# Patient Record
Sex: FEMALE | Race: WHITE | NOT HISPANIC OR LATINO | Employment: OTHER | URBAN - METROPOLITAN AREA
[De-identification: names, ages, dates, MRNs, and addresses within clinical notes are randomized per-mention and may not be internally consistent; named-entity substitution may affect disease eponyms.]

---

## 2017-01-13 ENCOUNTER — LAB VISIT (OUTPATIENT)
Dept: LAB | Facility: HOSPITAL | Age: 82
End: 2017-01-13
Attending: INTERNAL MEDICINE
Payer: MEDICARE

## 2017-01-13 DIAGNOSIS — I10 ESSENTIAL HYPERTENSION: ICD-10-CM

## 2017-01-13 LAB
ALBUMIN SERPL BCP-MCNC: 3.8 G/DL
ALP SERPL-CCNC: 79 U/L
ALT SERPL W/O P-5'-P-CCNC: 11 U/L
ANION GAP SERPL CALC-SCNC: 7 MMOL/L
AST SERPL-CCNC: 17 U/L
BASOPHILS # BLD AUTO: 0.04 K/UL
BASOPHILS NFR BLD: 0.5 %
BILIRUB SERPL-MCNC: 0.8 MG/DL
BUN SERPL-MCNC: 11 MG/DL
CALCIUM SERPL-MCNC: 9.3 MG/DL
CHLORIDE SERPL-SCNC: 100 MMOL/L
CHOLEST/HDLC SERPL: 2.8 {RATIO}
CO2 SERPL-SCNC: 29 MMOL/L
CREAT SERPL-MCNC: 0.8 MG/DL
DIFFERENTIAL METHOD: ABNORMAL
EOSINOPHIL # BLD AUTO: 0.2 K/UL
EOSINOPHIL NFR BLD: 2.2 %
ERYTHROCYTE [DISTWIDTH] IN BLOOD BY AUTOMATED COUNT: 13 %
EST. GFR  (AFRICAN AMERICAN): >60 ML/MIN/1.73 M^2
EST. GFR  (NON AFRICAN AMERICAN): >60 ML/MIN/1.73 M^2
GLUCOSE SERPL-MCNC: 99 MG/DL
HCT VFR BLD AUTO: 41.7 %
HDL/CHOLESTEROL RATIO: 35.2 %
HDLC SERPL-MCNC: 159 MG/DL
HDLC SERPL-MCNC: 56 MG/DL
HGB BLD-MCNC: 14.1 G/DL
LDLC SERPL CALC-MCNC: 82 MG/DL
LYMPHOCYTES # BLD AUTO: 1.7 K/UL
LYMPHOCYTES NFR BLD: 22.6 %
MCH RBC QN AUTO: 30.6 PG
MCHC RBC AUTO-ENTMCNC: 33.8 %
MCV RBC AUTO: 91 FL
MONOCYTES # BLD AUTO: 0.5 K/UL
MONOCYTES NFR BLD: 6.3 %
NEUTROPHILS # BLD AUTO: 5.1 K/UL
NEUTROPHILS NFR BLD: 68.4 %
NONHDLC SERPL-MCNC: 103 MG/DL
PLATELET # BLD AUTO: 266 K/UL
PMV BLD AUTO: 8.9 FL
POTASSIUM SERPL-SCNC: 4.6 MMOL/L
PROT SERPL-MCNC: 6.9 G/DL
RBC # BLD AUTO: 4.61 M/UL
SODIUM SERPL-SCNC: 136 MMOL/L
TRIGL SERPL-MCNC: 105 MG/DL
TSH SERPL DL<=0.005 MIU/L-ACNC: 1.56 UIU/ML
WBC # BLD AUTO: 7.43 K/UL

## 2017-01-13 PROCEDURE — 84443 ASSAY THYROID STIM HORMONE: CPT

## 2017-01-13 PROCEDURE — 80053 COMPREHEN METABOLIC PANEL: CPT

## 2017-01-13 PROCEDURE — 36415 COLL VENOUS BLD VENIPUNCTURE: CPT

## 2017-01-13 PROCEDURE — 80061 LIPID PANEL: CPT

## 2017-01-13 PROCEDURE — 85025 COMPLETE CBC W/AUTO DIFF WBC: CPT

## 2017-01-19 ENCOUNTER — OFFICE VISIT (OUTPATIENT)
Dept: INTERNAL MEDICINE | Facility: CLINIC | Age: 82
End: 2017-01-19
Payer: MEDICARE

## 2017-01-19 ENCOUNTER — TELEPHONE (OUTPATIENT)
Dept: INTERNAL MEDICINE | Facility: CLINIC | Age: 82
End: 2017-01-19

## 2017-01-19 VITALS
WEIGHT: 135.56 LBS | BODY MASS INDEX: 20.55 KG/M2 | HEART RATE: 80 BPM | HEIGHT: 68 IN | SYSTOLIC BLOOD PRESSURE: 138 MMHG | DIASTOLIC BLOOD PRESSURE: 80 MMHG

## 2017-01-19 DIAGNOSIS — M85.80 OSTEOPENIA: ICD-10-CM

## 2017-01-19 DIAGNOSIS — E03.4 HYPOTHYROIDISM DUE TO ACQUIRED ATROPHY OF THYROID: ICD-10-CM

## 2017-01-19 DIAGNOSIS — G31.84 MCI (MILD COGNITIVE IMPAIRMENT): ICD-10-CM

## 2017-01-19 DIAGNOSIS — R41.89 COGNITIVE DECLINE: Primary | ICD-10-CM

## 2017-01-19 DIAGNOSIS — I10 ESSENTIAL HYPERTENSION: ICD-10-CM

## 2017-01-19 DIAGNOSIS — N39.41 URGE INCONTINENCE OF URINE: Primary | ICD-10-CM

## 2017-01-19 DIAGNOSIS — E78.00 PURE HYPERCHOLESTEROLEMIA: ICD-10-CM

## 2017-01-19 DIAGNOSIS — I70.0 AORTIC ATHEROSCLEROSIS: ICD-10-CM

## 2017-01-19 PROCEDURE — 3079F DIAST BP 80-89 MM HG: CPT | Mod: S$GLB,,, | Performed by: INTERNAL MEDICINE

## 2017-01-19 PROCEDURE — 3075F SYST BP GE 130 - 139MM HG: CPT | Mod: S$GLB,,, | Performed by: INTERNAL MEDICINE

## 2017-01-19 PROCEDURE — 99214 OFFICE O/P EST MOD 30 MIN: CPT | Mod: S$GLB,,, | Performed by: INTERNAL MEDICINE

## 2017-01-19 PROCEDURE — 1159F MED LIST DOCD IN RCRD: CPT | Mod: S$GLB,,, | Performed by: INTERNAL MEDICINE

## 2017-01-19 PROCEDURE — 1157F ADVNC CARE PLAN IN RCRD: CPT | Mod: S$GLB,,, | Performed by: INTERNAL MEDICINE

## 2017-01-19 PROCEDURE — 1160F RVW MEDS BY RX/DR IN RCRD: CPT | Mod: S$GLB,,, | Performed by: INTERNAL MEDICINE

## 2017-01-19 PROCEDURE — 1126F AMNT PAIN NOTED NONE PRSNT: CPT | Mod: S$GLB,,, | Performed by: INTERNAL MEDICINE

## 2017-01-19 PROCEDURE — 99499 UNLISTED E&M SERVICE: CPT | Mod: S$GLB,,, | Performed by: INTERNAL MEDICINE

## 2017-01-19 PROCEDURE — 99999 PR PBB SHADOW E&M-EST. PATIENT-LVL IV: CPT | Mod: PBBFAC,,, | Performed by: INTERNAL MEDICINE

## 2017-01-19 NOTE — PROGRESS NOTES
Subjective:       Patient ID: Estefania Mccollum is a 82 y.o. female.    Chief Complaint: Follow-up (memory )    HPI   Patient is here for 6 month follow up  Patient is here for follow up of cognitive decline. She is on Namenda 5 mg and would like follow up at the main [place]. She liked Dr. Patel very much but is uncomfortable going to the [facility].    She has a long history of hypertension, hypothyroidism, and hyperlipidemia with good control on her current medications.    She was noted to have aortic atherosclerosis and is on asa and statin.  Review of Systems   Constitutional: Negative for chills, fever and unexpected weight change.   HENT: Negative for trouble swallowing.    Respiratory: Negative for cough, shortness of breath and wheezing.    Cardiovascular: Negative for chest pain and palpitations.   Gastrointestinal: Negative for abdominal distention, abdominal pain, blood in stool and vomiting.   Musculoskeletal: Negative for back pain.       Objective:      Physical Exam   Constitutional: She is oriented to person, place, and time. She appears well-developed and well-nourished. No distress.   Neck: Carotid bruit is not present. No thyromegaly present.   Cardiovascular: Normal rate, regular rhythm and normal heart sounds.  PMI is not displaced.    Pulmonary/Chest: Effort normal and breath sounds normal. No respiratory distress.   Abdominal: Soft. Bowel sounds are normal. She exhibits no distension. There is no tenderness.   Musculoskeletal: She exhibits no edema.   Neurological: She is alert and oriented to person, place, and time.       Assessment:       1. Cognitive decline    2. Aortic atherosclerosis    3. MCI (mild cognitive impairment)    4. Essential hypertension    5. Hypothyroidism due to acquired atrophy of thyroid    6. Pure hypercholesterolemia    7. Osteopenia        Plan:       Estefania was seen today for follow-up.    Diagnoses and all orders for this visit:    Cognitive decline; patient is  on namenda 5 mg and does seem to be helping, she has had a recent knee surgery and does not seem to be negatively effected by the surgery mentally.  -     Ambulatory consult to Neurology    Aortic atherosclerosis:    MCI (mild cognitive impairment)    Essential hypertension    Hypothyroidism due to acquired atrophy of thyroid    Pure hypercholesterolemia    Osteopenia      Return in about 8 months (around 9/15/2017) for Annual exam.    New Prescriptions    No medications on file       Modified Medications    No medications on file       Orders Placed This Encounter   Procedures    Ambulatory consult to Neurology     Referral Priority:   Routine     Referral Type:   Consultation     Referral Reason:   Specialty Services Required     Requested Specialty:   Neurology     Number of Visits Requested:   1       Labs, studies and consults associated with this visit were reviewed

## 2017-01-19 NOTE — MR AVS SNAPSHOT
[facility] - Internal Medicine  [address]  [facility] [address]  Phone: [phone]  Fax: [phone]                  Estefania Mccollum   2017 11:00 AM   Office Visit    Description:  Female : 10/31/1934   Provider:  Danna Levine MD   Department:  [facility]           Reason for Visit     Follow-up           Diagnoses this Visit        Comments    Cognitive decline    -  Primary     Aortic atherosclerosis         MCI (mild cognitive impairment)         Essential hypertension         Hypothyroidism due to acquired atrophy of thyroid         Pure hypercholesterolemia         Osteopenia                To Do List           Future Appointments        Provider Department Dept Phone    2017 2:00 PM Thien Guy MD [facility] [phone]    2017 12:00 PM [facility] [phone]      Goals (5 Years of Data)     None      Follow-Up and Disposition     Return in about 8 months (around 9/15/2017) for Annual exam.      [facility]     [facility] Nurse Care Line - 24/7 Assistance  Registered nurses in the [facility] provide clinical advisement, health education, appointment booking, and other advisory services.  Call for this free service at [phone].             Medications           Message regarding Medications     Verify the changes and/or additions to your medication regime listed below are the same as discussed with your clinician today.  If any of these changes or additions are incorrect, please notify your healthcare provider.             Verify that the below list of medications is an accurate representation of the medications you are currently taking.  If none reported, the list may be blank. If incorrect, please contact your healthcare provider. Carry this list with you in case of emergency.           Current Medications     aspirin 325 MG tablet Take 1 tablet (325 mg total) by  "mouth 2 (two) times daily.    calcium carbonate (OS-RAMONE) 600 mg (1,500 mg) Tab Take 600 mg by mouth 2 (two) times daily with meals.    fish oil-omega-3 fatty acids 300-1,000 mg capsule Take 2 g by mouth once daily.    levothyroxine (SYNTHROID) 100 MCG tablet Take 1 tablet (100 mcg total) by mouth once daily.    lisinopril (PRINIVIL,ZESTRIL) 20 MG tablet Take 1 tablet (20 mg total) by mouth once daily. Cancel lisinopril hctz    memantine (NAMENDA) 5 MG Tab Take 1 tablet (5 mg total) by mouth every morning.    mometasone 0.1% (ELOCON) 0.1 % cream Apply topically once daily. For itching of outer ears.    multivitamin capsule Take 1 capsule by mouth once daily.    naproxen sodium (ANAPROX) 220 MG tablet Take 220 mg by mouth 3 (three) times daily with meals.    omeprazole (PRILOSEC) 40 MG capsule Take 1 capsule (40 mg total) by mouth once daily. Cancel 20 mg    ondansetron (ZOFRAN-ODT) 4 MG TbDL Take 1 tablet (4 mg total) by mouth every 8 (eight) hours as needed.    oxybutynin (DITROPAN) 5 MG Tab Take 1 tablet (5 mg total) by mouth 2 (two) times daily.    oxycodone-acetaminophen (PERCOCET)  mg per tablet Take 1 tablet by mouth every 4 (four) hours as needed for Pain.    pravastatin (PRAVACHOL) 80 MG tablet Take 1 tablet (80 mg total) by mouth once daily.           Clinical Reference Information           Vital Signs - Last Recorded  Most recent update: 1/19/2017 11:02 AM by Earnest Núñez MA    BP Pulse Ht Wt BMI    138/80 80 5' 8" (1.727 m) 61.5 kg (135 lb 9.3 oz) 20.62 kg/m2      Blood Pressure          Most Recent Value    BP  138/80      Allergies as of 1/19/2017     Tetracycline      Immunizations Administered on Date of Encounter - 1/19/2017     None      Orders Placed During Today's Visit      Normal Orders This Visit    Ambulatory consult to Neurology       "

## 2017-01-19 NOTE — TELEPHONE ENCOUNTER
I cannot see that she has been prescribed this medication. If she would like to see Urology to see if it is needed I put a urology referral in. GML

## 2017-01-19 NOTE — TELEPHONE ENCOUNTER
Pt states that she forgot to ask you for a prescription. Myrbetriq ER 25 mg tablets. Pt would like to have rx sent to [facility].

## 2017-01-23 ENCOUNTER — HOSPITAL ENCOUNTER (OUTPATIENT)
Dept: RADIOLOGY | Facility: HOSPITAL | Age: 82
Discharge: HOME | End: 2017-01-23
Attending: ORTHOPAEDIC SURGERY
Payer: MEDICARE

## 2017-01-23 ENCOUNTER — OFFICE VISIT (OUTPATIENT)
Dept: NEUROLOGY | Facility: CLINIC | Age: 82
End: 2017-01-23
Payer: MEDICARE

## 2017-01-23 ENCOUNTER — OFFICE VISIT (OUTPATIENT)
Dept: ORTHOPEDICS | Facility: CLINIC | Age: 82
End: 2017-01-23
Payer: MEDICARE

## 2017-01-23 VITALS
HEIGHT: 68 IN | SYSTOLIC BLOOD PRESSURE: 142 MMHG | WEIGHT: 138 LBS | DIASTOLIC BLOOD PRESSURE: 84 MMHG | BODY MASS INDEX: 20.92 KG/M2 | HEART RATE: 93 BPM

## 2017-01-23 VITALS
BODY MASS INDEX: 20.95 KG/M2 | WEIGHT: 138.25 LBS | SYSTOLIC BLOOD PRESSURE: 138 MMHG | DIASTOLIC BLOOD PRESSURE: 72 MMHG | HEART RATE: 77 BPM | HEIGHT: 68 IN

## 2017-01-23 DIAGNOSIS — M25.562 LEFT KNEE PAIN, UNSPECIFIED CHRONICITY: ICD-10-CM

## 2017-01-23 DIAGNOSIS — R41.3 MEMORY LOSS: Primary | ICD-10-CM

## 2017-01-23 DIAGNOSIS — N39.498 OTHER URINARY INCONTINENCE: ICD-10-CM

## 2017-01-23 DIAGNOSIS — M25.562 LEFT KNEE PAIN, UNSPECIFIED CHRONICITY: Primary | ICD-10-CM

## 2017-01-23 PROCEDURE — 1126F AMNT PAIN NOTED NONE PRSNT: CPT | Mod: S$GLB,,, | Performed by: PSYCHIATRY & NEUROLOGY

## 2017-01-23 PROCEDURE — 99024 POSTOP FOLLOW-UP VISIT: CPT | Mod: S$GLB,,, | Performed by: ORTHOPAEDIC SURGERY

## 2017-01-23 PROCEDURE — 3075F SYST BP GE 130 - 139MM HG: CPT | Mod: S$GLB,,, | Performed by: PSYCHIATRY & NEUROLOGY

## 2017-01-23 PROCEDURE — 99213 OFFICE O/P EST LOW 20 MIN: CPT | Mod: S$GLB,,, | Performed by: PSYCHIATRY & NEUROLOGY

## 2017-01-23 PROCEDURE — 99999 PR PBB SHADOW E&M-EST. PATIENT-LVL III: CPT | Mod: PBBFAC,,, | Performed by: ORTHOPAEDIC SURGERY

## 2017-01-23 PROCEDURE — 1160F RVW MEDS BY RX/DR IN RCRD: CPT | Mod: S$GLB,,, | Performed by: PSYCHIATRY & NEUROLOGY

## 2017-01-23 PROCEDURE — 99999 PR PBB SHADOW E&M-EST. PATIENT-LVL III: CPT | Mod: PBBFAC,,, | Performed by: PSYCHIATRY & NEUROLOGY

## 2017-01-23 PROCEDURE — 1157F ADVNC CARE PLAN IN RCRD: CPT | Mod: S$GLB,,, | Performed by: PSYCHIATRY & NEUROLOGY

## 2017-01-23 PROCEDURE — 1159F MED LIST DOCD IN RCRD: CPT | Mod: S$GLB,,, | Performed by: PSYCHIATRY & NEUROLOGY

## 2017-01-23 PROCEDURE — 3078F DIAST BP <80 MM HG: CPT | Mod: S$GLB,,, | Performed by: PSYCHIATRY & NEUROLOGY

## 2017-01-23 PROCEDURE — 73560 X-RAY EXAM OF KNEE 1 OR 2: CPT | Mod: 26,LT,, | Performed by: RADIOLOGY

## 2017-01-23 RX ORDER — MEMANTINE HYDROCHLORIDE 10 MG/1
10 TABLET ORAL 2 TIMES DAILY
Qty: 60 TABLET | Refills: 11 | Status: SHIPPED | OUTPATIENT
Start: 2017-01-23 | End: 2017-08-15 | Stop reason: SDUPTHER

## 2017-01-23 NOTE — LETTER
January 23, 2017      Danna Levine MD  [address]  [facility] [address]           [facility] Neurology  [address]  [facility] [address]  Phone: [phone]  Fax: [phone]          Patient: Estefania Mccollum   MR Number: [id number]   YOB: 1934   Date of Visit: 1/23/2017       Dear Dr. Danna Levine:    Thank you for referring Estefania Mccollum to me for evaluation. Attached you will find relevant portions of my assessment and plan of care.    If you have questions, please do not hesitate to call me. I look forward to following Estefania Mccollum along with you.    Sincerely,    Dax Lopez MD    Enclosure  CC:  No Recipients    If you would like to receive this communication electronically, please contact [email or web address] or [phone] to request more information on [email or web address] Link access.    For providers and/or their staff who would like to refer a patient to [facility], please contact us through our one-stop-shop provider referral line, [facility], at [phone].    If you feel you have received this communication in error or would no longer like to receive these types of communications, please e-mail [email or web address]

## 2017-01-23 NOTE — MR AVS SNAPSHOT
[facility]  [address]  [facility] [address]  Phone: [phone]  Fax: [phone]                  Estefania Mccollum   2017 3:00 PM   Office Visit    Description:  Female : 10/31/1934   Provider:  Dxa Lopez MD   Department:  [facility]           Reason for Visit     Follow-up           Diagnoses this Visit        Comments    Memory loss    -  Primary     Other urinary incontinence                To Do List           Future Appointments        Provider Department Dept Phone    2017 12:00 PM [facility] [phone]    2017 3:00 PM Thien Guy MD [facility] - Orthopedics [phone]      Goals (5 Years of Data)     None       These Medications        Disp Refills Start End    memantine (NAMENDA) 10 MG Tab 60 tablet 11 2017    Take 1 tablet (10 mg total) by mouth 2 (two) times daily. - Oral    Pharmacy: [facility] [place] [id number] [facility] Ph #: [phone]         [facility]     [facility] Nurse Care Line -  Assistance  Registered nurses in the [facility] provide clinical advisement, health education, appointment booking, and other advisory services.  Call for this free service at [phone].             Medications           Message regarding Medications     Verify the changes and/or additions to your medication regime listed below are the same as discussed with your clinician today.  If any of these changes or additions are incorrect, please notify your healthcare provider.        START taking these NEW medications        Refills    memantine (NAMENDA) 10 MG Tab 11    Sig: Take 1 tablet (10 mg total) by mouth 2 (two) times daily.    Class: Normal    Route: Oral           Verify that the below list of medications is an accurate representation of the medications you are currently taking.  If none reported, the list may be blank. If  "incorrect, please contact your healthcare provider. Carry this list with you in case of emergency.           Current Medications     calcium carbonate (OS-RAMONE) 600 mg (1,500 mg) Tab Take 600 mg by mouth 2 (two) times daily with meals.    fish oil-omega-3 fatty acids 300-1,000 mg capsule Take 2 g by mouth once daily.    levothyroxine (SYNTHROID) 100 MCG tablet Take 1 tablet (100 mcg total) by mouth once daily.    lisinopril (PRINIVIL,ZESTRIL) 20 MG tablet Take 1 tablet (20 mg total) by mouth once daily. Cancel lisinopril hctz    multivitamin capsule Take 1 capsule by mouth once daily.    naproxen sodium (ANAPROX) 220 MG tablet Take 220 mg by mouth 3 (three) times daily with meals.    omeprazole (PRILOSEC) 40 MG capsule Take 1 capsule (40 mg total) by mouth once daily. Cancel 20 mg    oxybutynin (DITROPAN) 5 MG Tab Take 1 tablet (5 mg total) by mouth 2 (two) times daily.    oxycodone-acetaminophen (PERCOCET)  mg per tablet Take 1 tablet by mouth every 4 (four) hours as needed for Pain.    pravastatin (PRAVACHOL) 80 MG tablet Take 1 tablet (80 mg total) by mouth once daily.    aspirin 325 MG tablet Take 1 tablet (325 mg total) by mouth 2 (two) times daily.    memantine (NAMENDA) 10 MG Tab Take 1 tablet (10 mg total) by mouth 2 (two) times daily.    mometasone 0.1% (ELOCON) 0.1 % cream Apply topically once daily. For itching of outer ears.    ondansetron (ZOFRAN-ODT) 4 MG TbDL Take 1 tablet (4 mg total) by mouth every 8 (eight) hours as needed.           Clinical Reference Information           Vital Signs - Last Recorded  Most recent update: 1/23/2017  3:41 PM by Alysia Olivera MA    BP Pulse Ht Wt BMI    138/72 77 5' 8" (1.727 m) 62.7 kg (138 lb 3.7 oz) 21.02 kg/m2      Blood Pressure          Most Recent Value    BP  138/72      Allergies as of 1/23/2017     Tetracycline      Immunizations Administered on Date of Encounter - 1/23/2017     None      Orders Placed During Today's Visit      Normal Orders This " Visit    Ambulatory consult to Urogynecology       Instructions    Take namenda 10mg in the morning for 1 week then 10mg in the morning and 5 in the evening for 1 week then 10mg twice daily

## 2017-01-23 NOTE — MR AVS SNAPSHOT
[facility]oped[facility]  [address]  [facility] [address]  Phone: [phone]                  Estefania Mccollum   2017 2:00 PM   Office Visit    Description:  Female : 10/31/1934   Provider:  Thien Guy MD   Department:  [facility]           Reason for Visit     Post-op Evaluation           Diagnoses this Visit        Comments    Left knee pain, unspecified chronicity    -  Primary            To Do List           Future Appointments        Provider Department Dept Phone    2017 12:00 PM [facility] [phone]    2017 3:00 PM Thien Guy MD [facility] [phone]      Goals (5 Years of Data)     None      [facility]     [facility] Nurse Care Line -  Assistance  Registered nurses in the [facility] provide clinical advisement, health education, appointment booking, and other advisory services.  Call for this free service at [phone].             Medications           Message regarding Medications     Verify the changes and/or additions to your medication regime listed below are the same as discussed with your clinician today.  If any of these changes or additions are incorrect, please notify your healthcare provider.             Verify that the below list of medications is an accurate representation of the medications you are currently taking.  If none reported, the list may be blank. If incorrect, please contact your healthcare provider. Carry this list with you in case of emergency.           Current Medications     calcium carbonate (OS-RAMONE) 600 mg (1,500 mg) Tab Take 600 mg by mouth 2 (two) times daily with meals.    fish oil-omega-3 fatty acids 300-1,000 mg capsule Take 2 g by mouth once daily.    levothyroxine (SYNTHROID) 100 MCG tablet Take 1 tablet (100 mcg total) by mouth once daily.    lisinopril (PRINIVIL,ZESTRIL) 20 MG tablet Take 1 tablet (20 mg total) by mouth  "once daily. Cancel lisinopril hctz    memantine (NAMENDA) 5 MG Tab Take 1 tablet (5 mg total) by mouth every morning.    multivitamin capsule Take 1 capsule by mouth once daily.    naproxen sodium (ANAPROX) 220 MG tablet Take 220 mg by mouth 3 (three) times daily with meals.    omeprazole (PRILOSEC) 40 MG capsule Take 1 capsule (40 mg total) by mouth once daily. Cancel 20 mg    oxybutynin (DITROPAN) 5 MG Tab Take 1 tablet (5 mg total) by mouth 2 (two) times daily.    oxycodone-acetaminophen (PERCOCET)  mg per tablet Take 1 tablet by mouth every 4 (four) hours as needed for Pain.    pravastatin (PRAVACHOL) 80 MG tablet Take 1 tablet (80 mg total) by mouth once daily.    aspirin 325 MG tablet Take 1 tablet (325 mg total) by mouth 2 (two) times daily.    mometasone 0.1% (ELOCON) 0.1 % cream Apply topically once daily. For itching of outer ears.    ondansetron (ZOFRAN-ODT) 4 MG TbDL Take 1 tablet (4 mg total) by mouth every 8 (eight) hours as needed.           Clinical Reference Information           Vital Signs - Last Recorded  Most recent update: 1/23/2017  1:59 PM by Fay Bernard MA    BP Pulse Ht Wt BMI    (!) 142/84 (BP Location: Right arm, Patient Position: Sitting, BP Method: Automatic) 93 5' 8" (1.727 m) 62.6 kg (138 lb 0.1 oz) 20.98 kg/m2      Blood Pressure          Most Recent Value    BP  (!)  142/84      Allergies as of 1/23/2017     Tetracycline      Immunizations Administered on Date of Encounter - 1/23/2017     None      Orders Placed During Today's Visit     Future Labs/Procedures Expected by Expires    X-Ray Knee 1 or 2 View Left  1/23/2017 1/23/2018      "

## 2017-01-23 NOTE — PROGRESS NOTES
[facility] - NEUROLOGY  [facility]    Date: 2017   Patient Name: Estefania Mccollum   MRN: [id number]   PCP: Danna Levine  Referring Provider: Danna Levine MD    Assessment:      This is Estefania Mccollum, 82 y.o. female with moderate Alzheimer v vascular type dementia here for follow up.  CTH images reviewed and consistent with diagnosis, currently independent in ADL's.    Plan:      Increase namenda to 10mg bid as directed  Referral to Urogyn for frequency and incontinence.  Patient should not drive  Return in 6 months       I discussed side effects of the medications. I asked the patient to  stop the medication if She notices serious adverse effects as we discussed and to seek immediate medical attention at an ER.     Dax Lopez MD  [facility]   Department of Neurology    Subjective:   Initially seen 10/2016 by Dr. Patel for memory loss.  Presents with , generally unchanged since that time, compliant with namenda    PAST MEDICAL HISTORY:  Past Medical History   Diagnosis Date    Adenomatous polyp     Allergy      sinus    Annual physical exam 2011    Arthritis      osteoarthritis    Cataract     History of bone density study 2009    History of colonoscopy 10/30/2008    History of mammogram 2011    Hyperlipidemia     Hypertension     Macular degeneration     Postmenopausal hormone replacement therapy      therapy stopped    Thyroid disease      hypothyroidism    Vertigo        PAST SURGICAL HISTORY:  Past Surgical History   Procedure Laterality Date    Hysterectomy       total    Cholecystectomy      Cataract extraction, bilateral      Appendectomy       12 years old    Tonsillectomy      Cataract extraction       Both eyes    Yag        Left Eye     section       x 3    Colonoscopy  10/02/2013       CURRENT MEDS:  Current Outpatient Prescriptions   Medication Sig Dispense Refill    calcium  carbonate (OS-RAMONE) 600 mg (1,500 mg) Tab Take 600 mg by mouth 2 (two) times daily with meals.      fish oil-omega-3 fatty acids 300-1,000 mg capsule Take 2 g by mouth once daily.      levothyroxine (SYNTHROID) 100 MCG tablet Take 1 tablet (100 mcg total) by mouth once daily. 90 tablet 3    lisinopril (PRINIVIL,ZESTRIL) 20 MG tablet Take 1 tablet (20 mg total) by mouth once daily. Cancel lisinopril hctz 90 tablet 3    multivitamin capsule Take 1 capsule by mouth once daily.      naproxen sodium (ANAPROX) 220 MG tablet Take 220 mg by mouth 3 (three) times daily with meals.      omeprazole (PRILOSEC) 40 MG capsule Take 1 capsule (40 mg total) by mouth once daily. Cancel 20 mg 90 capsule 4    ondansetron (ZOFRAN-ODT) 4 MG TbDL Take 1 tablet (4 mg total) by mouth every 8 (eight) hours as needed. 30 tablet 0    oxybutynin (DITROPAN) 5 MG Tab Take 1 tablet (5 mg total) by mouth 2 (two) times daily. 180 tablet 4    pravastatin (PRAVACHOL) 80 MG tablet Take 1 tablet (80 mg total) by mouth once daily. 90 tablet 4    aspirin 325 MG tablet Take 1 tablet (325 mg total) by mouth 2 (two) times daily. 56 tablet 0    memantine (NAMENDA) 10 MG Tab Take 1 tablet (10 mg total) by mouth 2 (two) times daily. 60 tablet 11    mometasone 0.1% (ELOCON) 0.1 % cream Apply topically once daily. For itching of outer ears. 45 g 1    oxycodone-acetaminophen (PERCOCET)  mg per tablet Take 1 tablet by mouth every 4 (four) hours as needed for Pain. 60 tablet 0     No current facility-administered medications for this visit.        ALLERGIES:  Review of patient's allergies indicates:   Allergen Reactions    Tetracycline Other (See Comments)     Other reaction(s): Rash.. Pt states no drug allergie       FAMILY HISTORY:  Family History   Problem Relation Age of Onset    Heart failure Mother     Hypertension Mother     Hyperlipidemia Mother     Heart failure Father     Hypertension Father     Hyperlipidemia Father     Asthma  "Father     Hypertension Sister     Diabetes Sister      Prediabetes    Heart attack Brother     No Known Problems Daughter     Heart disease Brother      Has had open heart surgery    No Known Problems Brother     Mental retardation Daughter     No Known Problems Daughter     Coronary artery disease Neg Hx     Amblyopia Neg Hx     Blindness Neg Hx     Cataracts Neg Hx     Glaucoma Neg Hx     Macular degeneration Neg Hx     Retinal detachment Neg Hx     Strabismus Neg Hx     Melanoma Neg Hx     Psoriasis Neg Hx     Lupus Neg Hx     Eczema Neg Hx     Acne Neg Hx        SOCIAL HISTORY:  Social History   Substance Use Topics    Smoking status: Never Smoker    Smokeless tobacco: Never Used    Alcohol use Yes      Comment: wine occasionally       Review of Systems:  12 review of systems is negative except for the symptoms mentioned in HPI.        Objective:     Vitals:    01/23/17 1540   BP: 138/72   Pulse: 77   Weight: 62.7 kg (138 lb 3.7 oz)   Height: 5' 8" (1.727 m)       General: NAD, well nourished   Eyes: no tearing, discharge, no erythema   ENT: moist mucous membranes of the oral cavity, nares patent    Neck: Supple, full range of motion  Cardiovascular: Warm and well perfused, pulses equal and symmetrical  Lungs: Normal work of breathing, normal chest wall excursions  Skin: No rash, lesions, or breakdown on exposed skin  Psychiatry: Mood and affect are appropriate   Abdomen: soft, non tender, non distended  Extremeties: No cyanosis, clubbing or edema.    Neurological   MENTAL STATUS: MOCA 13/30 (-4 trails, cube, numbers, hands; -1 naming, -1 digits, -3 serial seven, -1 sentence, -1 fluency, -1 abstraction, -4 recall, -1 date)  CRANIAL NERVES: Visual fields intact. PERRL. EOMI. Facial sensation intact. Face symmetrical. Hearing imparied. Full shoulder shrug bilaterally. Tongue protrudes midline   SENSORY: Sensation is intact to light touch throughout.  Joint position perception intact. " Negative Romberg.   MOTOR: Normal bulk and tone. No pronator drift.  5/5 deltoid, biceps, triceps, interosseous, hand  bilaterally. 5/5 iliopsoas, knee extension/flexion, foot dorsi/plantarflexion bilaterally.    CEREBELLAR/COORDINATION/GAIT: Gait steady with normal arm swing and stride length.

## 2017-01-23 NOTE — PATIENT INSTRUCTIONS
Take namenda 10mg in the morning for 1 week then 10mg in the morning and 5 in the evening for 1 week then 10mg twice daily

## 2017-01-24 NOTE — PROGRESS NOTES
"Estefania Mccollum presents for second post-operative visit following a left knee makoplasty performed by Dr. Guy on 12/6/2016. Tolerating pain medication well.      Exam:   Visit Vitals    BP (!) 142/84 (BP Location: Right arm, Patient Position: Sitting, BP Method: Automatic)    Pulse 93    Ht 5' 8" (1.727 m)    Wt 62.6 kg (138 lb 0.1 oz)    BMI 20.98 kg/m2     Ambulating well with assistive device.  Incision is clean and dry without drainage or erythema. ROM:0-120    Initial post-operative radiographs reviewed today revealing a well fixed and aligned prosthesis.    A/P:  6 weeks s/p left total knee replacement  Weight-bear as tolerated continue trying extension.  Follow-up in 6 weeks.    "

## 2017-02-01 ENCOUNTER — TELEPHONE (OUTPATIENT)
Dept: NEUROLOGY | Facility: CLINIC | Age: 82
End: 2017-02-01

## 2017-02-01 NOTE — TELEPHONE ENCOUNTER
----- Message from Janice Lujan MA sent at 1/27/2017  3:27 PM CST -----  Contact: Self       ----- Message -----     From: Ana M Ogden     Sent: 1/27/2017   3:13 PM       To: Jag PADILLA Staff    Pt called to speak with someone in regards to he rx dosage. Pt stated she was told by the doctor to take one pill a day however the bottle states to take two pills daily.     Pt is confused on the dosage and would like to speak with someone to clarify    Pt can be contacted at  [phone]

## 2017-02-14 ENCOUNTER — OFFICE VISIT (OUTPATIENT)
Dept: NEUROLOGY | Facility: CLINIC | Age: 82
End: 2017-02-14
Payer: MEDICARE

## 2017-02-14 VITALS
SYSTOLIC BLOOD PRESSURE: 144 MMHG | HEART RATE: 84 BPM | HEIGHT: 68 IN | WEIGHT: 137.38 LBS | BODY MASS INDEX: 20.82 KG/M2 | DIASTOLIC BLOOD PRESSURE: 84 MMHG | RESPIRATION RATE: 16 BRPM

## 2017-02-14 DIAGNOSIS — R35.1 NOCTURIA: ICD-10-CM

## 2017-02-14 DIAGNOSIS — R41.3 MEMORY LOSS: Primary | ICD-10-CM

## 2017-02-14 PROCEDURE — 1160F RVW MEDS BY RX/DR IN RCRD: CPT | Mod: S$GLB,,, | Performed by: PSYCHIATRY & NEUROLOGY

## 2017-02-14 PROCEDURE — 3077F SYST BP >= 140 MM HG: CPT | Mod: S$GLB,,, | Performed by: PSYCHIATRY & NEUROLOGY

## 2017-02-14 PROCEDURE — 99214 OFFICE O/P EST MOD 30 MIN: CPT | Mod: S$GLB,,, | Performed by: PSYCHIATRY & NEUROLOGY

## 2017-02-14 PROCEDURE — 1157F ADVNC CARE PLAN IN RCRD: CPT | Mod: S$GLB,,, | Performed by: PSYCHIATRY & NEUROLOGY

## 2017-02-14 PROCEDURE — 1159F MED LIST DOCD IN RCRD: CPT | Mod: S$GLB,,, | Performed by: PSYCHIATRY & NEUROLOGY

## 2017-02-14 PROCEDURE — 1126F AMNT PAIN NOTED NONE PRSNT: CPT | Mod: S$GLB,,, | Performed by: PSYCHIATRY & NEUROLOGY

## 2017-02-14 PROCEDURE — 3079F DIAST BP 80-89 MM HG: CPT | Mod: S$GLB,,, | Performed by: PSYCHIATRY & NEUROLOGY

## 2017-02-14 PROCEDURE — 99999 PR PBB SHADOW E&M-EST. PATIENT-LVL III: CPT | Mod: PBBFAC,,, | Performed by: PSYCHIATRY & NEUROLOGY

## 2017-02-14 RX ORDER — ACETAMINOPHEN 325 MG/1
325 TABLET ORAL EVERY 6 HOURS PRN
COMMUNITY

## 2017-02-14 RX ORDER — MEMANTINE HYDROCHLORIDE 5 MG/1
10 TABLET ORAL DAILY
COMMUNITY
End: 2017-08-15 | Stop reason: DRUGHIGH

## 2017-02-14 NOTE — PATIENT INSTRUCTIONS
Old Bottle (5mg): take 2 in the morning and 1 at night until that bottle runs out  New Bottle (10mg): Take as written on bottle: 1 twice daily

## 2017-02-14 NOTE — MR AVS SNAPSHOT
[facility]  [address] [address]  Phone: [phone]  Fax: [phone]                  Estefania Mccollum   2017 2:00 PM   Office Visit    Description:  Female : 10/31/1934   Provider:  Marc Preston MD   Department:  [facility]           Reason for Visit     Memory Loss                To Do List           Future Appointments        Provider Department Dept Phone    2017 3:00 PM Thien Guy MD [facility] - Orthopedics [phone]      Goals (5 Years of Data)     None      Follow-Up and Disposition     Return in about 6 months (around 2017).      [facility] On Call     [facility] On Call Nurse Care Line -  Assistance  Registered nurses in the [facility] On Call Center provide clinical advisement, health education, appointment booking, and other advisory services.  Call for this free service at [phone].             Medications           Message regarding Medications     Verify the changes and/or additions to your medication regime listed below are the same as discussed with your clinician today.  If any of these changes or additions are incorrect, please notify your healthcare provider.        STOP taking these medications     naproxen sodium (ANAPROX) 220 MG tablet Take 220 mg by mouth 3 (three) times daily with meals.    oxycodone-acetaminophen (PERCOCET)  mg per tablet Take 1 tablet by mouth every 4 (four) hours as needed for Pain.           Verify that the below list of medications is an accurate representation of the medications you are currently taking.  If none reported, the list may be blank. If incorrect, please contact your healthcare provider. Carry this list with you in case of emergency.           Current Medications     acetaminophen (TYLENOL) 325 MG tablet Take 325 mg by mouth every 6 (six) hours as needed for Pain.    aspirin 325 MG tablet Take 1 tablet (325 mg total) by mouth 2 (two) times daily.     "calcium carbonate (OS-RAMONE) 600 mg (1,500 mg) Tab Take 600 mg by mouth once daily.     fish oil-omega-3 fatty acids 300-1,000 mg capsule Take 2 g by mouth once daily.    levothyroxine (SYNTHROID) 100 MCG tablet Take 1 tablet (100 mcg total) by mouth once daily.    lisinopril (PRINIVIL,ZESTRIL) 20 MG tablet Take 1 tablet (20 mg total) by mouth once daily. Cancel lisinopril hctz    memantine (NAMENDA) 5 MG Tab Take 10 mg by mouth once daily.    mometasone 0.1% (ELOCON) 0.1 % cream Apply topically once daily. For itching of outer ears.    multivitamin capsule Take 1 capsule by mouth once daily.    omeprazole (PRILOSEC) 40 MG capsule Take 1 capsule (40 mg total) by mouth once daily. Cancel 20 mg    ondansetron (ZOFRAN-ODT) 4 MG TbDL Take 1 tablet (4 mg total) by mouth every 8 (eight) hours as needed.    oxybutynin (DITROPAN) 5 MG Tab Take 1 tablet (5 mg total) by mouth 2 (two) times daily.    pravastatin (PRAVACHOL) 80 MG tablet Take 1 tablet (80 mg total) by mouth once daily.    memantine (NAMENDA) 10 MG Tab Take 1 tablet (10 mg total) by mouth 2 (two) times daily.           Clinical Reference Information           Your Vitals Were     BP Pulse Resp Height Weight BMI    144/84 (BP Location: Left arm, Patient Position: Sitting, BP Method: Manual) 84 16 5' 8" (1.727 m) 62.3 kg (137 lb 5.6 oz) 20.88 kg/m2      Blood Pressure          Most Recent Value    BP  (!)  144/84      Allergies as of 2/14/2017     Tetracycline      Immunizations Administered on Date of Encounter - 2/14/2017     None      Instructions    Old Bottle (5mg): take 2 in the morning and 1 at night until that bottle runs out  New Bottle (10mg): Take as written on bottle: 1 twice daily        Language Assistance Services     ATTENTION: Language assistance services are available, free of charge. Please call [phone].      ATENCIÓN: Si habla william, tiene a navarro disposición servicios gratuitos de asistencia lingüística. Llame al [phone].     DANA Ý: " N?u b?n nói Ti?ng Vi?t, có các d?ch v? h? tr? ngôn ng? mi?n phí dành cho b?n. G?i s? [phone].         [facility] complies with applicable Federal civil rights laws and does not discriminate on the basis of race, color, national origin, age, disability, or sex.

## 2017-02-14 NOTE — PROGRESS NOTES
HPI:  Estefania Mccollum is a 82 y.o. female known to me for  Vertigo. Responds to PT. Some vague symptoms at times, but improved with normal MRI brain prior  Patient has not seen me since 2014  Her dizziness has been rare and she uses rest for these brief spells which are positional and are improved overall.  However, in 2016, she saw neurology (Dr Patel) for MCI. She had CT and labs then. Namenda was started and she was followed up last month with neurology  She was increased on Namenda to 10mg BID by last neurology visit but only is taking 10mg in the am only-- was not sure she wanted to increase.  The patient reads and does memory exercises.  She has nocturia despite ditropan.   She shares bill responsibilities with her   She notes a mild amount of short term memory loss near daily but functions well with daily activities without help from family   She does drive without accident or incidents.     Review of Systems   Constitutional: Negative for fever.   Eyes: Negative for double vision.   Respiratory: Negative for hemoptysis.    Cardiovascular: Negative for leg swelling.   Gastrointestinal: Negative for blood in stool.   Genitourinary: Negative for hematuria.   Musculoskeletal: Negative for falls.   Skin: Negative for rash.   Neurological: Negative for seizures and headaches.   Psychiatric/Behavioral: Positive for memory loss.       Objective:      Physical Exam      Exam:  Gen Appearance, well developed/nourished in no apparent distress  CV: 2+ distal pulses with no edema or swelling  Neuro:  MS: Awake, alert, sustains attention.Oriented times 4 except off by one day (states she and her  don't celebrate Jett's day).   Recent recall 2/3 at 3 minutes/remote memory intact, Language is full to spontaneous speech/comprehension. Fund of Knowledge is full  Some mild difficulty with current events. Clock drawing is good. Patient's high intelligence is likely compensating for more subtle memory loss--  she writes notes to herself about advice during exam  CN: Optic discs are flat with normal vasculature, PERRL, Extraoccular movements and visual fields are full. Normal facial sensation and strength, Hearing symmetric, Tongue and Palate are midline and strong. Shoulder Shrug symmetric and strong.  Motor: Normal bulk, tone, no abnormal movements. 5/5 strength bilateral upper/lower extremities with 2+ reflexes and no clonus  Sensory:  Intact to temp and vibration Romberg negative  Cerebellar: Finger-nose,Rapid alternating movements intact  Gait: Normal stance, no ataxia      CT head 10/2016: 1.  No CT evidence of an acute intracranial abnormality.  2.  Atrophy and small vessel ischemic changes of the periventricular white matter    Labs: 10/2016 TSH and B12 normal  1/2017 CMP, CBC unremarkable  2  Assessment:     Estefania Mccollum is a 82 y.o. female known to me for  Vertigo. Responds to PT. Some vague symptoms at times, but improved with normal MRI brain prior  Also has mild cognitive impairment like symptoms vs Early Alzheimer's disease.     Plan:   1. Can increase Namenda to 10mg BID for MCI as prior. Discussed the further  treatment being good diet and physical and memory exercise. No restrictions/ patient is functioning well.   2. PT PRN vertigo helps. Meclizine causes her too many side effects. She has seen ENT and continues to follow with ENT about otalgia/ hearing loss/ prior infections  3. Refer to urology for nocturia despite oxybutynin use- she wants to wait for now.   RTC 6 months

## 2017-02-20 ENCOUNTER — OFFICE VISIT (OUTPATIENT)
Dept: ORTHOPEDICS | Facility: CLINIC | Age: 82
End: 2017-02-20
Payer: MEDICARE

## 2017-02-20 VITALS
SYSTOLIC BLOOD PRESSURE: 105 MMHG | TEMPERATURE: 99 F | WEIGHT: 139.69 LBS | HEART RATE: 85 BPM | DIASTOLIC BLOOD PRESSURE: 61 MMHG | BODY MASS INDEX: 21.17 KG/M2 | HEIGHT: 68 IN | RESPIRATION RATE: 18 BRPM

## 2017-02-20 DIAGNOSIS — M25.562 CHRONIC PAIN OF LEFT KNEE: Primary | ICD-10-CM

## 2017-02-20 DIAGNOSIS — G89.29 CHRONIC PAIN OF LEFT KNEE: Primary | ICD-10-CM

## 2017-02-20 PROCEDURE — 99024 POSTOP FOLLOW-UP VISIT: CPT | Mod: S$GLB,,, | Performed by: ORTHOPAEDIC SURGERY

## 2017-02-20 PROCEDURE — 99999 PR PBB SHADOW E&M-EST. PATIENT-LVL III: CPT | Mod: PBBFAC,,, | Performed by: ORTHOPAEDIC SURGERY

## 2017-02-20 NOTE — MR AVS SNAPSHOT
[facility] - Orthopedics  [address]  [facility] [address]  Phone: [phone]                  Estefania Mccollum   2017 3:00 PM   Office Visit    Description:  Female : 10/31/1934   Provider:  Thien Guy MD   Department:  [facility]           Reason for Visit     Post-op Evaluation           Diagnoses this Visit        Comments    Chronic pain of left knee    -  Primary            To Do List           Future Appointments        Provider Department Dept Phone    2017 11:30 AM Thien Guy MD [facility] - Orthopedics [phone]    8/15/2017 2:30 PM Marc Preston MD [facility] - Neurology [phone]      Goals (5 Years of Data)     None      Follow-Up and Disposition     Return in about 3 months (around 2017).    Follow-up and Disposition History      [facility]     [facility] Nurse Care Line -  Assistance  Registered nurses in the [facility] On Call Center provide clinical advisement, health education, appointment booking, and other advisory services.  Call for this free service at [phone].             Medications           Message regarding Medications     Verify the changes and/or additions to your medication regime listed below are the same as discussed with your clinician today.  If any of these changes or additions are incorrect, please notify your healthcare provider.             Verify that the below list of medications is an accurate representation of the medications you are currently taking.  If none reported, the list may be blank. If incorrect, please contact your healthcare provider. Carry this list with you in case of emergency.           Current Medications     acetaminophen (TYLENOL) 325 MG tablet Take 325 mg by mouth every 6 (six) hours as needed for Pain.    calcium carbonate (OS-RAMONE) 600 mg (1,500 mg) Tab Take 600 mg by mouth once daily.     fish oil-omega-3 fatty acids 300-1,000 mg capsule Take 2 g by mouth  "once daily.    levothyroxine (SYNTHROID) 100 MCG tablet Take 1 tablet (100 mcg total) by mouth once daily.    lisinopril (PRINIVIL,ZESTRIL) 20 MG tablet Take 1 tablet (20 mg total) by mouth once daily. Cancel lisinopril hctz    memantine (NAMENDA) 10 MG Tab Take 1 tablet (10 mg total) by mouth 2 (two) times daily.    memantine (NAMENDA) 5 MG Tab Take 10 mg by mouth once daily.    multivitamin capsule Take 1 capsule by mouth once daily.    omeprazole (PRILOSEC) 40 MG capsule Take 1 capsule (40 mg total) by mouth once daily. Cancel 20 mg    ondansetron (ZOFRAN-ODT) 4 MG TbDL Take 1 tablet (4 mg total) by mouth every 8 (eight) hours as needed.    oxybutynin (DITROPAN) 5 MG Tab Take 1 tablet (5 mg total) by mouth 2 (two) times daily.    pravastatin (PRAVACHOL) 80 MG tablet Take 1 tablet (80 mg total) by mouth once daily.    aspirin 325 MG tablet Take 1 tablet (325 mg total) by mouth 2 (two) times daily.    mometasone 0.1% (ELOCON) 0.1 % cream Apply topically once daily. For itching of outer ears.           Clinical Reference Information           Your Vitals Were     BP Pulse Temp Resp Height Weight    105/61 (BP Location: Right arm, Patient Position: Sitting, BP Method: Automatic) 85 99 °F (37.2 °C) (Oral) 18 5' 8" (1.727 m) 63.3 kg (139 lb 10.6 oz)    BMI                21.24 kg/m2          Blood Pressure          Most Recent Value    BP  105/61      Allergies as of 2/20/2017     Tetracycline      Immunizations Administered on Date of Encounter - 2/20/2017     None      Language Assistance Services     ATTENTION: Language assistance services are available, free of charge. Please call [phone].      ATENCIÓN: Si davidla william, tiene a navarro disposición servicios gratuitos de asistencia lingüística. Llame al [phone].     CHÚ Ý: N?u b?n nói Ti?ng Vi?t, có các d?ch v? h? tr? ngôn ng? mi?n phí dành cho b?n. G?i s? [phone].         [facility] complies with applicable Federal civil rights laws " and does not discriminate on the basis of race, color, national origin, age, disability, or sex.

## 2017-02-20 NOTE — PROGRESS NOTES
Estefania Mccollum presents for post-operative evaluation.  She is status-post  left knee unicompartment arthroplasty.  The wound is healing well with no signs of erythema or warmth.  There is no drainage.  No clinical signs or symptoms of infection are present.    ROM 4-130.    Post-operative radiographs were obtained today and show satisfactory position of the prosthesis.    We will continue post-operative physical therapy.    Follow-up in 12 weeks.

## 2017-04-04 ENCOUNTER — LAB VISIT (OUTPATIENT)
Dept: LAB | Facility: HOSPITAL | Age: 82
End: 2017-04-04
Attending: INTERNAL MEDICINE
Payer: MEDICARE

## 2017-04-04 DIAGNOSIS — E55.9 MILD VITAMIN D DEFICIENCY: ICD-10-CM

## 2017-04-04 DIAGNOSIS — I10 ESSENTIAL HYPERTENSION: ICD-10-CM

## 2017-04-04 DIAGNOSIS — E03.4 HYPOTHYROIDISM DUE TO ACQUIRED ATROPHY OF THYROID: ICD-10-CM

## 2017-04-04 DIAGNOSIS — R73.09 ELEVATED GLUCOSE: ICD-10-CM

## 2017-04-04 LAB
ALBUMIN SERPL BCP-MCNC: 3.8 G/DL
ALP SERPL-CCNC: 76 U/L
ALT SERPL W/O P-5'-P-CCNC: 14 U/L
ANION GAP SERPL CALC-SCNC: 10 MMOL/L
AST SERPL-CCNC: 22 U/L
BASOPHILS # BLD AUTO: 0.03 K/UL
BASOPHILS NFR BLD: 0.5 %
BILIRUB SERPL-MCNC: 1 MG/DL
BUN SERPL-MCNC: 13 MG/DL
CALCIUM SERPL-MCNC: 9.2 MG/DL
CHLORIDE SERPL-SCNC: 101 MMOL/L
CHOLEST/HDLC SERPL: 2.3 {RATIO}
CO2 SERPL-SCNC: 26 MMOL/L
CREAT SERPL-MCNC: 0.8 MG/DL
DIFFERENTIAL METHOD: NORMAL
EOSINOPHIL # BLD AUTO: 0.1 K/UL
EOSINOPHIL NFR BLD: 1.9 %
ERYTHROCYTE [DISTWIDTH] IN BLOOD BY AUTOMATED COUNT: 13.3 %
EST. GFR  (AFRICAN AMERICAN): >60 ML/MIN/1.73 M^2
EST. GFR  (NON AFRICAN AMERICAN): >60 ML/MIN/1.73 M^2
GLUCOSE SERPL-MCNC: 106 MG/DL
HCT VFR BLD AUTO: 43.3 %
HDL/CHOLESTEROL RATIO: 42.8 %
HDLC SERPL-MCNC: 152 MG/DL
HDLC SERPL-MCNC: 65 MG/DL
HGB BLD-MCNC: 14.8 G/DL
LDLC SERPL CALC-MCNC: 72.6 MG/DL
LYMPHOCYTES # BLD AUTO: 1.7 K/UL
LYMPHOCYTES NFR BLD: 26.4 %
MCH RBC QN AUTO: 30.5 PG
MCHC RBC AUTO-ENTMCNC: 34.2 %
MCV RBC AUTO: 89 FL
MONOCYTES # BLD AUTO: 0.4 K/UL
MONOCYTES NFR BLD: 6.4 %
NEUTROPHILS # BLD AUTO: 4 K/UL
NEUTROPHILS NFR BLD: 64.8 %
NONHDLC SERPL-MCNC: 87 MG/DL
PLATELET # BLD AUTO: 258 K/UL
PMV BLD AUTO: 9.2 FL
POTASSIUM SERPL-SCNC: 4.4 MMOL/L
PROT SERPL-MCNC: 6.6 G/DL
RBC # BLD AUTO: 4.85 M/UL
SODIUM SERPL-SCNC: 137 MMOL/L
TRIGL SERPL-MCNC: 72 MG/DL
TSH SERPL DL<=0.005 MIU/L-ACNC: 0.75 UIU/ML
WBC # BLD AUTO: 6.24 K/UL

## 2017-04-04 PROCEDURE — 84443 ASSAY THYROID STIM HORMONE: CPT

## 2017-04-04 PROCEDURE — 80053 COMPREHEN METABOLIC PANEL: CPT

## 2017-04-04 PROCEDURE — 85025 COMPLETE CBC W/AUTO DIFF WBC: CPT

## 2017-04-04 PROCEDURE — 80061 LIPID PANEL: CPT

## 2017-04-04 PROCEDURE — 82306 VITAMIN D 25 HYDROXY: CPT

## 2017-04-04 PROCEDURE — 83036 HEMOGLOBIN GLYCOSYLATED A1C: CPT

## 2017-04-04 PROCEDURE — 36415 COLL VENOUS BLD VENIPUNCTURE: CPT

## 2017-04-05 LAB
25(OH)D3+25(OH)D2 SERPL-MCNC: 37 NG/ML
ESTIMATED AVG GLUCOSE: 117 MG/DL
HBA1C MFR BLD HPLC: 5.7 %

## 2017-04-10 ENCOUNTER — OFFICE VISIT (OUTPATIENT)
Dept: INTERNAL MEDICINE | Facility: CLINIC | Age: 82
End: 2017-04-10
Payer: MEDICARE

## 2017-04-10 VITALS
BODY MASS INDEX: 21.42 KG/M2 | DIASTOLIC BLOOD PRESSURE: 78 MMHG | SYSTOLIC BLOOD PRESSURE: 120 MMHG | WEIGHT: 141.31 LBS | OXYGEN SATURATION: 99 % | HEART RATE: 87 BPM | HEIGHT: 68 IN

## 2017-04-10 DIAGNOSIS — I10 ESSENTIAL HYPERTENSION: ICD-10-CM

## 2017-04-10 DIAGNOSIS — G31.84 MCI (MILD COGNITIVE IMPAIRMENT): ICD-10-CM

## 2017-04-10 DIAGNOSIS — E03.9 ACQUIRED HYPOTHYROIDISM: ICD-10-CM

## 2017-04-10 DIAGNOSIS — E78.00 PURE HYPERCHOLESTEROLEMIA: ICD-10-CM

## 2017-04-10 DIAGNOSIS — R73.09 ELEVATED GLUCOSE: Primary | ICD-10-CM

## 2017-04-10 PROCEDURE — 3078F DIAST BP <80 MM HG: CPT | Mod: S$GLB,,, | Performed by: INTERNAL MEDICINE

## 2017-04-10 PROCEDURE — 99214 OFFICE O/P EST MOD 30 MIN: CPT | Mod: S$GLB,,, | Performed by: INTERNAL MEDICINE

## 2017-04-10 PROCEDURE — 1157F ADVNC CARE PLAN IN RCRD: CPT | Mod: S$GLB,,, | Performed by: INTERNAL MEDICINE

## 2017-04-10 PROCEDURE — 99499 UNLISTED E&M SERVICE: CPT | Mod: S$GLB,,, | Performed by: INTERNAL MEDICINE

## 2017-04-10 PROCEDURE — 99999 PR PBB SHADOW E&M-EST. PATIENT-LVL III: CPT | Mod: PBBFAC,,, | Performed by: INTERNAL MEDICINE

## 2017-04-10 PROCEDURE — 1126F AMNT PAIN NOTED NONE PRSNT: CPT | Mod: S$GLB,,, | Performed by: INTERNAL MEDICINE

## 2017-04-10 PROCEDURE — 3074F SYST BP LT 130 MM HG: CPT | Mod: S$GLB,,, | Performed by: INTERNAL MEDICINE

## 2017-04-10 PROCEDURE — 1160F RVW MEDS BY RX/DR IN RCRD: CPT | Mod: S$GLB,,, | Performed by: INTERNAL MEDICINE

## 2017-04-10 PROCEDURE — 1159F MED LIST DOCD IN RCRD: CPT | Mod: S$GLB,,, | Performed by: INTERNAL MEDICINE

## 2017-04-10 RX ORDER — LEVOTHYROXINE SODIUM 100 UG/1
100 TABLET ORAL DAILY
Qty: 90 TABLET | Refills: 3 | Status: SHIPPED | OUTPATIENT
Start: 2017-04-10 | End: 2018-04-29 | Stop reason: SDUPTHER

## 2017-04-10 RX ORDER — LISINOPRIL 20 MG/1
20 TABLET ORAL DAILY
Qty: 90 TABLET | Refills: 3 | Status: SHIPPED | OUTPATIENT
Start: 2017-04-10 | End: 2018-05-10 | Stop reason: SDUPTHER

## 2017-04-10 RX ORDER — PRAVASTATIN SODIUM 80 MG/1
80 TABLET ORAL DAILY
Qty: 90 TABLET | Refills: 3 | Status: SHIPPED | OUTPATIENT
Start: 2017-04-10 | End: 2018-05-10 | Stop reason: SDUPTHER

## 2017-04-10 RX ORDER — OXYBUTYNIN CHLORIDE 5 MG/1
TABLET ORAL
Qty: 180 TABLET | Refills: 3 | Status: SHIPPED | OUTPATIENT
Start: 2017-04-10 | End: 2018-06-04 | Stop reason: SDUPTHER

## 2017-04-10 RX ORDER — OMEPRAZOLE 40 MG/1
40 CAPSULE, DELAYED RELEASE ORAL DAILY
Qty: 90 CAPSULE | Refills: 3 | Status: SHIPPED | OUTPATIENT
Start: 2017-04-10 | End: 2017-05-22

## 2017-04-10 NOTE — MR AVS SNAPSHOT
[facility] - Internal Medicine  [address]  [facility] [address]  Phone: [phone]  Fax: [phone]                  Estefania Mccollum   4/10/2017 2:00 PM   Office Visit    Description:  Female : 10/31/1934   Provider:  Danna Levine MD   Department:  [facility] - Internal Medicine           Reason for Visit     Follow-up           Diagnoses this Visit        Comments    Elevated glucose    -  Primary     Acquired hypothyroidism         Pure hypercholesterolemia         Essential hypertension         MCI (mild cognitive impairment)                To Do List           Future Appointments        Provider Department Dept Phone    2017 11:30 AM Thien Guy MD [facility] - Orthopedics [phone]    8/15/2017 2:30 PM Marc Preston MD [facility] - Neurology [phone]      Goals (5 Years of Data)     None      Follow-Up and Disposition     Return in about 6 months (around 10/10/2017) for Annual: cbc, cmp, lipid, tsh and A1C.      [facility] On Call     [facility] On Call Nurse Care Line - 24 Assistance  Unless otherwise directed by your provider, please contact [facility], our nurse care line that is available for  assistance.     Registered nurses in the [facility] On Call Center provide: appointment scheduling, clinical advisement, health education, and other advisory services.  Call: [phone] (toll free)               Medications           Message regarding Medications     Verify the changes and/or additions to your medication regime listed below are the same as discussed with your clinician today.  If any of these changes or additions are incorrect, please notify your healthcare provider.             Verify that the below list of medications is an accurate representation of the medications you are currently taking.  If none reported, the list may be blank. If incorrect, please contact your healthcare provider. Carry this list with you in case of emergency.     "       Current Medications     acetaminophen (TYLENOL) 325 MG tablet Take 325 mg by mouth every 6 (six) hours as needed for Pain.    calcium carbonate (OS-RAMONE) 600 mg (1,500 mg) Tab Take 600 mg by mouth once daily.     fish oil-omega-3 fatty acids 300-1,000 mg capsule Take 2 g by mouth once daily.    levothyroxine (SYNTHROID) 100 MCG tablet Take 1 tablet (100 mcg total) by mouth once daily.    lisinopril (PRINIVIL,ZESTRIL) 20 MG tablet Take 1 tablet (20 mg total) by mouth once daily. Cancel lisinopril hctz    memantine (NAMENDA) 10 MG Tab Take 1 tablet (10 mg total) by mouth 2 (two) times daily.    memantine (NAMENDA) 5 MG Tab Take 10 mg by mouth once daily.    multivitamin capsule Take 1 capsule by mouth once daily.    omeprazole (PRILOSEC) 40 MG capsule Take 1 capsule (40 mg total) by mouth once daily. Cancel 20 mg    ondansetron (ZOFRAN-ODT) 4 MG TbDL Take 1 tablet (4 mg total) by mouth every 8 (eight) hours as needed.    oxybutynin (DITROPAN) 5 MG Tab Take 1 tablet (5 mg total) by mouth 2 (two) times daily.    pravastatin (PRAVACHOL) 80 MG tablet Take 1 tablet (80 mg total) by mouth once daily.    aspirin 325 MG tablet Take 1 tablet (325 mg total) by mouth 2 (two) times daily.    mometasone 0.1% (ELOCON) 0.1 % cream Apply topically once daily. For itching of outer ears.           Clinical Reference Information           Your Vitals Were     BP Pulse Height Weight SpO2 BMI    120/78 87 5' 8" (1.727 m) 64.1 kg (141 lb 5 oz) 99% 21.49 kg/m2      Blood Pressure          Most Recent Value    BP  120/78      Allergies as of 4/10/2017     Tetracycline      Immunizations Administered on Date of Encounter - 4/10/2017     None      Orders Placed During Today's Visit     Future Labs/Procedures Expected by Expires    CBC auto differential  10/7/2017 (Approximate) 12/6/2017    Comprehensive metabolic panel  10/7/2017 (Approximate) 12/6/2017    Hemoglobin A1c  10/7/2017 (Approximate) 12/6/2017    Lipid panel  10/7/2017 " (Approximate) 12/6/2017    TSH  10/7/2017 (Approximate) 12/6/2017      Language Assistance Services     ATTENTION: Language assistance services are available, free of charge. Please call [phone].      ATENCIÓN: Si habla william, tiene a navarro disposición servicios gratuitos de asistencia lingüística. Llame al [phone].     CHÚ Ý: N?u b?n nói Ti?ng Vi?t, có các d?ch v? h? tr? ngôn ng? mi?n phí dành cho b?n. G?i s? [phone].         [facility] complies with applicable Federal civil rights laws and does not discriminate on the basis of race, color, national origin, age, disability, or sex.

## 2017-04-10 NOTE — PROGRESS NOTES
Subjective:       Patient ID: Estefania Mccollum is a 82 y.o. female.    Chief Complaint: Follow-up    HPI   Problems:    1. Rechecked BP and normal    Labs:  1. Stable    1.Weight stable:      1.Exercise: reduce to one lap in one direction and one in the opposite direction    1.Leg: shows me an area that looks like a bruise    Patient has an appt with Dr. Preston :for follow up of memory issues      Review of Systems   Constitutional: Negative for chills, fever and unexpected weight change.   HENT: Negative for trouble swallowing.    Respiratory: Negative for cough, shortness of breath and wheezing.    Cardiovascular: Negative for chest pain and palpitations.   Gastrointestinal: Negative for abdominal distention, abdominal pain, blood in stool and vomiting.   Musculoskeletal: Negative for back pain.       Objective:      Physical Exam   Constitutional: She is oriented to person, place, and time. She appears well-developed and well-nourished. No distress.   Neck: Carotid bruit is not present. No thyromegaly present.   Cardiovascular: Normal rate, regular rhythm and normal heart sounds.  PMI is not displaced.    Pulmonary/Chest: Effort normal and breath sounds normal. No respiratory distress.   Abdominal: Soft. Bowel sounds are normal. She exhibits no distension. There is no tenderness.   Musculoskeletal: She exhibits no edema.   Neurological: She is alert and oriented to person, place, and time.       Assessment:       1. Elevated glucose    2. Acquired hypothyroidism    3. Pure hypercholesterolemia    4. Essential hypertension    5. MCI (mild cognitive impairment)        Plan:       Estefania was seen today for follow-up.    Diagnoses and all orders for this visit:    Elevated glucose: A1c stable  -     Hemoglobin A1c; Future    Acquired hypothyroidism: TSH within normal limits  -     TSH; Future    Pure hypercholesterolemia: Lipid profile excellent continue same medication  -     Lipid panel; Future    Essential  hypertension: Blood pressure initially elevated but then on repeat normal, continue same medication  -     CBC auto differential; Future  -     Comprehensive metabolic panel; Future    MCI (mild cognitive impairment): Followed by Dr. Preston    Other orders  -     levothyroxine (SYNTHROID) 100 MCG tablet; Take 1 tablet (100 mcg total) by mouth once daily.  -     lisinopril (PRINIVIL,ZESTRIL) 20 MG tablet; Take 1 tablet (20 mg total) by mouth once daily. Cancel lisinopril hctz  -     omeprazole (PRILOSEC) 40 MG capsule; Take 1 capsule (40 mg total) by mouth once daily.  -     oxybutynin (DITROPAN) 5 MG Tab; Take 2 tablets at night  -     pravastatin (PRAVACHOL) 80 MG tablet; Take 1 tablet (80 mg total) by mouth once daily.      Return in about 6 months (around 10/10/2017) for Annual: cbc, cmp, lipid, tsh and A1C.    New Prescriptions    No medications on file       Modified Medications    Modified Medication Previous Medication    LEVOTHYROXINE (SYNTHROID) 100 MCG TABLET levothyroxine (SYNTHROID) 100 MCG tablet       Take 1 tablet (100 mcg total) by mouth once daily.    Take 1 tablet (100 mcg total) by mouth once daily.    LISINOPRIL (PRINIVIL,ZESTRIL) 20 MG TABLET lisinopril (PRINIVIL,ZESTRIL) 20 MG tablet       Take 1 tablet (20 mg total) by mouth once daily. Cancel lisinopril hctz    Take 1 tablet (20 mg total) by mouth once daily. Cancel lisinopril hctz    OMEPRAZOLE (PRILOSEC) 40 MG CAPSULE omeprazole (PRILOSEC) 40 MG capsule       Take 1 capsule (40 mg total) by mouth once daily.    Take 1 capsule (40 mg total) by mouth once daily. Cancel 20 mg    OXYBUTYNIN (DITROPAN) 5 MG TAB oxybutynin (DITROPAN) 5 MG Tab       Take 2 tablets at night    Take 1 tablet (5 mg total) by mouth 2 (two) times daily.    PRAVASTATIN (PRAVACHOL) 80 MG TABLET pravastatin (PRAVACHOL) 80 MG tablet       Take 1 tablet (80 mg total) by mouth once daily.    Take 1 tablet (80 mg total) by mouth once daily.       Orders Placed This  Encounter   Procedures    CBC auto differential     Standing Status:   Future     Standing Expiration Date:   12/6/2017    Comprehensive metabolic panel     Standing Status:   Future     Standing Expiration Date:   12/6/2017    Lipid panel     Standing Status:   Future     Standing Expiration Date:   12/6/2017    TSH     Standing Status:   Future     Standing Expiration Date:   12/6/2017    Hemoglobin A1c     Standing Status:   Future     Standing Expiration Date:   12/6/2017       Labs, studies and consults associated with this visit were reviewed

## 2017-04-17 ENCOUNTER — TELEPHONE (OUTPATIENT)
Dept: ORTHOPEDICS | Facility: CLINIC | Age: 82
End: 2017-04-17

## 2017-04-17 NOTE — TELEPHONE ENCOUNTER
----- Message from Jesusita Mg sent at 4/17/2017  8:26 AM CDT -----  Contact: pt  patient would like to move her appointment from 5/18/17 to the week before or the day before.  Patient's # [phone]

## 2017-04-24 ENCOUNTER — TELEPHONE (OUTPATIENT)
Dept: INTERNAL MEDICINE | Facility: CLINIC | Age: 82
End: 2017-04-24

## 2017-04-24 DIAGNOSIS — H90.3 SENSORINEURAL HEARING LOSS (SNHL) OF BOTH EARS: Primary | ICD-10-CM

## 2017-04-24 NOTE — TELEPHONE ENCOUNTER
----- Message from Fletcher Enriquez sent at 4/24/2017  9:10 AM CDT -----  Contact: self [phone]  Patient stated she need a Prior Authorization for hearing aid . Please call and advise , Thanks !

## 2017-04-24 NOTE — TELEPHONE ENCOUNTER
Please advise. Pt would like to have orders for hearing aids sent to [facility]. Pt states that her insurance is not covering this and [facility] told her that they needed orders from her PCP.

## 2017-04-25 ENCOUNTER — TELEPHONE (OUTPATIENT)
Dept: OTOLARYNGOLOGY | Facility: CLINIC | Age: 82
End: 2017-04-25

## 2017-04-25 ENCOUNTER — TELEPHONE (OUTPATIENT)
Dept: INTERNAL MEDICINE | Facility: CLINIC | Age: 82
End: 2017-04-25

## 2017-04-25 NOTE — TELEPHONE ENCOUNTER
----- Message from Jeanine Katz sent at 4/25/2017  2:23 PM CDT -----  Contact: [phone]  Please call above patient about hearing aids

## 2017-04-25 NOTE — TELEPHONE ENCOUNTER
Spoke with patient informing her that I will forward the information to [facility] for Hearing aid reimbursement.She will call her after all information is handle.

## 2017-04-25 NOTE — TELEPHONE ENCOUNTER
----- Message from Ella Palma MA sent at 4/24/2017  4:13 PM CDT -----  Contact: self - [phone]   Patient stated the orders will need to be faxed to [facility] at [phone] for the hearing aids. Please call. Thanks!

## 2017-04-25 NOTE — TELEPHONE ENCOUNTER
----- Message from Rosey Cleary sent at 4/25/2017 12:13 PM CDT -----  Contact: Pt  Pt is calling to have a signed authorization form for hearing aids sent to [facility] for verification.    Signed authorization can be faxed to [phone].  Pt can be reached at [phone].    Thank you

## 2017-04-25 NOTE — TELEPHONE ENCOUNTER
Second return call informed patient Shaw with the hearing aid will call her after she see here patients

## 2017-04-25 NOTE — TELEPHONE ENCOUNTER
----- Message from Butch Wolfe sent at 4/25/2017  8:51 AM CDT -----  Contact: self/ [phone] home  Pt is calling to check the status of the hearing aids she requested.  She wants to know if the hearing aid orders have been faxed.  Please call and advise.    Thank you

## 2017-04-27 ENCOUNTER — TELEPHONE (OUTPATIENT)
Dept: INTERNAL MEDICINE | Facility: CLINIC | Age: 82
End: 2017-04-27

## 2017-04-27 NOTE — TELEPHONE ENCOUNTER
----- Message from Olimpia Ashton sent at 4/27/2017  2:32 PM CDT -----  Contact: Nela   The patient's orders for the hearing aids were not filled out correctly.  They will need the following information. They will need the specialist that provides the hearing aid, they will need the specialist's NPI, specialist's name and physical address, diagnosis and the procedure code.     Please call [employer] at [phone], reference # [id number].    Thanks!

## 2017-05-05 ENCOUNTER — TELEPHONE (OUTPATIENT)
Dept: INTERNAL MEDICINE | Facility: CLINIC | Age: 82
End: 2017-05-05

## 2017-05-05 NOTE — TELEPHONE ENCOUNTER
Left message informing Henna Dao that pt was informed that she needed to contact her ENT physician to write the correct order for the hearing aids.

## 2017-05-05 NOTE — TELEPHONE ENCOUNTER
----- Message from Adin Hancock sent at 5/5/2017  2:44 PM CDT -----  Contact: Henna Dao [phone] ext [phone]  Henna Dao was calling to check the status of a request for the pts Hearing aids she states they were not filled correctly,Please call

## 2017-05-22 ENCOUNTER — OFFICE VISIT (OUTPATIENT)
Dept: ORTHOPEDICS | Facility: CLINIC | Age: 82
End: 2017-05-22
Payer: MEDICARE

## 2017-05-22 VITALS — WEIGHT: 142.94 LBS | HEIGHT: 68 IN | BODY MASS INDEX: 21.66 KG/M2

## 2017-05-22 DIAGNOSIS — M17.0 PRIMARY OSTEOARTHRITIS OF BOTH KNEES: Primary | ICD-10-CM

## 2017-05-22 PROCEDURE — 99999 PR PBB SHADOW E&M-EST. PATIENT-LVL II: CPT | Mod: PBBFAC,,, | Performed by: ORTHOPAEDIC SURGERY

## 2017-05-22 PROCEDURE — 1160F RVW MEDS BY RX/DR IN RCRD: CPT | Mod: S$GLB,,, | Performed by: ORTHOPAEDIC SURGERY

## 2017-05-22 PROCEDURE — 1126F AMNT PAIN NOTED NONE PRSNT: CPT | Mod: S$GLB,,, | Performed by: ORTHOPAEDIC SURGERY

## 2017-05-22 PROCEDURE — 1159F MED LIST DOCD IN RCRD: CPT | Mod: S$GLB,,, | Performed by: ORTHOPAEDIC SURGERY

## 2017-05-22 PROCEDURE — 99213 OFFICE O/P EST LOW 20 MIN: CPT | Mod: S$GLB,,, | Performed by: ORTHOPAEDIC SURGERY

## 2017-05-22 NOTE — PROGRESS NOTES
CC:   S/p left medial uni  HPI:  S/p medial uni. No pain. Doing great. 0/10 pain.    PAST MEDICAL HISTORY:   Past Medical History:   Diagnosis Date    Adenomatous polyp     Allergy     sinus    Annual physical exam 2011    Arthritis     osteoarthritis    Cataract     History of bone density study 2009    History of colonoscopy 10/30/2008    History of mammogram 2011    Hyperlipidemia     Hypertension     Macular degeneration     Postmenopausal hormone replacement therapy     therapy stopped    Thyroid disease     hypothyroidism    Vertigo      PAST SURGICAL HISTORY:   Past Surgical History:   Procedure Laterality Date    APPENDECTOMY      12 years old    CATARACT EXTRACTION      Both eyes    CATARACT EXTRACTION, BILATERAL       SECTION      x 3    CHOLECYSTECTOMY      COLONOSCOPY  10/02/2013    HYSTERECTOMY      total    TONSILLECTOMY      TOTAL KNEE ARTHROPLASTY Left 2016    Yag       Left Eye     FAMILY HISTORY:   Family History   Problem Relation Age of Onset    Heart failure Mother     Hypertension Mother     Hyperlipidemia Mother     Heart failure Father     Hypertension Father     Hyperlipidemia Father     Asthma Father     Hypertension Sister     Diabetes Sister      Prediabetes    Heart attack Brother     No Known Problems Daughter     Heart disease Brother      Has had open heart surgery    No Known Problems Brother     Mental retardation Daughter     No Known Problems Daughter     Coronary artery disease Neg Hx     Amblyopia Neg Hx     Blindness Neg Hx     Cataracts Neg Hx     Glaucoma Neg Hx     Macular degeneration Neg Hx     Retinal detachment Neg Hx     Strabismus Neg Hx     Melanoma Neg Hx     Psoriasis Neg Hx     Lupus Neg Hx     Eczema Neg Hx     Acne Neg Hx      SOCIAL HISTORY:   Social History     Social History    Marital status:      Spouse name: N/A    Number of children: N/A    Years of education: N/A      Occupational History    Not on file.     Social History Main Topics    Smoking status: Never Smoker    Smokeless tobacco: Never Used    Alcohol use Yes      Comment: wine occasionally    Drug use: No    Sexual activity: Yes     Partners: Male     Other Topics Concern    Are You Pregnant Or Think You May Be? No    Breast-Feeding No     Social History Narrative    No narrative on file       MEDICATIONS:   Current Outpatient Prescriptions:     acetaminophen (TYLENOL) 325 MG tablet, Take 325 mg by mouth every 6 (six) hours as needed for Pain., Disp: , Rfl:     calcium carbonate (OS-RAMONE) 600 mg (1,500 mg) Tab, Take 600 mg by mouth once daily. , Disp: , Rfl:     fish oil-omega-3 fatty acids 300-1,000 mg capsule, Take 2 g by mouth once daily., Disp: , Rfl:     levothyroxine (SYNTHROID) 100 MCG tablet, Take 1 tablet (100 mcg total) by mouth once daily., Disp: 90 tablet, Rfl: 3    lisinopril (PRINIVIL,ZESTRIL) 20 MG tablet, Take 1 tablet (20 mg total) by mouth once daily. Cancel lisinopril hctz, Disp: 90 tablet, Rfl: 3    memantine (NAMENDA) 10 MG Tab, Take 1 tablet (10 mg total) by mouth 2 (two) times daily., Disp: 60 tablet, Rfl: 11    memantine (NAMENDA) 5 MG Tab, Take 10 mg by mouth once daily., Disp: , Rfl:     multivitamin capsule, Take 1 capsule by mouth once daily., Disp: , Rfl:     ondansetron (ZOFRAN-ODT) 4 MG TbDL, Take 1 tablet (4 mg total) by mouth every 8 (eight) hours as needed., Disp: 30 tablet, Rfl: 0    oxybutynin (DITROPAN) 5 MG Tab, Take 2 tablets at night, Disp: 180 tablet, Rfl: 3    pravastatin (PRAVACHOL) 80 MG tablet, Take 1 tablet (80 mg total) by mouth once daily., Disp: 90 tablet, Rfl: 3    aspirin 325 MG tablet, Take 1 tablet (325 mg total) by mouth 2 (two) times daily. (Patient taking differently: Take 325 mg by mouth once. ), Disp: 56 tablet, Rfl: 0    mometasone 0.1% (ELOCON) 0.1 % cream, Apply topically once daily. For itching of outer ears., Disp: 45 g, Rfl:  "1  ALLERGIES:   Review of patient's allergies indicates:   Allergen Reactions    Tetracycline Other (See Comments)     Other reaction(s): Rash.. Pt states no drug allergie       VITAL SIGNS: Ht 5' 8" (1.727 m)   Wt 64.8 kg (142 lb 15.5 oz)   BMI 21.74 kg/m²      Review of Systems   Constitution: Negative. Negative for chills, fever and night sweats.   HENT: Negative for congestion and headaches.    Eyes: Negative for blurred vision, left vision loss and right vision loss.   Cardiovascular: Negative for chest pain and syncope.   Respiratory: Negative for cough and shortness of breath.    Endocrine: Negative for polydipsia, polyphagia and polyuria.   Hematologic/Lymphatic: Negative for bleeding problem. Does not bruise/bleed easily.   Skin: Negative for dry skin, itching and rash.   Musculoskeletal: Negative for falls and muscle weakness.   Gastrointestinal: Negative for abdominal pain and bowel incontinence.   Genitourinary: Negative for bladder incontinence and nocturia.   Neurological: Negative for disturbances in coordination, loss of balance and seizures.   Psychiatric/Behavioral: Negative for depression. The patient does not have insomnia.    Allergic/Immunologic: Negative for hives and persistent infections.       Physical Exam   Constitutional: Oriented to person, place, and time. Appears well-developed and well-nourished.   HENT:   Head: Normocephalic and atraumatic.   Nose: Nose normal.   Eyes: No scleral icterus.   Neck: Normal range of motion. Neck supple.   Cardiovascular: Normal rate and regular rhythm.    Pulses:       Radial pulses are 2+ on the right side, and 2+ on the left side.   Pulmonary/Chest: Effort normal and breath sounds normal.   Abdominal: Soft.   Neurological: Alert and oriented to person, place, and time.   Skin: Skin is warm.   Psychiatric: Normal mood and affect.     Incision left knee c/d/i  nvi  5-135 degrees        Assessment:  Encounter Diagnoses   Name Primary?    Primary " osteoarthritis of both knees Yes       Plan:     wbat  F/u 6 months.     Return in about 6 months (around 11/22/2017).

## 2017-06-20 ENCOUNTER — CLINICAL SUPPORT (OUTPATIENT)
Dept: AUDIOLOGY | Facility: CLINIC | Age: 82
End: 2017-06-20

## 2017-06-20 ENCOUNTER — OFFICE VISIT (OUTPATIENT)
Dept: OTOLARYNGOLOGY | Facility: CLINIC | Age: 82
End: 2017-06-20
Payer: MEDICARE

## 2017-06-20 VITALS
BODY MASS INDEX: 21.39 KG/M2 | WEIGHT: 141.13 LBS | DIASTOLIC BLOOD PRESSURE: 86 MMHG | HEIGHT: 68 IN | SYSTOLIC BLOOD PRESSURE: 149 MMHG | HEART RATE: 82 BPM

## 2017-06-20 DIAGNOSIS — H90.3 SENSORINEURAL HEARING LOSS, BILATERAL: Primary | ICD-10-CM

## 2017-06-20 DIAGNOSIS — H61.20 IMPACTED CERUMEN, UNSPECIFIED LATERALITY: Primary | ICD-10-CM

## 2017-06-20 PROCEDURE — 69210 REMOVE IMPACTED EAR WAX UNI: CPT | Mod: S$GLB,,, | Performed by: NURSE PRACTITIONER

## 2017-06-20 PROCEDURE — 99499 UNLISTED E&M SERVICE: CPT | Mod: S$GLB,,, | Performed by: OTOLARYNGOLOGY

## 2017-06-20 PROCEDURE — 99999 PR PBB SHADOW E&M-EST. PATIENT-LVL III: CPT | Mod: PBBFAC,,, | Performed by: NURSE PRACTITIONER

## 2017-06-20 PROCEDURE — 99213 OFFICE O/P EST LOW 20 MIN: CPT | Mod: 25,S$GLB,, | Performed by: NURSE PRACTITIONER

## 2017-06-20 PROCEDURE — 1159F MED LIST DOCD IN RCRD: CPT | Mod: S$GLB,,, | Performed by: NURSE PRACTITIONER

## 2017-06-26 NOTE — PROGRESS NOTES
Subjective:       Patient ID: Estefania Humphreys is a 82 y.o. female.    Chief Complaint: Cerumen Impaction    HPI   Estefania Humphreys is a 83 y/o CF that presents for an ear cleaning. She is in nad, respirations even/unlabored, denies, cp, sob, n/v, no complaints.    Past Medical History: Patient has a past medical history of Adenomatous polyp; Allergy; Annual physical exam (2011); Arthritis; Cataract; History of bone density study (2009); History of colonoscopy (10/30/2008); History of mammogram (2011); Hyperlipidemia; Hypertension; Macular degeneration; Postmenopausal hormone replacement therapy; Thyroid disease; and Vertigo.    Past Surgical History: Patient has a past surgical history that includes Hysterectomy; Cholecystectomy; Cataract extraction, bilateral; Appendectomy; Tonsillectomy; Cataract extraction; Yag ;  section; Colonoscopy (10/02/2013); and Total knee arthroplasty (Left, 2016).    Social History: Patient reports that she has never smoked. She has never used smokeless tobacco. She reports that she drinks alcohol. She reports that she does not use drugs.    Family History: family history includes Asthma in her father; Diabetes in her sister; Heart attack in her brother; Heart disease in her brother; Heart failure in her father and mother; Hyperlipidemia in her father and mother; Hypertension in her father, mother, and sister; Mental retardation in her daughter; No Known Problems in her brother, daughter, and daughter.    Medications:   Current Outpatient Prescriptions   Medication Sig    acetaminophen (TYLENOL) 325 MG tablet Take 325 mg by mouth every 6 (six) hours as needed for Pain.    aspirin 325 MG tablet Take 1 tablet (325 mg total) by mouth 2 (two) times daily. (Patient taking differently: Take 325 mg by mouth once. )    calcium carbonate (OS-RAMONE) 600 mg (1,500 mg) Tab Take 600 mg by mouth once daily.     fish oil-omega-3 fatty acids 300-1,000 mg capsule Take 2 g by  mouth once daily.    levothyroxine (SYNTHROID) 100 MCG tablet Take 1 tablet (100 mcg total) by mouth once daily.    lisinopril (PRINIVIL,ZESTRIL) 20 MG tablet Take 1 tablet (20 mg total) by mouth once daily. Cancel lisinopril hctz    memantine (NAMENDA) 10 MG Tab Take 1 tablet (10 mg total) by mouth 2 (two) times daily.    memantine (NAMENDA) 5 MG Tab Take 10 mg by mouth once daily.    mometasone 0.1% (ELOCON) 0.1 % cream Apply topically once daily. For itching of outer ears.    multivitamin capsule Take 1 capsule by mouth once daily.    ondansetron (ZOFRAN-ODT) 4 MG TbDL Take 1 tablet (4 mg total) by mouth every 8 (eight) hours as needed.    oxybutynin (DITROPAN) 5 MG Tab Take 2 tablets at night    pravastatin (PRAVACHOL) 80 MG tablet Take 1 tablet (80 mg total) by mouth once daily.     No current facility-administered medications for this visit.        Allergies: Patient is allergic to tetracycline.    Review of Systems   Constitutional: Negative for activity change, appetite change, fatigue, fever and unexpected weight change.   HENT: Negative for congestion, dental problem, ear discharge, ear pain, facial swelling, hearing loss, nosebleeds, postnasal drip, rhinorrhea, sinus pressure, sneezing, sore throat, tinnitus, trouble swallowing and voice change.    Eyes: Negative for pain and visual disturbance.   Respiratory: Negative for cough, chest tightness, shortness of breath, wheezing and stridor.    Cardiovascular: Negative for chest pain.   Musculoskeletal: Negative for gait problem and neck pain.   Skin: Negative for color change and rash.   Allergic/Immunologic: Negative for environmental allergies.   Neurological: Negative for dizziness, seizures, syncope, facial asymmetry, speech difficulty, weakness, light-headedness, numbness and headaches.   Psychiatric/Behavioral: Negative for agitation and confusion. The patient is not nervous/anxious.        Objective:     BP (!) 149/86 (BP Location: Right  "arm, Patient Position: Sitting, BP Method: Automatic)   Pulse 82   Ht 5' 8" (1.727 m)   Wt 64 kg (141 lb 1.5 oz)   BMI 21.45 kg/m²     Physical Exam   Constitutional: She is oriented to person, place, and time. She appears well-developed and well-nourished.   HENT:   Head: Normocephalic and atraumatic. Not macrocephalic and not microcephalic. Head is without raccoon's eyes, without Ross's sign, without abrasion, without contusion, without laceration, without right periorbital erythema and without left periorbital erythema. Hair is normal.   Right Ear: Tympanic membrane, external ear and ear canal normal. No lacerations. No drainage, swelling or tenderness. No foreign bodies. No mastoid tenderness. Tympanic membrane is not injected, not scarred, not perforated, not erythematous, not retracted and not bulging. Tympanic membrane mobility is normal. No middle ear effusion. No hemotympanum. Decreased hearing is noted.   Left Ear: Tympanic membrane, external ear and ear canal normal. No lacerations. No drainage, swelling or tenderness. No foreign bodies. No mastoid tenderness. Tympanic membrane is not injected, not scarred, not perforated, not erythematous, not retracted and not bulging. Tympanic membrane mobility is normal.  No middle ear effusion. No hemotympanum. Decreased hearing is noted.   Nose: Nose normal. No mucosal edema, rhinorrhea, nose lacerations, sinus tenderness or nasal deformity.   Mouth/Throat: Uvula is midline.     Procedure Note:     AS: The patient was brought to the minor procedure room and placed under the operating microscope. Using a combination of suction, curettes and cup forceps the patient's cerumen impaction was removed. The tympanic membrane was evaluated and was unremarkable. The patient tolerated the procedure well. There were no complications.        Procedure Note:    AD: Patient was brought to the minor procedure room and using the operating microscope the right ear canal  was " cleaned of ceruminous debris. There was a significant cerumen impaction.  The forceps and suction were both used to perform this. Tympanic membrane intact. Pt tolerated well. There were no complications.     Eyes: Conjunctivae, EOM and lids are normal. Pupils are equal, round, and reactive to light.   Neck: Trachea normal and normal range of motion. Neck supple. No spinous process tenderness and no muscular tenderness present. No neck rigidity. No edema, no erythema and normal range of motion present. No thyroid mass and no thyromegaly present.   Pulmonary/Chest: Effort normal.   Abdominal: Soft.   Musculoskeletal: Normal range of motion.   Lymphadenopathy:        Head (right side): No submental, no submandibular, no tonsillar, no preauricular and no posterior auricular adenopathy present.        Head (left side): No submental, no submandibular, no tonsillar, no preauricular, no posterior auricular and no occipital adenopathy present.     She has no cervical adenopathy.   Neurological: She is alert and oriented to person, place, and time. No cranial nerve deficit or sensory deficit.   Skin: Skin is warm and dry.   Psychiatric: She has a normal mood and affect. Her behavior is normal. Judgment and thought content normal.   Nursing note and vitals reviewed.      Assessment:       1. Impacted cerumen, unspecified laterality        Plan:       1. Hearing conservation strongly recommended.  2. Trial of amplification bilaterally also recommended.  3. Re-check of hearing in 18-24 months or sooner if subjective change noted.  4. F/U with PCP as per schedule.

## 2017-07-10 ENCOUNTER — CLINICAL SUPPORT (OUTPATIENT)
Dept: AUDIOLOGY | Facility: CLINIC | Age: 82
End: 2017-07-10

## 2017-07-10 DIAGNOSIS — H90.3 SENSORINEURAL HEARING LOSS, BILATERAL: Primary | ICD-10-CM

## 2017-07-10 PROCEDURE — 99499 UNLISTED E&M SERVICE: CPT | Mod: S$GLB,,, | Performed by: OTOLARYNGOLOGY

## 2017-07-11 ENCOUNTER — TELEPHONE (OUTPATIENT)
Dept: INTERNAL MEDICINE | Facility: CLINIC | Age: 82
End: 2017-07-11

## 2017-07-11 DIAGNOSIS — Z12.31 ENCOUNTER FOR SCREENING MAMMOGRAM FOR BREAST CANCER: Primary | ICD-10-CM

## 2017-07-11 NOTE — PROGRESS NOTES
Patient picked up c shells.  Insertion, removal, care, and maintenance were reviewed.  Patient seemed to understand these instructions.  She will call to schedule further appointments.

## 2017-07-11 NOTE — TELEPHONE ENCOUNTER
----- Message from Michelle Lin sent at 7/11/2017  2:37 PM CDT -----  Contact: Self/ [phone]   Type: Orders Request    What orders/ testing are being requested? Mammo     Is there a future appointment scheduled for the patient with PCP? No     When?    Comments: pt is calling to receive a order for a Mammo. Please call and advise     Thank you

## 2017-08-15 ENCOUNTER — OFFICE VISIT (OUTPATIENT)
Dept: NEUROLOGY | Facility: CLINIC | Age: 82
End: 2017-08-15
Payer: MEDICARE

## 2017-08-15 VITALS
HEIGHT: 68 IN | DIASTOLIC BLOOD PRESSURE: 100 MMHG | RESPIRATION RATE: 16 BRPM | HEART RATE: 76 BPM | SYSTOLIC BLOOD PRESSURE: 184 MMHG | BODY MASS INDEX: 21.59 KG/M2 | WEIGHT: 142.44 LBS

## 2017-08-15 DIAGNOSIS — I10 ESSENTIAL HYPERTENSION: ICD-10-CM

## 2017-08-15 DIAGNOSIS — R41.3 MEMORY LOSS: ICD-10-CM

## 2017-08-15 DIAGNOSIS — R42 VERTIGO: Primary | ICD-10-CM

## 2017-08-15 PROCEDURE — 99499 UNLISTED E&M SERVICE: CPT | Mod: S$GLB,,, | Performed by: PSYCHIATRY & NEUROLOGY

## 2017-08-15 PROCEDURE — 99214 OFFICE O/P EST MOD 30 MIN: CPT | Mod: S$GLB,,, | Performed by: PSYCHIATRY & NEUROLOGY

## 2017-08-15 PROCEDURE — 1159F MED LIST DOCD IN RCRD: CPT | Mod: S$GLB,,, | Performed by: PSYCHIATRY & NEUROLOGY

## 2017-08-15 PROCEDURE — 3008F BODY MASS INDEX DOCD: CPT | Mod: S$GLB,,, | Performed by: PSYCHIATRY & NEUROLOGY

## 2017-08-15 PROCEDURE — 3077F SYST BP >= 140 MM HG: CPT | Mod: S$GLB,,, | Performed by: PSYCHIATRY & NEUROLOGY

## 2017-08-15 PROCEDURE — 99999 PR PBB SHADOW E&M-EST. PATIENT-LVL III: CPT | Mod: PBBFAC,,, | Performed by: PSYCHIATRY & NEUROLOGY

## 2017-08-15 PROCEDURE — 3080F DIAST BP >= 90 MM HG: CPT | Mod: S$GLB,,, | Performed by: PSYCHIATRY & NEUROLOGY

## 2017-08-15 PROCEDURE — 1126F AMNT PAIN NOTED NONE PRSNT: CPT | Mod: S$GLB,,, | Performed by: PSYCHIATRY & NEUROLOGY

## 2017-08-15 RX ORDER — MEMANTINE HYDROCHLORIDE 10 MG/1
10 TABLET ORAL 2 TIMES DAILY
Qty: 180 TABLET | Refills: 3 | Status: SHIPPED | OUTPATIENT
Start: 2017-08-15 | End: 2018-09-09 | Stop reason: SDUPTHER

## 2017-08-15 NOTE — PATIENT INSTRUCTIONS
Take your Blood Pressure a few times per week and keep a log to bring to Dr Levine at the next visit with her.  Notify Dr Levine if blood pressure remains as high or higher than today's reading.

## 2017-08-15 NOTE — PROGRESS NOTES
HPI:  Estefania Mccollum is a 82 y.o. female known to me for  Vertigo. Responds to PT. Some vague symptoms at times, but improved with normal MRI brain prior  Also has mild cognitive impairment like symptoms vs Early Alzheimer's disease.   She increased namenda to 10mg BID without side effects and feels this helps her.  She states her memory is no worse and sometimes better.   She is driving without difficulty and functioning well.  She states today's BP is an outlier  She only rarely had dizziness. She has not had to use anti-vert as prior.   She reports compliance with HTN meds/ all meds  Review of Systems   Constitutional: Negative for fever.   Eyes: Negative for double vision.   Respiratory: Negative for shortness of breath.    Cardiovascular: Negative for chest pain.   Gastrointestinal: Negative for blood in stool.   Genitourinary: Negative for hematuria.   Musculoskeletal: Negative for falls.   Skin: Negative for rash.   Neurological: Positive for tremors. Negative for headaches.   Psychiatric/Behavioral: Positive for memory loss.       Objective:      Physical Exam      Exam:  Gen Appearance, well developed/nourished in no apparent distress  CV: 2+ distal pulses with no edema or swelling  Neuro:  MS: Awake, alert, sustains attention. She is not oriented to exact date, but knows today is Tuesday.   Recent recall is fairly well intact and recalls 2/3 at 3 minutes/remote memory intact, Language is full to spontaneous speech/comprehension. Fund of Knowledge is full  She has good note keeping about her medications. She is intact with talking about meds and their purpose.   She is more intact to current events today  CN: Optic discs are flat with normal vasculature, PERRL, Extraoccular movements and visual fields are full. Normal facial sensation and strength, Hearing symmetric, Tongue and Palate are midline and strong. Shoulder Shrug symmetric and strong.  Motor: Normal bulk, tone, no abnormal movements. 5/5 strength  bilateral upper/lower extremities with 2+ reflexes and no clonus  Sensory:  Intact to temp and vibration Romberg negative  Cerebellar: Finger-nose,Rapid alternating movements intact  Gait: Normal stance, no ataxia    CT head 10/2016: 1.  No CT evidence of an acute intracranial abnormality.  2.  Atrophy and small vessel ischemic changes of the periventricular white matter    Labs: 10/2016 TSH and B12 normal  1/2017 CMP, CBC unremarkable  2  Assessment:     Estefania Mccollum is a 82 y.o. female known to me for  Vertigo. Responds to PT. Some vague symptoms at times, but improved with normal MRI brain prior  Also has mild cognitive impairment like symptoms vs Early Alzheimer's disease.     Plan:   1. Continue Namenda to 10mg BID Discussed the further  treatment being good diet and physical and memory exercise. No restrictions/ patient is functioning well.   2. PT PRN vertigo helps. Meclizine causes her too many side effects. She has seen ENT and continues to follow with ENT about otalgia/ hearing loss/ prior infections  3. Given HTN todayCheck BP at home a few times per week until following up with Dr Levine, PCP, soon. Notify MD if BP continues to be this high.   RTC 6 months

## 2017-09-07 ENCOUNTER — OFFICE VISIT (OUTPATIENT)
Dept: INTERNAL MEDICINE | Facility: CLINIC | Age: 82
End: 2017-09-07
Payer: MEDICARE

## 2017-09-07 ENCOUNTER — HOSPITAL ENCOUNTER (OUTPATIENT)
Dept: RADIOLOGY | Facility: HOSPITAL | Age: 82
Discharge: HOME | End: 2017-09-07
Attending: INTERNAL MEDICINE
Payer: MEDICARE

## 2017-09-07 VITALS
SYSTOLIC BLOOD PRESSURE: 148 MMHG | HEART RATE: 76 BPM | DIASTOLIC BLOOD PRESSURE: 65 MMHG | BODY MASS INDEX: 22.05 KG/M2 | WEIGHT: 145.5 LBS | HEIGHT: 68 IN

## 2017-09-07 VITALS — BODY MASS INDEX: 21.52 KG/M2 | HEIGHT: 68 IN | WEIGHT: 142 LBS

## 2017-09-07 DIAGNOSIS — Z12.31 ENCOUNTER FOR SCREENING MAMMOGRAM FOR BREAST CANCER: ICD-10-CM

## 2017-09-07 DIAGNOSIS — B02.9 HERPES ZOSTER WITHOUT COMPLICATION: Primary | ICD-10-CM

## 2017-09-07 PROCEDURE — 77067 SCR MAMMO BI INCL CAD: CPT | Mod: TC

## 2017-09-07 PROCEDURE — 99999 PR PBB SHADOW E&M-EST. PATIENT-LVL II: CPT | Mod: PBBFAC,,, | Performed by: INTERNAL MEDICINE

## 2017-09-07 PROCEDURE — 3008F BODY MASS INDEX DOCD: CPT | Mod: S$GLB,,, | Performed by: INTERNAL MEDICINE

## 2017-09-07 PROCEDURE — 3078F DIAST BP <80 MM HG: CPT | Mod: S$GLB,,, | Performed by: INTERNAL MEDICINE

## 2017-09-07 PROCEDURE — 1159F MED LIST DOCD IN RCRD: CPT | Mod: S$GLB,,, | Performed by: INTERNAL MEDICINE

## 2017-09-07 PROCEDURE — 77067 SCR MAMMO BI INCL CAD: CPT | Mod: 26,,, | Performed by: RADIOLOGY

## 2017-09-07 PROCEDURE — 99212 OFFICE O/P EST SF 10 MIN: CPT | Mod: S$GLB,,, | Performed by: INTERNAL MEDICINE

## 2017-09-07 PROCEDURE — 3077F SYST BP >= 140 MM HG: CPT | Mod: S$GLB,,, | Performed by: INTERNAL MEDICINE

## 2017-09-07 RX ORDER — ASPIRIN 81 MG/1
81 TABLET ORAL DAILY
COMMUNITY

## 2017-09-07 NOTE — PROGRESS NOTES
Clinic Note  9/7/2017      Subjective:       Patient ID:  Estefania is a 82 y.o. female being seen for an urgent care visit.    Chief Complaint: Rash (pooible shingles neck and shoulders)    Rash   This is a new problem. Episode onset: unclear onset, at least 2-3 d ago. The affected locations include the left shoulder. The rash is characterized by blistering and itchiness. She was exposed to nothing. Past treatments include nothing. (Had shingles vaccine)       Review of Systems   Skin: Positive for rash.       Medication List with Changes/Refills   Current Medications    ACETAMINOPHEN (TYLENOL) 325 MG TABLET    Take 325 mg by mouth every 6 (six) hours as needed for Pain.    ASPIRIN (ECOTRIN) 81 MG EC TABLET    Take 81 mg by mouth once daily.    ASPIRIN 325 MG TABLET    Take 1 tablet (325 mg total) by mouth 2 (two) times daily.    CALCIUM CARBONATE (OS-RAMONE) 600 MG (1,500 MG) TAB    Take 600 mg by mouth once daily.     FISH OIL-OMEGA-3 FATTY ACIDS 300-1,000 MG CAPSULE    Take 2 g by mouth once daily.    LEVOTHYROXINE (SYNTHROID) 100 MCG TABLET    Take 1 tablet (100 mcg total) by mouth once daily.    LISINOPRIL (PRINIVIL,ZESTRIL) 20 MG TABLET    Take 1 tablet (20 mg total) by mouth once daily. Cancel lisinopril hctz    MEMANTINE (NAMENDA) 10 MG TAB    Take 1 tablet (10 mg total) by mouth 2 (two) times daily.    MOMETASONE 0.1% (ELOCON) 0.1 % CREAM    Apply topically once daily. For itching of outer ears.    MULTIVITAMIN CAPSULE    Take 1 capsule by mouth once daily.    OXYBUTYNIN (DITROPAN) 5 MG TAB    Take 2 tablets at night    PRAVASTATIN (PRAVACHOL) 80 MG TABLET    Take 1 tablet (80 mg total) by mouth once daily.       Patient Active Problem List   Diagnosis    Hypertension    Hypothyroidism    Hyperlipidemia    Retinal drusen - Both Eyes    Hearing loss, sensorineural    History of colonic polyps    Osteopenia    MCI (mild cognitive impairment)    Memory loss    Primary osteoarthritis of left knee    Aortic  "atherosclerosis           Objective:      BP (!) 148/65   Pulse 76   Ht 5' 8" (1.727 m)   Wt 66 kg (145 lb 8.1 oz)   BMI 22.12 kg/m²   Estimated body mass index is 22.12 kg/m² as calculated from the following:    Height as of this encounter: 5' 8" (1.727 m).    Weight as of this encounter: 66 kg (145 lb 8.1 oz).  Physical Exam   Skin:              Assessment and Plan:         Problem List Items Addressed This Visit     None      Visit Diagnoses     Herpes zoster without complication    -  Primary - very mild case likely attenuated by vaccine hx.  Minimal pain. Will hold on pain meds janna given age and cognitive function.  Too far out for antiviral rx.              Follow Up:   Return if symptoms worsen or fail to improve.        Alejandro Pickett      "

## 2017-09-19 ENCOUNTER — OFFICE VISIT (OUTPATIENT)
Dept: DERMATOLOGY | Facility: CLINIC | Age: 82
End: 2017-09-19
Payer: MEDICARE

## 2017-09-19 DIAGNOSIS — L72.0 EIC (EPIDERMAL INCLUSION CYST): Primary | ICD-10-CM

## 2017-09-19 DIAGNOSIS — Z12.83 SCREENING EXAM FOR SKIN CANCER: ICD-10-CM

## 2017-09-19 DIAGNOSIS — L73.8 SEBACEOUS GLAND HYPERPLASIA: ICD-10-CM

## 2017-09-19 DIAGNOSIS — L81.4 LENTIGO: ICD-10-CM

## 2017-09-19 DIAGNOSIS — L82.1 SK (SEBORRHEIC KERATOSIS): ICD-10-CM

## 2017-09-19 PROCEDURE — 99999 PR PBB SHADOW E&M-EST. PATIENT-LVL II: CPT | Mod: PBBFAC,,, | Performed by: DERMATOLOGY

## 2017-09-19 PROCEDURE — 99214 OFFICE O/P EST MOD 30 MIN: CPT | Mod: S$GLB,,, | Performed by: DERMATOLOGY

## 2017-09-19 PROCEDURE — 3008F BODY MASS INDEX DOCD: CPT | Mod: S$GLB,,, | Performed by: DERMATOLOGY

## 2017-09-19 PROCEDURE — 1159F MED LIST DOCD IN RCRD: CPT | Mod: S$GLB,,, | Performed by: DERMATOLOGY

## 2017-09-19 PROCEDURE — 1126F AMNT PAIN NOTED NONE PRSNT: CPT | Mod: S$GLB,,, | Performed by: DERMATOLOGY

## 2017-09-19 NOTE — PROGRESS NOTES
Subjective:       Patient ID:  Estefania Mccollum is a 82 y.o. female who presents for   Chief Complaint   Patient presents with    Skin Check     TBSE    Lesion     left buttocks     Here for TBSE. Last seen 8/23/16 for exam. No h/o nmsc    Dx with shingles 2 - 3 weeks ago. Slightly sore and itchy - improving        Review of Systems   Skin: Positive for daily sunscreen use and activity-related sunscreen use. Negative for itching, rash and recent sunburn.   Hematologic/Lymphatic: Does not bruise/bleed easily.        Objective:    Physical Exam   Constitutional: She appears well-developed and well-nourished. No distress.   Neurological: She is alert and oriented to person, place, and time. She is not disoriented.   Psychiatric: She has a normal mood and affect.   Skin:   Areas Examined (abnormalities noted in diagram):   Scalp / Hair Palpated and Inspected  Head / Face Inspection Performed  Neck Inspection Performed  Chest / Axilla Inspection Performed  Abdomen Inspection Performed  Genitals / Buttocks / Groin Inspection Performed  Back Inspection Performed  RUE Inspected  LUE Inspection Performed  RLE Inspected  LLE Inspection Performed  Nails and Digits Inspection Performed                   Diagram Legend     Erythematous scaling macule/papule c/w actinic keratosis       Vascular papule c/w angioma      Pigmented verrucoid papule/plaque c/w seborrheic keratosis      Yellow umbilicated papule c/w sebaceous hyperplasia      Irregularly shaped tan macule c/w lentigo     1-2 mm smooth white papules consistent with Milia      Movable subcutaneous cyst with punctum c/w epidermal inclusion cyst      Subcutaneous movable cyst c/w pilar cyst      Firm pink to brown papule c/w dermatofibroma      Pedunculated fleshy papule(s) c/w skin tag(s)      Evenly pigmented macule c/w junctional nevus     Mildly variegated pigmented, slightly irregular-bordered macule c/w mildly atypical nevus      Flesh colored to evenly pigmented  papule c/w intradermal nevus       Pink pearly papule/plaque c/w basal cell carcinoma      Erythematous hyperkeratotic cursted plaque c/w SCC      Surgical scar with no sign of skin cancer recurrence      Open and closed comedones      Inflammatory papules and pustules      Verrucoid papule consistent consistent with wart     Erythematous eczematous patches and plaques     Dystrophic onycholytic nail with subungual debris c/w onychomycosis     Umbilicated papule    Erythematous-base heme-crusted tan verrucoid plaque consistent with inflamed seborrheic keratosis     Erythematous Silvery Scaling Plaque c/w Psoriasis     See annotation      Assessment / Plan:        EIC (epidermal inclusion cyst) - left medial buttock x 6 months no sx  Reassurance.   I and express x 1    SK (seborrheic keratosis)  These are benign inherited growths without a malignant potential. Reassurance given to patient. No treatment is necessary.       Lentigo  This is a benign hyperpigmented sun induced lesion. Daily sun protection will reduce the number of new lesions. Treatment of these benign lesions are considered cosmetic.      Sebaceous gland hyperplasia  This is a common condition representing benign enlargement of the sebaceous lobule. It typically occurs in adulthood. Reassurance given to patient.       Screening exam for skin cancer  Total body skin examination performed today including at least 12 points as noted in physical examination. No lesions suspicious for malignancy noted.               Return if symptoms worsen or fail to improve.

## 2017-10-03 ENCOUNTER — LAB VISIT (OUTPATIENT)
Dept: LAB | Facility: HOSPITAL | Age: 82
End: 2017-10-03
Attending: INTERNAL MEDICINE
Payer: MEDICARE

## 2017-10-03 DIAGNOSIS — I10 ESSENTIAL HYPERTENSION: ICD-10-CM

## 2017-10-03 DIAGNOSIS — R73.09 ELEVATED GLUCOSE: ICD-10-CM

## 2017-10-03 DIAGNOSIS — E03.9 ACQUIRED HYPOTHYROIDISM: ICD-10-CM

## 2017-10-03 DIAGNOSIS — E78.00 PURE HYPERCHOLESTEROLEMIA: ICD-10-CM

## 2017-10-03 LAB
ALBUMIN SERPL BCP-MCNC: 3.8 G/DL
ALP SERPL-CCNC: 82 U/L
ALT SERPL W/O P-5'-P-CCNC: 15 U/L
ANION GAP SERPL CALC-SCNC: 8 MMOL/L
AST SERPL-CCNC: 23 U/L
BASOPHILS # BLD AUTO: 0.02 K/UL
BASOPHILS NFR BLD: 0.2 %
BILIRUB SERPL-MCNC: 1.4 MG/DL
BUN SERPL-MCNC: 13 MG/DL
CALCIUM SERPL-MCNC: 9.5 MG/DL
CHLORIDE SERPL-SCNC: 99 MMOL/L
CHOLEST SERPL-MCNC: 195 MG/DL
CHOLEST/HDLC SERPL: 2.5 {RATIO}
CO2 SERPL-SCNC: 29 MMOL/L
CREAT SERPL-MCNC: 0.8 MG/DL
DIFFERENTIAL METHOD: ABNORMAL
EOSINOPHIL # BLD AUTO: 0.1 K/UL
EOSINOPHIL NFR BLD: 1.1 %
ERYTHROCYTE [DISTWIDTH] IN BLOOD BY AUTOMATED COUNT: 13.6 %
EST. GFR  (AFRICAN AMERICAN): >60 ML/MIN/1.73 M^2
EST. GFR  (NON AFRICAN AMERICAN): >60 ML/MIN/1.73 M^2
ESTIMATED AVG GLUCOSE: 111 MG/DL
GLUCOSE SERPL-MCNC: 116 MG/DL
HBA1C MFR BLD HPLC: 5.5 %
HCT VFR BLD AUTO: 43.6 %
HDLC SERPL-MCNC: 79 MG/DL
HDLC SERPL: 40.5 %
HGB BLD-MCNC: 14.7 G/DL
LDLC SERPL CALC-MCNC: 100.8 MG/DL
LYMPHOCYTES # BLD AUTO: 1.5 K/UL
LYMPHOCYTES NFR BLD: 16.8 %
MCH RBC QN AUTO: 30.4 PG
MCHC RBC AUTO-ENTMCNC: 33.7 G/DL
MCV RBC AUTO: 90 FL
MONOCYTES # BLD AUTO: 0.5 K/UL
MONOCYTES NFR BLD: 5.8 %
NEUTROPHILS # BLD AUTO: 6.8 K/UL
NEUTROPHILS NFR BLD: 76.1 %
NONHDLC SERPL-MCNC: 116 MG/DL
PLATELET # BLD AUTO: 245 K/UL
PMV BLD AUTO: 8.9 FL
POTASSIUM SERPL-SCNC: 4.1 MMOL/L
PROT SERPL-MCNC: 6.8 G/DL
RBC # BLD AUTO: 4.84 M/UL
SODIUM SERPL-SCNC: 136 MMOL/L
TRIGL SERPL-MCNC: 76 MG/DL
TSH SERPL DL<=0.005 MIU/L-ACNC: 1.45 UIU/ML
WBC # BLD AUTO: 8.89 K/UL

## 2017-10-03 PROCEDURE — 80061 LIPID PANEL: CPT

## 2017-10-03 PROCEDURE — 83036 HEMOGLOBIN GLYCOSYLATED A1C: CPT

## 2017-10-03 PROCEDURE — 36415 COLL VENOUS BLD VENIPUNCTURE: CPT

## 2017-10-03 PROCEDURE — 84443 ASSAY THYROID STIM HORMONE: CPT

## 2017-10-03 PROCEDURE — 80053 COMPREHEN METABOLIC PANEL: CPT

## 2017-10-03 PROCEDURE — 85025 COMPLETE CBC W/AUTO DIFF WBC: CPT

## 2017-10-09 ENCOUNTER — OFFICE VISIT (OUTPATIENT)
Dept: INTERNAL MEDICINE | Facility: CLINIC | Age: 82
End: 2017-10-09
Payer: MEDICARE

## 2017-10-09 VITALS
BODY MASS INDEX: 22.38 KG/M2 | WEIGHT: 147.69 LBS | HEART RATE: 83 BPM | HEIGHT: 68 IN | SYSTOLIC BLOOD PRESSURE: 128 MMHG | DIASTOLIC BLOOD PRESSURE: 78 MMHG | OXYGEN SATURATION: 97 %

## 2017-10-09 DIAGNOSIS — E03.9 HYPOTHYROIDISM, UNSPECIFIED TYPE: ICD-10-CM

## 2017-10-09 DIAGNOSIS — Z01.00 ENCOUNTER FOR VISION SCREENING: ICD-10-CM

## 2017-10-09 DIAGNOSIS — R73.09 ELEVATED GLUCOSE: ICD-10-CM

## 2017-10-09 DIAGNOSIS — E78.00 PURE HYPERCHOLESTEROLEMIA: ICD-10-CM

## 2017-10-09 DIAGNOSIS — G31.84 MCI (MILD COGNITIVE IMPAIRMENT): ICD-10-CM

## 2017-10-09 DIAGNOSIS — I10 ESSENTIAL HYPERTENSION: Primary | ICD-10-CM

## 2017-10-09 PROCEDURE — 99999 PR PBB SHADOW E&M-EST. PATIENT-LVL III: CPT | Mod: PBBFAC,,, | Performed by: INTERNAL MEDICINE

## 2017-10-09 PROCEDURE — 99214 OFFICE O/P EST MOD 30 MIN: CPT | Mod: S$GLB,,, | Performed by: INTERNAL MEDICINE

## 2017-10-09 PROCEDURE — 99499 UNLISTED E&M SERVICE: CPT | Mod: S$GLB,,, | Performed by: INTERNAL MEDICINE

## 2017-10-09 NOTE — PROGRESS NOTES
Subjective:      Patient ID: Estefania Mccollum is a 82 y.o. female.    Chief Complaint: Follow-up    HPI:  HPI   Patient had shingles since the last appt, a small patch on her back. She did take the shingles vaccine.    Reviewed labs, A1C normal range remainder of labs acceptable    Blood pressure: lisinopril 20 mg , doing well on current dose of medication, discussed salt reduction. We discussed that if her blood pressure is above 140/90 take an additional 1/2 a pill of the lisinopril 20 mg.    She also took a flu shot    Patient states that she has imbalance problems.    Annual exam: Reviewed 10/9/2017  Colonoscopy:  , 10/2/13  Mammogram: 2017  Gyn: Patient may make appt  Optho: Dr. Thibodeaux  Flu:   Tetanus  3/17/2014  Shingles: 2007  Pneumovax: 10/16/2002  Bone Density: 2013 3/29/2016        Patient Active Problem List   Diagnosis    Hypertension    Hypothyroidism    Hyperlipidemia    Retinal drusen - Both Eyes    Hearing loss, sensorineural    History of colonic polyps    Osteopenia    MCI (mild cognitive impairment)    Memory loss    Primary osteoarthritis of left knee    Aortic atherosclerosis     Past Medical History:   Diagnosis Date    Adenomatous polyp     Allergy     sinus    Annual physical exam 2011    Arthritis     osteoarthritis    Cataract     History of bone density study 2009    History of colonoscopy 10/30/2008    History of mammogram 2011    Hyperlipidemia     Hypertension     Macular degeneration     Postmenopausal hormone replacement therapy     therapy stopped    Thyroid disease     hypothyroidism    Vertigo      Past Surgical History:   Procedure Laterality Date    APPENDECTOMY      12 years old    CATARACT EXTRACTION      Both eyes    CATARACT EXTRACTION, BILATERAL       SECTION      x 3    CHOLECYSTECTOMY      COLONOSCOPY  10/02/2013    HYSTERECTOMY      total    OOPHORECTOMY      TONSILLECTOMY      TOTAL KNEE  "ARTHROPLASTY Left 12/2016    Yag       Left Eye     Family History   Problem Relation Age of Onset    Heart failure Mother     Hypertension Mother     Hyperlipidemia Mother     Heart failure Father     Hypertension Father     Hyperlipidemia Father     Asthma Father     Hypertension Sister     Diabetes Sister      Prediabetes    Heart attack Brother     No Known Problems Daughter     Heart disease Brother      Has had open heart surgery    No Known Problems Brother     Mental retardation Daughter     No Known Problems Daughter     Coronary artery disease Neg Hx     Amblyopia Neg Hx     Blindness Neg Hx     Cataracts Neg Hx     Glaucoma Neg Hx     Macular degeneration Neg Hx     Retinal detachment Neg Hx     Strabismus Neg Hx     Melanoma Neg Hx     Psoriasis Neg Hx     Lupus Neg Hx     Eczema Neg Hx     Acne Neg Hx      Review of Systems   Constitutional: Negative for chills, fever and unexpected weight change.   HENT: Negative for trouble swallowing.    Respiratory: Negative for cough, shortness of breath and wheezing.    Cardiovascular: Negative for chest pain and palpitations.   Gastrointestinal: Negative for abdominal distention, abdominal pain, blood in stool and vomiting.   Musculoskeletal: Negative for back pain.     Objective:     Vitals:    10/09/17 1346   BP: 128/78   Pulse: 83   SpO2: 97%   Weight: 67 kg (147 lb 11.3 oz)   Height: 5' 8" (1.727 m)   PainSc: 0-No pain     Body mass index is 22.46 kg/m².  Physical Exam   Constitutional: She is oriented to person, place, and time. She appears well-developed and well-nourished. No distress.   Neck: Carotid bruit is not present. No thyromegaly present.   Cardiovascular: Normal rate, regular rhythm and normal heart sounds.  PMI is not displaced.    Pulmonary/Chest: Effort normal and breath sounds normal. No respiratory distress.   Abdominal: Soft. Bowel sounds are normal. She exhibits no distension. There is no tenderness. "   Musculoskeletal: She exhibits no edema.   Neurological: She is alert and oriented to person, place, and time.     Assessment:     1. Essential hypertension    2. Encounter for vision screening    3. Hypothyroidism, unspecified type    4. Pure hypercholesterolemia    5. MCI (mild cognitive impairment)    6. Elevated glucose      Plan:   Estefania was seen today for follow-up.    Diagnoses and all orders for this visit:    Essential hypertension  The current medical regimen is effective;  continue present plan and medications.    -     CBC auto differential; Future  -     Comprehensive metabolic panel; Future    Encounter for vision screening  -     Ambulatory referral to Optometry    Hypothyroidism, unspecified type  The current medical regimen is effective;  continue present plan and medications.    -     TSH; Future    Pure hypercholesterolemia  The current medical regimen is effective;  continue present plan and medications.    -     Lipid panel; Future    MCI (mild cognitive impairment)  The current medical regimen is effective;  continue present plan and medications.      Elevated glucose: A1C 5.5  -     Hemoglobin A1c; Future      Return in about 6 months (around 4/9/2018), or Annual exam , for Follow up with cbc, cmp, lipid, tsh and A1C.     Medication List          Accurate as of 10/9/17  3:15 PM. If you have any questions, ask your nurse or doctor.               CONTINUE taking these medications    acetaminophen 325 MG tablet  Commonly known as:  TYLENOL     aspirin 81 MG EC tablet  Commonly known as:  ECOTRIN     calcium carbonate 600 mg calcium (1,500 mg) Tab  Commonly known as:  OS-RAMONE     fish oil-omega-3 fatty acids 300-1,000 mg capsule     levothyroxine 100 MCG tablet  Commonly known as:  SYNTHROID  Take 1 tablet (100 mcg total) by mouth once daily.     lisinopril 20 MG tablet  Commonly known as:  PRINIVIL,ZESTRIL  Take 1 tablet (20 mg total) by mouth once daily. Cancel lisinopril hctz     memantine 10  MG Tab  Commonly known as:  NAMENDA  Take 1 tablet (10 mg total) by mouth 2 (two) times daily.     mometasone 0.1% 0.1 % cream  Commonly known as:  ELOCON  Apply topically once daily. For itching of outer ears.     multivitamin capsule     oxybutynin 5 MG Tab  Commonly known as:  DITROPAN  Take 2 tablets at night     pravastatin 80 MG tablet  Commonly known as:  PRAVACHOL  Take 1 tablet (80 mg total) by mouth once daily.

## 2017-10-09 NOTE — PATIENT INSTRUCTIONS
We discussed that if her blood pressure is above 140/90 take an additional 1/2 a pill of the lisinopril 20 mg.    Balance is likely a function of age, PT may help, use a cane if she is unsteady.    Your next appt is in April: you should be able to book January 1st

## 2017-10-17 ENCOUNTER — OFFICE VISIT (OUTPATIENT)
Dept: OPTOMETRY | Facility: CLINIC | Age: 82
End: 2017-10-17
Payer: MEDICARE

## 2017-10-17 DIAGNOSIS — Z96.1 PSEUDOPHAKIA OF BOTH EYES: ICD-10-CM

## 2017-10-17 DIAGNOSIS — H35.363 RETINAL DRUSEN, BILATERAL: Primary | ICD-10-CM

## 2017-10-17 DIAGNOSIS — Z13.5 SCREENING FOR GLAUCOMA: ICD-10-CM

## 2017-10-17 DIAGNOSIS — H52.4 PRESBYOPIA: ICD-10-CM

## 2017-10-17 PROCEDURE — 99999 PR PBB SHADOW E&M-EST. PATIENT-LVL II: CPT | Mod: PBBFAC,,, | Performed by: OPTOMETRIST

## 2017-10-17 PROCEDURE — 92014 COMPRE OPH EXAM EST PT 1/>: CPT | Mod: S$GLB,,, | Performed by: OPTOMETRIST

## 2017-10-17 PROCEDURE — 92015 DETERMINE REFRACTIVE STATE: CPT | Mod: S$GLB,,, | Performed by: OPTOMETRIST

## 2017-10-17 NOTE — PROGRESS NOTES
HPI     DLS: 10/25/2016  Pt states Va has been stable. Pt denies floaters, flashes, glare, and eye   allergies. Only wears OTC +2.50--no problems with distance VA    NO gtts    Sp PC IOL OU / YAG OS   Hx PXS   Old CR Scar OD   ARM OU       Last edited by Tyler Thibodeaux, OD on 10/17/2017  2:27 PM. (History)        ROS     Positive for: Eyes (cat surgery OU)    Negative for: Constitutional, Gastrointestinal, Neurological, Skin,   Genitourinary, Musculoskeletal, HENT, Endocrine, Cardiovascular,   Respiratory, Psychiatric, Allergic/Imm, Heme/Lymph    Last edited by Tyler Thibodeaux, OD on 10/17/2017  2:27 PM. (History)        Assessment /Plan     For exam results, see Encounter Report.    Retinal drusen, bilateral    Pseudophakia of both eyes    Screening for glaucoma    Presbyopia      1. Pt gave up on CL wear  2.  Mild pco OD SP pciol ou/YAG OS.  Pt happy w otc readers  3. Hx PXS prior to cat surgery OU.  iop low. Mod cupping. +mild fam hx.  Doubt glauc now. Will monitor  4. Old CR scar OD  5. Mild ARM changes OU. Stable.  Pt to cont w amsler grid/vitamins    PLAN:     rtc 1 yr, or immediately if amsler changes

## 2017-10-17 NOTE — LETTER
October 17, 2017      Danna Levine MD  [address]  [facility] [address]           [place] [facility]  [address] [facility]  [place] [address]  Phone: [phone]  Fax: [phone]          Patient: Estefania Mccollum   MR Number: [id number]   YOB: 1934   Date of Visit: 10/17/2017       Dear Dr. Danna Levine:    Thank you for referring Estefania Mccollum to me for evaluation. Attached you will find relevant portions of my assessment and plan of care.    If you have questions, please do not hesitate to call me. I look forward to following Estefania Mccollum along with you.    Sincerely,    Tyler Thibodeaux, OD    Enclosure  CC:  No Recipients    If you would like to receive this communication electronically, please contact [email or web address] or [phone] to request more information on [employer] access.    For providers and/or their staff who would like to refer a patient to [facility], please contact us through our one-stop-shop provider referral line, [facility], at [phone].    If you feel you have received this communication in error or would no longer like to receive these types of communications, please e-mail [email or web address]

## 2017-10-19 ENCOUNTER — TELEPHONE (OUTPATIENT)
Dept: INTERNAL MEDICINE | Facility: CLINIC | Age: 82
End: 2017-10-19

## 2017-10-19 ENCOUNTER — OFFICE VISIT (OUTPATIENT)
Dept: OTOLARYNGOLOGY | Facility: CLINIC | Age: 82
End: 2017-10-19
Payer: MEDICARE

## 2017-10-19 ENCOUNTER — DOCUMENTATION ONLY (OUTPATIENT)
Dept: INTERNAL MEDICINE | Facility: CLINIC | Age: 82
End: 2017-10-19

## 2017-10-19 VITALS
BODY MASS INDEX: 22.46 KG/M2 | SYSTOLIC BLOOD PRESSURE: 118 MMHG | WEIGHT: 147.69 LBS | DIASTOLIC BLOOD PRESSURE: 76 MMHG

## 2017-10-19 DIAGNOSIS — H61.23 BILATERAL IMPACTED CERUMEN: ICD-10-CM

## 2017-10-19 DIAGNOSIS — Z12.11 COLON CANCER SCREENING: Primary | ICD-10-CM

## 2017-10-19 DIAGNOSIS — H93.8X3 SENSATION OF FULLNESS IN BOTH EARS: Primary | ICD-10-CM

## 2017-10-19 PROCEDURE — 99999 PR PBB SHADOW E&M-EST. PATIENT-LVL III: CPT | Mod: PBBFAC,,, | Performed by: NURSE PRACTITIONER

## 2017-10-19 PROCEDURE — 99213 OFFICE O/P EST LOW 20 MIN: CPT | Mod: 25,S$GLB,, | Performed by: NURSE PRACTITIONER

## 2017-10-19 PROCEDURE — 69210 REMOVE IMPACTED EAR WAX UNI: CPT | Mod: S$GLB,,, | Performed by: NURSE PRACTITIONER

## 2017-10-19 NOTE — PROGRESS NOTES
Subjective:       Patient ID: Estefania Mccollum is a 82 y.o. female.    Chief Complaint: No chief complaint on file.    Ear Fullness    There is pain in both ears. This is a recurrent problem. The current episode started more than 1 month ago. The problem occurs constantly. The problem has been unchanged. There has been no fever. The patient is experiencing no pain. Associated symptoms include hearing loss. Pertinent negatives include no abdominal pain, coughing, diarrhea, ear discharge, headaches, neck pain, rash, rhinorrhea, sore throat or vomiting. She has tried nothing for the symptoms. Her past medical history is significant for hearing loss. There is no history of a chronic ear infection or a tympanostomy tube.      Her  is present for today's visit.    Past Medical History: Patient has a past medical history of Adenomatous polyp; Allergy; Annual physical exam (2011); Arthritis; Cataract; History of bone density study (2009); History of colonoscopy (10/30/2008); History of mammogram (2011); Hyperlipidemia; Hypertension; Macular degeneration; Postmenopausal hormone replacement therapy; Thyroid disease; and Vertigo.    Past Surgical History: Patient has a past surgical history that includes Hysterectomy; Cholecystectomy; Cataract extraction, bilateral; Appendectomy; Tonsillectomy; Cataract extraction; Yag ;  section; Colonoscopy (10/02/2013); Total knee arthroplasty (Left, 2016); and Oophorectomy.    Social History: Patient reports that she has never smoked. She has never used smokeless tobacco. She reports that she drinks alcohol. She reports that she does not use drugs.    Family History: family history includes Asthma in her father; Diabetes in her sister; Heart attack in her brother; Heart disease in her brother; Heart failure in her father and mother; Hyperlipidemia in her father and mother; Hypertension in her father, mother, and sister; Mental retardation in her daughter; No  Known Problems in her brother, daughter, and daughter.    Medications:   Current Outpatient Prescriptions   Medication Sig    acetaminophen (TYLENOL) 325 MG tablet Take 325 mg by mouth every 6 (six) hours as needed for Pain.    aspirin (ECOTRIN) 81 MG EC tablet Take 81 mg by mouth once daily.    calcium carbonate (OS-RAMONE) 600 mg (1,500 mg) Tab Take 600 mg by mouth once daily.     fish oil-omega-3 fatty acids 300-1,000 mg capsule Take 2 g by mouth once daily.    FLUAD 0805-2024, 65 YR UP,,PF, 45 mcg (15 mcg x 3)/0.5 mL Syrg     levothyroxine (SYNTHROID) 100 MCG tablet Take 1 tablet (100 mcg total) by mouth once daily.    lisinopril (PRINIVIL,ZESTRIL) 20 MG tablet Take 1 tablet (20 mg total) by mouth once daily. Cancel lisinopril hctz    memantine (NAMENDA) 10 MG Tab Take 1 tablet (10 mg total) by mouth 2 (two) times daily.    mometasone 0.1% (ELOCON) 0.1 % cream Apply topically once daily. For itching of outer ears.    multivitamin capsule Take 1 capsule by mouth once daily.    oxybutynin (DITROPAN) 5 MG Tab Take 2 tablets at night    pravastatin (PRAVACHOL) 80 MG tablet Take 1 tablet (80 mg total) by mouth once daily.     No current facility-administered medications for this visit.        Allergies: Patient is allergic to tetracycline.    Review of Systems   Constitutional: Negative for activity change, appetite change, chills, diaphoresis, fatigue, fever and unexpected weight change.   HENT: Positive for hearing loss. Negative for congestion, dental problem, drooling, ear discharge, ear pain, facial swelling, mouth sores, nosebleeds, postnasal drip, rhinorrhea, sinus pain, sinus pressure, sneezing, sore throat, tinnitus, trouble swallowing and voice change.         B ear fullness.   Eyes: Negative for pain and visual disturbance.   Respiratory: Negative for cough, chest tightness, shortness of breath, wheezing and stridor.    Cardiovascular: Negative for chest pain.   Gastrointestinal: Negative for  abdominal pain, diarrhea and vomiting.   Musculoskeletal: Negative for gait problem and neck pain.   Skin: Negative for color change and rash.   Allergic/Immunologic: Negative for environmental allergies.   Neurological: Negative for dizziness, seizures, syncope, facial asymmetry, speech difficulty, weakness, light-headedness, numbness and headaches.   Psychiatric/Behavioral: Negative for agitation and confusion. The patient is not nervous/anxious.        Objective:       /76 (BP Location: Right arm, Patient Position: Sitting, BP Method: Large (Automatic))   Wt 67 kg (147 lb 11.3 oz)   BMI 22.46 kg/m²     Physical Exam   Constitutional: She is oriented to person, place, and time. She appears well-developed and well-nourished.   HENT:   Head: Normocephalic and atraumatic. Not macrocephalic and not microcephalic. Head is without raccoon's eyes, without Ross's sign, without abrasion, without contusion, without laceration, without right periorbital erythema and without left periorbital erythema. Hair is normal.   Right Ear: Tympanic membrane, external ear and ear canal normal. No lacerations. No drainage, swelling or tenderness. No foreign bodies. No mastoid tenderness. Tympanic membrane is not injected, not scarred, not perforated, not erythematous, not retracted and not bulging. Tympanic membrane mobility is normal. No middle ear effusion. No hemotympanum. Decreased hearing is noted.   Left Ear: Tympanic membrane, external ear and ear canal normal. No lacerations. No drainage, swelling or tenderness. No foreign bodies. No mastoid tenderness. Tympanic membrane is not injected, not scarred, not perforated, not erythematous, not retracted and not bulging. Tympanic membrane mobility is normal.  No middle ear effusion. No hemotympanum. Decreased hearing is noted.   Nose: Nose normal. No mucosal edema, rhinorrhea, nose lacerations, sinus tenderness or nasal deformity.   Mouth/Throat: Uvula is midline.   No AOM or  OE.  No mastoid, tenderness, swelling, or redness.  Facial nerve intact.     Wears B hearing aids.   Eyes: Conjunctivae, EOM and lids are normal. Pupils are equal, round, and reactive to light.   Neck: Trachea normal and normal range of motion. Neck supple. No spinous process tenderness and no muscular tenderness present. No neck rigidity. No edema, no erythema and normal range of motion present. No thyroid mass and no thyromegaly present.   Pulmonary/Chest: Effort normal.   Abdominal: Soft.   Musculoskeletal: Normal range of motion.   Lymphadenopathy:        Head (right side): No submental, no submandibular, no tonsillar, no preauricular and no posterior auricular adenopathy present.        Head (left side): No submental, no submandibular, no tonsillar, no preauricular, no posterior auricular and no occipital adenopathy present.     She has no cervical adenopathy.   Neurological: She is alert and oriented to person, place, and time. No cranial nerve deficit or sensory deficit.   Skin: Skin is warm and dry.   Psychiatric: She has a normal mood and affect. Her behavior is normal. Judgment and thought content normal.   Nursing note and vitals reviewed.      Assessment:       1. Sensation of fullness in both ears    2. Bilateral impacted cerumen        Plan:        RTC prn for routine ear cleaning.

## 2017-11-09 ENCOUNTER — PATIENT OUTREACH (OUTPATIENT)
Dept: ORTHOPEDICS | Facility: CLINIC | Age: 82
End: 2017-11-09

## 2018-02-21 ENCOUNTER — PES CALL (OUTPATIENT)
Dept: ADMINISTRATIVE | Facility: CLINIC | Age: 83
End: 2018-02-21

## 2018-02-27 ENCOUNTER — OFFICE VISIT (OUTPATIENT)
Dept: NEUROLOGY | Facility: CLINIC | Age: 83
End: 2018-02-27
Payer: MEDICARE

## 2018-02-27 VITALS
HEIGHT: 67 IN | BODY MASS INDEX: 24.08 KG/M2 | HEART RATE: 78 BPM | DIASTOLIC BLOOD PRESSURE: 76 MMHG | SYSTOLIC BLOOD PRESSURE: 138 MMHG | RESPIRATION RATE: 16 BRPM | WEIGHT: 153.44 LBS

## 2018-02-27 DIAGNOSIS — R26.89 BALANCE PROBLEM: ICD-10-CM

## 2018-02-27 DIAGNOSIS — G47.09 OTHER INSOMNIA: ICD-10-CM

## 2018-02-27 DIAGNOSIS — R41.3 MEMORY LOSS: Primary | ICD-10-CM

## 2018-02-27 PROCEDURE — 99214 OFFICE O/P EST MOD 30 MIN: CPT | Mod: S$GLB,,, | Performed by: PSYCHIATRY & NEUROLOGY

## 2018-02-27 PROCEDURE — 1159F MED LIST DOCD IN RCRD: CPT | Mod: S$GLB,,, | Performed by: PSYCHIATRY & NEUROLOGY

## 2018-02-27 PROCEDURE — 99999 PR PBB SHADOW E&M-EST. PATIENT-LVL III: CPT | Mod: PBBFAC,,, | Performed by: PSYCHIATRY & NEUROLOGY

## 2018-02-27 PROCEDURE — 3008F BODY MASS INDEX DOCD: CPT | Mod: S$GLB,,, | Performed by: PSYCHIATRY & NEUROLOGY

## 2018-02-27 PROCEDURE — 1126F AMNT PAIN NOTED NONE PRSNT: CPT | Mod: S$GLB,,, | Performed by: PSYCHIATRY & NEUROLOGY

## 2018-02-27 NOTE — PROGRESS NOTES
HPI:  Estefania Mccollum is a 83 y.o. female known to me for  Vertigo. Responds to PT. Some vague symptoms at times, but improved with normal MRI brain prior  Also has mild cognitive impairment like symptoms vs Early Alzheimer's disease.   Patient states her memory is the same, still not better and not as good as her husbands.  She is not currently dizzy but feels her balance is not perfect. There is no room spinning and she does not trip and fall and wonders if her balance if slowly declining with aging. There is not focal weakness or numbness.  No back or neck pain.  Walking speech is normal but she is careful.  She states she has difficulty with sleep at night after sleeping until 8am and trying to fall asleep at 9pm but can't sleep until 10pm. There is no electronic use.   She continues to drive without accidents or incidents  No numbness in the feet    Review of Systems   Constitutional: Negative for fever.   HENT: Negative for nosebleeds.    Eyes: Negative for double vision.   Respiratory: Negative for shortness of breath.    Cardiovascular: Negative for chest pain.   Gastrointestinal: Negative for blood in stool.   Genitourinary: Negative for hematuria.   Musculoskeletal: Negative for falls.   Skin: Negative for rash.   Neurological: Negative for dizziness, tremors, sensory change, focal weakness and headaches.   Psychiatric/Behavioral: Positive for memory loss.       Objective:      Physical Exam      Exam:  Gen Appearance, well developed/nourished in no apparent distress  CV: 2+ distal pulses with no edema or swelling  Neuro:  MS: Awake, alert, sustains attention. She is  oriented to all except exact date today, and knows today is Tuesday.   Recent recall /remote memory intact, Language is full to spontaneous speech/comprehension. Fund of Knowledge is full  She has good note keeping about her medications and keeps a detailed calender   CN: Optic discs are flat with normal vasculature, PERRL, Extraoccular  movements and visual fields are full. Normal facial sensation and strength, Hearing symmetric, Tongue and Palate are midline and strong. Shoulder Shrug symmetric and strong.  Motor: Normal bulk, tone, no abnormal movements. 5/5 strength bilateral upper/lower extremities with 2+ reflexes and no clonus  Sensory:  Intact to temp and vibration Romberg negative  Cerebellar: Finger-nose,Rapid alternating movements intact  Gait: Normal stance, no ataxia    CT head 10/2016: 1.  No CT evidence of an acute intracranial abnormality.  2.  Atrophy and small vessel ischemic changes of the periventricular white matter    Labs: 10/2016 TSH and B12 normal  1/2017 CMP, CBC unremarkable  2  Assessment:     Estefania Mccollum is a 83 y.o. female known to me for  Vertigo. Responds to PT. Some vague symptoms at times, but improved with normal MRI brain prior  Also has mild cognitive impairment like symptoms vs Early Alzheimer's disease.     Plan:   1. Continue Namenda to 10mg BID Discussed the further  treatment being good diet and physical and memory exercise. No restrictions/ patient is functioning well.   2. PT PRN vertigo helps. Meclizine causes her too many side effects. She has seen ENT and continues to follow with ENT about otalgia/ hearing loss/ prior infections  3. Refer to PT given her difficulty with balance which may be age related or other- will follow for improvement.   4. Reviewed sleep hygeine with patient who complains as insomnia- prolonged time in bed a cause and she was instructed to reduce this.     RTC 6 months

## 2018-03-08 ENCOUNTER — TELEPHONE (OUTPATIENT)
Dept: OTOLARYNGOLOGY | Facility: CLINIC | Age: 83
End: 2018-03-08

## 2018-03-08 ENCOUNTER — TELEPHONE (OUTPATIENT)
Dept: NEUROLOGY | Facility: CLINIC | Age: 83
End: 2018-03-08

## 2018-03-08 NOTE — TELEPHONE ENCOUNTER
----- Message from Daya Walden sent at 3/8/2018  7:42 AM CST -----  Contact: PATIENT  Estefania Mccollum  MRN: [id number]  : 10/31/1934  PCP: Danna Levine  Home Phone      [phone]  Work Phone      Not on file.  Mobile          [phone]      MESSAGE: Patient states that she is not comfortable doing physical therapy as Dr. Preston recommended.  Is there something else that she can do instead.        Phone: [phone]

## 2018-03-08 NOTE — TELEPHONE ENCOUNTER
She does not have to do PT. She can try to join a class like [employer] or other exercise group for elderly individuals if desired.

## 2018-03-08 NOTE — TELEPHONE ENCOUNTER
----- Message from Elodia Beverly MA sent at 3/8/2018 11:22 AM CST -----  Contact: Pt  Mable, this Dr. Oquendo patient. Can you please follow up with her?    Thanks,  Elodia Beverly MA  ----- Message -----  From: Rosey Cleary  Sent: 3/8/2018  11:15 AM  To: [facility] Staff    Pt is calling to speak with nurse in regards to pt care and can be reached at [phone].  Thank you

## 2018-03-20 ENCOUNTER — TELEPHONE (OUTPATIENT)
Dept: OTOLARYNGOLOGY | Facility: CLINIC | Age: 83
End: 2018-03-20

## 2018-03-20 NOTE — TELEPHONE ENCOUNTER
----- Message from Marysol Navarrete sent at 3/20/2018  3:40 PM CDT -----  Contact: Self [phone]  Patient is requesting a call back to see if it is possible to move her appointment on 3.22.18 to 4.26.18 after her scheduled appointment with her primary care doctor.  She does not want to cancel if nothing is available that day but would like a call either way to let her know.    Patient may be reached at [phone].    Thank you.  NAIF

## 2018-03-20 NOTE — TELEPHONE ENCOUNTER
----- Message from Marysol Navarrete sent at 3/20/2018  3:40 PM CDT -----  Contact: Self [phone]  Patient is requesting a call back to see if it is possible to move her appointment on 3.22.18 to 4.26.18 after her scheduled appointment with her primary care doctor.  She does not want to cancel if nothing is available that day but would like a call either way to let her know.    Patient may be reached at [phone].    Thank you.  NAIF

## 2018-03-22 ENCOUNTER — CLINICAL SUPPORT (OUTPATIENT)
Dept: AUDIOLOGY | Facility: CLINIC | Age: 83
End: 2018-03-22
Payer: MEDICARE

## 2018-03-22 ENCOUNTER — OFFICE VISIT (OUTPATIENT)
Dept: OTOLARYNGOLOGY | Facility: CLINIC | Age: 83
End: 2018-03-22
Payer: MEDICARE

## 2018-03-22 VITALS
BODY MASS INDEX: 23.88 KG/M2 | HEART RATE: 90 BPM | HEIGHT: 67 IN | DIASTOLIC BLOOD PRESSURE: 78 MMHG | SYSTOLIC BLOOD PRESSURE: 131 MMHG | WEIGHT: 152.13 LBS

## 2018-03-22 DIAGNOSIS — H93.8X3 SENSATION OF FULLNESS IN BOTH EARS: Primary | ICD-10-CM

## 2018-03-22 DIAGNOSIS — H90.3 SENSORINEURAL HEARING LOSS (SNHL) OF BOTH EARS: ICD-10-CM

## 2018-03-22 DIAGNOSIS — H90.3 SENSORINEURAL HEARING LOSS, BILATERAL: Primary | ICD-10-CM

## 2018-03-22 PROCEDURE — 99999 PR PBB SHADOW E&M-EST. PATIENT-LVL III: CPT | Mod: PBBFAC,,, | Performed by: OTOLARYNGOLOGY

## 2018-03-22 PROCEDURE — 99213 OFFICE O/P EST LOW 20 MIN: CPT | Mod: 25,S$GLB,, | Performed by: OTOLARYNGOLOGY

## 2018-03-22 PROCEDURE — 92557 COMPREHENSIVE HEARING TEST: CPT | Mod: S$GLB,,, | Performed by: AUDIOLOGIST-HEARING AID FITTER

## 2018-03-22 PROCEDURE — 99999 PR PBB SHADOW E&M-EST. PATIENT-LVL I: CPT | Mod: PBBFAC,,,

## 2018-03-22 PROCEDURE — 69210 REMOVE IMPACTED EAR WAX UNI: CPT | Mod: S$GLB,,, | Performed by: OTOLARYNGOLOGY

## 2018-03-22 PROCEDURE — 3078F DIAST BP <80 MM HG: CPT | Mod: CPTII,S$GLB,, | Performed by: OTOLARYNGOLOGY

## 2018-03-22 PROCEDURE — 3075F SYST BP GE 130 - 139MM HG: CPT | Mod: CPTII,S$GLB,, | Performed by: OTOLARYNGOLOGY

## 2018-03-22 RX ORDER — OMEPRAZOLE 40 MG/1
CAPSULE, DELAYED RELEASE ORAL
COMMUNITY
Start: 2018-03-06 | End: 2018-05-10 | Stop reason: SDUPTHER

## 2018-03-22 NOTE — PROGRESS NOTES
Estefania Mccollum was seen in the clinic today for a hearing evaluation. Ms. Mccollum has an established sensorineural hearing loss. She currently wears bilateral hearing aids.     Audiological testing revealed a moderate rising to moderately severe sensorineural hearing loss in the left ear and a moderate to severe sensorineural hearing loss in the right ear. A speech reception threshold was obtained at 40 dBHL, bilaterally. Speech discrimination was 76% in both ears.    Recommendations:  1. Otologic evaluation  2. Annual hearing evaluation  3. Continued use of hearing aids

## 2018-03-22 NOTE — PROGRESS NOTES
Subjective:       Patient ID: Estefania Mccollum is a 83 y.o. female.    Chief Complaint: Ear Fullness and Hearing Loss    Ear Fullness    There is pain in both ears. This is a recurrent problem. The current episode started more than 1 year ago. Episode frequency: Intermittent. The problem has been unchanged. There has been no fever. The patient is experiencing no pain. Associated symptoms include hearing loss. Pertinent negatives include no coughing, diarrhea, ear discharge, headaches, neck pain, rash, rhinorrhea or vomiting. She has tried nothing for the symptoms. Her past medical history is significant for hearing loss.   Hearing Loss:   Chronicity:  Chronic  Onset:  More than 1 year ago  Progression since onset:  Gradually worsening  Frequency:  Constantly  Severity:  Moderate  Hearing loss characteristics:  Moderate, muffled, trouble hearing TV, difficult on telephone, worse background noise and impaired select discriminationNo dizziness, no ear pain, no fever, no headaches and no rhinorrhea.  Aggravated by:  Noise  Risk factors: Presbycusis.  Treatments tried:  Hearing aids  Improvement on treatment:  MildNo dizziness.    Review of Systems   Constitutional: Negative for activity change, appetite change and fever.   HENT: Positive for hearing loss. Negative for congestion, ear discharge, ear pain, nosebleeds, postnasal drip, rhinorrhea and sneezing.    Eyes: Negative for redness and visual disturbance.   Respiratory: Negative for apnea, cough, shortness of breath and wheezing.    Cardiovascular: Negative for chest pain and palpitations.   Gastrointestinal: Negative for diarrhea and vomiting.   Genitourinary: Negative for difficulty urinating and frequency.   Musculoskeletal: Negative for arthralgias, back pain, gait problem and neck pain.   Skin: Negative for color change and rash.   Neurological: Negative for dizziness, speech difficulty, weakness and headaches.   Hematological: Negative for adenopathy. Does not  bruise/bleed easily.   Psychiatric/Behavioral: Negative for agitation and behavioral problems.       Objective:        Constitutional:   Vital signs are normal. She appears well-developed and well-nourished. She is active. Normal speech.      Head:  Normocephalic and atraumatic. Salivary glands normal.  Facial strength is normal.      Nose:  Nose normal including turbinates, nasal mucosa, sinuses and nasal septum.     Mouth/Throat  Oropharynx clear and moist without lesions or asymmetry and normal uvula midline.     Neck:  Neck normal without thyromegaly masses, asymmetry, normal tracheal structure, crepitus, and tenderness, thyroid normal, trachea normal, phonation normal and full range of motion with neck supple.     Psychiatric:   She has a normal mood and affect. Her speech is normal and behavior is normal.     Neurological:   She has neurological normal, alert and oriented.     Skin:   No abrasions, lacerations, lesions, or rashes.       PROCEDURE NOTE:  Ceruminosis is noted in both EACs.  Wax was removed by manual debridement and suctioning utilizing the assistance of binocular microscopy revealing EACs and TMs WNL. The patient tolerated this procedure without difficulty. The subjective decrease noted in hearing pre-cleaning was resolved post-cleaning.    As a result of this patients history and examination findings, a comprehensive audiogram was ordered to determine the level of hearing/hearing loss.      Assessment:       1. Sensation of fullness in both ears    2. Sensorineural hearing loss (SNHL) of both ears        Plan:       1. Hearing conservation strongly recommended.  2. Trial of amplification bilaterally also recommended.  3. Re-check of hearing in 18-24 months or sooner if subjective change noted.  4. F/U with PCP as per schedule.

## 2018-03-23 ENCOUNTER — TELEPHONE (OUTPATIENT)
Dept: OTOLARYNGOLOGY | Facility: CLINIC | Age: 83
End: 2018-03-23

## 2018-03-23 NOTE — TELEPHONE ENCOUNTER
----- Message from Cathleen Daly sent at 3/22/2018  4:36 PM CDT -----  Contact: self  Pt is calling in regards to an appt on 04/26 that she said should have been scheduled. Attempted to schedule appt for pt and books weren't open for me to schedule this appt. Pt would like a call back to do so.     Pt can be reached at [phone].    Thank you

## 2018-04-17 ENCOUNTER — LAB VISIT (OUTPATIENT)
Dept: LAB | Facility: HOSPITAL | Age: 83
End: 2018-04-17
Attending: INTERNAL MEDICINE
Payer: MEDICARE

## 2018-04-17 ENCOUNTER — TELEPHONE (OUTPATIENT)
Dept: OTOLARYNGOLOGY | Facility: CLINIC | Age: 83
End: 2018-04-17

## 2018-04-17 DIAGNOSIS — I10 ESSENTIAL HYPERTENSION: ICD-10-CM

## 2018-04-17 DIAGNOSIS — E03.9 HYPOTHYROIDISM, UNSPECIFIED TYPE: ICD-10-CM

## 2018-04-17 DIAGNOSIS — R73.09 ELEVATED GLUCOSE: ICD-10-CM

## 2018-04-17 DIAGNOSIS — E78.00 PURE HYPERCHOLESTEROLEMIA: ICD-10-CM

## 2018-04-17 LAB
ALBUMIN SERPL BCP-MCNC: 3.8 G/DL
ALP SERPL-CCNC: 165 U/L
ALT SERPL W/O P-5'-P-CCNC: 12 U/L
ANION GAP SERPL CALC-SCNC: 7 MMOL/L
AST SERPL-CCNC: 19 U/L
BASOPHILS # BLD AUTO: 0.01 K/UL
BASOPHILS NFR BLD: 0.1 %
BILIRUB SERPL-MCNC: 0.9 MG/DL
BUN SERPL-MCNC: 12 MG/DL
CALCIUM SERPL-MCNC: 9.2 MG/DL
CHLORIDE SERPL-SCNC: 99 MMOL/L
CHOLEST SERPL-MCNC: 167 MG/DL
CHOLEST/HDLC SERPL: 2.6 {RATIO}
CO2 SERPL-SCNC: 29 MMOL/L
CREAT SERPL-MCNC: 0.8 MG/DL
DIFFERENTIAL METHOD: ABNORMAL
EOSINOPHIL # BLD AUTO: 0.1 K/UL
EOSINOPHIL NFR BLD: 1.1 %
ERYTHROCYTE [DISTWIDTH] IN BLOOD BY AUTOMATED COUNT: 13.3 %
EST. GFR  (AFRICAN AMERICAN): >60 ML/MIN/1.73 M^2
EST. GFR  (NON AFRICAN AMERICAN): >60 ML/MIN/1.73 M^2
ESTIMATED AVG GLUCOSE: 111 MG/DL
GLUCOSE SERPL-MCNC: 116 MG/DL
HBA1C MFR BLD HPLC: 5.5 %
HCT VFR BLD AUTO: 44.2 %
HDLC SERPL-MCNC: 65 MG/DL
HDLC SERPL: 38.9 %
HGB BLD-MCNC: 15.2 G/DL
LDLC SERPL CALC-MCNC: 86.2 MG/DL
LYMPHOCYTES # BLD AUTO: 1.4 K/UL
LYMPHOCYTES NFR BLD: 19 %
MCH RBC QN AUTO: 31 PG
MCHC RBC AUTO-ENTMCNC: 34.4 G/DL
MCV RBC AUTO: 90 FL
MONOCYTES # BLD AUTO: 0.5 K/UL
MONOCYTES NFR BLD: 6.3 %
NEUTROPHILS # BLD AUTO: 5.2 K/UL
NEUTROPHILS NFR BLD: 73.5 %
NONHDLC SERPL-MCNC: 102 MG/DL
PLATELET # BLD AUTO: 263 K/UL
PMV BLD AUTO: 9.1 FL
POTASSIUM SERPL-SCNC: 4.4 MMOL/L
PROT SERPL-MCNC: 6.6 G/DL
RBC # BLD AUTO: 4.9 M/UL
SODIUM SERPL-SCNC: 135 MMOL/L
TRIGL SERPL-MCNC: 79 MG/DL
TSH SERPL DL<=0.005 MIU/L-ACNC: 1.26 UIU/ML
WBC # BLD AUTO: 7.11 K/UL

## 2018-04-17 PROCEDURE — 36415 COLL VENOUS BLD VENIPUNCTURE: CPT

## 2018-04-17 PROCEDURE — 85025 COMPLETE CBC W/AUTO DIFF WBC: CPT

## 2018-04-17 PROCEDURE — 80053 COMPREHEN METABOLIC PANEL: CPT

## 2018-04-17 PROCEDURE — 83036 HEMOGLOBIN GLYCOSYLATED A1C: CPT

## 2018-04-17 PROCEDURE — 80061 LIPID PANEL: CPT

## 2018-04-17 PROCEDURE — 84443 ASSAY THYROID STIM HORMONE: CPT

## 2018-04-17 NOTE — TELEPHONE ENCOUNTER
Spoke with patient again she needs to follow up with Audiology not DR KOEHLER . HEARING AID appointment slip mailed today

## 2018-04-17 NOTE — TELEPHONE ENCOUNTER
----- Message from Raquel Ogden sent at 4/12/2018 10:22 AM CDT -----  Contact: self  Pt would like to know when she should schedule her next appt.  Please advise.       Pt can be reached a [phone]

## 2018-04-26 ENCOUNTER — OFFICE VISIT (OUTPATIENT)
Dept: INTERNAL MEDICINE | Facility: CLINIC | Age: 83
End: 2018-04-26
Payer: MEDICARE

## 2018-04-26 ENCOUNTER — CLINICAL SUPPORT (OUTPATIENT)
Dept: AUDIOLOGY | Facility: CLINIC | Age: 83
End: 2018-04-26

## 2018-04-26 VITALS
WEIGHT: 152 LBS | BODY MASS INDEX: 23.86 KG/M2 | DIASTOLIC BLOOD PRESSURE: 88 MMHG | HEIGHT: 67 IN | SYSTOLIC BLOOD PRESSURE: 148 MMHG | HEART RATE: 82 BPM

## 2018-04-26 VITALS
DIASTOLIC BLOOD PRESSURE: 82 MMHG | SYSTOLIC BLOOD PRESSURE: 126 MMHG | BODY MASS INDEX: 23.67 KG/M2 | OXYGEN SATURATION: 96 % | HEART RATE: 78 BPM | WEIGHT: 150.81 LBS | HEIGHT: 67 IN

## 2018-04-26 DIAGNOSIS — G31.84 MCI (MILD COGNITIVE IMPAIRMENT): ICD-10-CM

## 2018-04-26 DIAGNOSIS — E78.00 PURE HYPERCHOLESTEROLEMIA: ICD-10-CM

## 2018-04-26 DIAGNOSIS — M85.80 OSTEOPENIA, UNSPECIFIED LOCATION: ICD-10-CM

## 2018-04-26 DIAGNOSIS — I70.0 AORTIC ATHEROSCLEROSIS: ICD-10-CM

## 2018-04-26 DIAGNOSIS — Z00.00 ENCOUNTER FOR PREVENTIVE HEALTH EXAMINATION: Primary | ICD-10-CM

## 2018-04-26 DIAGNOSIS — E03.9 HYPOTHYROIDISM, UNSPECIFIED TYPE: ICD-10-CM

## 2018-04-26 DIAGNOSIS — Z12.31 ENCOUNTER FOR SCREENING MAMMOGRAM FOR BREAST CANCER: ICD-10-CM

## 2018-04-26 DIAGNOSIS — M17.12 PRIMARY OSTEOARTHRITIS OF LEFT KNEE: ICD-10-CM

## 2018-04-26 DIAGNOSIS — Z00.00 WELLNESS EXAMINATION: ICD-10-CM

## 2018-04-26 DIAGNOSIS — H90.3 SENSORINEURAL HEARING LOSS (SNHL) OF BOTH EARS: ICD-10-CM

## 2018-04-26 DIAGNOSIS — I10 ESSENTIAL HYPERTENSION: ICD-10-CM

## 2018-04-26 DIAGNOSIS — Z78.0 POSTMENOPAUSAL: ICD-10-CM

## 2018-04-26 DIAGNOSIS — R26.89 BALANCE PROBLEM: ICD-10-CM

## 2018-04-26 DIAGNOSIS — Z00.00 PHYSICAL EXAM: Primary | ICD-10-CM

## 2018-04-26 DIAGNOSIS — M81.0 POSTMENOPAUSAL BONE LOSS: ICD-10-CM

## 2018-04-26 DIAGNOSIS — H90.3 SENSORINEURAL HEARING LOSS, BILATERAL: Primary | ICD-10-CM

## 2018-04-26 PROCEDURE — 3074F SYST BP LT 130 MM HG: CPT | Mod: CPTII,S$GLB,, | Performed by: INTERNAL MEDICINE

## 2018-04-26 PROCEDURE — 99499 UNLISTED E&M SERVICE: CPT | Mod: S$GLB,,, | Performed by: AUDIOLOGIST

## 2018-04-26 PROCEDURE — G0439 PPPS, SUBSEQ VISIT: HCPCS | Mod: S$GLB,,, | Performed by: NURSE PRACTITIONER

## 2018-04-26 PROCEDURE — 3079F DIAST BP 80-89 MM HG: CPT | Mod: CPTII,S$GLB,, | Performed by: INTERNAL MEDICINE

## 2018-04-26 PROCEDURE — 99499 UNLISTED E&M SERVICE: CPT | Mod: S$GLB,,, | Performed by: INTERNAL MEDICINE

## 2018-04-26 PROCEDURE — 99397 PER PM REEVAL EST PAT 65+ YR: CPT | Mod: S$GLB,,, | Performed by: INTERNAL MEDICINE

## 2018-04-26 PROCEDURE — 99999 PR PBB SHADOW E&M-EST. PATIENT-LVL III: CPT | Mod: PBBFAC,,, | Performed by: INTERNAL MEDICINE

## 2018-04-26 PROCEDURE — 99499 UNLISTED E&M SERVICE: CPT | Mod: S$PBB,,, | Performed by: NURSE PRACTITIONER

## 2018-04-26 PROCEDURE — 3077F SYST BP >= 140 MM HG: CPT | Mod: CPTII,S$GLB,, | Performed by: NURSE PRACTITIONER

## 2018-04-26 PROCEDURE — 99999 PR PBB SHADOW E&M-EST. PATIENT-LVL IV: CPT | Mod: PBBFAC,,, | Performed by: NURSE PRACTITIONER

## 2018-04-26 PROCEDURE — 3079F DIAST BP 80-89 MM HG: CPT | Mod: CPTII,S$GLB,, | Performed by: NURSE PRACTITIONER

## 2018-04-26 NOTE — PATIENT INSTRUCTIONS
Counseling and Referral of Other Preventative  (Italic type indicates deductible and co-insurance are waived)    Patient Name: Estefania Mccollum  Today's Date: 4/26/2018    Health Maintenance       Date Due Completion Date    DEXA SCAN 03/29/2018 3/29/2016    Override on 8/6/2009: (N/S)    Lipid Panel 04/17/2019 4/17/2018    TETANUS VACCINE 03/17/2024 3/17/2014        No orders of the defined types were placed in this encounter.    The following information is provided to all patients.  This information is to help you find resources for any of the problems found today that may be affecting your health:                Living healthy guide: www.[facility].[place].[facility]      Understanding Diabetes: www.diabetes.org      Eating healthy: www.cdc.gov/healthyweight      [facility] home safety checklist: www.cdc.gov/steadi/patient.html      Agency on Aging: www.goea.[place].[facility]      Alcoholics anonymous (AA): www.aa.org      Physical Activity: www.isaiah.nih.gov/si5vgfe      Tobacco use: www.quitwithusla.org     Exercises to Prevent Falls  Certain types of exercises may help make you less likely to fall. Try the ones below. Or do other exercises that your health care provider suggests. Depending on your health, you may need to start slowly. Don't let that stop you. Even small amounts of exercise can help you. Be sure to talk to your health care provider before starting any exercise program.       Improve balance  Many types of exercise can help improve balance. Frank chi and yoga are good examples. Here's another one to try. You can do it anytime and almost anywhere.  · Stand next to a counter or solid support.  · Push yourself up onto your tiptoes.  · Hold for 5 seconds. If you start to lose your balance, hold on to the counter.  · Rest and repeat 5 times. Work up to holding for 20 to 30 seconds, if you can. Increase flexibility  Being more flexible makes it easier for you to move around safely. Try exercises like the seated hamstring  "stretch.  · Sit in a chair and put one foot on a stool.  · Straighten your leg and reach with both hands down either side of your leg. Reach as far down your leg as you can.  · Hold for about 20 seconds.  · Go back to the starting position. Then repeat 5 times. Switch legs. Build strength  "Resistance" exercises help build strength. You can do them without equipment. Or you can use weights, elastic bands, or special machines. One such exercise is called the biceps curl. You can hold a 1-pound weight or even a can of soup. Do this exercise at least 3 times a week. Strive for every day.  · Sit up straight in a chair.  · Keep your elbow close to your body and your wrist straight.  · Bend your arm, moving your hand up to your shoulder. Then slowly lower your arm.  · Repeat 5 times. Switch to the other arm.   Build your staying power  Aerobic exercises make your heart and lungs stronger so you can keep moving longer. Walking and swimming are two of the best types of exercises you can do. Using a stationary bike is great, too. Find an aerobic exercise that you enjoy. Start slowly and build up. Even 5 minutes is helpful. Aim for a goal of 30 minutes, at least 3 times a week. You don't have to do 30 minutes in 1 session. Break it up and walk a little throughout the day.  More helpful tips  · Start easy. Slowly work up to doing more.  · Talk with your health care provider about the best exercises for you.  · Call senior centers or health clubs about exercise programs.  · If needed, have a family member watch you walk every so often to check your stability.  · Exercise with a friend. Choose an activity you both enjoy.  · Consider eli chi or yoga to strengthen your balance.  · Try exercises that you can do anytime, anywhere. Here are 2 examples. Have someone with you when you first try these:  ¨ Practice walking by placing 1 foot right in front of the other.  ¨ Stand up and sit down 10 times. Repeat this throughout the day. "   Date Last Reviewed: 6/13/2015  © 4499-8197 [employer], "[employer]". [address] [place] [address], [place] [address]. All rights reserved. This information is not intended as a substitute for professional medical care. Always follow your healthcare professional's instructions.

## 2018-04-26 NOTE — PROGRESS NOTES
"Estefania Mccollum presented for a  Medicare AWV and comprehensive Health Risk Assessment today. The following components were reviewed and updated:    · Medical history  · Family History  · Social history  · Allergies and Current Medications  · Health Risk Assessment  · Health Maintenance  · Care Team     ** See Completed Assessments for Annual Wellness Visit within the encounter summary.**       The following assessments were completed:  · Living Situation  · CAGE  · Depression Screening  · Timed Get Up and Go  · Whisper Test  · Cognitive Function Screening  · Nutrition Screening  · ADL Screening  · PAQ Screening    Vitals:    04/26/18 0929   BP: (!) 148/88   BP Location: Left arm   Patient Position: Sitting   Pulse: 82   Weight: 68.9 kg (152 lb)   Height: 5' 7" (1.702 m)     Body mass index is 23.81 kg/m².     Physical Exam   Constitutional: She is oriented to person, place, and time. She appears well-developed and well-nourished. No distress.   HENT:   Head: Normocephalic and atraumatic.   Cardiovascular: Normal rate, regular rhythm and normal heart sounds.    No murmur heard.  Pulmonary/Chest: Effort normal and breath sounds normal. No respiratory distress. She has no wheezes. She has no rales.   Musculoskeletal: She exhibits no edema, tenderness or deformity.   Slow gait   Neurological: She is alert and oriented to person, place, and time.   Memory loss   Skin: Skin is warm and dry. She is not diaphoretic.   Psychiatric: She has a normal mood and affect. Her behavior is normal. She expresses no homicidal and no suicidal ideation. She expresses no suicidal plans and no homicidal plans.   Vitals reviewed.        Diagnoses and health risks identified today and associated recommendations/orders:    1. Encounter for preventive health examination  Health Maintenance reviewed.  - DXA Bone Density Spine And Hip; Future    2. Osteopenia, unspecified location  Stable.  Calcium and Vitamin D.  Followed by PCP.  - DXA Bone " Density Spine And Hip; Future    3. Postmenopausal  - DXA Bone Density Spine And Hip; Future    4. Aortic atherosclerosis  Stable.   On statin and ASA.  Followed by PCP.     5. Essential hypertension  Slightly elevated. Asymptomatic.  Sees PCP today.  Low sodium diet.  Continue current medication.  Followed by PCP.     6. Hypothyroidism, unspecified type  Stable.   Continue current medication.  Followed by PCP.     7. Pure hypercholesterolemia  Stable.   Continue current medication.  Followed by PCP.     8. Sensorineural hearing loss (SNHL) of both ears  Stable.   Followed by ENT.     9. MCI (mild cognitive impairment)  Stable.   Continue current medication.  Followed by Neurology.     10. Primary osteoarthritis of left knee  Stable.   Followed by Ortho.     11. Balance problem  Stable.   Referred to PT by Neurology.  Followed by Neurology.       Provided Estefania with a 5-10 year written screening schedule and personal prevention plan. Recommendations were developed using the USPSTF age appropriate recommendations. Education, counseling, and referrals were provided as needed. After Visit Summary printed and given to patient which includes a list of additional screenings\tests needed.    Follow-up for PCP visit today. Return in 1 year for AWV.    Radha Weiss NP

## 2018-04-26 NOTE — PROGRESS NOTES
Mrs. Mccollum was in today with c/o hearing better without her hearing aids than with them. She also c/o broken removal handles on the custom shells (this makes it very hard for her to remove the hearing aids). After discussing her options, Mrs. Mccollum elected to send off both hearing aids for an in warranty repair of the removal handle and an overall check of the devices to ensure they are working properly. Otoscopy revealed slight non-occluding cerumen (more significant AS), bilaterally. Mrs. Mccollum requested to be seen by Dr. Oquendo today; however, that was not possible due to his schedule. An appointment was made in 2 weeks for cerumen removal with Dr. Oquendo. Immediately following this appointment, Mrs. Mccollum will see me to p/u her repaired hearing aids and determine what, if any, adjustments need to be made. Additionally, we will confirm that the devices are programmed according to her most recent audiogram from March 2018.

## 2018-04-26 NOTE — PROGRESS NOTES
Subjective:      Patient ID: Estefania Mccollum is a 83 y.o. female.    Chief Complaint: Annual Exam    HPI:  HPI   Labs were done prior to the exam  Hypothyroidism on medication and doing well  Hyperlipidemia on medications, no side effects   A1C : has been normal range  CMP: stable,alkaline phosphatase slightly elevated   CBC: normal  Blood pressure: lisinopril 20 mg , doing well on current dose of medication, discussed salt reduction. She is taking an asa   Memory: followed by Dr. Preston on Nemenda 10 mg daily    Patient states that she has imbalance problems. She is considering going to PT.    Annual exam: Reviewed 3/26/2018  Colonoscopy:  , 10/2/13  Mammogram: 2017  Gyn: Patient may make appt  Optho: Dr. Thibodeaux due in October  Flu: 2017  Tetanus  3/17/2014  Shingles: 2007  Pneumovax: 10/16/2002  Bone Density: 2013 3/29/2016        Patient Active Problem List   Diagnosis    Hypertension    Hypothyroidism    Hyperlipidemia    Retinal drusen - Both Eyes    Hearing loss, sensorineural    History of colonic polyps    Osteopenia    MCI (mild cognitive impairment)    Memory loss    Primary osteoarthritis of left knee    Aortic atherosclerosis    Balance problem     Past Medical History:   Diagnosis Date    Adenomatous polyp     Allergy     sinus    Annual physical exam 2011    Arthritis     osteoarthritis    Cataract     History of bone density study 2009    History of colonoscopy 10/30/2008    History of mammogram 2011    Hyperlipidemia     Hypertension     Macular degeneration     Postmenopausal hormone replacement therapy     therapy stopped    Thyroid disease     hypothyroidism    Vertigo      Past Surgical History:   Procedure Laterality Date    APPENDECTOMY      12 years old    CATARACT EXTRACTION      Both eyes    CATARACT EXTRACTION, BILATERAL       SECTION      x 3    CHOLECYSTECTOMY      COLONOSCOPY  10/02/2013    HYSTERECTOMY       "total    JOINT REPLACEMENT Left     OOPHORECTOMY      TONSILLECTOMY      TOTAL KNEE ARTHROPLASTY Left 12/2016    Yag       Left Eye     Family History   Problem Relation Age of Onset    Heart failure Mother     Hypertension Mother     Hyperlipidemia Mother     Heart failure Father     Hypertension Father     Hyperlipidemia Father     Asthma Father     Hypertension Sister     Diabetes Sister      Prediabetes    Heart attack Brother     No Known Problems Daughter     Heart disease Brother      Has had open heart surgery    No Known Problems Brother     Mental retardation Daughter     No Known Problems Daughter     Coronary artery disease Neg Hx     Amblyopia Neg Hx     Blindness Neg Hx     Cataracts Neg Hx     Glaucoma Neg Hx     Macular degeneration Neg Hx     Retinal detachment Neg Hx     Strabismus Neg Hx     Melanoma Neg Hx     Psoriasis Neg Hx     Lupus Neg Hx     Eczema Neg Hx     Acne Neg Hx      Review of Systems   Constitutional: Negative for chills, fever and unexpected weight change.   HENT: Negative for trouble swallowing.    Respiratory: Negative for cough, shortness of breath and wheezing.    Cardiovascular: Negative for chest pain and palpitations.   Gastrointestinal: Negative for abdominal distention, abdominal pain, blood in stool and vomiting.   Musculoskeletal: Negative for back pain.     Objective:     Vitals:    04/26/18 1259   BP: 126/82   Pulse: 78   SpO2: 96%   Weight: 68.4 kg (150 lb 12.7 oz)   Height: 5' 7" (1.702 m)   PainSc: 0-No pain     Body mass index is 23.62 kg/m².  Physical Exam   Constitutional: She is oriented to person, place, and time. She appears well-developed and well-nourished. No distress.   Neck: Carotid bruit is not present. No thyromegaly present.   Cardiovascular: Normal rate, regular rhythm and normal heart sounds.  PMI is not displaced.    Pulmonary/Chest: Effort normal and breath sounds normal. No respiratory distress.   Abdominal: " Soft. Bowel sounds are normal. She exhibits no distension. There is no tenderness.   Musculoskeletal: She exhibits no edema.   Neurological: She is alert and oriented to person, place, and time.     Assessment:     1. Physical exam    2. Wellness examination    3. Encounter for screening mammogram for breast cancer    4. Postmenopausal bone loss    5. Balance problem    6. Pure hypercholesterolemia    7. Essential hypertension    8. Hypothyroidism, unspecified type    9. MCI (mild cognitive impairment)      Plan:   Estefania was seen today for follow-up.    Diagnoses and all orders for this visit:    Estefania was seen today for annual exam.    Diagnoses and all orders for this visit:    Physical exam: updated and reviewed    Wellness examination: discussed labs    Encounter for screening mammogram for breast cancer  -     Mammo Digital Screening Bilat with Tomosynthesis CAD; Future    Postmenopausal bone loss  -     DXA Bone Density Spine And Hip; Future    Balance problem : consider PT    Pure hypercholesterolemia: continue statin    Essential hypertension: continue same med    Hypothyroidism, unspecified type: continue same thyroid    MCI (mild cognitive impairment): followed by Dr. Preston      Follow up 6 months

## 2018-04-29 RX ORDER — LEVOTHYROXINE SODIUM 100 UG/1
TABLET ORAL
Qty: 90 TABLET | Refills: 3 | Status: SHIPPED | OUTPATIENT
Start: 2018-04-29 | End: 2019-04-26 | Stop reason: SDUPTHER

## 2018-05-01 ENCOUNTER — TELEPHONE (OUTPATIENT)
Dept: INTERNAL MEDICINE | Facility: CLINIC | Age: 83
End: 2018-05-01

## 2018-05-01 NOTE — TELEPHONE ENCOUNTER
----- Message from Anisha Roldan sent at 4/30/2018  3:58 PM CDT -----  Contact: Amber/ Physical Therapy Specialist/ [phone]  Amber need the referral for P.JOSIAH Faxed to her @ [phone].

## 2018-05-10 ENCOUNTER — CLINICAL SUPPORT (OUTPATIENT)
Dept: AUDIOLOGY | Facility: CLINIC | Age: 83
End: 2018-05-10

## 2018-05-10 ENCOUNTER — OFFICE VISIT (OUTPATIENT)
Dept: OTOLARYNGOLOGY | Facility: CLINIC | Age: 83
End: 2018-05-10
Payer: MEDICARE

## 2018-05-10 VITALS
BODY MASS INDEX: 23.48 KG/M2 | DIASTOLIC BLOOD PRESSURE: 79 MMHG | WEIGHT: 149.94 LBS | SYSTOLIC BLOOD PRESSURE: 124 MMHG

## 2018-05-10 DIAGNOSIS — H93.8X3 SENSATION OF FULLNESS IN BOTH EARS: Primary | ICD-10-CM

## 2018-05-10 DIAGNOSIS — H61.23 BILATERAL IMPACTED CERUMEN: ICD-10-CM

## 2018-05-10 DIAGNOSIS — H90.3 SENSORINEURAL HEARING LOSS, BILATERAL: Primary | ICD-10-CM

## 2018-05-10 PROCEDURE — 3078F DIAST BP <80 MM HG: CPT | Mod: CPTII,S$GLB,, | Performed by: OTOLARYNGOLOGY

## 2018-05-10 PROCEDURE — 3074F SYST BP LT 130 MM HG: CPT | Mod: CPTII,S$GLB,, | Performed by: OTOLARYNGOLOGY

## 2018-05-10 PROCEDURE — 99213 OFFICE O/P EST LOW 20 MIN: CPT | Mod: 25,S$GLB,, | Performed by: OTOLARYNGOLOGY

## 2018-05-10 PROCEDURE — 69210 REMOVE IMPACTED EAR WAX UNI: CPT | Mod: S$GLB,,, | Performed by: OTOLARYNGOLOGY

## 2018-05-10 PROCEDURE — 99999 PR PBB SHADOW E&M-EST. PATIENT-LVL II: CPT | Mod: PBBFAC,,, | Performed by: OTOLARYNGOLOGY

## 2018-05-10 RX ORDER — OMEPRAZOLE 40 MG/1
40 CAPSULE, DELAYED RELEASE ORAL EVERY MORNING
Qty: 90 CAPSULE | Refills: 3 | Status: SHIPPED | OUTPATIENT
Start: 2018-05-10 | End: 2019-04-09 | Stop reason: SDUPTHER

## 2018-05-10 RX ORDER — PRAVASTATIN SODIUM 80 MG/1
80 TABLET ORAL DAILY
Qty: 90 TABLET | Refills: 3 | Status: SHIPPED | OUTPATIENT
Start: 2018-05-10 | End: 2019-04-09 | Stop reason: SDUPTHER

## 2018-05-10 RX ORDER — LISINOPRIL 20 MG/1
20 TABLET ORAL DAILY
Qty: 90 TABLET | Refills: 3 | Status: SHIPPED | OUTPATIENT
Start: 2018-05-10 | End: 2018-12-12 | Stop reason: SDUPTHER

## 2018-05-10 NOTE — PROGRESS NOTES
Mrs. Mccollum picked up her repaired hearing aids. She expressed satisfaction with the volume and sound quality of the hearing aids at today's appointment. We practiced insertion and removal multiple times. Additionally, we reviewed the difference between right and left hearing aids and I reiterated the importance of turning the hearing aids on when she takes them out of the . Mrs. Mccollum was encouraged to call if she has any issues.

## 2018-05-10 NOTE — TELEPHONE ENCOUNTER
----- Message from Diana Bishop sent at 5/10/2018  2:14 PM CDT -----  Contact: Patient [phone]  Rx Name: omeprazole (PRILOSEC) 40 MG capsule  lisinopril (PRINIVIL,ZESTRIL) 20 MG tablet  pravastatin (PRAVACHOL) 80 MG tablet    Refill: yes    Pharmacy: [facility] - [facility] [address]    Comments: 90 day supply. Call patient once Rx sent    Please call and advise.    Thank You

## 2018-05-10 NOTE — PROGRESS NOTES
Subjective:       Patient ID: Estefania Mccollum is a 83 y.o. female.    Chief Complaint: Ear Fullness and Cerumen Impaction    Ear Fullness    There is pain in both ears. This is a recurrent problem. The current episode started 1 to 4 weeks ago. The problem occurs constantly. The problem has been unchanged. There has been no fever. The patient is experiencing no pain. Associated symptoms include hearing loss. Pertinent negatives include no coughing, diarrhea, ear discharge, headaches, neck pain, rash, rhinorrhea or vomiting. She has tried nothing for the symptoms. Her past medical history is significant for hearing loss.     Review of Systems   Constitutional: Negative for activity change, appetite change and fever.   HENT: Positive for hearing loss. Negative for congestion, ear discharge, ear pain, nosebleeds, postnasal drip, rhinorrhea and sneezing.    Eyes: Negative for redness and visual disturbance.   Respiratory: Negative for apnea, cough, shortness of breath and wheezing.    Cardiovascular: Negative for chest pain and palpitations.   Gastrointestinal: Negative for diarrhea and vomiting.   Genitourinary: Negative for difficulty urinating and frequency.   Musculoskeletal: Negative for arthralgias, back pain, gait problem and neck pain.   Skin: Negative for color change and rash.   Neurological: Negative for dizziness, speech difficulty, weakness and headaches.   Hematological: Negative for adenopathy. Does not bruise/bleed easily.   Psychiatric/Behavioral: Negative for agitation and behavioral problems.       Objective:        Constitutional:   Vital signs are normal. She appears well-developed and well-nourished. She is active. Normal speech.      Head:  Normocephalic and atraumatic. Salivary glands normal.  Facial strength is normal.      Nose:  Nose normal including turbinates, nasal mucosa, sinuses and nasal septum.     Mouth/Throat  Oropharynx clear and moist without lesions or asymmetry and normal uvula  midline.     Neck:  Neck normal without thyromegaly masses, asymmetry, normal tracheal structure, crepitus, and tenderness, thyroid normal, trachea normal, phonation normal and full range of motion with neck supple.     Psychiatric:   She has a normal mood and affect. Her speech is normal and behavior is normal.     Neurological:   She has neurological normal, alert and oriented.     Skin:   No abrasions, lacerations, lesions, or rashes.       PROCEDURE NOTE:  Ceruminosis is noted in both EACs.  Wax was removed by manual debridement and suctioning utilizing the assistance of binocular microscopy revealing EACs and TMs WNL. The patient tolerated this procedure without difficulty. The subjective decrease noted in hearing pre-cleaning was resolved post-cleaning.      Assessment:       1. Sensation of fullness in both ears    2. Bilateral impacted cerumen        Plan:       1. Hearing conservation strongly recommended.  2. Trial of amplification bilaterally also recommended (patient picking up new one's today)  3. Re-check of hearing in 18-24 months or sooner if subjective change noted.  4. F/U with PCP as per schedule.

## 2018-06-04 RX ORDER — OXYBUTYNIN CHLORIDE 5 MG/1
TABLET ORAL
Qty: 180 TABLET | Refills: 3 | Status: SHIPPED | OUTPATIENT
Start: 2018-06-04 | End: 2019-04-09 | Stop reason: SDUPTHER

## 2018-06-28 ENCOUNTER — OFFICE VISIT (OUTPATIENT)
Dept: DERMATOLOGY | Facility: CLINIC | Age: 83
End: 2018-06-28
Payer: MEDICARE

## 2018-06-28 DIAGNOSIS — Z12.83 SCREENING EXAM FOR SKIN CANCER: ICD-10-CM

## 2018-06-28 DIAGNOSIS — L82.0 INFLAMED SEBORRHEIC KERATOSIS: Primary | ICD-10-CM

## 2018-06-28 DIAGNOSIS — L81.4 LENTIGO: ICD-10-CM

## 2018-06-28 DIAGNOSIS — L82.1 SK (SEBORRHEIC KERATOSIS): ICD-10-CM

## 2018-06-28 DIAGNOSIS — L73.8 SEBACEOUS GLAND HYPERPLASIA: ICD-10-CM

## 2018-06-28 PROCEDURE — 17110 DESTRUCTION B9 LES UP TO 14: CPT | Mod: S$GLB,,, | Performed by: DERMATOLOGY

## 2018-06-28 PROCEDURE — 99213 OFFICE O/P EST LOW 20 MIN: CPT | Mod: 25,S$GLB,, | Performed by: DERMATOLOGY

## 2018-06-28 PROCEDURE — 99999 PR PBB SHADOW E&M-EST. PATIENT-LVL II: CPT | Mod: PBBFAC,,, | Performed by: DERMATOLOGY

## 2018-06-28 NOTE — PROGRESS NOTES
"  Subjective:       Patient ID:  Estefania Mccollum is a 83 y.o. female who presents for   Chief Complaint   Patient presents with    Spot     face, years, asymptomatic, no prev tx    Skin Check     tbse     History of Present Illness: The patient presents for follow up of skin check.    The patient was last seen on: 9/17 for skin check (TBSE)  No h/o nmsc   Other skin complaints: spot on left cheek x many years "bothers pt".          Review of Systems   Skin: Positive for daily sunscreen use and activity-related sunscreen use. Negative for itching, rash and recent sunburn.   Hematologic/Lymphatic: Does not bruise/bleed easily.        Objective:    Physical Exam   Constitutional: She appears well-developed and well-nourished. No distress.   Neurological: She is alert and oriented to person, place, and time. She is not disoriented.   Psychiatric: She has a normal mood and affect.   Skin:   Areas Examined (abnormalities noted in diagram):   Head / Face Inspection Performed  Neck Inspection Performed  Chest / Axilla Inspection Performed  Back Inspection Performed  RUE Inspected  LUE Inspection Performed                   Diagram Legend     Erythematous scaling macule/papule c/w actinic keratosis       Vascular papule c/w angioma      Pigmented verrucoid papule/plaque c/w seborrheic keratosis      Yellow umbilicated papule c/w sebaceous hyperplasia      Irregularly shaped tan macule c/w lentigo     1-2 mm smooth white papules consistent with Milia      Movable subcutaneous cyst with punctum c/w epidermal inclusion cyst      Subcutaneous movable cyst c/w pilar cyst      Firm pink to brown papule c/w dermatofibroma      Pedunculated fleshy papule(s) c/w skin tag(s)      Evenly pigmented macule c/w junctional nevus     Mildly variegated pigmented, slightly irregular-bordered macule c/w mildly atypical nevus      Flesh colored to evenly pigmented papule c/w intradermal nevus       Pink pearly papule/plaque c/w basal cell " carcinoma      Erythematous hyperkeratotic cursted plaque c/w SCC      Surgical scar with no sign of skin cancer recurrence      Open and closed comedones      Inflammatory papules and pustules      Verrucoid papule consistent consistent with wart     Erythematous eczematous patches and plaques     Dystrophic onycholytic nail with subungual debris c/w onychomycosis     Umbilicated papule    Erythematous-base heme-crusted tan verrucoid plaque consistent with inflamed seborrheic keratosis     Erythematous Silvery Scaling Plaque c/w Psoriasis     See annotation      Assessment / Plan:        Inflamed seborrheic keratosis - left cheek  Procedure note for destruction via hyfrecation and curettage:    Verbal consent obtained. Risks of recurrence and scarring discussed.   1 lesions cleaned with alcohol and anesthetized with 1% lidocaine with epinephrine. Areas then lightly hyfrecated and curetted to remove gross lesion. Hemostasis achieved with aluminum chloride application. No complications.   Areas dressed with Vaseline jelly and bandage. Wound care discussed.      SK (seborrheic keratosis)  These are benign inherited growths without a malignant potential. Reassurance given to patient. No treatment is necessary.       Lentigo   - stable and chronic      Sebaceous gland hyperplasia  This is a common condition representing benign enlargement of the sebaceous lobule. It typically occurs in adulthood. Reassurance given to patient.       Screening exam for skin cancer    Upper body skin examination performed today including at least 6 points as noted in physical examination. No lesions suspicious for malignancy noted.               Follow-up in about 1 year (around 6/28/2019), or if symptoms worsen or fail to improve.

## 2018-06-28 NOTE — PATIENT INSTRUCTIONS

## 2018-07-26 ENCOUNTER — TELEPHONE (OUTPATIENT)
Dept: INTERNAL MEDICINE | Facility: CLINIC | Age: 83
End: 2018-07-26

## 2018-07-26 NOTE — TELEPHONE ENCOUNTER
----- Message from Ibis Boyd sent at 7/26/2018  2:05 PM CDT -----  Contact: Autumn  Pt  (Alejandro) is in the hospital. Pt is not taking it well and the nurses on the 10th floor is stating she need to be seen by someone.    Autumn would like a call back at (h) [phone] or (c) [phone].    Thank You

## 2018-07-26 NOTE — TELEPHONE ENCOUNTER
----- Message from Jeanine Núñez sent at 7/26/2018  3:58 PM CDT -----  Contact: pt daughter Autumn can be reached at [phone]  Autumn is calling to speak with the nurse concerning pt dementia. Pt is causing problems, pt  is in the hospital.     Thank you!

## 2018-07-26 NOTE — TELEPHONE ENCOUNTER
I spoke to the patient and hopefully convinced her to go home with her sister and return with Emani tomorrow. GML

## 2018-08-27 ENCOUNTER — TELEPHONE (OUTPATIENT)
Dept: NEUROLOGY | Facility: CLINIC | Age: 83
End: 2018-08-27

## 2018-08-27 NOTE — TELEPHONE ENCOUNTER
----- Message from Daya Walden sent at 2018  9:28 AM CDT -----  Contact: SUYAPA - DAUGHTER  Estefania Mccollum  MRN: [id number]  : 10/31/1934  PCP: Danna Levine  Home Phone      [phone]  Work Phone      Not on file.  Mobile          [phone]      MESSAGE: Daughter would like to speak with someone prior to the patients appointment tomorrow. Daughter would like to give an update on the patient.        Phone: [phone]

## 2018-08-27 NOTE — TELEPHONE ENCOUNTER
Spoke with daughter Emani who wanted to report some changes with her mom that she noticed and felt it was important for her upcoming visit tomorrow. About 4 weeks ago her dad had a major surgery due to having a Sarcoma, she no doubtabley noticed how much her mom depended on her dad but of course her mom won't say that. In the last 4 weeks which she feels may have been more stress related due to all what was going on and she does not think her mom was taking her medication like she was suppose to her memory issues were more noticeable to them then they had been before and that's how they knew how much their mom depended on the dad. Emani states that overall she has noticed a decline in her mom with her memory and more anxious but she doesn't feel that its to the point that she needs to take things away from her but knows that she needs help for instance driving long distances [place] she has arranged for a sitter to check on them twice a day making sure medications are being taking from the pill boxes and that they are eating and whatever else they may need. Her dad is doing much better and does not feel that a sitter all day long is necessary as they can still do for themselves. She says that her mom still drives close to home like to [employer], store, and hairdresser and she feels safe with her doing that at this time and is trying to make her mom understand the reason why she is lining up drivers to take them to there other appointments like [place] and even [place]. She was questioning if it was time to add another medication with her moms Namenda as she knows when her mom comes that she tells you she is doing good.

## 2018-08-28 ENCOUNTER — OFFICE VISIT (OUTPATIENT)
Dept: NEUROLOGY | Facility: CLINIC | Age: 83
End: 2018-08-28
Payer: MEDICARE

## 2018-08-28 VITALS
BODY MASS INDEX: 23.31 KG/M2 | HEART RATE: 82 BPM | DIASTOLIC BLOOD PRESSURE: 76 MMHG | WEIGHT: 145.06 LBS | RESPIRATION RATE: 16 BRPM | HEIGHT: 66 IN | SYSTOLIC BLOOD PRESSURE: 132 MMHG

## 2018-08-28 DIAGNOSIS — R42 VERTIGO: ICD-10-CM

## 2018-08-28 DIAGNOSIS — R26.89 BALANCE PROBLEM: ICD-10-CM

## 2018-08-28 DIAGNOSIS — R41.3 MEMORY LOSS: Primary | ICD-10-CM

## 2018-08-28 PROCEDURE — 99214 OFFICE O/P EST MOD 30 MIN: CPT | Mod: S$GLB,,, | Performed by: PSYCHIATRY & NEUROLOGY

## 2018-08-28 PROCEDURE — 99999 PR PBB SHADOW E&M-EST. PATIENT-LVL III: CPT | Mod: PBBFAC,,, | Performed by: PSYCHIATRY & NEUROLOGY

## 2018-08-28 PROCEDURE — 3075F SYST BP GE 130 - 139MM HG: CPT | Mod: CPTII,S$GLB,, | Performed by: PSYCHIATRY & NEUROLOGY

## 2018-08-28 PROCEDURE — 3078F DIAST BP <80 MM HG: CPT | Mod: CPTII,S$GLB,, | Performed by: PSYCHIATRY & NEUROLOGY

## 2018-08-28 RX ORDER — DONEPEZIL HYDROCHLORIDE 5 MG/1
5 TABLET, FILM COATED ORAL NIGHTLY
Qty: 90 TABLET | Refills: 3 | Status: SHIPPED | OUTPATIENT
Start: 2018-08-28 | End: 2019-03-12 | Stop reason: SDUPTHER

## 2018-08-28 NOTE — PROGRESS NOTES
HPI:  Estefania Mccollum is a 83 y.o. female known to me for  Vertigo. Responds to PT. Some vague symptoms at times, but improved with normal MRI brain prior  Also has mild cognitive impairment like symptoms vs Early Alzheimer's disease.     The patient's daughter called yesterday asking if another med could be added as the patient's memory is still challanged and sometimes worse.  Her  has been sick with sarcoma recently and family hired sitters to help with the couple's distance driving and medication oversight.  Patient has been having to drive more due to her 's leg surgery.   The patient is still driving well locally per her daughter.  She was advised to try a local silver sneakers like program as she declined PT and balance is no worse/ beter  She is sleeping better    Her dizziness has been inactive  The patient seemed a bit more forgetful when her  was in the hospital and she was getting little sleep in the  bed there.         Review of Systems   Constitutional: Negative for fever.   HENT: Negative for nosebleeds.    Eyes: Negative for double vision.   Respiratory: Negative for shortness of breath.    Cardiovascular: Negative for chest pain.   Gastrointestinal: Negative for blood in stool.   Genitourinary: Negative for hematuria.   Musculoskeletal: Negative for falls.   Skin: Negative for rash.   Neurological: Negative for dizziness, tremors, sensory change, focal weakness, seizures and headaches.   Psychiatric/Behavioral: Positive for memory loss.       Objective:      Physical Exam      Exam:  Gen Appearance, well developed/nourished in no apparent distress  CV: 2+ distal pulses with no edema or swelling  Neuro:  MS: Awake, alert, sustains attention. She is  oriented to all except exact date today, and knows today is Tuesday.   Recent recall is intact to her recent meals but repeats self once during visit/remote memory intact, Language is full to spontaneous speech/comprehension.  Fund of Knowledge is full  CN: Optic discs are flat with normal vasculature, PERRL, Extraoccular movements and visual fields are full. Normal facial sensation and strength, Hearing symmetric, Tongue and Palate are midline and strong. Shoulder Shrug symmetric and strong.  Motor: Normal bulk, tone, no abnormal movements. 5/5 strength bilateral upper/lower extremities with 2+ reflexes and no clonus  Sensory:  Intact to temp and vibration Romberg negative  Cerebellar: Finger-nose,Rapid alternating movements intact  Gait: Normal stance, no ataxia    CT head 10/2016: 1.  No CT evidence of an acute intracranial abnormality.  2.  Atrophy and small vessel ischemic changes of the periventricular white matter    Labs: 10/2016 TSH and B12 normal  1/2017 CMP, CBC unremarkable  2  Assessment:     Estefania Mccollum is a 83 y.o. female known to me for  Vertigo. Responds to PT. Some vague symptoms at times, but improved with normal MRI brain prior  Also has mild cognitive impairment like symptoms vs Early Alzheimer's disease.     Plan:   1. Continue Namenda to 10mg BID Discussed the further  treatment being good diet and physical and memory exercise.   2. Aricept to be added per orders unless light headedness or GI upset  3. Driving locally to familiar areas in the daytime can continue unless accidents or incidents are noted. She plans to taper driving for any worsening memory loss. She has stopped driving to unfamiliar places. We discussed that any accidents or incidents would be the best predictor that she should stop driving.   4. PT PRN vertigo helps. Meclizine causes her too many side effects. She has seen ENT and continues to follow with ENT about otalgia/ hearing loss/ prior infections  5. Referred to silver sneakers program given her difficulty with balance which may be age related- helping  6. Insomnia resolved with better sleep hygeine    RTC 6 months

## 2018-09-09 DIAGNOSIS — R41.3 MEMORY LOSS: ICD-10-CM

## 2018-09-10 RX ORDER — MEMANTINE HYDROCHLORIDE 10 MG/1
TABLET ORAL
Qty: 180 TABLET | Refills: 3 | Status: SHIPPED | OUTPATIENT
Start: 2018-09-10 | End: 2019-03-12 | Stop reason: SDUPTHER

## 2018-09-10 RX ORDER — MEMANTINE HYDROCHLORIDE 10 MG/1
10 TABLET ORAL 2 TIMES DAILY
Qty: 30 TABLET | Refills: 0 | Status: SHIPPED | OUTPATIENT
Start: 2018-09-10 | End: 2019-03-12 | Stop reason: SDUPTHER

## 2018-09-10 NOTE — TELEPHONE ENCOUNTER
Emani states that the patient needs a short term refill on Memantine 10 mg BID sent to [facility].  She also needs a 90 day supply of the same medication sent to [facility]

## 2018-09-14 ENCOUNTER — DOCUMENTATION ONLY (OUTPATIENT)
Dept: AUDIOLOGY | Facility: CLINIC | Age: 83
End: 2018-09-14

## 2018-09-14 NOTE — PROGRESS NOTES
Skye Adams B90-R   Aid lost: 11/29/16 (Lt SN: [id number])    New SN: [id number]  Warranty Ends: 12/26/19

## 2018-09-17 ENCOUNTER — HOSPITAL ENCOUNTER (OUTPATIENT)
Dept: RADIOLOGY | Facility: HOSPITAL | Age: 83
Discharge: HOME | End: 2018-09-17
Attending: INTERNAL MEDICINE
Payer: MEDICARE

## 2018-09-17 DIAGNOSIS — Z12.31 ENCOUNTER FOR SCREENING MAMMOGRAM FOR BREAST CANCER: ICD-10-CM

## 2018-09-17 PROCEDURE — 77063 BREAST TOMOSYNTHESIS BI: CPT | Mod: 26,,, | Performed by: RADIOLOGY

## 2018-09-17 PROCEDURE — 77067 SCR MAMMO BI INCL CAD: CPT | Mod: 26,,, | Performed by: RADIOLOGY

## 2018-09-17 PROCEDURE — 77067 SCR MAMMO BI INCL CAD: CPT | Mod: TC,PO

## 2018-10-23 ENCOUNTER — OFFICE VISIT (OUTPATIENT)
Dept: OPTOMETRY | Facility: CLINIC | Age: 83
End: 2018-10-23
Payer: COMMERCIAL

## 2018-10-23 DIAGNOSIS — H35.363 RETINAL DRUSEN, BILATERAL: ICD-10-CM

## 2018-10-23 DIAGNOSIS — Z96.1 PSEUDOPHAKIA OF BOTH EYES: ICD-10-CM

## 2018-10-23 DIAGNOSIS — Z13.5 SCREENING FOR GLAUCOMA: ICD-10-CM

## 2018-10-23 DIAGNOSIS — H52.4 PRESBYOPIA: Primary | ICD-10-CM

## 2018-10-23 PROCEDURE — 92014 COMPRE OPH EXAM EST PT 1/>: CPT | Mod: S$GLB,,, | Performed by: OPTOMETRIST

## 2018-10-23 PROCEDURE — 99212 OFFICE O/P EST SF 10 MIN: CPT | Mod: PBBFAC,PO | Performed by: OPTOMETRIST

## 2018-10-23 PROCEDURE — 92015 DETERMINE REFRACTIVE STATE: CPT | Mod: S$GLB,,, | Performed by: OPTOMETRIST

## 2018-10-23 PROCEDURE — 99999 PR PBB SHADOW E&M-EST. PATIENT-LVL II: CPT | Mod: PBBFAC,,, | Performed by: OPTOMETRIST

## 2018-10-23 NOTE — PROGRESS NOTES
HPI     DLS:10/17/2018  Pt states  No [facility] changes OTC reader +2.50.  No problems with distance  No f/f   No gtts    Last edited by Tyler Thibodeaux, OD on 10/23/2018  1:27 PM. (History)        ROS     Positive for: Eyes (cat surgery OU)    Negative for: Constitutional, Gastrointestinal, Neurological, Skin,   Genitourinary, Musculoskeletal, HENT, Endocrine, Cardiovascular,   Respiratory, Psychiatric, Allergic/Imm, Heme/Lymph    Last edited by Tyler Thibodeaux, OD on 10/23/2018  1:27 PM. (History)        Assessment /Plan     For exam results, see Encounter Report.    Presbyopia    Pseudophakia of both eyes    Screening for glaucoma    Retinal drusen, bilateral      1. Pt gave up on CL wear  2.  Mild pco OD SP pciol ou/YAG OS.  Pt happy w otc readers  3. Hx PXS prior to cat surgery OU.  iop low. Mod cupping. +mild fam hx.  Doubt glauc now. Will monitor  4. Old CR scar OD  5. Mild ARM changes OU. Stable.  Pt to cont w amsler grid/vitamins    PLAN:     rtc 1 yr, or immediately if amsler changes

## 2018-12-05 ENCOUNTER — TELEPHONE (OUTPATIENT)
Dept: OTOLARYNGOLOGY | Facility: CLINIC | Age: 83
End: 2018-12-05

## 2018-12-12 ENCOUNTER — CLINICAL SUPPORT (OUTPATIENT)
Dept: AUDIOLOGY | Facility: CLINIC | Age: 83
End: 2018-12-12
Payer: MEDICARE

## 2018-12-12 ENCOUNTER — OFFICE VISIT (OUTPATIENT)
Dept: OTOLARYNGOLOGY | Facility: CLINIC | Age: 83
End: 2018-12-12
Payer: MEDICARE

## 2018-12-12 ENCOUNTER — TELEPHONE (OUTPATIENT)
Dept: INTERNAL MEDICINE | Facility: CLINIC | Age: 83
End: 2018-12-12

## 2018-12-12 VITALS — SYSTOLIC BLOOD PRESSURE: 101 MMHG | TEMPERATURE: 98 F | HEART RATE: 84 BPM | DIASTOLIC BLOOD PRESSURE: 64 MMHG

## 2018-12-12 DIAGNOSIS — H61.23 BILATERAL IMPACTED CERUMEN: ICD-10-CM

## 2018-12-12 DIAGNOSIS — H90.3 SENSORY HEARING LOSS, BILATERAL: Primary | ICD-10-CM

## 2018-12-12 DIAGNOSIS — Z97.4 WEARS HEARING AID IN BOTH EARS: ICD-10-CM

## 2018-12-12 DIAGNOSIS — Z86.69 HISTORY OF HEARING LOSS: Primary | ICD-10-CM

## 2018-12-12 PROCEDURE — 92557 COMPREHENSIVE HEARING TEST: CPT | Mod: HCNC,S$GLB,, | Performed by: AUDIOLOGIST

## 2018-12-12 PROCEDURE — G0268 REMOVAL OF IMPACTED WAX MD: HCPCS | Mod: HCNC,S$GLB,, | Performed by: OTOLARYNGOLOGY

## 2018-12-12 PROCEDURE — 99499 UNLISTED E&M SERVICE: CPT | Mod: HCNC,S$GLB,, | Performed by: OTOLARYNGOLOGY

## 2018-12-12 PROCEDURE — 99999 PR PBB SHADOW E&M-EST. PATIENT-LVL IV: CPT | Mod: PBBFAC,HCNC,, | Performed by: OTOLARYNGOLOGY

## 2018-12-12 PROCEDURE — 99999 PR PBB SHADOW E&M-EST. PATIENT-LVL I: CPT | Mod: PBBFAC,HCNC,,

## 2018-12-12 RX ORDER — LISINOPRIL 20 MG/1
20 TABLET ORAL DAILY
Qty: 90 TABLET | Refills: 3 | Status: SHIPPED | OUTPATIENT
Start: 2018-12-12 | End: 2020-02-13

## 2018-12-12 NOTE — PROGRESS NOTES
Audiologic Evaluation    Estefania Mccollum was seen on the above date for a hearing evaluation. Estefania Mccollum reports a known hearing loss for which she wears amplification.     Audiometric testing via insert ear phones indicated a mild to severe sensorineural hearing loss for 250-8000 Hz in the left ear ear and a mild to profound sensorineural hearing loss for 250-8000 Hz in the right ear. Pure tone average and speech recognition threshold were in good agreement. Excellent to good  speech discrimination ability was demonstrated in quiet when novel words were presented at an amplified level in the left and right ears, respectively.      Recommendations:  1) Otologic consultation.  2) Annual audiometric testing to monitor hearing sensitivity.  3) Continued use of amplification and periodic follow-up with dispensing audiologist.

## 2018-12-12 NOTE — PATIENT INSTRUCTIONS
Dry cerumen impactions removed from both eacs; AS CI > AD CI  Audiometry reviewed, compared to previous study  Return to clinic in 4 months for ear cleaning

## 2018-12-13 NOTE — PROGRESS NOTES
CC:Ear cleaning +  HPI:Ms. Mccollum is an 84-year-old  female who is accompanied by her  today. She was evaluated by me back in June of 2013 for evaluation of hearing loss as well as removal of large cerumen impactions from both ear canals.  She was also diagnosed with otitis externa of the right ear canal then.  She had been followed by Dr. Oquendo  previously.  She is here is ostensibly here for ear cleaning procedures.   She had  completed an audiometric study earlier this year in March, the results of which were duplicated below.    She  completed another audiometric study today after her ears were cleaned.  She is not wearing her hearing aids today.  Her medication list includes Namenda and Aricept.    Past Medical History:   Diagnosis Date    Adenomatous polyp     Allergy     sinus    Annual physical exam 8/30/2011    Arthritis     osteoarthritis    Cataract     History of bone density study 8/6/2009    History of colonoscopy 10/30/2008    History of mammogram 8/19/2011    Hyperlipidemia     Hypertension     Macular degeneration     Postmenopausal hormone replacement therapy     therapy stopped    Thyroid disease     hypothyroidism    Vertigo     Allergies:  Tetracycline  Current Outpatient Medications on File Prior to Visit   Medication Sig Dispense Refill    acetaminophen (TYLENOL) 325 MG tablet Take 325 mg by mouth every 6 (six) hours as needed for Pain.      aspirin (ECOTRIN) 81 MG EC tablet Take 81 mg by mouth once daily.      calcium carbonate (OS-RAMONE) 600 mg (1,500 mg) Tab Take 600 mg by mouth once daily.       fish oil-omega-3 fatty acids 300-1,000 mg capsule Take 2 g by mouth once daily.      levothyroxine (SYNTHROID) 100 MCG tablet TAKE 1 TABLET EVERY DAY 90 tablet 3    memantine (NAMENDA) 10 MG Tab TAKE 1 TABLET TWICE DAILY 180 tablet 3    memantine (NAMENDA) 10 MG Tab Take 1 tablet (10 mg total) by mouth 2 (two) times daily. 30 tablet 0    multivitamin capsule  Take 1 capsule by mouth once daily.      omeprazole (PRILOSEC) 40 MG capsule Take 1 capsule (40 mg total) by mouth every morning. 90 capsule 3    oxybutynin (DITROPAN) 5 MG Tab TAKE 2 TABLETS AT NIGHT 180 tablet 3    pravastatin (PRAVACHOL) 80 MG tablet Take 1 tablet (80 mg total) by mouth once daily. 90 tablet 3    donepezil (ARICEPT) 5 MG tablet Take 1 tablet (5 mg total) by mouth every evening. 90 tablet 3    mometasone 0.1% (ELOCON) 0.1 % cream Apply topically once daily. For itching of outer ears. 45 g 1     No current facility-administered medications on file prior to visit.        PE:  Blood pressure 101/64 pulse 84 temperature 97.7°  General:  Alert and oriented lady in no acute distress; she is quite hard of hearing without her hearing aids  Both ears were examined under the microscope in the micro procedure room.  Some dry cerumen is removed from the right ear canal manually.  Right eardrum is intact and clear as visualized.  A larger volume of semi occlusive cerumen is removed from left ear canal manually.  A blunt curette micro forceps were used.  Left eardrum is intact and clear as visualized.  The patient is immediately sent for an audiometric study, the results of which duplicated below and  compared to her previous study.      DIAGNOSIS:     ICD-10-CM ICD-9-CM    1. History of hearing loss Z86.69 V12.49    2. Bilateral impacted cerumen H61.23 380.4    3. Wears hearing aid in both ears Z97.4 [id number]      PLAN: Dry cerumen impactions removed from both eacs; AS CI > AD CI  Audiometry reviewed, compared to previous study  Return to clinic in 4 months for ear cleaning

## 2019-02-01 ENCOUNTER — PES CALL (OUTPATIENT)
Dept: ADMINISTRATIVE | Facility: CLINIC | Age: 84
End: 2019-02-01

## 2019-02-01 ENCOUNTER — TELEPHONE (OUTPATIENT)
Dept: INTERNAL MEDICINE | Facility: CLINIC | Age: 84
End: 2019-02-01

## 2019-02-01 DIAGNOSIS — R73.09 ELEVATED GLUCOSE: ICD-10-CM

## 2019-02-01 DIAGNOSIS — E53.8 B12 NUTRITIONAL DEFICIENCY: Primary | ICD-10-CM

## 2019-02-01 DIAGNOSIS — E03.9 HYPOTHYROIDISM, UNSPECIFIED TYPE: ICD-10-CM

## 2019-02-01 DIAGNOSIS — E55.9 MILD VITAMIN D DEFICIENCY: ICD-10-CM

## 2019-02-01 DIAGNOSIS — I10 ESSENTIAL HYPERTENSION: ICD-10-CM

## 2019-02-01 NOTE — TELEPHONE ENCOUNTER
----- Message from Sherron Hatfield sent at 2/1/2019  3:03 PM CST -----  Doctor appointment and lab have been scheduled.  Please link lab orders to the lab appointment.  Date of doctor appointment:  3/14  Physical or EP:  EPP  Date of lab appointment:  3/7  Comments:

## 2019-02-07 NOTE — TELEPHONE ENCOUNTER
Outpatient Procedures Ordered This Visit    CBC auto differential              Comprehensive metabolic panel              Hemoglobin A1c              Lipid panel              TSH              Vitamin B12              Vitamin D               I believe they are near [place] for lab

## 2019-03-07 ENCOUNTER — LAB VISIT (OUTPATIENT)
Dept: LAB | Facility: HOSPITAL | Age: 84
End: 2019-03-07
Attending: INTERNAL MEDICINE
Payer: MEDICARE

## 2019-03-07 DIAGNOSIS — I10 ESSENTIAL HYPERTENSION: ICD-10-CM

## 2019-03-07 DIAGNOSIS — R73.09 ELEVATED GLUCOSE: ICD-10-CM

## 2019-03-07 DIAGNOSIS — E03.9 HYPOTHYROIDISM, UNSPECIFIED TYPE: ICD-10-CM

## 2019-03-07 DIAGNOSIS — E55.9 MILD VITAMIN D DEFICIENCY: ICD-10-CM

## 2019-03-07 DIAGNOSIS — E53.8 B12 NUTRITIONAL DEFICIENCY: ICD-10-CM

## 2019-03-07 LAB
25(OH)D3+25(OH)D2 SERPL-MCNC: 26 NG/ML
ALBUMIN SERPL BCP-MCNC: 3.9 G/DL
ALP SERPL-CCNC: 88 U/L
ALT SERPL W/O P-5'-P-CCNC: 9 U/L
ANION GAP SERPL CALC-SCNC: 9 MMOL/L
AST SERPL-CCNC: 16 U/L
BASOPHILS # BLD AUTO: 0.04 K/UL
BASOPHILS NFR BLD: 0.6 %
BILIRUB SERPL-MCNC: 1.1 MG/DL
BUN SERPL-MCNC: 12 MG/DL
CALCIUM SERPL-MCNC: 9.6 MG/DL
CHLORIDE SERPL-SCNC: 99 MMOL/L
CHOLEST SERPL-MCNC: 170 MG/DL
CHOLEST/HDLC SERPL: 2.5 {RATIO}
CO2 SERPL-SCNC: 28 MMOL/L
CREAT SERPL-MCNC: 0.9 MG/DL
DIFFERENTIAL METHOD: ABNORMAL
EOSINOPHIL # BLD AUTO: 0.1 K/UL
EOSINOPHIL NFR BLD: 1.5 %
ERYTHROCYTE [DISTWIDTH] IN BLOOD BY AUTOMATED COUNT: 13.4 %
EST. GFR  (AFRICAN AMERICAN): >60 ML/MIN/1.73 M^2
EST. GFR  (NON AFRICAN AMERICAN): 59 ML/MIN/1.73 M^2
ESTIMATED AVG GLUCOSE: 117 MG/DL
GLUCOSE SERPL-MCNC: 106 MG/DL
HBA1C MFR BLD HPLC: 5.7 %
HCT VFR BLD AUTO: 45.4 %
HDLC SERPL-MCNC: 67 MG/DL
HDLC SERPL: 39.4 %
HGB BLD-MCNC: 15.2 G/DL
LDLC SERPL CALC-MCNC: 86 MG/DL
LYMPHOCYTES # BLD AUTO: 1.8 K/UL
LYMPHOCYTES NFR BLD: 27.8 %
MCH RBC QN AUTO: 30 PG
MCHC RBC AUTO-ENTMCNC: 33.5 G/DL
MCV RBC AUTO: 90 FL
MONOCYTES # BLD AUTO: 0.5 K/UL
MONOCYTES NFR BLD: 8.3 %
NEUTROPHILS # BLD AUTO: 4 K/UL
NEUTROPHILS NFR BLD: 61.8 %
NONHDLC SERPL-MCNC: 103 MG/DL
PLATELET # BLD AUTO: 249 K/UL
PMV BLD AUTO: 9 FL
POTASSIUM SERPL-SCNC: 4.1 MMOL/L
PROT SERPL-MCNC: 6.8 G/DL
RBC # BLD AUTO: 5.06 M/UL
SODIUM SERPL-SCNC: 136 MMOL/L
TRIGL SERPL-MCNC: 85 MG/DL
TSH SERPL DL<=0.005 MIU/L-ACNC: 1.12 UIU/ML
VIT B12 SERPL-MCNC: 427 PG/ML
WBC # BLD AUTO: 6.51 K/UL

## 2019-03-07 PROCEDURE — 84443 ASSAY THYROID STIM HORMONE: CPT | Mod: HCNC

## 2019-03-07 PROCEDURE — 80061 LIPID PANEL: CPT | Mod: HCNC

## 2019-03-07 PROCEDURE — 85025 COMPLETE CBC W/AUTO DIFF WBC: CPT | Mod: HCNC

## 2019-03-07 PROCEDURE — 82607 VITAMIN B-12: CPT | Mod: HCNC

## 2019-03-07 PROCEDURE — 82306 VITAMIN D 25 HYDROXY: CPT | Mod: HCNC

## 2019-03-07 PROCEDURE — 80053 COMPREHEN METABOLIC PANEL: CPT | Mod: HCNC

## 2019-03-07 PROCEDURE — 36415 COLL VENOUS BLD VENIPUNCTURE: CPT | Mod: HCNC

## 2019-03-07 PROCEDURE — 83036 HEMOGLOBIN GLYCOSYLATED A1C: CPT | Mod: HCNC

## 2019-03-11 ENCOUNTER — TELEPHONE (OUTPATIENT)
Dept: INTERNAL MEDICINE | Facility: CLINIC | Age: 84
End: 2019-03-11

## 2019-03-11 NOTE — TELEPHONE ENCOUNTER
----- Message from Luz Walker sent at 3/11/2019  4:07 PM CDT -----  Contact: daughter Autumn [phone]  Patient's daughter would like to speak with Dr Levine regarding mom's and dad'a apt on 3/14, to discuss senior options with both of her parents, please call Autumn back [phone]

## 2019-03-11 NOTE — TELEPHONE ENCOUNTER
"Please tell her I tried to call her at 6 pm yesterday.    Please find out what "senior options" she is concerned about and maybe we have someone help.    Dr. Levine  "

## 2019-03-12 ENCOUNTER — OFFICE VISIT (OUTPATIENT)
Dept: NEUROLOGY | Facility: CLINIC | Age: 84
End: 2019-03-12
Payer: MEDICARE

## 2019-03-12 VITALS
BODY MASS INDEX: 24.31 KG/M2 | RESPIRATION RATE: 16 BRPM | DIASTOLIC BLOOD PRESSURE: 86 MMHG | WEIGHT: 151.25 LBS | HEIGHT: 66 IN | HEART RATE: 86 BPM | SYSTOLIC BLOOD PRESSURE: 140 MMHG

## 2019-03-12 DIAGNOSIS — R41.3 MEMORY LOSS: Primary | ICD-10-CM

## 2019-03-12 DIAGNOSIS — R42 VERTIGO: ICD-10-CM

## 2019-03-12 DIAGNOSIS — G47.09 OTHER INSOMNIA: ICD-10-CM

## 2019-03-12 PROCEDURE — 3079F DIAST BP 80-89 MM HG: CPT | Mod: HCNC,CPTII,S$GLB, | Performed by: PSYCHIATRY & NEUROLOGY

## 2019-03-12 PROCEDURE — 1101F PT FALLS ASSESS-DOCD LE1/YR: CPT | Mod: HCNC,CPTII,S$GLB, | Performed by: PSYCHIATRY & NEUROLOGY

## 2019-03-12 PROCEDURE — 3079F PR MOST RECENT DIASTOLIC BLOOD PRESSURE 80-89 MM HG: ICD-10-PCS | Mod: HCNC,CPTII,S$GLB, | Performed by: PSYCHIATRY & NEUROLOGY

## 2019-03-12 PROCEDURE — 3077F PR MOST RECENT SYSTOLIC BLOOD PRESSURE >= 140 MM HG: ICD-10-PCS | Mod: HCNC,CPTII,S$GLB, | Performed by: PSYCHIATRY & NEUROLOGY

## 2019-03-12 PROCEDURE — 3077F SYST BP >= 140 MM HG: CPT | Mod: HCNC,CPTII,S$GLB, | Performed by: PSYCHIATRY & NEUROLOGY

## 2019-03-12 PROCEDURE — 99214 OFFICE O/P EST MOD 30 MIN: CPT | Mod: HCNC,S$GLB,, | Performed by: PSYCHIATRY & NEUROLOGY

## 2019-03-12 PROCEDURE — 99999 PR PBB SHADOW E&M-EST. PATIENT-LVL III: CPT | Mod: PBBFAC,HCNC,, | Performed by: PSYCHIATRY & NEUROLOGY

## 2019-03-12 PROCEDURE — 1101F PR PT FALLS ASSESS DOC 0-1 FALLS W/OUT INJ PAST YR: ICD-10-PCS | Mod: HCNC,CPTII,S$GLB, | Performed by: PSYCHIATRY & NEUROLOGY

## 2019-03-12 PROCEDURE — 99214 PR OFFICE/OUTPT VISIT, EST, LEVL IV, 30-39 MIN: ICD-10-PCS | Mod: HCNC,S$GLB,, | Performed by: PSYCHIATRY & NEUROLOGY

## 2019-03-12 PROCEDURE — 99999 PR PBB SHADOW E&M-EST. PATIENT-LVL III: ICD-10-PCS | Mod: PBBFAC,HCNC,, | Performed by: PSYCHIATRY & NEUROLOGY

## 2019-03-12 RX ORDER — MEMANTINE HYDROCHLORIDE 10 MG/1
10 TABLET ORAL 2 TIMES DAILY
Qty: 180 TABLET | Refills: 3 | Status: SHIPPED | OUTPATIENT
Start: 2019-03-12 | End: 2020-07-06

## 2019-03-12 RX ORDER — DONEPEZIL HYDROCHLORIDE 5 MG/1
5 TABLET, FILM COATED ORAL NIGHTLY
Qty: 90 TABLET | Refills: 3 | Status: SHIPPED | OUTPATIENT
Start: 2019-03-12 | End: 2020-06-19 | Stop reason: SDUPTHER

## 2019-03-12 NOTE — PROGRESS NOTES
HPI:  Estefania Mccollum is a 84 y.o. female known to me for  Vertigo. Responds to PT. Some vague symptoms at times, but improved with normal MRI brain prior  Also has mild cognitive impairment like symptoms vs Early Alzheimer's disease.     Since the last visit, the patient started Aricept and memory is about the same.  She is still forgetful, but she does not feel this is daily.  She lives with her elderly  and they have meals on wheels now. She manages her medication with the help of her .  She continues to drive without accident or incident.    Her vertigo is not currently active.   She continues to exercise and sleeps well at night but has melatonin 3mg nightly if needed    She enjoys visiting with her family  She has one daughter living in [place] and one in [facility]     states her memory is only mildly worse at times and she drives well.    Review of Systems   Constitutional: Negative for fever.   HENT: Negative for nosebleeds.    Eyes: Negative for double vision.   Respiratory: Negative for shortness of breath.    Cardiovascular: Negative for chest pain.   Gastrointestinal: Negative for blood in stool.   Genitourinary: Negative for hematuria.   Musculoskeletal: Negative for falls.   Skin: Negative for rash.   Neurological: Negative for dizziness, tremors, sensory change, focal weakness, seizures and headaches.   Psychiatric/Behavioral: Positive for memory loss.       Objective:      Physical Exam      Exam:  Gen Appearance, well developed/nourished in no apparent distress  CV: 2+ distal pulses with no edema or swelling  Neuro:  MS: Awake, alert, sustains attention. She is  oriented to all except exact date today, and knows today is Tuesday (consistent with prior).   Recent recall is 2/3 at 5 minutes /remote memory intact, Language is full to spontaneous speech/comprehension. Fund of Knowledge is full  Visual spatial skill are good   She is able to name the current and 2 past presidents.    CN: Optic discs are flat with normal vasculature, PERRL, Extraoccular movements and visual fields are full. Normal facial sensation and strength, Hearing symmetric, Tongue and Palate are midline and strong. Shoulder Shrug symmetric and strong.  Motor: Normal bulk, tone, no abnormal movements. 5/5 strength bilateral upper/lower extremities with 2+ reflexes and no clonus  Sensory:  Intact to temp and vibration Romberg negative  Cerebellar: Finger-nose,Rapid alternating movements intact  Gait: Normal stance, no ataxia    CT head 10/2016: 1.  No CT evidence of an acute intracranial abnormality.  2.  Atrophy and small vessel ischemic changes of the periventricular white matter    Labs: 10/2016 TSH and B12 normal  1/2017 CMP, CBC unremarkable  2  Assessment:     Estefania Mccollum is a 84 y.o. female known to me for  Vertigo. Responds to PT. Some vague symptoms at times, but improved with normal MRI brain prior  Also has mild cognitive impairment like symptoms vs Early Alzheimer's disease.     Plan:   1. Continue Namenda to 10mg BID Discussed the further  treatment being good diet and physical and memory exercise.   2. Aricept to continue at current dose  3. Driving locally to familiar areas in the daytime can continue unless accidents or incidents are noted. She plans to taper driving for any worsening memory loss. She has stopped driving to unfamiliar places. We reviewed that any accidents or incidents would be the best predictor that she should stop driving.   4. PT PRN vertigo helps. Meclizine causes her too many side effects. She has seen ENT and continues to follow with ENT about otalgia/ hearing loss/ prior infections  5. Referred to silver sneakers program given her difficulty with balance which may be age related- helping  6. Insomnia resolved with better sleep hygeine. Melatonin 3mg OTC to use PRN     RTC 6 months

## 2019-03-14 ENCOUNTER — OFFICE VISIT (OUTPATIENT)
Dept: OTOLARYNGOLOGY | Facility: CLINIC | Age: 84
End: 2019-03-14
Payer: MEDICARE

## 2019-03-14 ENCOUNTER — OFFICE VISIT (OUTPATIENT)
Dept: INTERNAL MEDICINE | Facility: CLINIC | Age: 84
End: 2019-03-14
Payer: MEDICARE

## 2019-03-14 VITALS
HEART RATE: 90 BPM | SYSTOLIC BLOOD PRESSURE: 132 MMHG | WEIGHT: 130.06 LBS | DIASTOLIC BLOOD PRESSURE: 71 MMHG | BODY MASS INDEX: 20.37 KG/M2

## 2019-03-14 VITALS
BODY MASS INDEX: 20.48 KG/M2 | DIASTOLIC BLOOD PRESSURE: 80 MMHG | HEART RATE: 81 BPM | SYSTOLIC BLOOD PRESSURE: 128 MMHG | OXYGEN SATURATION: 97 % | WEIGHT: 130.5 LBS | HEIGHT: 67 IN

## 2019-03-14 DIAGNOSIS — L98.9 SKIN LESION: ICD-10-CM

## 2019-03-14 DIAGNOSIS — E03.9 HYPOTHYROIDISM, UNSPECIFIED TYPE: ICD-10-CM

## 2019-03-14 DIAGNOSIS — I70.0 AORTIC ATHEROSCLEROSIS: ICD-10-CM

## 2019-03-14 DIAGNOSIS — E78.00 PURE HYPERCHOLESTEROLEMIA: ICD-10-CM

## 2019-03-14 DIAGNOSIS — I10 ESSENTIAL HYPERTENSION: ICD-10-CM

## 2019-03-14 DIAGNOSIS — H93.8X3 SENSATION OF FULLNESS IN BOTH EARS: Primary | ICD-10-CM

## 2019-03-14 DIAGNOSIS — Z13.83 SCREENING FOR RESPIRATORY CONDITION: ICD-10-CM

## 2019-03-14 DIAGNOSIS — R26.89 BALANCE PROBLEM: ICD-10-CM

## 2019-03-14 DIAGNOSIS — R26.9 GAIT ABNORMALITY: Primary | ICD-10-CM

## 2019-03-14 LAB
POST FEV1 FVC: NORMAL
POST FEV1: NORMAL
POST FVC: NORMAL
PRE FEV1 FVC: 77.3
PRE FEV1: 1.51
PRE FVC: 1.96
PREDICTED FEV1: 72
PREDICTED FVC: 70

## 2019-03-14 PROCEDURE — 99999 PR PBB SHADOW E&M-EST. PATIENT-LVL III: ICD-10-PCS | Mod: PBBFAC,HCNC,, | Performed by: OTOLARYNGOLOGY

## 2019-03-14 PROCEDURE — 3075F PR MOST RECENT SYSTOLIC BLOOD PRESS GE 130-139MM HG: ICD-10-PCS | Mod: HCNC,CPTII,S$GLB, | Performed by: OTOLARYNGOLOGY

## 2019-03-14 PROCEDURE — 99214 OFFICE O/P EST MOD 30 MIN: CPT | Mod: HCNC,S$GLB,, | Performed by: INTERNAL MEDICINE

## 2019-03-14 PROCEDURE — 99999 PR PBB SHADOW E&M-EST. PATIENT-LVL III: CPT | Mod: PBBFAC,HCNC,, | Performed by: OTOLARYNGOLOGY

## 2019-03-14 PROCEDURE — 3075F SYST BP GE 130 - 139MM HG: CPT | Mod: HCNC,CPTII,S$GLB, | Performed by: OTOLARYNGOLOGY

## 2019-03-14 PROCEDURE — 69210 REMOVE IMPACTED EAR WAX UNI: CPT | Mod: HCNC,S$GLB,, | Performed by: OTOLARYNGOLOGY

## 2019-03-14 PROCEDURE — 99999 PR PBB SHADOW E&M-EST. PATIENT-LVL V: ICD-10-PCS | Mod: PBBFAC,HCNC,, | Performed by: INTERNAL MEDICINE

## 2019-03-14 PROCEDURE — 3074F SYST BP LT 130 MM HG: CPT | Mod: HCNC,CPTII,S$GLB, | Performed by: INTERNAL MEDICINE

## 2019-03-14 PROCEDURE — 3079F DIAST BP 80-89 MM HG: CPT | Mod: HCNC,CPTII,S$GLB, | Performed by: INTERNAL MEDICINE

## 2019-03-14 PROCEDURE — 1101F PR PT FALLS ASSESS DOC 0-1 FALLS W/OUT INJ PAST YR: ICD-10-PCS | Mod: HCNC,CPTII,S$GLB, | Performed by: OTOLARYNGOLOGY

## 2019-03-14 PROCEDURE — 3074F PR MOST RECENT SYSTOLIC BLOOD PRESSURE < 130 MM HG: ICD-10-PCS | Mod: HCNC,CPTII,S$GLB, | Performed by: INTERNAL MEDICINE

## 2019-03-14 PROCEDURE — 1101F PT FALLS ASSESS-DOCD LE1/YR: CPT | Mod: HCNC,CPTII,S$GLB, | Performed by: OTOLARYNGOLOGY

## 2019-03-14 PROCEDURE — 99213 PR OFFICE/OUTPT VISIT, EST, LEVL III, 20-29 MIN: ICD-10-PCS | Mod: 25,HCNC,S$GLB, | Performed by: OTOLARYNGOLOGY

## 2019-03-14 PROCEDURE — 3079F PR MOST RECENT DIASTOLIC BLOOD PRESSURE 80-89 MM HG: ICD-10-PCS | Mod: HCNC,CPTII,S$GLB, | Performed by: INTERNAL MEDICINE

## 2019-03-14 PROCEDURE — 1101F PR PT FALLS ASSESS DOC 0-1 FALLS W/OUT INJ PAST YR: ICD-10-PCS | Mod: HCNC,CPTII,S$GLB, | Performed by: INTERNAL MEDICINE

## 2019-03-14 PROCEDURE — 3078F PR MOST RECENT DIASTOLIC BLOOD PRESSURE < 80 MM HG: ICD-10-PCS | Mod: HCNC,CPTII,S$GLB, | Performed by: OTOLARYNGOLOGY

## 2019-03-14 PROCEDURE — 99213 OFFICE O/P EST LOW 20 MIN: CPT | Mod: 25,HCNC,S$GLB, | Performed by: OTOLARYNGOLOGY

## 2019-03-14 PROCEDURE — 99214 PR OFFICE/OUTPT VISIT, EST, LEVL IV, 30-39 MIN: ICD-10-PCS | Mod: HCNC,S$GLB,, | Performed by: INTERNAL MEDICINE

## 2019-03-14 PROCEDURE — 69210 PR REMOVAL IMPACTED CERUMEN REQUIRING INSTRUMENTATION, UNILATERAL: ICD-10-PCS | Mod: HCNC,S$GLB,, | Performed by: OTOLARYNGOLOGY

## 2019-03-14 PROCEDURE — 3078F DIAST BP <80 MM HG: CPT | Mod: HCNC,CPTII,S$GLB, | Performed by: OTOLARYNGOLOGY

## 2019-03-14 PROCEDURE — 99999 PR PBB SHADOW E&M-EST. PATIENT-LVL V: CPT | Mod: PBBFAC,HCNC,, | Performed by: INTERNAL MEDICINE

## 2019-03-14 PROCEDURE — 1101F PT FALLS ASSESS-DOCD LE1/YR: CPT | Mod: HCNC,CPTII,S$GLB, | Performed by: INTERNAL MEDICINE

## 2019-03-14 NOTE — Clinical Note
Helen Joaquin, I was supposed to send you this chart with the picture.  Her  had an appointment on May 7th and we were able to get her an appointment the same day.  If you agreed that this is okay.,Then we will leave that date

## 2019-03-14 NOTE — PROGRESS NOTES
Subjective:      Patient ID: Estefania Mccollum is a 84 y.o. female.    Chief Complaint: Follow-up    HPI:  HPI   Patient comes in every 6 months for review of medical problems. Her health has been declining.  She still lives independently with her  and still is able to drive but only short distances.  She still goes to exercise 3 times a week but wonders whether she would further benefit from physical therapy based on some balance issues.  She is followed in Neurology.  She has been diagnosed with mild cognitive impairment.  She has a history of hypertension, hypothyroidism and hyperlipidemia all of which have medications that she is taking and have been reviewed.  Laboratory studies were done prior to the appointment which do show good control of the thyroid and the lipids.    She also tells me of a new problem which is an area on the left thigh that I have taken a picture of.  Lesion of the skin present about a month          .    Patient Active Problem List   Diagnosis    Hypertension    Hypothyroidism    Hyperlipidemia    Retinal drusen - Both Eyes    Hearing loss, sensorineural    History of colonic polyps    Osteopenia    MCI (mild cognitive impairment)    Memory loss    Primary osteoarthritis of left knee    Aortic atherosclerosis    Balance problem     Past Medical History:   Diagnosis Date    Adenomatous polyp     Allergy     sinus    Annual physical exam 2011    Arthritis     osteoarthritis    Cataract     History of bone density study 2009    History of colonoscopy 10/30/2008    History of mammogram 2011    Hyperlipidemia     Hypertension     Macular degeneration     Postmenopausal hormone replacement therapy     therapy stopped    Thyroid disease     hypothyroidism    Vertigo      Past Surgical History:   Procedure Laterality Date    APPENDECTOMY      12 years old    CATARACT EXTRACTION      Both eyes    CATARACT EXTRACTION, BILATERAL       SECTION  "     x 3    CHOLECYSTECTOMY      COLONOSCOPY  10/02/2013    COLONOSCOPY N/A 10/2/2013    [facility] (4TH [facility])    HYSTERECTOMY      total    JOINT REPLACEMENT Left     OOPHORECTOMY      REPLACEMENT-KNEE WITH NAVIGATION and PATELLOPLASTY Left 12/6/2016    [facility] OR 2ND FLR    TONSILLECTOMY      TOTAL KNEE ARTHROPLASTY Left 12/2016    Yag       Left Eye     Family History   Problem Relation Age of Onset    Heart failure Mother     Hypertension Mother     Hyperlipidemia Mother     Heart failure Father     Hypertension Father     Hyperlipidemia Father     Asthma Father     Hypertension Sister     Diabetes Sister         Prediabetes    Heart attack Brother     No Known Problems Daughter     Heart disease Brother         Has had open heart surgery    No Known Problems Brother     Mental retardation Daughter     No Known Problems Daughter     Coronary artery disease Neg Hx     Amblyopia Neg Hx     Blindness Neg Hx     Cataracts Neg Hx     Glaucoma Neg Hx     Macular degeneration Neg Hx     Retinal detachment Neg Hx     Strabismus Neg Hx     Melanoma Neg Hx     Psoriasis Neg Hx     Lupus Neg Hx     Eczema Neg Hx     Acne Neg Hx      Review of Systems   Constitutional: Negative for chills, fever and unexpected weight change.   HENT: Negative for trouble swallowing.    Respiratory: Negative for cough, shortness of breath and wheezing.    Cardiovascular: Negative for chest pain and palpitations.   Gastrointestinal: Negative for abdominal distention, abdominal pain, blood in stool and vomiting.   Musculoskeletal: Negative for back pain.     Objective:     Vitals:    03/14/19 1037   BP: 128/80   Pulse: 81   SpO2: 97%   Weight: 59.2 kg (130 lb 8.2 oz)   Height: 5' 7" (1.702 m)   PainSc: 0-No pain     Body mass index is 20.44 kg/m².  Physical Exam   Constitutional: She is oriented to person, place, and time. She appears " "well-developed and well-nourished. No distress.   Neck: Carotid bruit is not present. No thyromegaly present.   Cardiovascular: Normal rate, regular rhythm and normal heart sounds. PMI is not displaced.   Pulmonary/Chest: Effort normal and breath sounds normal. No respiratory distress.   Abdominal: Soft. Bowel sounds are normal. She exhibits no distension. There is no tenderness.   Musculoskeletal: She exhibits no edema.   Neurological: She is alert and oriented to person, place, and time.     Assessment:     1. Gait abnormality    2. Skin lesion    3. Aortic atherosclerosis    4. Balance problem    5. Essential hypertension    6. Pure hypercholesterolemia    7. Hypothyroidism, unspecified type      Plan:   Estefania was seen today for follow-up.    Diagnoses and all orders for this visit:    Gait abnormality  -     Ambulatory consult to Physical Therapy   I have read through the notes and I am unsure in with the patient's gait abnormality truly is.  She has some features of what I would believed to be Parkinson's but these have not been mentioned in the Neurology notes.  I believe that she would benefit from physical therapy    Skin lesion  -     Ambulatory consult to Dermatology  For skin lesion to the left thigh    Aortic atherosclerosis:  Patient on statin and aspirin:  She should continue on pravastatin 80 mg and baby aspirin 81 mg    Balance problem:  PTT scheduled    Essential hypertension:  Continue on lisinopril 20 mg    Pure hypercholesterolemia:  Continue on proper statin 80 mg    Hypothyroidism, unspecified type:  Continue on levothyroxine 100 mg daily        Problem List Items Addressed This Visit     Hypertension    Hypothyroidism    Hyperlipidemia    Aortic atherosclerosis    Overview     Location in Record and Date: X-Ray Chest-12/9/2008   "There are some calcifications at the level of the aortic arch."   Other Chronic Conditions: HLD   Medications: aspirin 325 mg, fish oil-ome  ga-3 fatty acids " 300-1,000 mg, Pravachol 80 mg     Please document in your clinic note if the patient has:     Aortic Atherosclerosis   Other   Clinically Undetermined          Balance problem      Other Visit Diagnoses     Gait abnormality    -  Primary    Relevant Orders    Ambulatory consult to Physical Therapy    Skin lesion        Relevant Orders    Ambulatory consult to Dermatology        Orders Placed This Encounter   Procedures    Ambulatory consult to Physical Therapy     Referral Priority:   Routine     Referral Type:   Physical Medicine     Referral Reason:   Specialty Services Required     Requested Specialty:   Physical Therapy     Number of Visits Requested:   1    Ambulatory consult to Dermatology     Referral Priority:   Routine     Referral Type:   Consultation     Referral Reason:   Specialty Services Required     Requested Specialty:   Dermatology     Number of Visits Requested:   1     Follow-up in about 6 months (around 9/14/2019) for Follow up: 6 months.     Medication List           Accurate as of 3/14/19 10:59 AM. If you have any questions, ask your nurse or doctor.               CONTINUE taking these medications    acetaminophen 325 MG tablet  Commonly known as:  TYLENOL     aspirin 81 MG EC tablet  Commonly known as:  ECOTRIN     calcium carbonate 600 mg calcium (1,500 mg) Tab  Commonly known as:  OS-RAMONE     donepezil 5 MG tablet  Commonly known as:  ARICEPT  Take 1 tablet (5 mg total) by mouth every evening.     fish oil-omega-3 fatty acids 300-1,000 mg capsule     levothyroxine 100 MCG tablet  Commonly known as:  SYNTHROID  TAKE 1 TABLET EVERY DAY     lisinopril 20 MG tablet  Commonly known as:  PRINIVIL,ZESTRIL  Take 1 tablet (20 mg total) by mouth once daily.     memantine 10 MG Tab  Commonly known as:  NAMENDA  Take 1 tablet (10 mg total) by mouth 2 (two) times daily.     mometasone 0.1% 0.1 % cream  Commonly known as:  ELOCON  Apply topically once daily. For itching of outer ears.     multivitamin  capsule     omeprazole 40 MG capsule  Commonly known as:  PRILOSEC  Take 1 capsule (40 mg total) by mouth every morning.     oxybutynin 5 MG Tab  Commonly known as:  DITROPAN  TAKE 2 TABLETS AT NIGHT     pravastatin 80 MG tablet  Commonly known as:  PRAVACHOL  Take 1 tablet (80 mg total) by mouth once daily.

## 2019-03-14 NOTE — PROGRESS NOTES
Subjective:       Patient ID: Estefania Mccollum is a 84 y.o. female.    Chief Complaint: Ear Fullness    Ear Fullness    There is pain in both ears. This is a recurrent problem. The current episode started 1 to 4 weeks ago. The problem occurs constantly. The problem has been unchanged. There has been no fever. The patient is experiencing no pain. Associated symptoms include hearing loss. Pertinent negatives include no coughing, diarrhea, ear discharge, headaches, neck pain, rash, rhinorrhea or vomiting. Associated symptoms comments: Wears aids binaurally.. She has tried nothing for the symptoms. Her past medical history is significant for hearing loss.     Review of Systems   Constitutional: Negative for activity change, appetite change and fever.   HENT: Positive for hearing loss. Negative for congestion, ear discharge, ear pain, nosebleeds, postnasal drip, rhinorrhea and sneezing.    Eyes: Negative for redness and visual disturbance.   Respiratory: Negative for apnea, cough, shortness of breath and wheezing.    Cardiovascular: Negative for chest pain and palpitations.   Gastrointestinal: Negative for diarrhea and vomiting.   Genitourinary: Negative for difficulty urinating and frequency.   Musculoskeletal: Negative for arthralgias, back pain, gait problem and neck pain.   Skin: Negative for color change and rash.   Neurological: Negative for dizziness, speech difficulty, weakness and headaches.   Hematological: Negative for adenopathy. Does not bruise/bleed easily.   Psychiatric/Behavioral: Negative for agitation and behavioral problems.       Objective:        Constitutional:   Vital signs are normal. She appears well-developed and well-nourished. She is active. Normal speech.      Head:  Normocephalic and atraumatic. Salivary glands normal.  Facial strength is normal.      Nose:  Nose normal including turbinates, nasal mucosa, sinuses and nasal septum.     Mouth/Throat  Oropharynx clear and moist without lesions  or asymmetry and normal uvula midline.     Neck:  Neck normal without thyromegaly masses, asymmetry, normal tracheal structure, crepitus, and tenderness, thyroid normal, trachea normal, phonation normal and full range of motion with neck supple.     Psychiatric:   She has a normal mood and affect. Her speech is normal and behavior is normal.     Neurological:   She has neurological normal, alert and oriented.     Skin:   No abrasions, lacerations, lesions, or rashes.         PROCEDURE NOTE:  Ceruminosis is noted in both EACs.  Wax was removed by manual debridement and suctioning utilizing the assistance of binocular microscopy revealing EACs and TMs WNL. The patient tolerated this procedure without difficulty. The subjective decrease noted in hearing pre-cleaning was resolved post-cleaning.       Assessment:       1. Sensation of fullness in both ears        Plan:       1. Hearing conservation strongly recommended.  2. Trial of amplification bilaterally also recommended (patient already wearing).  3. Re-check of hearing in 18-24 months or sooner if subjective change noted.  4. F/U with PCP as per schedule.

## 2019-04-09 RX ORDER — PRAVASTATIN SODIUM 80 MG/1
TABLET ORAL
Qty: 90 TABLET | Refills: 3 | Status: SHIPPED | OUTPATIENT
Start: 2019-04-09 | End: 2020-06-10 | Stop reason: SDUPTHER

## 2019-04-09 RX ORDER — OMEPRAZOLE 40 MG/1
CAPSULE, DELAYED RELEASE ORAL
Qty: 90 CAPSULE | Refills: 3 | Status: SHIPPED | OUTPATIENT
Start: 2019-04-09 | End: 2020-06-10 | Stop reason: SDUPTHER

## 2019-04-09 RX ORDER — OXYBUTYNIN CHLORIDE 5 MG/1
TABLET ORAL
Qty: 180 TABLET | Refills: 3 | Status: SHIPPED | OUTPATIENT
Start: 2019-04-09 | End: 2020-06-11

## 2019-04-26 RX ORDER — LEVOTHYROXINE SODIUM 100 UG/1
100 TABLET ORAL DAILY
Qty: 90 TABLET | Refills: 1 | Status: SHIPPED | OUTPATIENT
Start: 2019-04-26 | End: 2019-10-28 | Stop reason: SDUPTHER

## 2019-04-26 NOTE — TELEPHONE ENCOUNTER
----- Message from Genevieve Kebede sent at 4/26/2019  8:04 AM CDT -----  Contact: [phone]  Type: Rx    Name of medication(s): levothyroxine (SYNTHROID) 100 MCG tablet    Is this a refill? New rx?  refill    Who prescribed medication? Abner    Pharmacy Name, Phone, & Location:[facility] - [place], [place] - [address]      Comments:  Please advise, thank you

## 2019-05-27 ENCOUNTER — OFFICE VISIT (OUTPATIENT)
Dept: DERMATOLOGY | Facility: CLINIC | Age: 84
End: 2019-05-27
Payer: MEDICARE

## 2019-05-27 DIAGNOSIS — L81.4 LENTIGO: ICD-10-CM

## 2019-05-27 DIAGNOSIS — D48.5 NEOPLASM OF UNCERTAIN BEHAVIOR OF SKIN: Primary | ICD-10-CM

## 2019-05-27 DIAGNOSIS — L73.8 SEBACEOUS GLAND HYPERPLASIA: ICD-10-CM

## 2019-05-27 DIAGNOSIS — Z12.83 SCREENING EXAM FOR SKIN CANCER: ICD-10-CM

## 2019-05-27 DIAGNOSIS — L82.1 SK (SEBORRHEIC KERATOSIS): ICD-10-CM

## 2019-05-27 PROCEDURE — 1101F PT FALLS ASSESS-DOCD LE1/YR: CPT | Mod: HCNC,CPTII,S$GLB, | Performed by: DERMATOLOGY

## 2019-05-27 PROCEDURE — 11102 PR TANGENTIAL BIOPSY, SKIN, SINGLE LESION: ICD-10-PCS | Mod: HCNC,S$GLB,, | Performed by: DERMATOLOGY

## 2019-05-27 PROCEDURE — 99214 OFFICE O/P EST MOD 30 MIN: CPT | Mod: 25,HCNC,S$GLB, | Performed by: DERMATOLOGY

## 2019-05-27 PROCEDURE — 99999 PR PBB SHADOW E&M-EST. PATIENT-LVL II: ICD-10-PCS | Mod: PBBFAC,HCNC,, | Performed by: DERMATOLOGY

## 2019-05-27 PROCEDURE — 1101F PR PT FALLS ASSESS DOC 0-1 FALLS W/OUT INJ PAST YR: ICD-10-PCS | Mod: HCNC,CPTII,S$GLB, | Performed by: DERMATOLOGY

## 2019-05-27 PROCEDURE — 88305 TISSUE EXAM BY PATHOLOGIST: CPT | Mod: HCNC | Performed by: PATHOLOGY

## 2019-05-27 PROCEDURE — 99999 PR PBB SHADOW E&M-EST. PATIENT-LVL II: CPT | Mod: PBBFAC,HCNC,, | Performed by: DERMATOLOGY

## 2019-05-27 PROCEDURE — 11102 TANGNTL BX SKIN SINGLE LES: CPT | Mod: HCNC,S$GLB,, | Performed by: DERMATOLOGY

## 2019-05-27 PROCEDURE — 88305 TISSUE SPECIMEN TO PATHOLOGY, DERMATOLOGY: ICD-10-PCS | Mod: 26,HCNC,, | Performed by: PATHOLOGY

## 2019-05-27 PROCEDURE — 99214 PR OFFICE/OUTPT VISIT, EST, LEVL IV, 30-39 MIN: ICD-10-PCS | Mod: 25,HCNC,S$GLB, | Performed by: DERMATOLOGY

## 2019-05-27 NOTE — PROGRESS NOTES
Subjective:       Patient ID:  Estefania Mccollum is a 84 y.o. female who presents for   Chief Complaint   Patient presents with    Skin Check     TBSE    Lesion     left thigh     History of Present Illness: The patient presents for follow up of skin check.    The patient was last seen on: 6/18 for skin check (TBSE)  No h/o nmsc   Other skin complaints: spot on left thigh x 1 month. + scabs      Review of Systems   Skin: Positive for daily sunscreen use and activity-related sunscreen use. Negative for itching, rash and recent sunburn.   Hematologic/Lymphatic: Does not bruise/bleed easily.        Objective:    Physical Exam   Constitutional: She appears well-developed and well-nourished. No distress.   Neurological: She is alert. She is disoriented (with ).   Psychiatric: She has a normal mood and affect.   Skin:   Areas Examined (abnormalities noted in diagram):   Scalp / Hair Palpated and Inspected  Head / Face Inspection Performed  Neck Inspection Performed  Chest / Axilla Inspection Performed  Abdomen Inspection Performed  Genitals / Buttocks / Groin Inspection Performed  Back Inspection Performed  RUE Inspected  LUE Inspection Performed  RLE Inspected  LLE Inspection Performed  Nails and Digits Inspection Performed                   Diagram Legend     Erythematous scaling macule/papule c/w actinic keratosis       Vascular papule c/w angioma      Pigmented verrucoid papule/plaque c/w seborrheic keratosis      Yellow umbilicated papule c/w sebaceous hyperplasia      Irregularly shaped tan macule c/w lentigo     1-2 mm smooth white papules consistent with Milia      Movable subcutaneous cyst with punctum c/w epidermal inclusion cyst      Subcutaneous movable cyst c/w pilar cyst      Firm pink to brown papule c/w dermatofibroma      Pedunculated fleshy papule(s) c/w skin tag(s)      Evenly pigmented macule c/w junctional nevus     Mildly variegated pigmented, slightly irregular-bordered macule c/w mildly  atypical nevus      Flesh colored to evenly pigmented papule c/w intradermal nevus       Pink pearly papule/plaque c/w basal cell carcinoma      Erythematous hyperkeratotic cursted plaque c/w SCC      Surgical scar with no sign of skin cancer recurrence      Open and closed comedones      Inflammatory papules and pustules      Verrucoid papule consistent consistent with wart     Erythematous eczematous patches and plaques     Dystrophic onycholytic nail with subungual debris c/w onychomycosis     Umbilicated papule    Erythematous-base heme-crusted tan verrucoid plaque consistent with inflamed seborrheic keratosis     Erythematous Silvery Scaling Plaque c/w Psoriasis     See annotation          Assessment / Plan:      Pathology Orders:     Normal Orders This Visit    Tissue Specimen To Pathology, Dermatology     Questions:    Directional Terms:  Other(comment)    Clinical Information:  r/o isk vs scc vs other    Specific Site:  left thigh        Neoplasm of uncertain behavior of skin  -     Tissue Specimen To Pathology, Dermatology    Shave biopsy procedure note:    Shave biopsy performed after verbal consent including risk of infection, scar, recurrence, need for additional treatment of site. Area prepped with alcohol, anesthetized with approximately 1.0cc of 1% lidocaine with epinephrine. Lesional tissue shaved with razor blade. Hemostasis achieved with application of aluminum chloride followed by hyfrecation. No complications. Dressing applied. Wound care explained.        SK (seborrheic keratosis)  These are benign inherited growths without a malignant potential. Reassurance given to patient. No treatment is necessary.     Sebaceous gland hyperplasia  This is a common condition representing benign enlargement of the sebaceous lobule. It typically occurs in adulthood. Reassurance given to patient.     Lentigo  This is a benign hyperpigmented sun induced lesion. Daily sun protection will reduce the number of new  lesions. Treatment of these benign lesions are considered cosmetic.      Screening exam for skin cancer  Total body skin examination performed today including at least 12 points as noted in physical examination. Suspicious lesions noted.               Follow up in about 1 year (around 5/27/2020).

## 2019-05-27 NOTE — PATIENT INSTRUCTIONS

## 2019-05-27 NOTE — LETTER
May 27, 2019      Danna Levine MD  [address]  [place] [address]           [facility] - Dermatology  [address]  [place] [address]  Phone: [phone]  Fax: [phone]          Patient: Estefania Mccollum   MR Number: [id number]   YOB: 1934   Date of Visit: 5/27/2019       Dear Dr. Danna Levine:    Thank you for referring Estefania Mccollum to me for evaluation. Attached you will find relevant portions of my assessment and plan of care.    If you have questions, please do not hesitate to call me. I look forward to following Estefania Mccollum along with you.    Sincerely,    Emani Nguyen MD    Enclosure  CC:  No Recipients    If you would like to receive this communication electronically, please contact [email or web address] or [phone] to request more information on [employer] Link access.    For providers and/or their staff who would like to refer a patient to [facility], please contact us through our one-stop-shop provider referral line, [facility], at [phone].    If you feel you have received this communication in error or would no longer like to receive these types of communications, please e-mail [email or web address]

## 2019-05-31 ENCOUNTER — PATIENT MESSAGE (OUTPATIENT)
Dept: DERMATOLOGY | Facility: CLINIC | Age: 84
End: 2019-05-31

## 2019-06-27 ENCOUNTER — PROCEDURE VISIT (OUTPATIENT)
Dept: DERMATOLOGY | Facility: CLINIC | Age: 84
End: 2019-06-27
Payer: MEDICARE

## 2019-06-27 DIAGNOSIS — C44.729 SCC (SQUAMOUS CELL CARCINOMA), LEG, LEFT: Primary | ICD-10-CM

## 2019-06-27 PROCEDURE — 99499 UNLISTED E&M SERVICE: CPT | Mod: HCNC,S$GLB,, | Performed by: DERMATOLOGY

## 2019-06-27 PROCEDURE — 99499 NO LOS: ICD-10-PCS | Mod: HCNC,S$GLB,, | Performed by: DERMATOLOGY

## 2019-06-27 PROCEDURE — 17262 DSTRJ MAL LES T/A/L 1.1-2.0: CPT | Mod: HCNC,S$GLB,, | Performed by: DERMATOLOGY

## 2019-06-27 PROCEDURE — 17262 PR DESTR MALIG TRUNK,EXTREM 1.1-2 CM: ICD-10-PCS | Mod: HCNC,S$GLB,, | Performed by: DERMATOLOGY

## 2019-06-27 NOTE — PROGRESS NOTES
Here for electrodesiccation and curettage of SCC (KA type) on the left thigh. bx done on 5/27/19:    FINAL PATHOLOGIC DIAGNOSIS  1. Skin, left thigh, shave biopsy:  - INVASIVE SQUAMOUS CELL CARCINOMA, WELL-DIFFERENTIATED, CRATERIFORM  (KERATOACANTHOMATOUS TYPE).  - THE TUMOR EXTENDS TO THE DEEP BIOPSY MARGIN.  MICROSCOPIC DESCRIPTION: Sections show a crateriform squamous proliferation exhibiting minimal atypia.    Electrodessication and Curettage Procedure note:    Verbal consent obtained. Lesional tissue marked and prepped with alcohol. Lesion anesthetized with 1% lidocaine with epinephrine. Curettage and Desiccation x 3 cycles to base. Aluminum chloride for hemostasis. Lesion size after primary curettage: 1.8 cm    Area bandaged and wound care explained.    F/u 3 months

## 2019-06-27 NOTE — PATIENT INSTRUCTIONS

## 2019-07-17 ENCOUNTER — OFFICE VISIT (OUTPATIENT)
Dept: INTERNAL MEDICINE | Facility: CLINIC | Age: 84
End: 2019-07-17
Payer: MEDICARE

## 2019-07-17 VITALS
DIASTOLIC BLOOD PRESSURE: 70 MMHG | HEART RATE: 84 BPM | BODY MASS INDEX: 24.45 KG/M2 | WEIGHT: 152.13 LBS | SYSTOLIC BLOOD PRESSURE: 100 MMHG | HEIGHT: 66 IN

## 2019-07-17 DIAGNOSIS — H90.3 SENSORINEURAL HEARING LOSS (SNHL) OF BOTH EARS: ICD-10-CM

## 2019-07-17 DIAGNOSIS — M85.80 OSTEOPENIA, UNSPECIFIED LOCATION: ICD-10-CM

## 2019-07-17 DIAGNOSIS — E78.00 PURE HYPERCHOLESTEROLEMIA: ICD-10-CM

## 2019-07-17 DIAGNOSIS — E03.9 HYPOTHYROIDISM, UNSPECIFIED TYPE: ICD-10-CM

## 2019-07-17 DIAGNOSIS — R41.3 MEMORY LOSS: ICD-10-CM

## 2019-07-17 DIAGNOSIS — I70.0 AORTIC ATHEROSCLEROSIS: ICD-10-CM

## 2019-07-17 DIAGNOSIS — I10 ESSENTIAL HYPERTENSION: ICD-10-CM

## 2019-07-17 DIAGNOSIS — R94.4 DECREASED GFR: ICD-10-CM

## 2019-07-17 DIAGNOSIS — R26.89 BALANCE PROBLEM: ICD-10-CM

## 2019-07-17 DIAGNOSIS — Z00.00 ENCOUNTER FOR PREVENTIVE HEALTH EXAMINATION: Primary | ICD-10-CM

## 2019-07-17 PROCEDURE — 3078F PR MOST RECENT DIASTOLIC BLOOD PRESSURE < 80 MM HG: ICD-10-PCS | Mod: HCNC,CPTII,S$GLB, | Performed by: NURSE PRACTITIONER

## 2019-07-17 PROCEDURE — 99999 PR PBB SHADOW E&M-EST. PATIENT-LVL IV: ICD-10-PCS | Mod: PBBFAC,HCNC,, | Performed by: NURSE PRACTITIONER

## 2019-07-17 PROCEDURE — 99999 PR PBB SHADOW E&M-EST. PATIENT-LVL IV: CPT | Mod: PBBFAC,HCNC,, | Performed by: NURSE PRACTITIONER

## 2019-07-17 PROCEDURE — G0439 PPPS, SUBSEQ VISIT: HCPCS | Mod: HCNC,S$GLB,, | Performed by: NURSE PRACTITIONER

## 2019-07-17 PROCEDURE — G0439 PR MEDICARE ANNUAL WELLNESS SUBSEQUENT VISIT: ICD-10-PCS | Mod: HCNC,S$GLB,, | Performed by: NURSE PRACTITIONER

## 2019-07-17 PROCEDURE — 3074F PR MOST RECENT SYSTOLIC BLOOD PRESSURE < 130 MM HG: ICD-10-PCS | Mod: HCNC,CPTII,S$GLB, | Performed by: NURSE PRACTITIONER

## 2019-07-17 PROCEDURE — 3074F SYST BP LT 130 MM HG: CPT | Mod: HCNC,CPTII,S$GLB, | Performed by: NURSE PRACTITIONER

## 2019-07-17 PROCEDURE — 3078F DIAST BP <80 MM HG: CPT | Mod: HCNC,CPTII,S$GLB, | Performed by: NURSE PRACTITIONER

## 2019-07-17 NOTE — PROGRESS NOTES
I offered to discuss end of life issues, including information on how to make advance directives that the patient could use to name someone who would make medical decisions on their behalf if they became too ill to make themselves.    ___Patient declined  _X_Per patient and , advance directives in order

## 2019-07-17 NOTE — PROGRESS NOTES
"Estefania Mccollum presented for a  Medicare AWV and comprehensive Health Risk Assessment today. The following components were reviewed and updated:    · Medical history  · Family History  · Social history  · Allergies and Current Medications  · Health Risk Assessment  · Health Maintenance  · Care Team     ** See Completed Assessments for Annual Wellness Visit within the encounter summary.**       The following assessments were completed:  · Living Situation  · CAGE  · Depression Screening  · Timed Get Up and Go  · Whisper Test  · Cognitive Function Screening  · Nutrition Screening  · ADL Screening  · PAQ Screening    Vitals:    07/17/19 1020   BP: 100/70   BP Location: Left arm   Patient Position: Sitting   Pulse: 84   Weight: 69 kg (152 lb 1.9 oz)   Height: 5' 6" (1.676 m)     Body mass index is 24.55 kg/m².     Physical Exam   Constitutional: She appears well-developed and well-nourished.   To visit with , slow gait   HENT:   Head: Normocephalic and atraumatic.   Eyes: Conjunctivae are normal. No scleral icterus.   Neck: Normal range of motion. Neck supple.   Cardiovascular: Normal rate, regular rhythm, normal heart sounds and intact distal pulses.   Pulmonary/Chest: Effort normal and breath sounds normal. No respiratory distress.   Abdominal: She exhibits no distension.   Musculoskeletal: Normal range of motion. She exhibits no edema.   Neurological: She is alert.   Memory impairment noted   Skin: Skin is warm and dry. She is not diaphoretic.   Psychiatric: She has a normal mood and affect. Her behavior is normal.       Diagnoses and health risks identified today and associated recommendations/orders:    1. Encounter for preventive health examination  Assessments completed  Preventive health recommendations reviewed  Get up and go abnormal: fall precautions reviewed.  No recent falls.     2. Aortic atherosclerosis  Stable.  Controlled with current medical therapy  Followed by PCP.     3. Memory loss  Stable. "   Controlled with current medical therapy  Followed by neurology     4. Sensorineural hearing loss (SNHL) of both ears  Stable.   Has hearing aids.  Only wears them periodically  Followed by PCP.     5. Essential hypertension  Blood pressure mildly decreased today.  Patient denies dizziness, weakness, light-headedness.    Controlled with current medical therapy  Followed by PCP.     6. Pure hypercholesterolemia  Stable.   Controlled with current medical therapy  Followed by PCP.     7. Hypothyroidism, unspecified type  Stable.   Controlled with current medical therapy  Followed by PCP.     8. Osteopenia, unspecified location  Stable.   Controlled with current medical therapy  Followed by PCP.     9. Balance problem  Stable.   Fall precautions reviewed  Followed by PCP.     10. Decreased GFR  GFR on 03/07/19 noted to be 59x1.  Followed by PCP    Weight checked twice at today's visit because large increase noted from previous visit.  69.0kg and then 68.95kg obtained.  Previous visit's weigh may have been an error because 152 seems to be more consistent with what patient's weight has been in the past.     Provided Estefania with a 5-10 year written screening schedule and personal prevention plan. Recommendations were developed using the USPSTF age appropriate recommendations. Education, counseling, and referrals were provided as needed. After Visit Summary printed and given to patient which includes a list of additional screenings\tests needed.    Follow up in about 2 months (around 9/17/2019) for a routine visit with your primary care provider or sooner if problems arise. .    Alysia Case NP

## 2019-07-24 ENCOUNTER — HOSPITAL ENCOUNTER (EMERGENCY)
Facility: HOSPITAL | Age: 84
Discharge: HOME | End: 2019-07-24
Attending: SURGERY
Payer: MEDICARE

## 2019-07-24 VITALS
RESPIRATION RATE: 18 BRPM | SYSTOLIC BLOOD PRESSURE: 140 MMHG | TEMPERATURE: 97 F | WEIGHT: 162 LBS | HEART RATE: 70 BPM | BODY MASS INDEX: 26.03 KG/M2 | HEIGHT: 66 IN | OXYGEN SATURATION: 98 % | DIASTOLIC BLOOD PRESSURE: 60 MMHG

## 2019-07-24 DIAGNOSIS — R42 VERTIGO: Primary | ICD-10-CM

## 2019-07-24 DIAGNOSIS — R42 DIZZINESS: ICD-10-CM

## 2019-07-24 LAB
ALBUMIN SERPL BCP-MCNC: 3.8 G/DL (ref 3.5–5.2)
ALP SERPL-CCNC: 90 U/L (ref 55–135)
ALT SERPL W/O P-5'-P-CCNC: 11 U/L (ref 10–44)
ANION GAP SERPL CALC-SCNC: 11 MMOL/L (ref 8–16)
AST SERPL-CCNC: 19 U/L (ref 10–40)
BACTERIA #/AREA URNS HPF: NORMAL /HPF
BASOPHILS # BLD AUTO: 0.03 K/UL (ref 0–0.2)
BASOPHILS NFR BLD: 0.4 % (ref 0–1.9)
BILIRUB SERPL-MCNC: 1.4 MG/DL (ref 0.1–1)
BILIRUB UR QL STRIP: NEGATIVE
BNP SERPL-MCNC: 75 PG/ML (ref 0–99)
BUN SERPL-MCNC: 11 MG/DL (ref 8–23)
CALCIUM SERPL-MCNC: 9.3 MG/DL (ref 8.7–10.5)
CHLORIDE SERPL-SCNC: 96 MMOL/L (ref 95–110)
CK MB SERPL-MCNC: 3.8 NG/ML (ref 0.1–6.5)
CK MB SERPL-RTO: 1.1 % (ref 0–5)
CK SERPL-CCNC: 339 U/L (ref 20–180)
CK SERPL-CCNC: 339 U/L (ref 20–180)
CLARITY UR: CLEAR
CO2 SERPL-SCNC: 26 MMOL/L (ref 23–29)
COLOR UR: YELLOW
CREAT SERPL-MCNC: 0.8 MG/DL (ref 0.5–1.4)
DIFFERENTIAL METHOD: ABNORMAL
EOSINOPHIL # BLD AUTO: 0 K/UL (ref 0–0.5)
EOSINOPHIL NFR BLD: 0.5 % (ref 0–8)
ERYTHROCYTE [DISTWIDTH] IN BLOOD BY AUTOMATED COUNT: 12.9 % (ref 11.5–14.5)
EST. GFR  (AFRICAN AMERICAN): >60 ML/MIN/1.73 M^2
EST. GFR  (NON AFRICAN AMERICAN): >60 ML/MIN/1.73 M^2
GLUCOSE SERPL-MCNC: 113 MG/DL (ref 70–110)
GLUCOSE UR QL STRIP: NEGATIVE
HCT VFR BLD AUTO: 45.4 % (ref 37–48.5)
HGB BLD-MCNC: 15.9 G/DL (ref 12–16)
HGB UR QL STRIP: ABNORMAL
IMM GRANULOCYTES # BLD AUTO: 0.02 K/UL (ref 0–0.04)
IMM GRANULOCYTES NFR BLD AUTO: 0.2 % (ref 0–0.5)
KETONES UR QL STRIP: NEGATIVE
LEUKOCYTE ESTERASE UR QL STRIP: NEGATIVE
LYMPHOCYTES # BLD AUTO: 1 K/UL (ref 1–4.8)
LYMPHOCYTES NFR BLD: 11.5 % (ref 18–48)
MAGNESIUM SERPL-MCNC: 2 MG/DL (ref 1.6–2.6)
MCH RBC QN AUTO: 29.4 PG (ref 27–31)
MCHC RBC AUTO-ENTMCNC: 35 G/DL (ref 32–36)
MCV RBC AUTO: 84 FL (ref 82–98)
MICROSCOPIC COMMENT: NORMAL
MONOCYTES # BLD AUTO: 0.6 K/UL (ref 0.3–1)
MONOCYTES NFR BLD: 7.4 % (ref 4–15)
NEUTROPHILS # BLD AUTO: 6.7 K/UL (ref 1.8–7.7)
NEUTROPHILS NFR BLD: 80 % (ref 38–73)
NITRITE UR QL STRIP: NEGATIVE
NRBC BLD-RTO: 0 /100 WBC
PH UR STRIP: 7 [PH] (ref 5–8)
PHOSPHATE SERPL-MCNC: 2.9 MG/DL (ref 2.7–4.5)
PLATELET # BLD AUTO: 225 K/UL (ref 150–350)
PMV BLD AUTO: 9.2 FL (ref 9.2–12.9)
POTASSIUM SERPL-SCNC: 4 MMOL/L (ref 3.5–5.1)
PROT SERPL-MCNC: 6.5 G/DL (ref 6–8.4)
PROT UR QL STRIP: NEGATIVE
RBC # BLD AUTO: 5.41 M/UL (ref 4–5.4)
RBC #/AREA URNS HPF: 4 /HPF (ref 0–4)
SODIUM SERPL-SCNC: 133 MMOL/L (ref 136–145)
SP GR UR STRIP: 1.01 (ref 1–1.03)
TROPONIN I SERPL DL<=0.01 NG/ML-MCNC: 0.01 NG/ML (ref 0–0.03)
TSH SERPL DL<=0.005 MIU/L-ACNC: 2.31 UIU/ML (ref 0.4–4)
URN SPEC COLLECT METH UR: ABNORMAL
UROBILINOGEN UR STRIP-ACNC: NEGATIVE EU/DL
WBC # BLD AUTO: 8.35 K/UL (ref 3.9–12.7)

## 2019-07-24 PROCEDURE — 96360 HYDRATION IV INFUSION INIT: CPT | Mod: HCNC

## 2019-07-24 PROCEDURE — 84484 ASSAY OF TROPONIN QUANT: CPT | Mod: HCNC

## 2019-07-24 PROCEDURE — 93010 EKG 12-LEAD: ICD-10-PCS | Mod: HCNC,,, | Performed by: INTERNAL MEDICINE

## 2019-07-24 PROCEDURE — 82553 CREATINE MB FRACTION: CPT | Mod: HCNC

## 2019-07-24 PROCEDURE — 99285 EMERGENCY DEPT VISIT HI MDM: CPT | Mod: 25,HCNC

## 2019-07-24 PROCEDURE — 83880 ASSAY OF NATRIURETIC PEPTIDE: CPT | Mod: HCNC

## 2019-07-24 PROCEDURE — 93010 ELECTROCARDIOGRAM REPORT: CPT | Mod: HCNC,,, | Performed by: INTERNAL MEDICINE

## 2019-07-24 PROCEDURE — 93005 ELECTROCARDIOGRAM TRACING: CPT | Mod: HCNC

## 2019-07-24 PROCEDURE — 80053 COMPREHEN METABOLIC PANEL: CPT | Mod: HCNC

## 2019-07-24 PROCEDURE — 82550 ASSAY OF CK (CPK): CPT | Mod: HCNC

## 2019-07-24 PROCEDURE — 83735 ASSAY OF MAGNESIUM: CPT | Mod: HCNC

## 2019-07-24 PROCEDURE — 85025 COMPLETE CBC W/AUTO DIFF WBC: CPT | Mod: HCNC

## 2019-07-24 PROCEDURE — 25000003 PHARM REV CODE 250: Mod: HCNC | Performed by: SURGERY

## 2019-07-24 PROCEDURE — 81000 URINALYSIS NONAUTO W/SCOPE: CPT | Mod: HCNC

## 2019-07-24 PROCEDURE — 84100 ASSAY OF PHOSPHORUS: CPT | Mod: HCNC

## 2019-07-24 PROCEDURE — 36415 COLL VENOUS BLD VENIPUNCTURE: CPT | Mod: HCNC

## 2019-07-24 PROCEDURE — 84443 ASSAY THYROID STIM HORMONE: CPT | Mod: HCNC

## 2019-07-24 RX ORDER — ONDANSETRON 4 MG/1
4 TABLET, ORALLY DISINTEGRATING ORAL EVERY 8 HOURS PRN
Qty: 20 TABLET | Refills: 0 | Status: SHIPPED | OUTPATIENT
Start: 2019-07-24 | End: 2019-09-17

## 2019-07-24 RX ORDER — MECLIZINE HYDROCHLORIDE 25 MG/1
25 TABLET ORAL
Status: COMPLETED | OUTPATIENT
Start: 2019-07-24 | End: 2019-07-24

## 2019-07-24 RX ORDER — SODIUM CHLORIDE 9 MG/ML
1000 INJECTION, SOLUTION INTRAVENOUS
Status: COMPLETED | OUTPATIENT
Start: 2019-07-24 | End: 2019-07-24

## 2019-07-24 RX ORDER — ONDANSETRON 4 MG/1
4 TABLET, ORALLY DISINTEGRATING ORAL
Status: COMPLETED | OUTPATIENT
Start: 2019-07-24 | End: 2019-07-24

## 2019-07-24 RX ORDER — MECLIZINE HYDROCHLORIDE 25 MG/1
25 TABLET ORAL 3 TIMES DAILY PRN
Qty: 20 TABLET | Refills: 0 | Status: SHIPPED | OUTPATIENT
Start: 2019-07-24 | End: 2019-09-17

## 2019-07-24 RX ADMIN — SODIUM CHLORIDE 1000 ML: 0.9 INJECTION, SOLUTION INTRAVENOUS at 09:07

## 2019-07-24 RX ADMIN — ONDANSETRON 4 MG: 4 TABLET, ORALLY DISINTEGRATING ORAL at 10:07

## 2019-07-24 RX ADMIN — MECLIZINE HYDROCHLORIDE 25 MG: 25 TABLET ORAL at 10:07

## 2019-07-24 NOTE — ED TRIAGE NOTES
PT presents via [facility] with C/O dizziness     x 3 days and slid out of her chair onto the floor, Denies LOC, denies hitting her head

## 2019-07-24 NOTE — ED PROVIDER NOTES
[facility]                                                 Chief Complaint  84 y.o. female with Dizziness (x 3 days)    History of Present Illness  Estefania Mccollum presents to the emergency room with dizziness issues  The patient has longstanding and dizziness issues with vertigo today  Patient felt weak and almost fell at home, denies actual head trauma  Patient denies any loss of consciousness, long history of vertigo noted  Alert and appropriate, no neuro deficit vision, no obvious nystagmus now  Review of systems is only positive for dizziness, no chest pain or SOB    The history is provided by the patient   device was not used during this ER visit    Past Medical History   -- Adenomatous polyp    -- Allergy    -- Annual physical exam    -- Arthritis    -- Cataract    -- History of bone density study    -- History of colonoscopy    -- History of mammogram    -- Hyperlipidemia    -- Hypertension    -- Macular degeneration    -- Postmenopausal hormone replacement therapy    -- Thyroid disease    -- Vertigo      Past Surgical History   -- APPENDECTOMY     -- CATARACT EXTRACTION     -- CATARACT EXTRACTION, BILATERAL     --  SECTION     -- CHOLECYSTECTOMY     -- COLONOSCOPY     -- COLONOSCOPY     -- HYSTERECTOMY     -- JOINT REPLACEMENT     -- OOPHORECTOMY     -- REPLACEMENT-KNEE     -- TONSILLECTOMY     -- TOTAL KNEE ARTHROPLASTY     -- Yag         Review of patient's allergies    -- Tetracycline       I have reviewed all of this patient's past medical, surgical, family, and social   histories as well as active allergies and medications documented in the  electronic medical record    Review of Systems and Physical Exam      Review of Systems  -- Constitution - no fever, denies fatigue, no weakness, no chills  -- Eyes - no tearing or redness, no visual disturbance  -- Ear, Nose - no tinnitus or earache, no nasal congestion or discharge  -- Mouth,Throat - no sore  throat, no toothache, normal voice, normal swallowing  -- Respiratory - denies cough and congestion, no shortness of breath, no AYOUB  -- Cardiovascular - denies chest pain, no palpitations, denies claudication  -- Gastrointestinal - denies abdominal pain, nausea, vomiting, or diarrhea  -- Genitourinary - no dysuria, denies flank pain, no hematuria, no STD risk  -- Musculoskeletal - denies back pain, negative for trauma or injury  -- Neurological - dizziness, no headache, denies weakness or seizure  -- Skin - denies pallor, rash, or changes in skin. no hives or welts noted  -- Psychiatric - Denies SI or HI, no psychosis or fractured thought noted     Vital Signs  Her tympanic temperature is 97.2 °F (36.2 °C).   Her blood pressure is 170/70 and her pulse is 80.   Her respiration is 18 and oxygen saturation is 98%.     Physical Exam  -- Nursing note and vitals reviewed  -- Constitutional: Appears well-developed and well-nourished  -- Head: Atraumatic. Normocephalic. No obvious abnormality  -- Eyes: Pupils are equal and reactive to light. Normal conjunctiva and lids  -- Cardiac: Normal rate, regular rhythm and normal heart sounds  -- Pulmonary: Normal respiratory effort, breath sounds clear to auscultation  -- Abdominal: Soft, no tenderness. Normal bowel sounds. Normal liver edge  -- Musculoskeletal: Normal range of motion, no effusions. Joints stable   -- Neurological: No focal deficits. Showed good interaction with staff  -- Vascular: Posterior tibial, dorsalis pedis and radial pulses 2+ bilaterally      Emergency Room Course      Lab Results   (L)   K 4.0   CL 96   CO2 26   BUN 11   CREATININE 0.8    (H)   ALKPHOS 90   AST 19   ALT 11   BILITOT 1.4 (H)   ALBUMIN 3.8   PROT 6.5   WBC 8.35   HGB 15.9   HCT 45.4       (H)    (H)   CPKMB 3.8   TROPONINI 0.006   BNP 75   MG 2.0   TSH 2.310     Urinalysis  -- Urinalysis performed during this ER visit showed no signs of infection       EKG  -- The EKG findings today were without concerning findings from baseline     Radiology  -- The CT of the head performed in the ER today was negative for acute pathology  -- Chest x-ray showed no infiltrate and showed no acute pathology     Medications Given  0.9%  NaCl infusion (1,000 mLs Intravenous New Bag 7/24/19 0947)   meclizine tablet 25 mg (25 mg Oral Given 7/24/19 1028)   ondansetron disintegrating tablet 4 mg (4 mg Oral Given 7/24/19 1029)     MDM  Number of Diagnoses or Management Options  Dizziness: new, needed workup  Vertigo: new, needed workup     Amount and/or Complexity of Data Reviewed  Clinical lab tests: ordered and reviewed  Tests in the radiology section of CPT®: ordered and reviewed  Tests in the medicine section of CPT®: ordered and reviewed    Risk of Complications, Morbidity, and/or Mortality  Presenting problems: low  Diagnostic procedures: low  Management options: low       ED Physician Management  -- Diagnosis management comments: 84 y.o. female with dizziness today  -- longstanding history of vertigo, improved with IV fluids and Antivert here  -- patient is asymptomatic on discharge, will prescribe Antivert at home  -- patient counseled to follow up with her primary care physician in 48 hours  -- patient counseled to return to the ER with any concerning signs or symptoms    Diagnosis  -- The primary encounter diagnosis was Vertigo.   -- A diagnosis of Dizziness was also pertinent to this visit.    Disposition and Plan  -- Disposition: home  -- Condition: stable  -- Follow-up: Patient to follow up with Danna Levine MD in 1-2 days.  -- I advised the patient that we have found no life threatening condition today  -- At this time, I believe the patient is clinically stable for discharge.   -- The patient acknowledges that close follow up with a MD is required   -- Patient agrees to comply with all instruction and direction given in the ER    This note is dictated on M*Modal word  recognition program.  There are word recognition mistakes that are occasionally missed on review.         Hair Longo MD  07/24/19 3383

## 2019-07-26 ENCOUNTER — OFFICE VISIT (OUTPATIENT)
Dept: INTERNAL MEDICINE | Facility: CLINIC | Age: 84
End: 2019-07-26
Payer: MEDICARE

## 2019-07-26 VITALS
HEIGHT: 68 IN | HEART RATE: 93 BPM | SYSTOLIC BLOOD PRESSURE: 100 MMHG | DIASTOLIC BLOOD PRESSURE: 62 MMHG | BODY MASS INDEX: 24.63 KG/M2

## 2019-07-26 DIAGNOSIS — R41.3 MEMORY LOSS: ICD-10-CM

## 2019-07-26 DIAGNOSIS — R26.89 BALANCE PROBLEM: ICD-10-CM

## 2019-07-26 DIAGNOSIS — I10 ESSENTIAL HYPERTENSION: ICD-10-CM

## 2019-07-26 DIAGNOSIS — G31.84 MCI (MILD COGNITIVE IMPAIRMENT): Primary | ICD-10-CM

## 2019-07-26 DIAGNOSIS — R42 VERTIGO: ICD-10-CM

## 2019-07-26 PROCEDURE — 99214 PR OFFICE/OUTPT VISIT, EST, LEVL IV, 30-39 MIN: ICD-10-PCS | Mod: HCNC,S$GLB,, | Performed by: PHYSICIAN ASSISTANT

## 2019-07-26 PROCEDURE — 1100F PR PT FALLS ASSESS DOC 2+ FALLS/FALL W/INJURY/YR: ICD-10-PCS | Mod: HCNC,CPTII,S$GLB, | Performed by: PHYSICIAN ASSISTANT

## 2019-07-26 PROCEDURE — 3288F FALL RISK ASSESSMENT DOCD: CPT | Mod: HCNC,CPTII,S$GLB, | Performed by: PHYSICIAN ASSISTANT

## 2019-07-26 PROCEDURE — 3078F PR MOST RECENT DIASTOLIC BLOOD PRESSURE < 80 MM HG: ICD-10-PCS | Mod: HCNC,CPTII,S$GLB, | Performed by: PHYSICIAN ASSISTANT

## 2019-07-26 PROCEDURE — 99999 PR PBB SHADOW E&M-EST. PATIENT-LVL IV: CPT | Mod: PBBFAC,HCNC,, | Performed by: PHYSICIAN ASSISTANT

## 2019-07-26 PROCEDURE — 99214 OFFICE O/P EST MOD 30 MIN: CPT | Mod: HCNC,S$GLB,, | Performed by: PHYSICIAN ASSISTANT

## 2019-07-26 PROCEDURE — 99999 PR PBB SHADOW E&M-EST. PATIENT-LVL IV: ICD-10-PCS | Mod: PBBFAC,HCNC,, | Performed by: PHYSICIAN ASSISTANT

## 2019-07-26 PROCEDURE — 1100F PTFALLS ASSESS-DOCD GE2>/YR: CPT | Mod: HCNC,CPTII,S$GLB, | Performed by: PHYSICIAN ASSISTANT

## 2019-07-26 PROCEDURE — 3288F PR FALLS RISK ASSESSMENT DOCUMENTED: ICD-10-PCS | Mod: HCNC,CPTII,S$GLB, | Performed by: PHYSICIAN ASSISTANT

## 2019-07-26 PROCEDURE — 3078F DIAST BP <80 MM HG: CPT | Mod: HCNC,CPTII,S$GLB, | Performed by: PHYSICIAN ASSISTANT

## 2019-07-26 PROCEDURE — 3074F SYST BP LT 130 MM HG: CPT | Mod: HCNC,CPTII,S$GLB, | Performed by: PHYSICIAN ASSISTANT

## 2019-07-26 PROCEDURE — 3074F PR MOST RECENT SYSTOLIC BLOOD PRESSURE < 130 MM HG: ICD-10-PCS | Mod: HCNC,CPTII,S$GLB, | Performed by: PHYSICIAN ASSISTANT

## 2019-07-26 NOTE — PATIENT INSTRUCTIONS
DECREASE LISINOPRIL TO HALF TABLET 10MG DAILY.     PLEASE BRING ALL MEDICATIONS TO YOUR NEXT APPOINTMENT    I WOULD LIKE FOR YOU TO SEE YOUR NEUROLOGIST SOONER THAN YOUR SCHEDULED APPOINTMENT. I WILL CALL YOU WITH THIS INFORMATION.     FOLLOW UP WITH DR. SWENSON ON Monday AT 10:00AM

## 2019-07-26 NOTE — PROGRESS NOTES
"Subjective:       Patient ID: Estefania Mccollum is a 84 y.o. female.    Chief Complaint: Follow-up    HPI     Established pt of Danna Levine MD (new to me)    Here with  to follow up recent ED visit two days ago (7/24/19) for dizziness and vertigo.     In ED patient was evaluated with lab, EKG and CT Head which were unremarkable for acute process. Treated with Zofran, Meclizine and IV fluids while in ED with improvement of symptoms and discharged home with the same.     Today pt states medications has helped. No longer nausea but still occ feels "dizzy" which she mostly characterizes as generalized weakness/balance when walking. No room spinning sensations. Per chart review she has had chronic issues with memory, balance and vertigo. Following with Neurology. No recent falls. She is able to ambulate with her walker but slow gait.     Was referred by PCP for Physical therapy for gait/balance at last visit.     Today her BP is low normal in clinic. Pt reports not drinking much fluids as wants to limit how often she as to get up to ambulate to the restroom.   preparing medications at home. He is unsure of medication names.     Past Medical History:   Diagnosis Date    Adenomatous polyp     Allergy     sinus    Annual physical exam 8/30/2011    Arthritis     osteoarthritis    Cataract     History of bone density study 8/6/2009    History of colonoscopy 10/30/2008    History of mammogram 8/19/2011    Hyperlipidemia     Hypertension     Macular degeneration     Postmenopausal hormone replacement therapy     therapy stopped    Thyroid disease     hypothyroidism    Vertigo      Social History     Tobacco Use    Smoking status: Never Smoker    Smokeless tobacco: Never Used   Substance Use Topics    Alcohol use: Yes     Comment: wine occasionally    Drug use: No     Review of patient's allergies indicates:   Allergen Reactions    Tetracycline Other (See Comments)     Other reaction(s): Rash.. " "Pt states no drug allergie           Review of Systems   Constitutional: Negative for chills, diaphoresis, fever and unexpected weight change.   Eyes: Negative for visual disturbance.   Respiratory: Negative for cough and shortness of breath.    Cardiovascular: Negative for chest pain and leg swelling.   Gastrointestinal: Negative for abdominal pain, nausea (resolved) and vomiting.   Skin: Negative for rash.   Neurological: Positive for dizziness (none currently, improved with meclizine) and weakness (generalized/balance). Negative for light-headedness and headaches.       Objective: /62   Pulse 93   Ht 5' 8" (1.727 m)   BMI 24.63 kg/m²         Physical Exam   Constitutional: She appears well-developed and well-nourished. No distress.   HENT:   Head: Normocephalic and atraumatic.   Mouth/Throat: Oropharynx is clear and moist.   Eyes: Pupils are equal, round, and reactive to light.   Cardiovascular: Normal rate and regular rhythm. Exam reveals no friction rub.   No murmur heard.  Pulmonary/Chest: Effort normal and breath sounds normal. She has no wheezes. She has no rales.   Abdominal: Soft. Bowel sounds are normal. There is no tenderness.   Musculoskeletal:   In wheelchair  5/5/ strength to BLE/BUE  Alert and conversational   Neurological: She is alert.   Skin: Skin is warm and dry. No rash noted.   Vitals reviewed.      Assessment:       1. MCI (mild cognitive impairment)    2. Memory loss    3. Balance problem    4. Vertigo        Plan:         Estefania was seen today for follow-up.    Diagnoses and all orders for this visit:    MCI (mild cognitive impairment)    Memory loss    Balance problem    Vertigo    Essential hypertension      Given low normal BP today will decrease lisinopril to half tablet 20->10mg  Advises increased hydration  PCP f/u next week  [facility] about sooner follow up there as well.   ED precautions discussed.        Elba Higginbotham PA-C    "

## 2019-07-26 NOTE — Clinical Note
Pt with recent ED visit on 7/24 for dizziness/vertigo. Reaching out for earlier appointment than currently schedule for follow up

## 2019-07-29 ENCOUNTER — TELEPHONE (OUTPATIENT)
Dept: INTERNAL MEDICINE | Facility: CLINIC | Age: 84
End: 2019-07-29

## 2019-07-29 ENCOUNTER — OFFICE VISIT (OUTPATIENT)
Dept: INTERNAL MEDICINE | Facility: CLINIC | Age: 84
End: 2019-07-29
Payer: MEDICARE

## 2019-07-29 ENCOUNTER — PATIENT MESSAGE (OUTPATIENT)
Dept: INTERNAL MEDICINE | Facility: CLINIC | Age: 84
End: 2019-07-29

## 2019-07-29 VITALS — OXYGEN SATURATION: 96 % | DIASTOLIC BLOOD PRESSURE: 80 MMHG | HEART RATE: 83 BPM | SYSTOLIC BLOOD PRESSURE: 130 MMHG
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
## 2019-07-29 DIAGNOSIS — R42 DIZZINESS: ICD-10-CM

## 2019-07-29 DIAGNOSIS — R53.1 WEAKNESS: ICD-10-CM

## 2019-07-29 PROCEDURE — 1101F PR PT FALLS ASSESS DOC 0-1 FALLS W/OUT INJ PAST YR: ICD-10-PCS | Mod: HCNC,CPTII,S$GLB, | Performed by: INTERNAL MEDICINE

## 2019-07-29 PROCEDURE — 99214 PR OFFICE/OUTPT VISIT, EST, LEVL IV, 30-39 MIN: ICD-10-PCS | Mod: HCNC,S$GLB,, | Performed by: INTERNAL MEDICINE

## 2019-07-29 PROCEDURE — 3079F DIAST BP 80-89 MM HG: CPT | Mod: HCNC,CPTII,S$GLB, | Performed by: INTERNAL MEDICINE

## 2019-07-29 PROCEDURE — 99214 OFFICE O/P EST MOD 30 MIN: CPT | Mod: HCNC,S$GLB,, | Performed by: INTERNAL MEDICINE

## 2019-07-29 PROCEDURE — 1101F PT FALLS ASSESS-DOCD LE1/YR: CPT | Mod: HCNC,CPTII,S$GLB, | Performed by: INTERNAL MEDICINE

## 2019-07-29 PROCEDURE — 99999 PR PBB SHADOW E&M-EST. PATIENT-LVL V: ICD-10-PCS | Mod: PBBFAC,HCNC,, | Performed by: INTERNAL MEDICINE

## 2019-07-29 PROCEDURE — 3079F PR MOST RECENT DIASTOLIC BLOOD PRESSURE 80-89 MM HG: ICD-10-PCS | Mod: HCNC,CPTII,S$GLB, | Performed by: INTERNAL MEDICINE

## 2019-07-29 PROCEDURE — 99999 PR PBB SHADOW E&M-EST. PATIENT-LVL V: CPT | Mod: PBBFAC,HCNC,, | Performed by: INTERNAL MEDICINE

## 2019-07-29 PROCEDURE — 3075F SYST BP GE 130 - 139MM HG: CPT | Mod: HCNC,CPTII,S$GLB, | Performed by: INTERNAL MEDICINE

## 2019-07-29 PROCEDURE — 3075F PR MOST RECENT SYSTOLIC BLOOD PRESS GE 130-139MM HG: ICD-10-PCS | Mod: HCNC,CPTII,S$GLB, | Performed by: INTERNAL MEDICINE

## 2019-07-29 NOTE — PROGRESS NOTES
Subjective:      Patient ID: Estefania Mccollum is a 84 y.o. female.    Chief Complaint: Follow-up    HPI:  HPI   Patient is here for ER follow up. She is a long time patient of [facility]. She has developed MCI and possible early Alzheimers. Recently she had been driving up to a week ago . The first change was that she went to the dentist and you were unable to drive home with dizziness and weakness. She describes dizzy as swaying or unable to stand. On Thursday of last week she slid to the floor at her home and was taken to the ER. The work up of CT/lab/urine was negative. She subsequently was on the floor on Sunday and today. It appears that she slipped off her Lazy Boy both times. She was able to walk with a walker at some point prior to finding her on the floor.    CT head, labs and urine reviewed.    Patient has a neurologist and ENT.  She generally saw her ENT for vertigo but actually has not seen him in a while.  Her neurologist is currently out of town.    Patient Active Problem List   Diagnosis    Hypertension    Hypothyroidism    Hyperlipidemia    Retinal drusen - Both Eyes    Hearing loss, sensorineural    History of colonic polyps    Osteopenia    MCI (mild cognitive impairment)    Memory loss    Primary osteoarthritis of left knee    Aortic atherosclerosis    Balance problem    Decreased GFR     Past Medical History:   Diagnosis Date    Adenomatous polyp     Allergy     sinus    Annual physical exam 8/30/2011    Arthritis     osteoarthritis    Cataract     History of bone density study 8/6/2009    History of colonoscopy 10/30/2008    History of mammogram 8/19/2011    Hyperlipidemia     Hypertension     Macular degeneration     Postmenopausal hormone replacement therapy     therapy stopped    Thyroid disease     hypothyroidism    Vertigo      Past Surgical History:   Procedure Laterality Date    APPENDECTOMY      12 years old     CATARACT EXTRACTION      Both eyes    CATARACT EXTRACTION, BILATERAL       SECTION      x 3    CHOLECYSTECTOMY      COLONOSCOPY  10/02/2013    COLONOSCOPY N/A 10/2/2013    [facility] (4TH F[facility])    HYSTERECTOMY      total    JOINT REPLACEMENT Left     OOPHORECTOMY      REPLACEMENT-KNEE WITH NAVIGATION and PATELLOPLASTY Left 2016    Performed by [facility] at [facility] OR 2ND FLR    TONSILLECTOMY      TOTAL KNEE ARTHROPLASTY Left 2016    Yag       Left Eye     Family History   Problem Relation Age of Onset    Heart failure Mother     Hypertension Mother     Hyperlipidemia Mother     Heart failure Father     Hypertension Father     Hyperlipidemia Father     Asthma Father     Hypertension Sister     Diabetes Sister         Prediabetes    Heart attack Brother     No Known Problems Daughter     Heart disease Brother         Has had open heart surgery    No Known Problems Brother     Mental retardation Daughter     No Known Problems Daughter     Coronary artery disease Neg Hx     Amblyopia Neg Hx     Blindness Neg Hx     Cataracts Neg Hx     Glaucoma Neg Hx     Macular degeneration Neg Hx     Retinal detachment Neg Hx     Strabismus Neg Hx     Melanoma Neg Hx     Psoriasis Neg Hx     Lupus Neg Hx     Eczema Neg Hx     Acne Neg Hx      Review of Systems   Patient has been the same question during the visit she is known to have MCI which has likely progressed.  She has difficulty explaining her dizziness or her weakness.  Objective:     Vitals:    19 1016   BP: 130/80   Pulse: 83   SpO2: 96%   PainSc: 0-No pain     There is no height or weight on file to calculate BMI.  Physical Exam   Patient was able to stand but she did not have a walker so I did not feel I could safely walker.  Lungs were clear to auscultation and percussion  Heart is a regular rate and rhythm  Assessment:     1. Fall, sequela    2. Dizziness   "    Plan:   Estefania was seen today for follow-up.    Diagnoses and all orders for this visit:    Fall, sequela:  I have spoken with the patient's daughter Emani and she is coming in and has an appointment with Neurology for her mother tomorrow.  Review of the ER evaluation does not show any new findings.  I am sending home health and physical therapy as well as a wheelchair for home use  -     Ambulatory referral to Home Health      Dizziness:  I have asked her sister who is present to help with an appointment for ENT to decide what part dizziness is playing.  -     Ambulatory consult to ENT    I have suggested to her daughter that I would like some observations by her to see how we could further help.  It is not clear actually what is happening to the patient.    In addition her  is also present and he fell into a ditch at the house yesterday and scraped his arm apparently paramedics are coming to change the dressing daily      Problem List Items Addressed This Visit     None      Visit Diagnoses     Fall, sequela    -  Primary    Relevant Orders    Ambulatory referral to Home Health    WALKER FOR HOME USE    Dizziness        Relevant Orders    Ambulatory consult to ENT    Ambulatory consult to ENT        Orders Placed This Encounter   Procedures    WALKER FOR HOME USE     Order Specific Question:   Type of Walker:     Answer:   Adult (5'4"-6'6")     Order Specific Question:   With wheels?     Answer:   Yes     Order Specific Question:   Height:     Answer:   5'8"     Order Specific Question:   Weight:     Answer:   162lb     Order Specific Question:   Length of need (1-99 months):     Answer:   99     Order Specific Question:   Please check all that apply:     Answer:   Patient's condition impairs ambulation.    Ambulatory referral to Home Health     Referral Priority:   Routine     Referral Type:   Home Health     Referral Reason:   Specialty Services Required     Requested Specialty:   Home Health " Services     Number of Visits Requested:   1    Ambulatory consult to ENT     Referral Priority:   Routine     Referral Type:   Consultation     Referral Reason:   Specialty Services Required     Requested Specialty:   Otolaryngology     Number of Visits Requested:   1    Ambulatory consult to ENT     Referral Priority:   Routine     Referral Type:   Consultation     Referral Reason:   Specialty Services Required     Requested Specialty:   Otolaryngology     Number of Visits Requested:   1     No follow-ups on file.     Medication List           Accurate as of 7/29/19  6:27 PM. If you have any questions, ask your nurse or doctor.               CONTINUE taking these medications    acetaminophen 325 MG tablet  Commonly known as:  TYLENOL     aspirin 81 MG EC tablet  Commonly known as:  ECOTRIN     calcium carbonate 600 mg calcium (1,500 mg) Tab  Commonly known as:  OS-RAMONE     donepezil 5 MG tablet  Commonly known as:  ARICEPT  Take 1 tablet (5 mg total) by mouth every evening.     fish oil-omega-3 fatty acids 300-1,000 mg capsule     levothyroxine 100 MCG tablet  Commonly known as:  SYNTHROID  Take 1 tablet (100 mcg total) by mouth once daily.     lisinopril 20 MG tablet  Commonly known as:  PRINIVIL,ZESTRIL  Take 1 tablet (20 mg total) by mouth once daily.     meclizine 25 mg tablet  Commonly known as:  ANTIVERT  Take 1 tablet (25 mg total) by mouth 3 (three) times daily as needed.     memantine 10 MG Tab  Commonly known as:  NAMENDA  Take 1 tablet (10 mg total) by mouth 2 (two) times daily.     mometasone 0.1% 0.1 % cream  Commonly known as:  ELOCON  Apply topically once daily. For itching of outer ears.     multivitamin capsule     omeprazole 40 MG capsule  Commonly known as:  PRILOSEC  TAKE 1 CAPSULE EVERY MORNING     ondansetron 4 MG Tbdl  Commonly known as:  ZOFRAN-ODT  Take 1 tablet (4 mg total) by mouth every 8 (eight) hours as needed (nausea).     oxybutynin 5 MG Tab  Commonly known as:  DITROPAN  TAKE 2  TABLETS AT NIGHT     pravastatin 80 MG tablet  Commonly known as:  PRAVACHOL  TAKE 1 TABLET ONE TIME DAILY

## 2019-07-29 NOTE — TELEPHONE ENCOUNTER
----- Message from Anmol Driscoll sent at 7/29/2019  8:04 AM CDT -----  Contact: Daughter/ Emani [phone]  She is worried about them living at home with no assistance.    Thank you

## 2019-07-29 NOTE — TELEPHONE ENCOUNTER
I spoke to Emani. She will be taking her mother to a Neurology appt tomorrow. I have suggested that she also make the ENT appt. Home Health and PT have been ordered. Wheel chair for home use. L

## 2019-07-30 ENCOUNTER — OFFICE VISIT (OUTPATIENT)
Dept: NEUROLOGY | Facility: CLINIC | Age: 84
End: 2019-07-30
Payer: MEDICARE

## 2019-07-30 VITALS
HEIGHT: 67 IN | DIASTOLIC BLOOD PRESSURE: 90 MMHG | SYSTOLIC BLOOD PRESSURE: 138 MMHG | RESPIRATION RATE: 14 BRPM | BODY MASS INDEX: 23.18 KG/M2 | WEIGHT: 147.69 LBS | HEART RATE: 82 BPM

## 2019-07-30 DIAGNOSIS — E78.00 PURE HYPERCHOLESTEROLEMIA: ICD-10-CM

## 2019-07-30 DIAGNOSIS — R42 VERTIGO: Primary | ICD-10-CM

## 2019-07-30 DIAGNOSIS — G31.84 MCI (MILD COGNITIVE IMPAIRMENT): ICD-10-CM

## 2019-07-30 DIAGNOSIS — H90.3 SENSORINEURAL HEARING LOSS (SNHL) OF BOTH EARS: ICD-10-CM

## 2019-07-30 DIAGNOSIS — R26.89 BALANCE PROBLEM: ICD-10-CM

## 2019-07-30 DIAGNOSIS — I10 ESSENTIAL HYPERTENSION: ICD-10-CM

## 2019-07-30 DIAGNOSIS — E03.9 HYPOTHYROIDISM, UNSPECIFIED TYPE: ICD-10-CM

## 2019-07-30 DIAGNOSIS — R41.3 MEMORY LOSS: ICD-10-CM

## 2019-07-30 DIAGNOSIS — R94.4 DECREASED GFR: ICD-10-CM

## 2019-07-30 PROCEDURE — G0180 PR HOME HEALTH MD CERTIFICATION: ICD-10-PCS | Mod: ,,, | Performed by: INTERNAL MEDICINE

## 2019-07-30 PROCEDURE — G0180 MD CERTIFICATION HHA PATIENT: HCPCS | Mod: ,,, | Performed by: INTERNAL MEDICINE

## 2019-07-30 PROCEDURE — 3080F DIAST BP >= 90 MM HG: CPT | Mod: HCNC,CPTII,S$GLB, | Performed by: NURSE PRACTITIONER

## 2019-07-30 PROCEDURE — 3075F SYST BP GE 130 - 139MM HG: CPT | Mod: HCNC,CPTII,S$GLB, | Performed by: NURSE PRACTITIONER

## 2019-07-30 PROCEDURE — 99214 OFFICE O/P EST MOD 30 MIN: CPT | Mod: HCNC,S$GLB,, | Performed by: NURSE PRACTITIONER

## 2019-07-30 PROCEDURE — 3075F PR MOST RECENT SYSTOLIC BLOOD PRESS GE 130-139MM HG: ICD-10-PCS | Mod: HCNC,CPTII,S$GLB, | Performed by: NURSE PRACTITIONER

## 2019-07-30 PROCEDURE — 99999 PR PBB SHADOW E&M-EST. PATIENT-LVL IV: CPT | Mod: PBBFAC,HCNC,, | Performed by: NURSE PRACTITIONER

## 2019-07-30 PROCEDURE — 99214 PR OFFICE/OUTPT VISIT, EST, LEVL IV, 30-39 MIN: ICD-10-PCS | Mod: HCNC,S$GLB,, | Performed by: NURSE PRACTITIONER

## 2019-07-30 PROCEDURE — 99999 PR PBB SHADOW E&M-EST. PATIENT-LVL IV: ICD-10-PCS | Mod: PBBFAC,HCNC,, | Performed by: NURSE PRACTITIONER

## 2019-07-30 PROCEDURE — 1101F PR PT FALLS ASSESS DOC 0-1 FALLS W/OUT INJ PAST YR: ICD-10-PCS | Mod: HCNC,CPTII,S$GLB, | Performed by: NURSE PRACTITIONER

## 2019-07-30 PROCEDURE — 1101F PT FALLS ASSESS-DOCD LE1/YR: CPT | Mod: HCNC,CPTII,S$GLB, | Performed by: NURSE PRACTITIONER

## 2019-07-30 PROCEDURE — 3080F PR MOST RECENT DIASTOLIC BLOOD PRESSURE >= 90 MM HG: ICD-10-PCS | Mod: HCNC,CPTII,S$GLB, | Performed by: NURSE PRACTITIONER

## 2019-07-30 NOTE — PROGRESS NOTES
"HPI: Estefania Mccollum is a 84 y.o. female with Vertigo. Responds to PT. Some vague symptoms at times, but improved with normal MRI brain prior. Also has mild cognitive impairment like symptoms vs. Early Alzheimer's disease. She has HTN, HLD, and hypothyroidism.     She presents today for an ER follow up visit for "vertigo".    Dizziness:  Patient presents for evaluation of dizziness. It is difficult for her to describe the dizziness, due to memory issues.   The symptoms started on 7/21/2019 after laying down in a dentist chair for an hour then being raised up.   The attacks occur times per day/week and last for minutes/hours: every day, but unable to provide details due to memory loss  Positions that worsen symptoms: standing up; she did have a 30 point drop in SBP upon standing with orthostatic vitals; however, her lower SBP was in the 120's.   Previous workup/treatments: Head CT per ED unremarkable. Prescribed Meclizine, but has "wooziness and fatigue" with this. Has seen an ENT in the past. PT improved dizziness. Could not tolerate Meclizine prior, due to side effects.   Recent changes in blood pressure medication: no  Associated ear symptoms: no  Hearing loss: yes  Other associated symptoms:   Recent infections: no  Head trauma: no  Neck pain: no  Anxiety: no    Family reports that memory loss is worse over the past 6 months, but she stopped wearing her hearing aids 6 months ago. She is less engaged in conversation and is confused at times, but patient cannot hear well, as she is not using her hearing aids.     No hallucinations. No anxiety or depression. She sleeps well at night. Appetite is fair- 4 pound weight loss since last visit.     No falls or gait imbalance until she had return of her vertiginous complaints, which did result in one fall. Her daughter is very concerned about "mobility issues", though patient was functioning well with no gait imbalance prior to the onset of her dizziness.     She stopped " driving since her dizziness returned, but drove well prior to this.     She has one daughter living in [place] and one in [facility]    Review of Systems   Constitutional: Negative for fever.   HENT: Positive for hearing loss. Negative for nosebleeds.    Eyes: Negative for double vision.   Respiratory: Negative for shortness of breath.    Cardiovascular: Negative for chest pain.   Gastrointestinal: Negative for blood in stool.   Genitourinary: Negative for hematuria.   Musculoskeletal: Positive for falls.   Skin: Negative for rash.   Neurological: Positive for dizziness. Negative for tremors, sensory change, focal weakness, seizures and headaches.   Psychiatric/Behavioral: Positive for memory loss.       Objective:      Physical Exam      Exam:  Gen Appearance, well developed/nourished in no apparent distress  CV: 2+ distal pulses with no edema or swelling  Neuro:  MS: Awake, alert, sustains attention. She is  oriented to all except exact date today, and knows today is Tuesday (consistent with prior).   Recent recall is 2/3 at 5 minutes /remote memory intact, Language is full to spontaneous speech/comprehension. Fund of Knowledge is full  Visual spatial skill are good   She is able to name the current and 2 past presidents.   CN: Optic discs are flat with normal vasculature, PERRL, Extraoccular movements and visual fields are full; horizontal nystagmus with right gaze. Normal facial sensation and strength, Hearing symmetrically reduced, Tongue and Palate are midline and strong. Shoulder Shrug symmetric and strong.  Motor: Normal bulk, tone, no abnormal movements. 5/5 strength bilateral upper/lower extremities with 2+ reflexes and no clonus  Sensory:  Intact to temp and vibration Romberg negative  Cerebellar: Finger-nose,Rapid alternating movements intact  Gait: Normal stance, ambulating with a walker for stability.     CT Head 7/2019:   No acute intracranial findings    Age-appropriate cerebral volume loss with  mild moderate patchy decreased attenuation supratentorial white matter while nonspecific suggestive for chronic ischemic change.    No evidence for acute intracranial hemorrhage.  Clinical correlation and further evaluation as warranted.    CT head 10/2016:   1.  No CT evidence of an acute intracranial abnormality.  2.  Atrophy and small vessel ischemic changes of the periventricular white matter    Labs:   10/2016 TSH and B12 normal  1/2017 CMP, CBC unremarkable  Assessment:     Estefania Mccollum is a 84 y.o. female with Vertigo. Responds to PT. Some vague symptoms at times, but improved with normal MRI brain prior. Also has mild cognitive impairment like symptoms vs. Early Alzheimer's disease. She has HTN, HLD, and hypothyroidism.     Plan:   1. Head CT reassuring. She has + vertigo with head thrust today and + nystagmus-this is consistent with vertigo.   2. She was referred for home health for PT per her PCP, which she should continue with. PT was helpful prior.   3. Referred to ENT as well, who she saw for vertigo prior.   4. She may take Meclizine if she can tolerate this; however, she has had much fatigue thus far with 25 mg per ER; advised to reduce dose by half, which she may tolerate better.   5. Fall precautions discussed; ambulate with walker until vertigo is resolved.   6. No driving until vertigo is resolved.  Driving locally to familiar areas in the daytime can continue unless accidents or incidents are noted. She plans to taper driving for any worsening memory loss. She has stopped driving to unfamiliar places. We reviewed that any accidents or incidents would be the best predictor that she should stop driving.   7. Advised to use hearing aids consistently to help with memory. I do not believe that her memory loss has progressed, but that she may not be as engaged, due to stopping use of her hearing aids.   8. Continue Namenda to 10mg BID Discussed the further  treatment being good diet and physical and  memory exercise.   9. Aricept to continue at current dose  10. Prior-referred to silver sneakers program given her difficulty with balance which may be age related-this was helpful.   11. Insomnia resolved with better sleep hygeine. Melatonin 3mg OTC to use PRN     RTC 6 weeks.

## 2019-08-14 ENCOUNTER — EXTERNAL HOME HEALTH (OUTPATIENT)
Dept: HOME HEALTH SERVICES | Facility: HOSPITAL | Age: 84
End: 2019-08-14
Payer: MEDICARE

## 2019-08-19 ENCOUNTER — TELEPHONE (OUTPATIENT)
Dept: INTERNAL MEDICINE | Facility: CLINIC | Age: 84
End: 2019-08-19

## 2019-08-19 NOTE — TELEPHONE ENCOUNTER
----- Message from Liat Espinosa sent at 8/19/2019  2:44 PM CDT -----  Contact: Alejandro/Spouse/[phone]  Diabetic or Medical Supplies.  What supplies are needed: Rolling walker  What is the brand name of the supplies:   Is this a refill or new prescription:  New Rx  Who prescribed the supplies:  Dr Washington  Pharmacy or company name, phone # and location:  [id number] - [place] - [address] [phone] (Phone) [phone] (Fax)  Comments:

## 2019-08-20 NOTE — TELEPHONE ENCOUNTER
----- Message from Quiana Osullivan sent at 8/20/2019  2:18 PM CDT -----  Contact: /bijan/[phone]  Pt  called in regards to checking the status of her order for a rolling walker.       Please advs

## 2019-08-20 NOTE — TELEPHONE ENCOUNTER
Left message for pt's  and informed I faxed the walker order form to [facility]

## 2019-08-21 ENCOUNTER — TELEPHONE (OUTPATIENT)
Dept: INTERNAL MEDICINE | Facility: CLINIC | Age: 84
End: 2019-08-21

## 2019-08-21 NOTE — TELEPHONE ENCOUNTER
----- Message from Swapna Chan RN sent at 8/20/2019  4:26 PM CDT -----  Contact: [phone]      ----- Message -----  From: Genevieve Kebede  Sent: 8/20/2019   4:22 PM  To: Abner CORREIA Staff    Patient's  is requesting if the DME company can  the wheel chair the have.    Patient does not remember where it was order from.    Please advise, thank you

## 2019-08-21 NOTE — TELEPHONE ENCOUNTER
----- Message from Whit Kaye sent at 8/21/2019  2:24 PM CDT -----  Contact: [facility] [phone]  Requesting a order for a rolling walker. Hoping to deliver it tomorrow. Fax [phone]    Please call and advise.    Thank You

## 2019-09-04 ENCOUNTER — TELEPHONE (OUTPATIENT)
Dept: HOME HEALTH SERVICES | Facility: HOSPITAL | Age: 84
End: 2019-09-04

## 2019-09-13 ENCOUNTER — PATIENT MESSAGE (OUTPATIENT)
Dept: INTERNAL MEDICINE | Facility: CLINIC | Age: 84
End: 2019-09-13

## 2019-09-15 ENCOUNTER — PATIENT OUTREACH (OUTPATIENT)
Dept: ADMINISTRATIVE | Facility: OTHER | Age: 84
End: 2019-09-15

## 2019-09-16 ENCOUNTER — TELEPHONE (OUTPATIENT)
Dept: HOME HEALTH SERVICES | Facility: HOSPITAL | Age: 84
End: 2019-09-16

## 2019-09-16 ENCOUNTER — OFFICE VISIT (OUTPATIENT)
Dept: INTERNAL MEDICINE | Facility: CLINIC | Age: 84
End: 2019-09-16
Payer: MEDICARE

## 2019-09-16 ENCOUNTER — OFFICE VISIT (OUTPATIENT)
Dept: DERMATOLOGY | Facility: CLINIC | Age: 84
End: 2019-09-16
Payer: MEDICARE

## 2019-09-16 VITALS
BODY MASS INDEX: 25.05 KG/M2 | OXYGEN SATURATION: 96 % | WEIGHT: 155.88 LBS | SYSTOLIC BLOOD PRESSURE: 122 MMHG | HEIGHT: 66 IN | HEART RATE: 93 BPM | DIASTOLIC BLOOD PRESSURE: 80 MMHG

## 2019-09-16 DIAGNOSIS — R53.81 PHYSICAL DECONDITIONING: ICD-10-CM

## 2019-09-16 DIAGNOSIS — Z12.31 ENCOUNTER FOR SCREENING MAMMOGRAM FOR BREAST CANCER: ICD-10-CM

## 2019-09-16 DIAGNOSIS — C44.729 SCC (SQUAMOUS CELL CARCINOMA), LEG, LEFT: Primary | ICD-10-CM

## 2019-09-16 DIAGNOSIS — Z74.09 IMPAIRED MOBILITY AND ADLS: ICD-10-CM

## 2019-09-16 DIAGNOSIS — Z78.9 IMPAIRED MOBILITY AND ADLS: ICD-10-CM
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
## 2019-09-16 PROCEDURE — 1101F PR PT FALLS ASSESS DOC 0-1 FALLS W/OUT INJ PAST YR: ICD-10-PCS | Mod: HCNC,CPTII,S$GLB, | Performed by: DERMATOLOGY

## 2019-09-16 PROCEDURE — 1101F PT FALLS ASSESS-DOCD LE1/YR: CPT | Mod: HCNC,CPTII,S$GLB, | Performed by: DERMATOLOGY

## 2019-09-16 PROCEDURE — 3078F PR MOST RECENT DIASTOLIC BLOOD PRESSURE < 80 MM HG: ICD-10-PCS | Mod: HCNC,CPTII,S$GLB, | Performed by: DERMATOLOGY

## 2019-09-16 PROCEDURE — 99999 PR PBB SHADOW E&M-EST. PATIENT-LVL II: ICD-10-PCS | Mod: PBBFAC,HCNC,, | Performed by: DERMATOLOGY

## 2019-09-16 PROCEDURE — 99999 PR PBB SHADOW E&M-EST. PATIENT-LVL II: CPT | Mod: PBBFAC,HCNC,, | Performed by: DERMATOLOGY

## 2019-09-16 PROCEDURE — 99214 OFFICE O/P EST MOD 30 MIN: CPT | Mod: HCNC,S$GLB,, | Performed by: INTERNAL MEDICINE

## 2019-09-16 PROCEDURE — 1101F PR PT FALLS ASSESS DOC 0-1 FALLS W/OUT INJ PAST YR: ICD-10-PCS | Mod: HCNC,CPTII,S$GLB, | Performed by: INTERNAL MEDICINE

## 2019-09-16 PROCEDURE — 3074F SYST BP LT 130 MM HG: CPT | Mod: HCNC,CPTII,S$GLB, | Performed by: DERMATOLOGY

## 2019-09-16 PROCEDURE — 3078F DIAST BP <80 MM HG: CPT | Mod: HCNC,CPTII,S$GLB, | Performed by: DERMATOLOGY

## 2019-09-16 PROCEDURE — 99214 PR OFFICE/OUTPT VISIT, EST, LEVL IV, 30-39 MIN: ICD-10-PCS | Mod: HCNC,S$GLB,, | Performed by: INTERNAL MEDICINE

## 2019-09-16 PROCEDURE — 99212 PR OFFICE/OUTPT VISIT, EST, LEVL II, 10-19 MIN: ICD-10-PCS | Mod: HCNC,S$GLB,, | Performed by: DERMATOLOGY

## 2019-09-16 PROCEDURE — 99999 PR PBB SHADOW E&M-EST. PATIENT-LVL V: ICD-10-PCS | Mod: PBBFAC,HCNC,, | Performed by: INTERNAL MEDICINE

## 2019-09-16 PROCEDURE — 3074F PR MOST RECENT SYSTOLIC BLOOD PRESSURE < 130 MM HG: ICD-10-PCS | Mod: HCNC,CPTII,S$GLB, | Performed by: INTERNAL MEDICINE

## 2019-09-16 PROCEDURE — 3074F SYST BP LT 130 MM HG: CPT | Mod: HCNC,CPTII,S$GLB, | Performed by: INTERNAL MEDICINE

## 2019-09-16 PROCEDURE — 99999 PR PBB SHADOW E&M-EST. PATIENT-LVL V: CPT | Mod: PBBFAC,HCNC,, | Performed by: INTERNAL MEDICINE

## 2019-09-16 PROCEDURE — 99212 OFFICE O/P EST SF 10 MIN: CPT | Mod: HCNC,S$GLB,, | Performed by: DERMATOLOGY

## 2019-09-16 PROCEDURE — 3079F DIAST BP 80-89 MM HG: CPT | Mod: HCNC,CPTII,S$GLB, | Performed by: INTERNAL MEDICINE

## 2019-09-16 PROCEDURE — 1101F PT FALLS ASSESS-DOCD LE1/YR: CPT | Mod: HCNC,CPTII,S$GLB, | Performed by: INTERNAL MEDICINE

## 2019-09-16 PROCEDURE — 3079F PR MOST RECENT DIASTOLIC BLOOD PRESSURE 80-89 MM HG: ICD-10-PCS | Mod: HCNC,CPTII,S$GLB, | Performed by: INTERNAL MEDICINE

## 2019-09-16 PROCEDURE — 3074F PR MOST RECENT SYSTOLIC BLOOD PRESSURE < 130 MM HG: ICD-10-PCS | Mod: HCNC,CPTII,S$GLB, | Performed by: DERMATOLOGY

## 2019-09-16 NOTE — PROGRESS NOTES
Subjective:       Patient ID:  Estefania Mccollum is a 84 y.o. female who presents for   Chief Complaint   Patient presents with    Skin Check     post ED&C f/u      S/p ED and C for SCC, KA type left thigh on 6/27/19. Healed well per pt and pt's daughter      Review of Systems   Skin: Positive for daily sunscreen use and activity-related sunscreen use. Negative for itching, rash and recent sunburn.   Hematologic/Lymphatic: Does not bruise/bleed easily.        Objective:    Physical Exam   Constitutional: She appears well-developed and well-nourished. No distress.   Neurological: She is alert and oriented to person, place, and time. She is not disoriented.   Psychiatric: She has a normal mood and affect.   Skin:   Areas Examined (abnormalities noted in diagram):   LLE Inspection Performed              Diagram Legend     Erythematous scaling macule/papule c/w actinic keratosis       Vascular papule c/w angioma      Pigmented verrucoid papule/plaque c/w seborrheic keratosis      Yellow umbilicated papule c/w sebaceous hyperplasia      Irregularly shaped tan macule c/w lentigo     1-2 mm smooth white papules consistent with Milia      Movable subcutaneous cyst with punctum c/w epidermal inclusion cyst      Subcutaneous movable cyst c/w pilar cyst      Firm pink to brown papule c/w dermatofibroma      Pedunculated fleshy papule(s) c/w skin tag(s)      Evenly pigmented macule c/w junctional nevus     Mildly variegated pigmented, slightly irregular-bordered macule c/w mildly atypical nevus      Flesh colored to evenly pigmented papule c/w intradermal nevus       Pink pearly papule/plaque c/w basal cell carcinoma      Erythematous hyperkeratotic cursted plaque c/w SCC      Surgical scar with no sign of skin cancer recurrence      Open and closed comedones      Inflammatory papules and pustules      Verrucoid papule consistent consistent with wart     Erythematous eczematous patches and plaques     Dystrophic onycholytic  nail with subungual debris c/w onychomycosis     Umbilicated papule    Erythematous-base heme-crusted tan verrucoid plaque consistent with inflamed seborrheic keratosis     Erythematous Silvery Scaling Plaque c/w Psoriasis     See annotation      Assessment / Plan:        SCC (squamous cell carcinoma), left thigh  S/p ED and C  Looks great           Follow up in about 6 months (around 3/16/2020).

## 2019-09-17 ENCOUNTER — TELEPHONE (OUTPATIENT)
Dept: INTERNAL MEDICINE | Facility: CLINIC | Age: 84
End: 2019-09-17

## 2019-09-17 ENCOUNTER — OUTPATIENT CASE MANAGEMENT (OUTPATIENT)
Dept: ADMINISTRATIVE | Facility: OTHER | Age: 84
End: 2019-09-17

## 2019-09-17 ENCOUNTER — OFFICE VISIT (OUTPATIENT)
Dept: NEUROLOGY | Facility: CLINIC | Age: 84
End: 2019-09-17
Payer: MEDICARE

## 2019-09-17 VITALS
HEIGHT: 66 IN | DIASTOLIC BLOOD PRESSURE: 68 MMHG | BODY MASS INDEX: 25.26 KG/M2 | RESPIRATION RATE: 16 BRPM | WEIGHT: 157.19 LBS | SYSTOLIC BLOOD PRESSURE: 106 MMHG | HEART RATE: 86 BPM

## 2019-09-17 DIAGNOSIS — R42 VERTIGO: Primary | ICD-10-CM

## 2019-09-17 DIAGNOSIS — R41.3 MEMORY LOSS: ICD-10-CM

## 2019-09-17 DIAGNOSIS — R53.81 PHYSICAL DECONDITIONING: ICD-10-CM

## 2019-09-17 PROCEDURE — 99214 PR OFFICE/OUTPT VISIT, EST, LEVL IV, 30-39 MIN: ICD-10-PCS | Mod: HCNC,S$GLB,, | Performed by: PSYCHIATRY & NEUROLOGY

## 2019-09-17 PROCEDURE — 99214 OFFICE O/P EST MOD 30 MIN: CPT | Mod: HCNC,S$GLB,, | Performed by: PSYCHIATRY & NEUROLOGY

## 2019-09-17 PROCEDURE — 3078F PR MOST RECENT DIASTOLIC BLOOD PRESSURE < 80 MM HG: ICD-10-PCS | Mod: HCNC,CPTII,S$GLB, | Performed by: PSYCHIATRY & NEUROLOGY

## 2019-09-17 PROCEDURE — 3074F SYST BP LT 130 MM HG: CPT | Mod: HCNC,CPTII,S$GLB, | Performed by: PSYCHIATRY & NEUROLOGY

## 2019-09-17 PROCEDURE — 99999 PR PBB SHADOW E&M-EST. PATIENT-LVL III: ICD-10-PCS | Mod: PBBFAC,HCNC,, | Performed by: PSYCHIATRY & NEUROLOGY

## 2019-09-17 PROCEDURE — 3078F DIAST BP <80 MM HG: CPT | Mod: HCNC,CPTII,S$GLB, | Performed by: PSYCHIATRY & NEUROLOGY

## 2019-09-17 PROCEDURE — 3074F PR MOST RECENT SYSTOLIC BLOOD PRESSURE < 130 MM HG: ICD-10-PCS | Mod: HCNC,CPTII,S$GLB, | Performed by: PSYCHIATRY & NEUROLOGY

## 2019-09-17 PROCEDURE — 99999 PR PBB SHADOW E&M-EST. PATIENT-LVL III: CPT | Mod: PBBFAC,HCNC,, | Performed by: PSYCHIATRY & NEUROLOGY

## 2019-09-17 PROCEDURE — 1101F PT FALLS ASSESS-DOCD LE1/YR: CPT | Mod: HCNC,CPTII,S$GLB, | Performed by: PSYCHIATRY & NEUROLOGY

## 2019-09-17 PROCEDURE — 1101F PR PT FALLS ASSESS DOC 0-1 FALLS W/OUT INJ PAST YR: ICD-10-PCS | Mod: HCNC,CPTII,S$GLB, | Performed by: PSYCHIATRY & NEUROLOGY

## 2019-09-17 NOTE — PROGRESS NOTES
This LMSW received a referral on the above patient.   Reason for referral:I have also placed an order for case management as the family feels that a different living environment may be beneficial.  The patient is against this.  We have had a discussion as to whether she would at least look at alternative living environments.    This LMSW completed assessment with patient .  Patient oriented . Patient able to answer two patient identifiers. Patient reports she and spouse resides together. Patient denied any family checks on her daily. Patient denied resources on Emergency Alert System. Patient denied needing assistance with food, shelter, medication or medical. Patient reports she can complete her ADL's without assistance. Patient reports she ambulates with a walker. Patient reports her spouse provides transportation to and from appointments. Patient reports having an MPOA, however not on file. LMSW completed PHQ screening with patient . Patient scored a 0. No SI or Hi. LMSW explain the reason for referral is to assist with different living environment. Patient reports she is not interested in a different living environment at this time. LMSW advised patient she will let PCP know. Patient has the capacity to make decisions. LMSW will close case at this time.

## 2019-09-17 NOTE — PROGRESS NOTES
HPI:  Estefania Mccollum is a 84 y.o. female known to me for  Vertigo. Responds to PT. Some vague symptoms at times, but improved with normal MRI brain prior  Also has mild cognitive impairment like symptoms vs Early Alzheimer's disease.    She is here with her daughter today  Since the last visit with me, the patient has been seeing NP, Mirian Ortiz, in Neurology in this clinic for vertigo complaint.  This is a chronic complaint for her and she was seen in the ER prior that visit    The patient had PT at home and this was prescribed by Dr Mei (epley maneuver advised)  Has some stiffness with her activities related to some arthritis  Memory is forgetful at times but this is variable. She is much better with hearing aides on.   She and family have discussed reviewing assisted living facilities   has been driving her lately   Sleep has been good    Review of Systems   Constitutional: Negative for fever.   HENT: Negative for nosebleeds.    Eyes: Negative for double vision.   Respiratory: Negative for shortness of breath.    Cardiovascular: Negative for chest pain.   Gastrointestinal: Negative for blood in stool.   Genitourinary: Negative for hematuria.   Musculoskeletal: Negative for falls.   Skin: Negative for rash.   Neurological: Negative for dizziness, tremors, sensory change, focal weakness, seizures and headaches.   Psychiatric/Behavioral: Positive for memory loss.       Objective:      Physical Exam      Exam:  Gen Appearance, well developed/nourished in no apparent distress  CV: 2+ distal pulses with no edema or swelling  Neuro:  MS: Awake, alert, sustains attention. She is  oriented to all except exact date today, and knows today is Tuesday (consistent with prior).   Recent recall is 2/3 at 5 minutes /remote memory intact, Language is full to spontaneous speech/comprehension. Fund of Knowledge is full  Visual spatial skill are good   She is able to name the current and 2 past presidents.   CN: Optic  discs are flat with normal vasculature, PERRL, Extraoccular movements and visual fields are full. Normal facial sensation and strength, Hearing symmetric, Tongue and Palate are midline and strong. Shoulder Shrug symmetric and strong.  Motor: Normal bulk, tone, no abnormal movements. 5/5 strength bilateral upper/lower extremities with 2+ reflexes and no clonus  Sensory:  Intact to temp and vibration Romberg negative  Cerebellar: Finger-nose,Rapid alternating movements intact  Gait: Normal stance, no ataxia    CT head 10/2016: 1.  No CT evidence of an acute intracranial abnormality.  2.  Atrophy and small vessel ischemic changes of the periventricular white matter    CT Head 7/2019:   No acute intracranial findings    Age-appropriate cerebral volume loss with mild moderate patchy decreased attenuation supratentorial white matter while nonspecific suggestive for chronic ischemic change.    No evidence for acute intracranial hemorrhage.  Clinical correlation and further evaluation as warranted.    Labs: 10/2016 TSH and B12 normal  1/2017 CMP, CBC unremarkable  2  Assessment:     Estefania Mccollum is a 84 y.o. female known to me for  Vertigo. Responds to PT. Some vague symptoms at times, but improved with normal MRI brain prior  Also has mild cognitive impairment like symptoms vs Early Alzheimer's disease.     Plan:   1. Continue Namenda to 10mg BID  - Discussed the further  treatment being good diet and physical and memory exercise.   2. Aricept to continue at current dose  3. She is currently avoiding driving as she continues with episodes of vertigo or memory loss  4. PT PRN vertigo helps.She has seen ENT and continues to follow with ENT about otalgia/ hearing loss/ prior infections  -Head CT reassuring in 7/2019 for dizziness She had + vertigo with head thrust in 7/2019 and + nystagmus consistent with vertigo.   -Referred to ENT as well, who she saw for vertigo prior. Will continue with PT for vestibular rehab as an  outpatient once she is discharged from home health. Add OT as she continues to have difficulty with ADLs related to some arthritic pains  -She has difficulty tolerating larger doses of meclizine  5. Referred to silver sneakers program given her difficulty with balance which may be age related- helped  6. Insomnia resolved with better sleep hygeine. Melatonin 3mg OTC to use PRN     She and her  are considering an assisted living facility vs use more resources at home    RTC 6 months

## 2019-09-17 NOTE — TELEPHONE ENCOUNTER
Please call and see what Home Health company is coming to the house. I need to place further occupational therapy orders with the company. GML

## 2019-09-17 NOTE — TELEPHONE ENCOUNTER
----- Message from Violette Benton MA sent at 9/17/2019  3:40 PM CDT -----      ----- Message -----  From: Carrie Pickett LMSW  Sent: 9/17/2019   3:27 PM  To: Abner CORREIA Staff    This LMSW received a referral on the above patient.  This LMSW contacted patient regarding referral. LMSW didn't identify any needs during SW assessment. LMSW explain the reason for referral is to assist with a different living environment. Patient reports she is not interested in a different living environment at this time.  Patient has the capacity to make decisions currently .    Thank you for the referral,  Carrie Pickett LMSW

## 2019-09-17 NOTE — TELEPHONE ENCOUNTER
"----- Message from Violette Benton MA sent at 9/17/2019  3:40 PM CDT -----  Contact: [phone]      ----- Message -----  From: Carrie Pickett LMSW  Sent: 9/17/2019  12:22 PM  To: Violette Benton MA    I am unsure what patient spouse is referring to. I contacted patient earlier and completed a SW assessment over the phone as patient PCP placed a referral to assses for needs. PCP placed a referral for  " case management as the family feels that a different living environment may be beneficial.  The patient is against this.  We have had a discussion as to whether she would at least look at alternative living environments." LMSW spoke with patient regarding referral . Patient declined information on alternative living environments.     Thank you,  Carrie Pickett LMSW     ----- Message -----  From: Violette Benton MA  Sent: 9/17/2019  12:01 PM  To: Carrie Pickett LMSW        ----- Message -----  From: Genevieve Kebede  Sent: 9/17/2019  11:06 AM  To: Abner CORREIA Staff    Patient's  is requesting a call from the office regarding a call he received about having some type of balancing test done for the patient.    Please advise, thank you    "

## 2019-09-17 NOTE — PROGRESS NOTES
Subjective:      Patient ID: Estefania Mccollum is a 84 y.o. female.    Chief Complaint: Follow-up    HPI:  HPI   Patient is accompanied by her  and her daughter.  At the last appointment the patient had been having a series of falls.  She has had aggressive physical therapy at home which the family reports is helping.  They would like to extend the physical therapy and include occupational therapy.  I have also placed an order for case management as the family feels that a different living environment may be beneficial.  The patient is against this.  We have had a discussion as to whether she would at least look at alternative living environments.  Her family states that she spends most of her day in a chair.  She does have mild cognitive impairment.  Her problems of hypertension hypothyroidism and hyperlipidemia have been under good control.  Patient Active Problem List   Diagnosis    Hypertension    Hypothyroidism    Hyperlipidemia    Retinal drusen - Both Eyes    Hearing loss, sensorineural    History of colonic polyps    Osteopenia    MCI (mild cognitive impairment)    Memory loss    Primary osteoarthritis of left knee    Aortic atherosclerosis    Balance problem    Decreased GFR     Past Medical History:   Diagnosis Date    Adenomatous polyp     Allergy     sinus    Annual physical exam 2011    Arthritis     osteoarthritis    Cataract     History of bone density study 2009    History of colonoscopy 10/30/2008    History of mammogram 2011    Hyperlipidemia     Hypertension     Macular degeneration     Postmenopausal hormone replacement therapy     therapy stopped    Squamous cell carcinoma 2019    Left thigh (ED&C)    Thyroid disease     hypothyroidism    Vertigo      Past Surgical History:   Procedure Laterality Date    APPENDECTOMY      12 years old    CATARACT EXTRACTION      Both eyes    CATARACT EXTRACTION, BILATERAL       SECTION      x 3     CHOLECYSTECTOMY      COLONOSCOPY  10/02/2013    COLONOSCOPY N/A 10/2/2013    Performed by Gil [facility] at [facility] (4TH FLR)    HYSTERECTOMY      total    JOINT REPLACEMENT Left     OOPHORECTOMY      REPLACEMENT-KNEE WITH NAVIGATION and PATELLOPLASTY Left 12/6/2016    Performed by Thien Guy [facility] at [facility] OR 2ND FLR    TONSILLECTOMY      TOTAL KNEE ARTHROPLASTY Left 12/2016    Yag       Left Eye     Family History   Problem Relation Age of Onset    Heart failure Mother     Hypertension Mother     Hyperlipidemia Mother     Heart failure Father     Hypertension Father     Hyperlipidemia Father     Asthma Father     Hypertension Sister     Diabetes Sister         Prediabetes    Heart attack Brother     No Known Problems Daughter     Heart disease Brother         Has had open heart surgery    No Known Problems Brother     Mental retardation Daughter     No Known Problems Daughter     Coronary artery disease Neg Hx     Amblyopia Neg Hx     Blindness Neg Hx     Cataracts Neg Hx     Glaucoma Neg Hx     Macular degeneration Neg Hx     Retinal detachment Neg Hx     Strabismus Neg Hx     Melanoma Neg Hx     Psoriasis Neg Hx     Lupus Neg Hx     Eczema Neg Hx     Acne Neg Hx      Review of Systems   Constitutional: Negative for activity change and unexpected weight change.   HENT: Positive for hearing loss. Negative for rhinorrhea and trouble swallowing.    Eyes: Negative for discharge and visual disturbance.   Respiratory: Negative for chest tightness and wheezing.    Cardiovascular: Negative for chest pain and palpitations.   Gastrointestinal: Positive for constipation. Negative for blood in stool, diarrhea and vomiting.   Endocrine: Negative for polydipsia and polyuria.   Genitourinary: Negative for difficulty urinating, dysuria, hematuria and menstrual problem.   Musculoskeletal: Negative for arthralgias, joint swelling and neck pain.   Neurological: Negative for  "weakness and headaches.   Psychiatric/Behavioral: Negative for confusion and dysphoric mood.    Patient does have hearing aids  Objective:     Vitals:    09/16/19 1416   BP: 122/80   Pulse: 93   SpO2: 96%   Weight: 70.7 kg (155 lb 13.8 oz)   Height: 5' 6" (1.676 m)   PainSc: 0-No pain     Body mass index is 25.16 kg/m².  Physical Exam  Assessment:     1. Fall, sequela    2. Physical deconditioning    3. Encounter for screening mammogram for breast cancer    4. Impaired mobility and ADLs      Plan:   Estefania was seen today for follow-up.    Diagnoses and all orders for this visit:    Fall, sequela  -     Ambulatory referral to Outpatient Case Management  -     Ambulatory consult to Occupational Therapy    Physical deconditioning  -     Ambulatory referral to Outpatient Case Management  -     Ambulatory consult to Occupational Therapy    Encounter for screening mammogram for breast cancer  -     Mammo Digital Screening Bilat w/ Lexa; Future    Impaired mobility and ADLs  -     Ambulatory consult to Occupational Therapy        Problem List Items Addressed This Visit     None      Visit Diagnoses     Fall, sequela    -  Primary    Relevant Orders    Ambulatory referral to Outpatient Case Management    Ambulatory consult to Occupational Therapy    Physical deconditioning        Relevant Orders    Ambulatory referral to Outpatient Case Management    Ambulatory consult to Occupational Therapy    Encounter for screening mammogram for breast cancer        Relevant Orders    Mammo Digital Screening Bilat w/ Lexa    Impaired mobility and ADLs        Relevant Orders    Ambulatory consult to Occupational Therapy        Orders Placed This Encounter   Procedures    Mammo Digital Screening Bilat w/ Lexa     Standing Status:   Future     Standing Expiration Date:   11/15/2020     Order Specific Question:   May the Radiologist modify the order per protocol to meet the clinical needs of the patient?     Answer:   Yes    Ambulatory " referral to Outpatient Case Management     Referral Priority:   Routine     Referral Type:   Consultation     Referral Reason:   Specialty Services Required     Number of Visits Requested:   1    Ambulatory consult to Occupational Therapy     Referral Priority:   Routine     Referral Type:   Occupational Therapy     Referral Reason:   Specialty Services Required     Requested Specialty:   Occupational Therapy     Number of Visits Requested:   1     No follow-ups on file.     Medication List           Accurate as of 9/16/19 11:59 PM. If you have any questions, ask your nurse or doctor.               CONTINUE taking these medications    acetaminophen 325 MG tablet  Commonly known as:  TYLENOL     aspirin 81 MG EC tablet  Commonly known as:  ECOTRIN     calcium carbonate 600 mg calcium (1,500 mg) Tab  Commonly known as:  OS-RAMONE     donepezil 5 MG tablet  Commonly known as:  ARICEPT  Take 1 tablet (5 mg total) by mouth every evening.     fish oil-omega-3 fatty acids 300-1,000 mg capsule     FLUAD 0219-1121 (65 YR UP)(PF) 45 mcg (15 mcg x 3)/0.5 mL Syrg  Generic drug:  flu vac 2019 [id number](PF)     levothyroxine 100 MCG tablet  Commonly known as:  SYNTHROID  Take 1 tablet (100 mcg total) by mouth once daily.     lisinopril 20 MG tablet  Commonly known as:  PRINIVIL,ZESTRIL  Take 1 tablet (20 mg total) by mouth once daily.     meclizine 25 mg tablet  Commonly known as:  ANTIVERT  Take 1 tablet (25 mg total) by mouth 3 (three) times daily as needed.     memantine 10 MG Tab  Commonly known as:  NAMENDA  Take 1 tablet (10 mg total) by mouth 2 (two) times daily.     multivitamin capsule     omeprazole 40 MG capsule  Commonly known as:  PRILOSEC  TAKE 1 CAPSULE EVERY MORNING     ondansetron 4 MG Tbdl  Commonly known as:  ZOFRAN-ODT  Take 1 tablet (4 mg total) by mouth every 8 (eight) hours as needed (nausea).     oxybutynin 5 MG Tab  Commonly known as:  DITROPAN  TAKE 2 TABLETS AT NIGHT     pravastatin 80 MG  tablet  Commonly known as:  PRAVACHOL  TAKE 1 TABLET ONE TIME DAILY

## 2019-09-18 ENCOUNTER — TELEPHONE (OUTPATIENT)
Dept: INTERNAL MEDICINE | Facility: CLINIC | Age: 84
End: 2019-09-18

## 2019-09-18 NOTE — TELEPHONE ENCOUNTER
Spoke with vinicius. Stated pt has [facility] coming to the house. Also stated pt's neurologist Dr. Preston has put in orders for outpatient PT on yesterday.

## 2019-09-18 NOTE — TELEPHONE ENCOUNTER
----- Message from Violette Benton MA sent at 9/18/2019  4:04 PM CDT -----  Contact: Elizabeth villanueva Mr. Mccollum/Pts Home# [phone] or Mobile# [phone]      ----- Message -----  From: Caitlin uQigley  Sent: 9/18/2019   9:03 AM  To: Abner CORREIA Staff    Patient is returning a phone call.  Who left a message for the patient:   Does patient know what this is regarding:    Comments: Mr. Mccollum said that his wife received a call on yesterday and she would like a call back please.

## 2019-09-26 ENCOUNTER — HOSPITAL ENCOUNTER (OUTPATIENT)
Facility: HOSPITAL | Age: 84
Discharge: HOME | End: 2019-09-28
Attending: SURGERY | Admitting: FAMILY MEDICINE
Payer: MEDICARE

## 2019-09-26 ENCOUNTER — PATIENT MESSAGE (OUTPATIENT)
Dept: INTERNAL MEDICINE | Facility: CLINIC | Age: 84
End: 2019-09-26

## 2019-09-26 DIAGNOSIS — R53.81 DEBILITY: ICD-10-CM

## 2019-09-26 DIAGNOSIS — R41.3 MEMORY LOSS: ICD-10-CM

## 2019-09-26 DIAGNOSIS — R29.6 FREQUENT FALLS: ICD-10-CM

## 2019-09-26 DIAGNOSIS — I10 ESSENTIAL HYPERTENSION: ICD-10-CM

## 2019-09-26 DIAGNOSIS — N30.00 ACUTE CYSTITIS WITHOUT HEMATURIA: Primary | ICD-10-CM

## 2019-09-26 DIAGNOSIS — R53.83 FATIGUE: ICD-10-CM

## 2019-09-26 LAB
ALBUMIN SERPL BCP-MCNC: 3.7 G/DL (ref 3.5–5.2)
ALP SERPL-CCNC: 81 U/L (ref 55–135)
ALT SERPL W/O P-5'-P-CCNC: 11 U/L (ref 10–44)
ANION GAP SERPL CALC-SCNC: 11 MMOL/L (ref 8–16)
AST SERPL-CCNC: 19 U/L (ref 10–40)
BACTERIA #/AREA URNS HPF: ABNORMAL /HPF
BASOPHILS # BLD AUTO: 0.02 K/UL (ref 0–0.2)
BASOPHILS NFR BLD: 0.1 % (ref 0–1.9)
BILIRUB SERPL-MCNC: 1.9 MG/DL (ref 0.1–1)
BILIRUB UR QL STRIP: ABNORMAL
BNP SERPL-MCNC: 70 PG/ML (ref 0–99)
BUN SERPL-MCNC: 19 MG/DL (ref 8–23)
CALCIUM SERPL-MCNC: 9 MG/DL (ref 8.7–10.5)
CHLORIDE SERPL-SCNC: 97 MMOL/L (ref 95–110)
CK MB SERPL-MCNC: 2.2 NG/ML (ref 0.1–6.5)
CK MB SERPL-RTO: 0.6 % (ref 0–5)
CK SERPL-CCNC: 349 U/L (ref 20–180)
CK SERPL-CCNC: 349 U/L (ref 20–180)
CLARITY UR: CLEAR
CO2 SERPL-SCNC: 23 MMOL/L (ref 23–29)
COLOR UR: YELLOW
CREAT SERPL-MCNC: 1.1 MG/DL (ref 0.5–1.4)
DIFFERENTIAL METHOD: ABNORMAL
EOSINOPHIL # BLD AUTO: 0 K/UL (ref 0–0.5)
EOSINOPHIL NFR BLD: 0 % (ref 0–8)
ERYTHROCYTE [DISTWIDTH] IN BLOOD BY AUTOMATED COUNT: 13.3 % (ref 11.5–14.5)
EST. GFR  (AFRICAN AMERICAN): 53 ML/MIN/1.73 M^2
EST. GFR  (NON AFRICAN AMERICAN): 46 ML/MIN/1.73 M^2
GLUCOSE SERPL-MCNC: 146 MG/DL (ref 70–110)
GLUCOSE UR QL STRIP: NEGATIVE
HCT VFR BLD AUTO: 43.6 % (ref 37–48.5)
HGB BLD-MCNC: 14.6 G/DL (ref 12–16)
HGB UR QL STRIP: ABNORMAL
HYALINE CASTS #/AREA URNS LPF: 7 /LPF
IMM GRANULOCYTES # BLD AUTO: 0.07 K/UL (ref 0–0.04)
IMM GRANULOCYTES NFR BLD AUTO: 0.4 % (ref 0–0.5)
KETONES UR QL STRIP: NEGATIVE
LACTATE SERPL-SCNC: 1.3 MMOL/L (ref 0.5–2.2)
LEUKOCYTE ESTERASE UR QL STRIP: ABNORMAL
LYMPHOCYTES # BLD AUTO: 1.2 K/UL (ref 1–4.8)
LYMPHOCYTES NFR BLD: 7 % (ref 18–48)
MAGNESIUM SERPL-MCNC: 2 MG/DL (ref 1.6–2.6)
MCH RBC QN AUTO: 29.7 PG (ref 27–31)
MCHC RBC AUTO-ENTMCNC: 33.5 G/DL (ref 32–36)
MCV RBC AUTO: 89 FL (ref 82–98)
MICROSCOPIC COMMENT: ABNORMAL
MONOCYTES # BLD AUTO: 1.1 K/UL (ref 0.3–1)
MONOCYTES NFR BLD: 6 % (ref 4–15)
NEUTROPHILS # BLD AUTO: 15.2 K/UL (ref 1.8–7.7)
NEUTROPHILS NFR BLD: 86.5 % (ref 38–73)
NITRITE UR QL STRIP: NEGATIVE
NRBC BLD-RTO: 0 /100 WBC
PH UR STRIP: 6 [PH] (ref 5–8)
PHOSPHATE SERPL-MCNC: 3 MG/DL (ref 2.7–4.5)
PLATELET # BLD AUTO: 228 K/UL (ref 150–350)
PMV BLD AUTO: 9.2 FL (ref 9.2–12.9)
POTASSIUM SERPL-SCNC: 4.1 MMOL/L (ref 3.5–5.1)
PROT SERPL-MCNC: 6.4 G/DL (ref 6–8.4)
PROT UR QL STRIP: ABNORMAL
RBC # BLD AUTO: 4.91 M/UL (ref 4–5.4)
RBC #/AREA URNS HPF: 35 /HPF (ref 0–4)
SODIUM SERPL-SCNC: 131 MMOL/L (ref 136–145)
SP GR UR STRIP: 1.02 (ref 1–1.03)
SQUAMOUS #/AREA URNS HPF: 7 /HPF
TROPONIN I SERPL DL<=0.01 NG/ML-MCNC: <0.006 NG/ML (ref 0–0.03)
TSH SERPL DL<=0.005 MIU/L-ACNC: 1.44 UIU/ML (ref 0.4–4)
URN SPEC COLLECT METH UR: ABNORMAL
UROBILINOGEN UR STRIP-ACNC: NEGATIVE EU/DL
WBC # BLD AUTO: 17.62 K/UL (ref 3.9–12.7)
WBC #/AREA URNS HPF: 60 /HPF (ref 0–5)

## 2019-09-26 PROCEDURE — 83605 ASSAY OF LACTIC ACID: CPT | Mod: HCNC

## 2019-09-26 PROCEDURE — 82550 ASSAY OF CK (CPK): CPT | Mod: HCNC

## 2019-09-26 PROCEDURE — 87040 BLOOD CULTURE FOR BACTERIA: CPT | Mod: HCNC

## 2019-09-26 PROCEDURE — 84100 ASSAY OF PHOSPHORUS: CPT | Mod: HCNC

## 2019-09-26 PROCEDURE — 83735 ASSAY OF MAGNESIUM: CPT | Mod: HCNC

## 2019-09-26 PROCEDURE — 87086 URINE CULTURE/COLONY COUNT: CPT | Mod: HCNC

## 2019-09-26 PROCEDURE — 36415 COLL VENOUS BLD VENIPUNCTURE: CPT | Mod: HCNC

## 2019-09-26 PROCEDURE — 85025 COMPLETE CBC W/AUTO DIFF WBC: CPT | Mod: HCNC

## 2019-09-26 PROCEDURE — 82553 CREATINE MB FRACTION: CPT | Mod: HCNC

## 2019-09-26 PROCEDURE — 83880 ASSAY OF NATRIURETIC PEPTIDE: CPT | Mod: HCNC

## 2019-09-26 PROCEDURE — 80053 COMPREHEN METABOLIC PANEL: CPT | Mod: HCNC

## 2019-09-26 PROCEDURE — 81000 URINALYSIS NONAUTO W/SCOPE: CPT | Mod: HCNC

## 2019-09-26 PROCEDURE — 93010 ELECTROCARDIOGRAM REPORT: CPT | Mod: HCNC,,, | Performed by: INTERNAL MEDICINE

## 2019-09-26 PROCEDURE — 11000001 HC ACUTE MED/SURG PRIVATE ROOM: Mod: HCNC

## 2019-09-26 PROCEDURE — 93005 ELECTROCARDIOGRAM TRACING: CPT | Mod: HCNC

## 2019-09-26 PROCEDURE — G0378 HOSPITAL OBSERVATION PER HR: HCPCS | Mod: HCNC

## 2019-09-26 PROCEDURE — 99285 EMERGENCY DEPT VISIT HI MDM: CPT | Mod: 25,HCNC

## 2019-09-26 PROCEDURE — 84484 ASSAY OF TROPONIN QUANT: CPT | Mod: HCNC

## 2019-09-26 PROCEDURE — 84443 ASSAY THYROID STIM HORMONE: CPT | Mod: HCNC

## 2019-09-26 PROCEDURE — 63600175 PHARM REV CODE 636 W HCPCS: Mod: HCNC | Performed by: SURGERY

## 2019-09-26 PROCEDURE — 93010 EKG 12-LEAD: ICD-10-PCS | Mod: HCNC,,, | Performed by: INTERNAL MEDICINE

## 2019-09-26 RX ADMIN — CEFTRIAXONE 2 G: 2 INJECTION, SOLUTION INTRAVENOUS at 11:09

## 2019-09-27 PROBLEM — R29.6 FREQUENT FALLS: Status: ACTIVE | Noted: 2019-09-27

## 2019-09-27 PROBLEM — R53.81 DEBILITY: Status: ACTIVE | Noted: 2019-09-27

## 2019-09-27 LAB
ALBUMIN SERPL BCP-MCNC: 3.3 G/DL (ref 3.5–5.2)
ALP SERPL-CCNC: 73 U/L (ref 55–135)
ALT SERPL W/O P-5'-P-CCNC: 10 U/L (ref 10–44)
ANION GAP SERPL CALC-SCNC: 9 MMOL/L (ref 8–16)
AST SERPL-CCNC: 18 U/L (ref 10–40)
BASOPHILS # BLD AUTO: 0.03 K/UL (ref 0–0.2)
BASOPHILS NFR BLD: 0.3 % (ref 0–1.9)
BILIRUB SERPL-MCNC: 1.3 MG/DL (ref 0.1–1)
BUN SERPL-MCNC: 13 MG/DL (ref 8–23)
CALCIUM SERPL-MCNC: 8.7 MG/DL (ref 8.7–10.5)
CHLORIDE SERPL-SCNC: 101 MMOL/L (ref 95–110)
CO2 SERPL-SCNC: 26 MMOL/L (ref 23–29)
CREAT SERPL-MCNC: 0.9 MG/DL (ref 0.5–1.4)
DIFFERENTIAL METHOD: ABNORMAL
EOSINOPHIL # BLD AUTO: 0.1 K/UL (ref 0–0.5)
EOSINOPHIL NFR BLD: 0.7 % (ref 0–8)
ERYTHROCYTE [DISTWIDTH] IN BLOOD BY AUTOMATED COUNT: 13.2 % (ref 11.5–14.5)
EST. GFR  (AFRICAN AMERICAN): >60 ML/MIN/1.73 M^2
EST. GFR  (NON AFRICAN AMERICAN): 59 ML/MIN/1.73 M^2
GLUCOSE SERPL-MCNC: 101 MG/DL (ref 70–110)
HCT VFR BLD AUTO: 41.9 % (ref 37–48.5)
HGB BLD-MCNC: 13.6 G/DL (ref 12–16)
IMM GRANULOCYTES # BLD AUTO: 0.03 K/UL (ref 0–0.04)
IMM GRANULOCYTES NFR BLD AUTO: 0.3 % (ref 0–0.5)
LACTATE SERPL-SCNC: 0.8 MMOL/L (ref 0.5–2.2)
LACTATE SERPL-SCNC: 1.3 MMOL/L (ref 0.5–2.2)
LYMPHOCYTES # BLD AUTO: 2 K/UL (ref 1–4.8)
LYMPHOCYTES NFR BLD: 19.2 % (ref 18–48)
MCH RBC QN AUTO: 28.8 PG (ref 27–31)
MCHC RBC AUTO-ENTMCNC: 32.5 G/DL (ref 32–36)
MCV RBC AUTO: 89 FL (ref 82–98)
MONOCYTES # BLD AUTO: 0.7 K/UL (ref 0.3–1)
MONOCYTES NFR BLD: 6.7 % (ref 4–15)
NEUTROPHILS # BLD AUTO: 7.8 K/UL (ref 1.8–7.7)
NEUTROPHILS NFR BLD: 72.8 % (ref 38–73)
NRBC BLD-RTO: 0 /100 WBC
PLATELET # BLD AUTO: 216 K/UL (ref 150–350)
PMV BLD AUTO: 9.1 FL (ref 9.2–12.9)
POTASSIUM SERPL-SCNC: 4.1 MMOL/L (ref 3.5–5.1)
PROT SERPL-MCNC: 5.7 G/DL (ref 6–8.4)
RBC # BLD AUTO: 4.73 M/UL (ref 4–5.4)
SODIUM SERPL-SCNC: 136 MMOL/L (ref 136–145)
WBC # BLD AUTO: 10.63 K/UL (ref 3.9–12.7)

## 2019-09-27 PROCEDURE — 80053 COMPREHEN METABOLIC PANEL: CPT | Mod: HCNC

## 2019-09-27 PROCEDURE — 83605 ASSAY OF LACTIC ACID: CPT | Mod: HCNC

## 2019-09-27 PROCEDURE — 97162 PT EVAL MOD COMPLEX 30 MIN: CPT | Mod: HCNC

## 2019-09-27 PROCEDURE — 97535 SELF CARE MNGMENT TRAINING: CPT | Mod: HCNC

## 2019-09-27 PROCEDURE — 85025 COMPLETE CBC W/AUTO DIFF WBC: CPT | Mod: HCNC

## 2019-09-27 PROCEDURE — 36415 COLL VENOUS BLD VENIPUNCTURE: CPT | Mod: HCNC

## 2019-09-27 PROCEDURE — 97116 GAIT TRAINING THERAPY: CPT | Mod: HCNC

## 2019-09-27 PROCEDURE — 99220 PR INITIAL OBSERVATION CARE,LEVL III: ICD-10-PCS | Mod: HCNC,,, | Performed by: FAMILY MEDICINE

## 2019-09-27 PROCEDURE — 25000003 PHARM REV CODE 250: Mod: HCNC | Performed by: NURSE PRACTITIONER

## 2019-09-27 PROCEDURE — 63600175 PHARM REV CODE 636 W HCPCS: Mod: HCNC | Performed by: SURGERY

## 2019-09-27 PROCEDURE — 97530 THERAPEUTIC ACTIVITIES: CPT | Mod: HCNC

## 2019-09-27 PROCEDURE — 94761 N-INVAS EAR/PLS OXIMETRY MLT: CPT | Mod: HCNC

## 2019-09-27 PROCEDURE — 25000003 PHARM REV CODE 250: Mod: HCNC | Performed by: SURGERY

## 2019-09-27 PROCEDURE — 96361 HYDRATE IV INFUSION ADD-ON: CPT | Performed by: SURGERY

## 2019-09-27 PROCEDURE — G0378 HOSPITAL OBSERVATION PER HR: HCPCS | Mod: HCNC

## 2019-09-27 PROCEDURE — 97166 OT EVAL MOD COMPLEX 45 MIN: CPT | Mod: HCNC

## 2019-09-27 PROCEDURE — 99220 PR INITIAL OBSERVATION CARE,LEVL III: CPT | Mod: HCNC,,, | Performed by: FAMILY MEDICINE

## 2019-09-27 RX ORDER — ASPIRIN 81 MG/1
81 TABLET ORAL DAILY
Status: DISCONTINUED | OUTPATIENT
Start: 2019-09-27 | End: 2019-09-28 | Stop reason: HOSPADM

## 2019-09-27 RX ORDER — OXYBUTYNIN CHLORIDE 5 MG/1
10 TABLET ORAL NIGHTLY
Status: DISCONTINUED | OUTPATIENT
Start: 2019-09-27 | End: 2019-09-28 | Stop reason: HOSPADM

## 2019-09-27 RX ORDER — MEMANTINE HYDROCHLORIDE 10 MG/1
10 TABLET ORAL 2 TIMES DAILY
Status: DISCONTINUED | OUTPATIENT
Start: 2019-09-27 | End: 2019-09-28 | Stop reason: HOSPADM

## 2019-09-27 RX ORDER — LEVOTHYROXINE SODIUM 100 UG/1
100 TABLET ORAL DAILY
Status: DISCONTINUED | OUTPATIENT
Start: 2019-09-27 | End: 2019-09-28 | Stop reason: HOSPADM

## 2019-09-27 RX ORDER — PANTOPRAZOLE SODIUM 40 MG/1
40 TABLET, DELAYED RELEASE ORAL DAILY
Status: DISCONTINUED | OUTPATIENT
Start: 2019-09-27 | End: 2019-09-28 | Stop reason: HOSPADM

## 2019-09-27 RX ORDER — SODIUM CHLORIDE 9 MG/ML
INJECTION, SOLUTION INTRAVENOUS CONTINUOUS
Status: DISCONTINUED | OUTPATIENT
Start: 2019-09-27 | End: 2019-09-27

## 2019-09-27 RX ORDER — ONDANSETRON 2 MG/ML
4 INJECTION INTRAMUSCULAR; INTRAVENOUS EVERY 8 HOURS PRN
Status: DISCONTINUED | OUTPATIENT
Start: 2019-09-27 | End: 2019-09-28 | Stop reason: HOSPADM

## 2019-09-27 RX ORDER — SODIUM CHLORIDE 0.9 % (FLUSH) 0.9 %
10 SYRINGE (ML) INJECTION
Status: DISCONTINUED | OUTPATIENT
Start: 2019-09-27 | End: 2019-09-28 | Stop reason: HOSPADM

## 2019-09-27 RX ORDER — ACETAMINOPHEN 325 MG/1
650 TABLET ORAL EVERY 8 HOURS PRN
Status: DISCONTINUED | OUTPATIENT
Start: 2019-09-27 | End: 2019-09-28 | Stop reason: HOSPADM

## 2019-09-27 RX ORDER — PRAVASTATIN SODIUM 40 MG/1
80 TABLET ORAL NIGHTLY
Status: DISCONTINUED | OUTPATIENT
Start: 2019-09-27 | End: 2019-09-28 | Stop reason: HOSPADM

## 2019-09-27 RX ORDER — LISINOPRIL 20 MG/1
20 TABLET ORAL DAILY
Status: DISCONTINUED | OUTPATIENT
Start: 2019-09-27 | End: 2019-09-28 | Stop reason: HOSPADM

## 2019-09-27 RX ORDER — DONEPEZIL HYDROCHLORIDE 5 MG/1
5 TABLET, FILM COATED ORAL NIGHTLY
Status: DISCONTINUED | OUTPATIENT
Start: 2019-09-27 | End: 2019-09-28 | Stop reason: HOSPADM

## 2019-09-27 RX ADMIN — LISINOPRIL 20 MG: 20 TABLET ORAL at 08:09

## 2019-09-27 RX ADMIN — ASPIRIN 81 MG: 81 TABLET, COATED ORAL at 08:09

## 2019-09-27 RX ADMIN — OXYBUTYNIN CHLORIDE 10 MG: 5 TABLET ORAL at 09:09

## 2019-09-27 RX ADMIN — DONEPEZIL HYDROCHLORIDE 5 MG: 5 TABLET, FILM COATED ORAL at 09:09

## 2019-09-27 RX ADMIN — MEMANTINE 10 MG: 10 TABLET ORAL at 08:09

## 2019-09-27 RX ADMIN — PANTOPRAZOLE SODIUM 40 MG: 40 TABLET, DELAYED RELEASE ORAL at 08:09

## 2019-09-27 RX ADMIN — MEMANTINE 10 MG: 10 TABLET ORAL at 09:09

## 2019-09-27 RX ADMIN — SODIUM CHLORIDE: 0.9 INJECTION, SOLUTION INTRAVENOUS at 01:09

## 2019-09-27 RX ADMIN — LEVOTHYROXINE SODIUM 100 MCG: 100 TABLET ORAL at 08:09

## 2019-09-27 RX ADMIN — PRAVASTATIN SODIUM 80 MG: 40 TABLET ORAL at 09:09

## 2019-09-27 NOTE — NURSING
Patient admitted with acute cystitis without hematuria. Patient has a history of falls. Patient is on Room air with Sats in the upper 90s. Patient is continent and uses the bedpan. Patient claims to be ambulatory with a walker at home.  at bedside. Patient has dementia.Patient has no swelling or edema. Patient has ecchymosis noted to GERARDO arms. Patient is on a 2 gram low sodium diet. Lungs clear. Will continue to monitor.

## 2019-09-27 NOTE — HPI
85YO WF with PMHX of HTN,  hypothyroidism and hyperlipidemia, had been having a series of falls since July and has been receiving PT at home. She underwent recently neurology evaluation  9/17/19 for vertigo and memory loss; impression mild cognitive impairment like symptoms vs Early Alzheimer's disease. Pt was prescribed Namenda. She presented to ER last PM; found on the floor after being there for 10+ hours per caregiver, noted + confusion  -- patient urine appears to be infected with [place] [address] today, Lactate 1.3. Afebrile on arrival, /59, HR 94   CT of head - Atrophy.  No acute intracranial abnormality  Xray of pelvis negative   WBC 17.61>10.63

## 2019-09-27 NOTE — ASSESSMENT & PLAN NOTE
Complicated UTI with elevated WBC   IV Fluids  Urine culture in process  IV ABX Rocephin 2gm q 24   Repeat U/A in am. If significantly improved, will send home on po abx and f/u Cx as outpt

## 2019-09-27 NOTE — PT/OT/SLP EVAL
"Physical Therapy Evaluation    Patient Name:  Estefania Mccollum   MRN:  [id number]    Recommendations:     Discharge Recommendations:  home with home health, home health PT, home health OT   Discharge Equipment Recommendations: none   Barriers to discharge: None    Assessment:     Estefania Mccollum is a 84 y.o. female admitted with a medical diagnosis of Acute cystitis without hematuria.  She presents with the following impairments/functional limitations:  weakness, impaired self care skills, impaired functional mobilty, gait instability, impaired cognition, impaired balance. Patient noted decrease safety awareness, impulsive and sometimes forgetful due to mild cognitive impairment. Patient had 4 falls within a month(per ). Patient requires supervision with out of bed activities and gait functions using RW. Patient will benefit from acute Skilled PT tx to inc safety and inc independence with mobility, self care tasks and gait functions.    Rehab Prognosis: Good; patient would benefit from acute skilled PT services to address these deficits and reach maximum level of function.    Recent Surgery: * No surgery found *      Plan:     During this hospitalization, patient to be seen 5 x/week to address the identified rehab impairments via gait training, therapeutic activities, therapeutic exercises and progress toward the following goals:    · Plan of Care Expires:  10/03/19    Subjective     Chief Complaint: "Feeling Tired"  Patient/Family Comments/goals: "To get stronger, walk better not to fall and go home".  Pain/Comfort:  · Pain Rating 1: 0/10  · Pain Rating Post-Intervention 1: 0/10    Patients cultural, spiritual, Denominational conflicts given the current situation: no    Living Environment:  Patient lives with  in a [facility] with no steps to enter  Prior to admission, patients level of function was independent with mobility, self care task and gait functions using RW.  Equipment used at home: walker, rolling, " bedside commode.  DME owned (not currently used): none.  Upon discharge, patient will have assistance from .    Objective:     Communicated with patient,  and daughter prior to session.  Patient found supine with peripheral IV, telemetry  upon PT entry to room.    General Precautions: Standard, fall   Orthopedic Precautions:N/A   Braces: N/A     Exams:  · Cognitive Exam:  Patient is oriented to Person, Place, Time and but forgetfull at times  · Gross Motor Coordination:  WFL  · Skin Integrity/Edema:      · -       Skin integrity: Visible skin intact  · RUE ROM: WFL  · RUE Strength: WFL  · LUE ROM: WFL  · LUE Strength: WFL  · RLE ROM: WFL  · RLE Strength: WFL  · LLE ROM: WFL  · LLE Strength: WFL    Functional Mobility:  · Bed Mobility:     · Rolling Left:  supervision  · Rolling Right: supervision  · Scooting: supervision  · Supine to Sit: contact guard assistance  · Sit to Supine: contact guard assistance  · Transfers:     · Sit to Stand:  stand by assistance with rolling walker  · Toilet Transfer: stand by assistance with  rolling walker  using  Step Transfer  · Gait: RW Ambulation x 100 feet with SBA.  · Balance: Stand dynamic using RW: Good- grade      Therapeutic Activities and Exercises:   Completed PT eval. Worked on safety education and proper body mechanics on bed mobilioty, transfers and gait functions using RW.    AM-PAC 6 CLICK MOBILITY  Total Score:19     Patient left supine with bed alarm on, nurse notified and , daughter and sister present.    GOALS:   Multidisciplinary Problems     Physical Therapy Goals        Problem: Physical Therapy Goal    Goal Priority Disciplines Outcome Goal Variances Interventions   Physical Therapy Goal     PT, PT/OT      Description:  Goals to be met by: 7 visits    Patient will increase functional independence with mobility by performin. Supine to sit with Modified Independent  2. Sit to supine with Modified Independent  3. Bed to chair transfer  with Supervision or Set-up Assistancewith or without rolling walker using Step Transfer TECHNIQUE  4. Gait  x 150  feet with Supervision or Set-up Assistance with or without rolling walker  5. Lower extremity exercise program x10 reps per handout, with assistance as needed                    History:     Past Medical History:   Diagnosis Date    Adenomatous polyp     Allergy     sinus    Annual physical exam 2011    Arthritis     osteoarthritis    Cataract     History of bone density study 2009    History of colonoscopy 10/30/2008    History of mammogram 2011    Hyperlipidemia     Hypertension     Macular degeneration     Postmenopausal hormone replacement therapy     therapy stopped    Squamous cell carcinoma 2019    Left thigh (ED&C)    Thyroid disease     hypothyroidism    Vertigo        Past Surgical History:   Procedure Laterality Date    APPENDECTOMY      12 years old    CATARACT EXTRACTION      Both eyes    CATARACT EXTRACTION, BILATERAL       SECTION      x 3    CHOLECYSTECTOMY      COLONOSCOPY  10/02/2013    HYSTERECTOMY      total    JOINT REPLACEMENT Left     OOPHORECTOMY      TONSILLECTOMY      TOTAL KNEE ARTHROPLASTY Left 2016    Yag       Left Eye       Time Tracking:     PT Received On: 19  PT Start Time: 1253     PT Stop Time: 1340  PT Total Time (min): 47 min     Billable Minutes: Evaluation 15, Gait Training 12 and Therapeutic Activity 15      Cricket Gaytan, PT  2019

## 2019-09-27 NOTE — ED TRIAGE NOTES
84 y.o. female presents to ER MONTIEL 01/MONTIEL 01   Chief Complaint   Patient presents with    Fall   Pt brought to ER by [facility] ambulance for frequent falls at home and increased confusion. Pt acting normal right now according to family. No acute distress noted.

## 2019-09-27 NOTE — PLAN OF CARE
09/27/19 1005   PALOMO Message   Medicare Outpatient and Observation Notification regarding financial responsibility Given to patient/caregiver;Explained to patient/caregiver;Signed/date by patient/caregiver   Date PALOMO was signed 09/27/19   Time PALOMO was signed 0819

## 2019-09-27 NOTE — H&P
[facility] Medicine  History & Physical    Patient Name: Estefania Mccollum  MRN: [id number]  Admission Date: 2019  Attending Physician: Mary Land MD   Primary Care Provider: Danna Levine MD         Patient information was obtained from patient and ER records.     Subjective:     Principal Problem:Acute cystitis without hematuria    Chief Complaint:   Chief Complaint   Patient presents with    Fall        HPI: 85YO WF with PMHX of HTN,  hypothyroidism and hyperlipidemia, had been having a series of falls since July and has been receiving PT at home. She underwent recently neurology evaluation  19 for vertigo and memory loss; impression mild cognitive impairment like symptoms vs Early Alzheimer's disease. Pt was prescribed Namenda. She presented to ER last PM; found on the floor after being there for 10+ hours per caregiver, noted + confusion  -- patient urine appears to be infected with a white count of 52281 today, Lactate 1.3. Afebrile on arrival, /59, HR 94   CT of head - Atrophy.  No acute intracranial abnormality  Xray of pelvis negative   WBC 17.61>10.63     Past Medical History:   Diagnosis Date    Adenomatous polyp     Allergy     sinus    Annual physical exam 2011    Arthritis     osteoarthritis    Cataract     History of bone density study 2009    History of colonoscopy 10/30/2008    History of mammogram 2011    Hyperlipidemia     Hypertension     Macular degeneration     Postmenopausal hormone replacement therapy     therapy stopped    Squamous cell carcinoma 2019    Left thigh (ED&C)    Thyroid disease     hypothyroidism    Vertigo        Past Surgical History:   Procedure Laterality Date    APPENDECTOMY      12 years old    CATARACT EXTRACTION      Both eyes    CATARACT EXTRACTION, BILATERAL       SECTION      x 3    CHOLECYSTECTOMY      COLONOSCOPY  10/02/2013    HYSTERECTOMY      total    JOINT  REPLACEMENT Left     OOPHORECTOMY      TONSILLECTOMY      TOTAL KNEE ARTHROPLASTY Left 12/2016    Yag       Left Eye       Review of patient's allergies indicates:   Allergen Reactions    Tetracycline Other (See Comments)     Other reaction(s): Rash.. Pt states no drug allergie       No current facility-administered medications on file prior to encounter.      Current Outpatient Medications on File Prior to Encounter   Medication Sig    aspirin (ECOTRIN) 81 MG EC tablet Take 81 mg by mouth once daily.    donepezil (ARICEPT) 5 MG tablet Take 1 tablet (5 mg total) by mouth every evening.    fish oil-omega-3 fatty acids 300-1,000 mg capsule Take 2 g by mouth once daily.    FLUAD 3957-3483, 65 YR UP,,PF, 45 mcg (15 mcg x 3)/0.5 mL Syrg ADM 0.5ML IM UTD    levothyroxine (SYNTHROID) 100 MCG tablet Take 1 tablet (100 mcg total) by mouth once daily.    lisinopril (PRINIVIL,ZESTRIL) 20 MG tablet Take 1 tablet (20 mg total) by mouth once daily.    memantine (NAMENDA) 10 MG Tab Take 1 tablet (10 mg total) by mouth 2 (two) times daily.    multivitamin capsule Take 1 capsule by mouth once daily.    omeprazole (PRILOSEC) 40 MG capsule TAKE 1 CAPSULE EVERY MORNING    oxybutynin (DITROPAN) 5 MG Tab TAKE 2 TABLETS AT NIGHT    pravastatin (PRAVACHOL) 80 MG tablet TAKE 1 TABLET ONE TIME DAILY    acetaminophen (TYLENOL) 325 MG tablet Take 325 mg by mouth every 6 (six) hours as needed for Pain.    calcium carbonate (OS-RAMONE) 600 mg (1,500 mg) Tab Take 600 mg by mouth once daily.      Family History     Problem Relation (Age of Onset)    Asthma Father    Diabetes Sister    Heart attack Brother    Heart disease Brother    Heart failure Mother, Father    Hyperlipidemia Mother, Father    Hypertension Mother, Father, Sister    Mental retardation Daughter    No Known Problems Daughter, Brother, Daughter        Tobacco Use    Smoking status: Never Smoker    Smokeless tobacco: Never Used   Substance and Sexual Activity     Alcohol use: Yes     Frequency: Monthly or less     Drinks per session: Patient refused     Binge frequency: Never     Comment: wine occasionally    Drug use: No    Sexual activity: Yes     Partners: Male     Review of Systems   Constitutional: Negative for chills, fatigue and fever.   HENT: Negative for congestion, ear pain, postnasal drip, sinus pressure, sneezing and sore throat.    Eyes: Negative for discharge.   Respiratory: Negative for cough, chest tightness and shortness of breath.    Cardiovascular: Negative.    Gastrointestinal: Positive for constipation and diarrhea. Negative for abdominal pain, nausea and vomiting.        Pt notes she had recent consitpation and took OTC meds then had diarrhea    Genitourinary: Negative for difficulty urinating, flank pain and hematuria.   Musculoskeletal: Positive for gait problem (recent falls- has walker at home ). Negative for arthralgias and joint swelling.   Skin: Negative.    Neurological: Positive for dizziness ( intermittent dizziness - seen by ENT recelntly ) and weakness. Negative for headaches.   Psychiatric/Behavioral: Positive for confusion. Negative for behavioral problems.     Objective:     Vital Signs (Most Recent):  Temp: 96.6 °F (35.9 °C) (09/27/19 0705)  Pulse: 75 (09/27/19 0705)  Resp: 18 (09/27/19 0705)  BP: 138/65 (09/27/19 0705)  SpO2: 96 % (09/27/19 0705) Vital Signs (24h Range):  Temp:  [96.6 °F (35.9 °C)-98.2 °F (36.8 °C)] 96.6 °F (35.9 °C)  Pulse:  [70-94] 75  Resp:  [16-19] 18  SpO2:  [96 %-97 %] 96 %  BP: (116-151)/(59-80) 138/65     Weight: 71.2 kg (157 lb)  Body mass index is 25.34 kg/m².    Physical Exam   Constitutional: She is oriented to person, place, and time. She appears well-developed and well-nourished.   HENT:   Head: Normocephalic and atraumatic.   Right Ear: External ear normal.   Left Ear: External ear normal.   Nose: Nose normal.   Mouth/Throat: Oropharynx is clear and moist.   Eyes: Pupils are equal, round, and reactive  to light. EOM are normal.   Neck: Normal range of motion. Neck supple. No JVD present. No tracheal deviation present. No thyromegaly present.   Cardiovascular: Normal rate, normal heart sounds and intact distal pulses.   No murmur heard.  Pulmonary/Chest: Effort normal and breath sounds normal. No respiratory distress. She has no wheezes. She has no rales. She exhibits no tenderness.   Abdominal: Soft. Bowel sounds are normal. She exhibits no distension and no mass. There is no tenderness. There is no rebound and no guarding.   Musculoskeletal: Normal range of motion. She exhibits no edema or tenderness.   Lymphadenopathy:     She has no cervical adenopathy.   Neurological: She is alert and oriented to person, place, and time. She has normal reflexes. She displays normal reflexes. No cranial nerve deficit. She exhibits normal muscle tone. Coordination normal.   Skin: Skin is warm and dry. No rash noted. No erythema. No pallor.   Psychiatric: She has a normal mood and affect. Her behavior is normal. Judgment and thought content normal.         CRANIAL NERVES     CN III, IV, VI   Pupils are equal, round, and reactive to light.  Extraocular motions are normal.        Significant Labs:   A1C: No results for input(s): HGBA1C in the last 4320 hours.  ABGs: No results for input(s): PH, PCO2, HCO3, POCSATURATED, BE, TOTALHB, COHB, METHB, O2HB, POCFIO2 in the last 48 hours.  Bilirubin:   Recent Labs   Lab 09/26/19 2106 09/27/19  0621   BILITOT 1.9* 1.3*     Blood Culture: No results for input(s): LABBLOO in the last 48 hours.  CBC:   Recent Labs   Lab 09/26/19 2106 09/27/19  0621   WBC 17.62* 10.63   HGB 14.6 13.6   HCT 43.6 41.9    216     CMP:   Recent Labs   Lab 09/26/19 2106 09/27/19  0621   * 136   K 4.1 4.1   CL 97 101   CO2 23 26   * 101   BUN 19 13   CREATININE 1.1 0.9   CALCIUM 9.0 8.7   PROT 6.4 5.7*   ALBUMIN 3.7 3.3*   BILITOT 1.9* 1.3*   ALKPHOS 81 73   AST 19 18   ALT 11 10   ANIONGAP 11  9   EGFRNONAA 46* 59*     Cardiac Markers:   Recent Labs   Lab 09/26/19 2106   BNP 70     Coagulation: No results for input(s): PT, INR, APTT in the last 48 hours.  Lactic Acid:   Recent Labs   Lab 09/26/19  2233 09/27/19  0228 09/27/19  0621   LACTATE 1.3 1.3 0.8       Magnesium:   Recent Labs   Lab 09/26/19 2106   MG 2.0     Troponin:   Recent Labs   Lab 09/26/19 2106   TROPONINI <0.006     TSH:   Recent Labs   Lab 09/26/19 2106   TSH 1.444     Urine Culture: No results for input(s): LABURIN in the last 48 hours.  Urine Studies:   Recent Labs   Lab 09/26/19 2128   COLORU Yellow   APPEARANCEUA Clear   PHUR 6.0   SPECGRAV 1.025   PROTEINUA 1+*   GLUCUA Negative   KETONESU Negative   BILIRUBINUA 1+*   OCCULTUA 2+*   NITRITE Negative   UROBILINOGEN Negative   LEUKOCYTESUR 1+*   RBCUA 35*   WBCUA 60*   BACTERIA Moderate*   SQUAMEPITHEL 7   HYALINECASTS 7*       Significant Imaging: I have reviewed all pertinent imaging results/findings within the past 24 hours.     9/26/11 CT head    Atrophy.  No acute intracranial abnormality    Xray  Pelvis- Bone density and architecture are normal. No acute findings. The bony pelvis is intact    Assessment/Plan:     * Acute cystitis without hematuria   Complicated UTI with elevated WBC   IV Fluids  Urine culture in process  IV ABX Rocephin 2gm q 24   Repeat U/A in am. If significantly improved, will send home on po abx and f/u Cx as outpt       Frequent falls  Recent dizziness eval with ENT, getting PT at home  Chart reviewed; have discussed assisted living   Now with UTI   PT consulted       Debility  PT       Memory loss    Recent neuro eval  Continue Namenda     Hyperlipidemia  Resume pravastatin      Hypothyroidism  Resume synthoid      Hypertension  sbp 150s this am  Resume lisinopril        VTE Risk Mitigation (From admission, onward)         Ordered     IP VTE HIGH RISK PATIENT  Once      09/27/19 0042     Place sequential compression device  Until discontinued       09/27/19 0042                   Manish Lynn MD  Department of Hospital Medicine   [facility]

## 2019-09-27 NOTE — PLAN OF CARE
09/27/19 1014   Advance Directives (For Healthcare)   Advance Directive  (If Adv Dir status is received, view document under Adv Dir in header or Chart Review Media tab) Patient has advance directive, copy not received.       Full code confirmed.

## 2019-09-27 NOTE — SUBJECTIVE & OBJECTIVE
Past Medical History:   Diagnosis Date    Adenomatous polyp     Allergy     sinus    Annual physical exam 2011    Arthritis     osteoarthritis    Cataract     History of bone density study 2009    History of colonoscopy 10/30/2008    History of mammogram 2011    Hyperlipidemia     Hypertension     Macular degeneration     Postmenopausal hormone replacement therapy     therapy stopped    Squamous cell carcinoma 2019    Left thigh (ED&C)    Thyroid disease     hypothyroidism    Vertigo        Past Surgical History:   Procedure Laterality Date    APPENDECTOMY      12 years old    CATARACT EXTRACTION      Both eyes    CATARACT EXTRACTION, BILATERAL       SECTION      x 3    CHOLECYSTECTOMY      COLONOSCOPY  10/02/2013    HYSTERECTOMY      total    JOINT REPLACEMENT Left     OOPHORECTOMY      TONSILLECTOMY      TOTAL KNEE ARTHROPLASTY Left 2016    Yag       Left Eye       Review of patient's allergies indicates:   Allergen Reactions    Tetracycline Other (See Comments)     Other reaction(s): Rash.. Pt states no drug allergie       No current facility-administered medications on file prior to encounter.      Current Outpatient Medications on File Prior to Encounter   Medication Sig    aspirin (ECOTRIN) 81 MG EC tablet Take 81 mg by mouth once daily.    donepezil (ARICEPT) 5 MG tablet Take 1 tablet (5 mg total) by mouth every evening.    fish oil-omega-3 fatty acids 300-1,000 mg capsule Take 2 g by mouth once daily.    FLUAD 7776-1410, 65 YR UP,,PF, 45 mcg (15 mcg x 3)/0.5 mL Syrg ADM 0.5ML IM UTD    levothyroxine (SYNTHROID) 100 MCG tablet Take 1 tablet (100 mcg total) by mouth once daily.    lisinopril (PRINIVIL,ZESTRIL) 20 MG tablet Take 1 tablet (20 mg total) by mouth once daily.    memantine (NAMENDA) 10 MG Tab Take 1 tablet (10 mg total) by mouth 2 (two) times daily.    multivitamin capsule Take 1 capsule by mouth once daily.    omeprazole (PRILOSEC)  40 MG capsule TAKE 1 CAPSULE EVERY MORNING    oxybutynin (DITROPAN) 5 MG Tab TAKE 2 TABLETS AT NIGHT    pravastatin (PRAVACHOL) 80 MG tablet TAKE 1 TABLET ONE TIME DAILY    acetaminophen (TYLENOL) 325 MG tablet Take 325 mg by mouth every 6 (six) hours as needed for Pain.    calcium carbonate (OS-RAMONE) 600 mg (1,500 mg) Tab Take 600 mg by mouth once daily.      Family History     Problem Relation (Age of Onset)    Asthma Father    Diabetes Sister    Heart attack Brother    Heart disease Brother    Heart failure Mother, Father    Hyperlipidemia Mother, Father    Hypertension Mother, Father, Sister    Mental retardation Daughter    No Known Problems Daughter, Brother, Daughter        Tobacco Use    Smoking status: Never Smoker    Smokeless tobacco: Never Used   Substance and Sexual Activity    Alcohol use: Yes     Frequency: Monthly or less     Drinks per session: Patient refused     Binge frequency: Never     Comment: wine occasionally    Drug use: No    Sexual activity: Yes     Partners: Male     Review of Systems   Constitutional: Negative for chills, fatigue and fever.   HENT: Negative for congestion, ear pain, postnasal drip, sinus pressure, sneezing and sore throat.    Eyes: Negative for discharge.   Respiratory: Negative for cough, chest tightness and shortness of breath.    Cardiovascular: Negative.    Gastrointestinal: Positive for constipation and diarrhea. Negative for abdominal pain, nausea and vomiting.        Pt notes she had recent consitpation and took OTC meds then had diarrhea    Genitourinary: Negative for difficulty urinating, flank pain and hematuria.   Musculoskeletal: Positive for gait problem (recent falls- has walker at home ). Negative for arthralgias and joint swelling.   Skin: Negative.    Neurological: Positive for dizziness ( intermittent dizziness - seen by ENT recelntly ) and weakness. Negative for headaches.   Psychiatric/Behavioral: Positive for confusion. Negative for  behavioral problems.     Objective:     Vital Signs (Most Recent):  Temp: 96.6 °F (35.9 °C) (09/27/19 0705)  Pulse: 75 (09/27/19 0705)  Resp: 18 (09/27/19 0705)  BP: 138/65 (09/27/19 0705)  SpO2: 96 % (09/27/19 0705) Vital Signs (24h Range):  Temp:  [96.6 °F (35.9 °C)-98.2 °F (36.8 °C)] 96.6 °F (35.9 °C)  Pulse:  [70-94] 75  Resp:  [16-19] 18  SpO2:  [96 %-97 %] 96 %  BP: (116-151)/(59-80) 138/65     Weight: 71.2 kg (157 lb)  Body mass index is 25.34 kg/m².    Physical Exam   Constitutional: She is oriented to person, place, and time. She appears well-developed and well-nourished.   HENT:   Head: Normocephalic and atraumatic.   Right Ear: External ear normal.   Left Ear: External ear normal.   Nose: Nose normal.   Mouth/Throat: Oropharynx is clear and moist.   Eyes: Pupils are equal, round, and reactive to light. EOM are normal.   Neck: Normal range of motion. Neck supple. No JVD present. No tracheal deviation present. No thyromegaly present.   Cardiovascular: Normal rate, normal heart sounds and intact distal pulses.   No murmur heard.  Pulmonary/Chest: Effort normal and breath sounds normal. No respiratory distress. She has no wheezes. She has no rales. She exhibits no tenderness.   Abdominal: Soft. Bowel sounds are normal. She exhibits no distension and no mass. There is no tenderness. There is no rebound and no guarding.   Musculoskeletal: Normal range of motion. She exhibits no edema or tenderness.   Lymphadenopathy:     She has no cervical adenopathy.   Neurological: She is alert and oriented to person, place, and time. She has normal reflexes. She displays normal reflexes. No cranial nerve deficit. She exhibits normal muscle tone. Coordination normal.   Skin: Skin is warm and dry. No rash noted. No erythema. No pallor.   Psychiatric: She has a normal mood and affect. Her behavior is normal. Judgment and thought content normal.         CRANIAL NERVES     CN III, IV, VI   Pupils are equal, round, and reactive  to light.  Extraocular motions are normal.        Significant Labs:   A1C: No results for input(s): HGBA1C in the last 4320 hours.  ABGs: No results for input(s): PH, PCO2, HCO3, POCSATURATED, BE, TOTALHB, COHB, METHB, O2HB, POCFIO2 in the last 48 hours.  Bilirubin:   Recent Labs   Lab 09/26/19 2106 09/27/19 0621   BILITOT 1.9* 1.3*     Blood Culture: No results for input(s): LABBLOO in the last 48 hours.  CBC:   Recent Labs   Lab 09/26/19 2106 09/27/19 0621   WBC 17.62* 10.63   HGB 14.6 13.6   HCT 43.6 41.9    216     CMP:   Recent Labs   Lab 09/26/19 2106 09/27/19 0621   * 136   K 4.1 4.1   CL 97 101   CO2 23 26   * 101   BUN 19 13   CREATININE 1.1 0.9   CALCIUM 9.0 8.7   PROT 6.4 5.7*   ALBUMIN 3.7 3.3*   BILITOT 1.9* 1.3*   ALKPHOS 81 73   AST 19 18   ALT 11 10   ANIONGAP 11 9   EGFRNONAA 46* 59*     Cardiac Markers:   Recent Labs   Lab 09/26/19 2106   BNP 70     Coagulation: No results for input(s): PT, INR, APTT in the last 48 hours.  Lactic Acid:   Recent Labs   Lab 09/26/19  2233 09/27/19 0228 09/27/19 0621   LACTATE 1.3 1.3 0.8       Magnesium:   Recent Labs   Lab 09/26/19 2106   MG 2.0     Troponin:   Recent Labs   Lab 09/26/19 2106   TROPONINI <0.006     TSH:   Recent Labs   Lab 09/26/19 2106   TSH 1.444     Urine Culture: No results for input(s): LABURIN in the last 48 hours.  Urine Studies:   Recent Labs   Lab 09/26/19 2128   COLORU Yellow   APPEARANCEUA Clear   PHUR 6.0   SPECGRAV 1.025   PROTEINUA 1+*   GLUCUA Negative   KETONESU Negative   BILIRUBINUA 1+*   OCCULTUA 2+*   NITRITE Negative   UROBILINOGEN Negative   LEUKOCYTESUR 1+*   RBCUA 35*   WBCUA 60*   BACTERIA Moderate*   SQUAMEPITHEL 7   HYALINECASTS 7*       Significant Imaging: I have reviewed all pertinent imaging results/findings within the past 24 hours.     9/26/11 CT head    Atrophy.  No acute intracranial abnormality    Xray  Pelvis- Bone density and architecture are normal. No acute findings. The bony  pelvis is intact

## 2019-09-27 NOTE — PT/OT/SLP EVAL
Occupational Therapy   Evaluation    Name: Estefania Mccollum  MRN: [id number]  Admitting Diagnosis:  Acute cystitis without hematuria      Recommendations:     Discharge Recommendations: home health PT, home health OT, home with home health  Discharge Equipment Recommendations:  shower chair, tub bench  Barriers to discharge:  Other (Comment)    Assessment:     Estefania Mccollum is a 84 y.o. female with a medical diagnosis of Acute cystitis without hematuria.  She presents with frequent falls at home, sedentary life style, hard of hearing,  Forgetfulness making initiating tasks and following through with healthy habits and routines more difficult.. Performance deficits affecting function: weakness, decreased safety awareness, impaired functional mobilty, gait instability, impaired cognition, impaired balance, impaired self care skills, impaired endurance.      Rehab Prognosis: Fair; patient would benefit from acute skilled OT services to address these deficits and reach maximum level of function.       Plan:     Patient to be seen 5 x/week to address the above listed problems via self-care/home management, therapeutic activities, therapeutic exercises  · Plan of Care Expires: 10/02/19  · Plan of Care Reviewed with: patient, spouse, daughter    Subjective     Chief Complaint: Well, I was so busy as a home , I guess when I retired I just sort of started to relax more and more, I suppose I could get up and get dressed vs staying in nightgown, and be more active during the day, exercise and eat/ drink more, maybe I could take a few showers a week vs sponge bathing, I suppose we could go to [employer], we used to.  Patient/Family Comments/goals: to establish a routine of healthier / more active habits and routines    Occupational Profile:  Living Environment: lives with  of 61 years in cut off in one level home, no steps  Previous level of function: needing more help from  for BADL and lots more help with  IADL due to forgetfulness and falling at home  Roles and Routines: impaired  Equipment Used at Home:  walker, rolling, bedside commode  Assistance upon Discharge: , daughter is here briefly from out of state    Pain/Comfort:  · Pain Rating 1: 0/10  · Pain Rating Post-Intervention 1: 0/10    Patients cultural, spiritual, Cheondoism conflicts given the current situation:      Objective:     Communicated with: nursing prior to session.  Patient found supine with peripheral IV, telemetry upon OT entry to room.    General Precautions: Standard, fall   Orthopedic Precautions:N/A   Braces: N/A     Occupational Performance:    Bed Mobility:    · Patient completed Rolling/Turning to Left with  supervision  · Patient completed Rolling/Turning to Right with supervision  · Patient completed Scooting/Bridging with supervision  · Patient completed Supine to Sit with supervision  · Patient completed Sit to Supine with supervision    Functional Mobility/Transfers:  · Patient completed Sit <> Stand Transfer with contact guard assistance  with  rolling walker   · Patient completed Bed <> Chair Transfer using Stand Pivot technique with contact guard assistance with rolling walker  · Patient completed Toilet Transfer Stand Pivot technique with contact guard assistance with  rolling walker  ·     Activities of Daily Living:  · Feeding:  supervision needs reminders to drink more and eat, (limited appetite, does better with small frequent meals) she and  would benefit from someone bringing in meals for them occasionally and eating with them if possible.  · Grooming: supervision . needs reminders to initiate brushing teeth more often, set up of supplies to sequence and checks for thouroughness of her grooming  · Bathing: minimum assistance to sponge bath- encouraged her and  to consider installing a shower chair or tub bench, a hand held shower hose and have a bath aid 2x week on a schedule to establish a schedule of  more thourough hygeine  · Upper Body Dressing: supervision gown  · Lower Body Dressing: stand by assistance socks  · Toileting: contact guard assistance needs cues for better hygeine, needs cues to drink more fluids, needs supervsion to see that she sits ON the toilet vs beside it = fall risk.    Cognitive/Visual Perceptual:  Cognitive/Psychosocial Skills:     -       Follows Commands/attention:Follows one-step commands will benefit from formal cognitive testing, suggest SUKUMAR to guide d/c planning and help with caregiver education. Dtr reports that her father is becoming exhausted with her care and needs respite care. Gave them a dementia care ADL handout to read and discuss strategies / needs with OT/ social work      [facility] 6 Click ADL:  AMPAC Total Score: 21    Treatment & Education:  Eval, goals setting and self care- gave a red theraband to begin her exercise program to build endurance / activity tolerance/ reduce fall risk, and encouraged them to offer her something to drink after building thirst through exercise.    -Pt edu on OT role/POC  -Importance of OOB activity with staff assistance  -Safety during functional t/f and mobility  - Multiple self care tasks/functional mobility completed-- assistance level noted above  - All questions/concerns answered within OT scope of practice   -OT discussed importance of patient's performance of Home Exercise Program (HEP) with pt verbalizing understanding  Education:    Patient left supine with all lines intact and call button in reach    GOALS:   Multidisciplinary Problems     Occupational Therapy Goals        Problem: Occupational Therapy Goal    Goal Priority Disciplines Outcome Interventions   Occupational Therapy Goal     OT, PT/OT     Description:  Goals to be met by: 10/2/2019     Patient will increase functional independence with ADLs by performing:    LE Dressing with [place].  Grooming while standing at sink with Modified [place].  Toileting from  bedside commode with Modified [place] for hygiene and clothing management.   Bathing from  shower chair/bench with Stand-by Assistance.  Upper extremity exercise program x10 reps per handout, with assistance as needed  Patient will establish a daily routine of getting up (8 am) , using bathroom , getting dressed , eating breakfast , doing her exercises and drinking water/ fluids with reminders to initiate tasks and set up for sequencing from , / dtr have been given a dementia care hand out with strategies to improve participation in healthier habits and routines. It is recommended that she shower at least 2x week (might need bath aid), get a shower chair for his bathroom or a tub bench for her bathroom and a shower hose. It is recommended that dtr set up some respite care for him in addition to PT/ OT / bath aide in the home to improve safety, reduce fall risk..                      History:     Past Medical History:   Diagnosis Date    Adenomatous polyp     Allergy     sinus    Annual physical exam 2011    Arthritis     osteoarthritis    Cataract     History of bone density study 2009    History of colonoscopy 10/30/2008    History of mammogram 2011    Hyperlipidemia     Hypertension     Macular degeneration     Postmenopausal hormone replacement therapy     therapy stopped    Squamous cell carcinoma 2019    Left thigh (ED&C)    Thyroid disease     hypothyroidism    Vertigo        Past Surgical History:   Procedure Laterality Date    APPENDECTOMY      12 years old    CATARACT EXTRACTION      Both eyes    CATARACT EXTRACTION, BILATERAL       SECTION      x 3    CHOLECYSTECTOMY      COLONOSCOPY  10/02/2013    HYSTERECTOMY      total    JOINT REPLACEMENT Left     OOPHORECTOMY      TONSILLECTOMY      TOTAL KNEE ARTHROPLASTY Left 2016    Yag       Left Eye       Time Tracking:     OT Date of Treatment: 19  OT Start Time: 1615  OT Stop  Time: 1700  OT Total Time (min): 45 min    Billable Minutes:Evaluation 15  Self Care/Home Management 30    Cynthia Ricci OT  9/27/2019

## 2019-09-27 NOTE — CONSULTS
SW provided patient's daughter, Emani, with brochures and information for assisted living facilities in the area for her parents to include [facility], [facility] and [facility]. Also provided a brochure for [facility] for information regarding sitters. Patient's daughter also inquired about SNF placement. JUAN JOSÉ explained that if the doctors and therapist believe that this is an appropriate option for the patient that they will order SNF for her mother. The patient does not want to go into a nursing home. DIMITRI Davalos recommends that the patient go home with home health services. Patient accepted to [facility].     Urmila Ordaz LMSW

## 2019-09-27 NOTE — PLAN OF CARE
09/27/19 1405   Post-Acute Status   Post-Acute Authorization Home Health/Hospice   Home Health/Hospice Status Set-up Complete       Patient accepted via [employer] by [facility]. Informed patient's nurse, JAMES Viera, of status in case of discharge over the weekend.    Urmila Ordaz LMSW

## 2019-09-27 NOTE — PLAN OF CARE
09/27/19 0951   Discharge Reassessment   Assessment Type Discharge Planning Assessment   Provided patient/caregiver education on the expected discharge date and the discharge plan Yes   Do you have any problems affording any of your prescribed medications? No   Discharge Plan A Home with family   Discharge Plan B Home Health   DME Needed Upon Discharge  none   Patient choice form signed by patient/caregiver N/A   Anticipated Discharge Disposition Home   Can the patient answer the patient profile reliably? Yes, cognitively intact   How does the patient rate their overall health at the present time? Fair   Describe the patient's ability to walk at the present time. Walks with the help of equipment   How often would a person be available to care for the patient? Whenever needed   Number of comorbid conditions (as recorded on the chart) Five or more   During the past month, has the patient often been bothered by feeling down, depressed or hopeless? No   During the past month, has the patient often been bothered by little interest or pleasure in doing things? No         Patient admitted for UTI. She lives at home with spouse. CM in contact with  to aid with resources for discharge planning. Home Health can be an option upon discharge. Patient educated on diagnosis for this admission, and patient verbalizes understanding. Discharge planning brochure and educational handout of what signs and symptoms to look for after discharge, and how to manage health at home, given to patient and family. Patient and family are encouraged to call with any questions or help the would need. Contact information given. CM will continue to follow patient throughout the transitions of care, and assist with any discharge needs.      Spoke with Dr. Lynn about patient only meeting observation criteria. Condition code 44 signed, and patient rolled back to observation.

## 2019-09-27 NOTE — PLAN OF CARE
Patient admitted with acute cystitis. Vital signs stable. Patient received IV rocephin. Normal saline infusing at 75 ml/hr.VSS, Afebrile. SCDs in place.  at bedside. No signs of distress noted. Plan of care reviewed with Patient and spouse. Patient and spouse verbalized understanding

## 2019-09-27 NOTE — PLAN OF CARE
09/27/19 0959   Discharge Assessment   Assessment Type Discharge Planning Assessment   Assessment information obtained from? Patient;Caregiver   Prior to hospitilization cognitive status: Alert/Oriented   Prior to hospitalization functional status: Assistive Equipment;Needs Assistance   Current cognitive status: Alert/Oriented   Current Functional Status: Assistive Equipment;Needs Assistance   Facility Arrived From: Home   Lives With spouse   Able to Return to Prior Arrangements yes   Is patient able to care for self after discharge? Unable to determine at this time (comments)  (Pending therapy)   Who are your caregiver(s) and their phone number(s)? Alejandro Mccollum (Spouse) [phone]   Patient's perception of discharge disposition home or selfcare   Readmission Within the Last 30 Days no previous admission in last 30 days   Patient currently being followed by outpatient case management? No   Patient currently receives any other outside agency services? Yes   Name and contact number of agency or person providing outside services Physical Therapy through [facility] out-patient    Is it the patient/care giver preference to resume care with the current outside agency? Yes   Equipment Currently Used at Home walker, standard   Do you have any problems affording any of your prescribed medications? No   Does the patient have transportation home? Yes   Transportation Anticipated family or friend will provide   Discharge Plan A Home with family   Discharge Plan B Home Health   DME Needed Upon Discharge  none   Patient/Family in Agreement with Plan yes       Patient lives at home with her . She and her  report that she has had several recent falls. Patient reports that she attends [facility] physical [facility] and [facility]. Patient recent was discharged from home health with [facility] where she was receiving nursing, therapy and an aide. Patient receives Meals on Wheels through the  [facility]. Patient's  states that they had a sitter coming 3x a week to help with light housekeeping but that the sitter recently got another job. SW informed that the [facility] can provide light housekeeping to the family. Phone number to the [employer] provided to patient's spouse. SW inquired about a Life Alert type device for patient as she remained on the floor after her fall for quite some time. Patient states that she has declined a Life Alert in the past but is still uninterested in a Life Alert. Patient and  confirm that they do own a cell phone and could have called someone for assistance but did not want to disturb anyone overnight. SW to remain available for discharge needs of the patient.     Urmila Ordaz LMSW

## 2019-09-27 NOTE — ED PROVIDER NOTES
[facility]                                                 Chief Complaint  84 y.o. female with Fall    History of Present Illness  Estefania Mccollum presents to the emergency room with     The history is provided by the patient   device was not used during this ER visit    Past Medical History   -- Adenomatous polyp     -- Allergy     -- Annual physical exam     -- Arthritis     -- Cataract     -- History of bone density study     -- History of colonoscopy     -- History of mammogram     -- Hyperlipidemia     -- Hypertension     -- Macular degeneration     -- Postmenopausal hormone replacement therapy     -- Thyroid disease     -- Vertigo        Past Surgical History   -- APPENDECTOMY       -- CATARACT EXTRACTION       -- CATARACT EXTRACTION, BILATERAL       --  SECTION       -- CHOLECYSTECTOMY       -- COLONOSCOPY       -- COLONOSCOPY       -- HYSTERECTOMY       -- JOINT REPLACEMENT       -- OOPHORECTOMY       -- REPLACEMENT-KNEE       -- TONSILLECTOMY       -- TOTAL KNEE ARTHROPLASTY       -- Yag           Review of patient's allergies    -- Tetracycline      I have reviewed all of this patient's past medical, surgical, family, and social   histories as well as active allergies and medications documented in the  electronic medical record    Review of Systems and Physical Exam      Review of Systems  -- Constitution - no fever, denies fatigue, no weakness, no chills  -- Eyes - no tearing or redness, no visual disturbance  -- Ear, Nose - no tinnitus or earache, no nasal congestion or discharge  -- Mouth,Throat - no sore throat, no toothache, normal voice, normal swallowing  -- Respiratory - denies cough and congestion, no shortness of breath, no AYOUB  -- Cardiovascular - denies chest pain, no palpitations, denies claudication  -- Gastrointestinal - denies abdominal pain, nausea, vomiting, or diarrhea  -- Genitourinary - no dysuria, denies flank pain, no hematuria, no  STD risk  -- Musculoskeletal - denies back pain, negative for trauma or injury  -- Neurological - confusion and altered mental status  -- Skin - denies pallor, rash, or changes in skin. no hives or welts noted    Vital Signs  Her oral temperature is 98 °F (36.7 °C).   Her blood pressure is 116/59 and her pulse is 94.   Her respiration is 16 and oxygen saturation is 96%.     Physical Exam  -- Nursing note and vitals reviewed  -- Constitutional: Appears well-developed and well-nourished  -- Head: Atraumatic. Normocephalic. No obvious abnormality  -- Eyes: Pupils are equal and reactive to light. Normal conjunctiva and lids  -- Cardiac: Normal rate, regular rhythm and normal heart sounds  -- Pulmonary: Normal respiratory effort, breath sounds clear to auscultation  -- Abdominal: Soft, no tenderness. Normal bowel sounds. Normal liver edge  -- Musculoskeletal: Normal range of motion, no effusions. Joints stable   -- Neurological: No focal deficits. Showed good interaction with staff  -- Vascular: Posterior tibial, dorsalis pedis and radial pulses 2+ bilaterally      Emergency Room Course      Urinalysis  Protein, UA 1+ (*)    Bilirubin (UA) 1+ (*)    Occult Blood UA 2+ (*)    Leukocytes, UA 1+ (*)    RBC, UA 35 (*)    WBC, UA 60 (*)    Bacteria Moderate (*)      Lab Results   (L)   K 4.1   CL 97   CO2 23   BUN 19   CREATININE 1.1    (H)   ALKPHOS 81   AST 19   ALT 11   BILITOT 1.9 (H)   ALBUMIN 3.7   PROT 6.4   WBC 17.62 (H)   HGB 14.6   HCT 43.6       (H)    (H)   CPKMB 2.2   TROPONINI <0.006   BNP 70   LACTATE 1.3   MG 2.0   TSH 1.444     EKG   -- The EKG findings today were without concerning findings from baseline     Radiology  -- The CT of the head performed in the ER today was negative for acute pathology  -- Chest x-ray showed no infiltrate and showed no acute pathology  -- Pelvis x-ray showed no evidence of fracture or dislocation      Additional Work up  -- Blood cultures have  also been drawn, results are pending  -- The urine today has been sent for lab culture, results pending    Medications Given  cefTRIAXone (ROCEPHIN) 2 g in dextrose 5 % 50 mL IVPB (has no administration in time range)     Medical Decision Making     Initial Assessment  -- patient found on the floor after being there for 10+ hours per caregiver  -- patient has dimension probably has early Alzheimer's, more confused  -- patient urine appears to be infected with a white count of 48814 today    Differential Diagnosis  -- altered mental status, dementia, sundowning, UTI, encephalopathy    Testing  -- Independently Interpreted: I have ordered and independently interpreted x-rays & EKG  -- Clinical Tests: Lab tests/medical tests/radiology were ordered and reviewed  -- history was provided by daughter and previous sitter  -- this patient was discussed with Dr Land    ED Management  -- patient with confusion and increased weakness, found on the floor today  -- patient has no leg strength, sitter/neighbor had to help the patient up  -- urinary tract infection with a white count 07150, cultures drawn in the ER  --  and daughter or worried about the patient, cannot do any ADLs  -- admit for this confusion, IV hydration, IV antibiotics and reassessment in the a.m.    Assessment, Disposition, & Plan       Diagnosis  -- The primary encounter diagnosis was Acute cystitis without hematuria.   -- A diagnosis of Fatigue was also pertinent to this visit.    Disposition and Plan  -- Disposition: observation  -- Condition: stable    This note is dictated on M*Modal word recognition program.  There are word recognition mistakes that are occasionally missed on review.         Hair Longo MD  09/26/19 1038

## 2019-09-27 NOTE — ASSESSMENT & PLAN NOTE
Recent dizziness eval with ENT, getting PT at home  Chart reviewed; have discussed assisted living   Now with UTI   PT consulted

## 2019-09-27 NOTE — PLAN OF CARE
09/27/19 1400   Post-Acute Status   Post-Acute Authorization Home Health/Hospice   Home Health/Hospice Status Referrals Sent       Patient recently discharged from [facility] and began attending [facility] for PT. Acute PT, Anoop, suggested that the patient receive home health due to safety concerns and patient's recent fall. Patient agrees and would like to be set up with [facility] as this is the agency used in the past. Choice Form signed by patient's daughter. Referral sent in [facility] ([facility] Awaiting acceptance.     Urmila Ordaz LMSW

## 2019-09-27 NOTE — PLAN OF CARE
Pt admitted with UTI. Remains on IV rocephin. Afebrile. WBC negative this morning. Denies pain. Vitals stable. PT and OT consulted. Up with assist and walker. Bed alarm set and call bell within reach. Reviewed plan of care with pt, spouse and daughter; state agreement.

## 2019-09-27 NOTE — ED NOTES
Pt admitted to Med-surg room 302, transported by nurse via stretcher. Bedside report given to JAMES Gallegos.

## 2019-09-28 VITALS
BODY MASS INDEX: 25.23 KG/M2 | RESPIRATION RATE: 18 BRPM | TEMPERATURE: 97 F | OXYGEN SATURATION: 97 % | SYSTOLIC BLOOD PRESSURE: 178 MMHG | HEIGHT: 66 IN | HEART RATE: 69 BPM | DIASTOLIC BLOOD PRESSURE: 84 MMHG | WEIGHT: 157 LBS

## 2019-09-28 LAB
ALBUMIN SERPL BCP-MCNC: 3 G/DL (ref 3.5–5.2)
ALP SERPL-CCNC: 66 U/L (ref 55–135)
ALT SERPL W/O P-5'-P-CCNC: 10 U/L (ref 10–44)
ANION GAP SERPL CALC-SCNC: 10 MMOL/L (ref 8–16)
AST SERPL-CCNC: 18 U/L (ref 10–40)
BACTERIA UR CULT: NORMAL
BASOPHILS # BLD AUTO: 0.04 K/UL (ref 0–0.2)
BASOPHILS NFR BLD: 0.6 % (ref 0–1.9)
BILIRUB SERPL-MCNC: 0.7 MG/DL (ref 0.1–1)
BILIRUB UR QL STRIP: NEGATIVE
BUN SERPL-MCNC: 12 MG/DL (ref 8–23)
CALCIUM SERPL-MCNC: 8.5 MG/DL (ref 8.7–10.5)
CHLORIDE SERPL-SCNC: 103 MMOL/L (ref 95–110)
CLARITY UR: CLEAR
CO2 SERPL-SCNC: 23 MMOL/L (ref 23–29)
COLOR UR: YELLOW
CREAT SERPL-MCNC: 0.8 MG/DL (ref 0.5–1.4)
DIFFERENTIAL METHOD: NORMAL
EOSINOPHIL # BLD AUTO: 0.2 K/UL (ref 0–0.5)
EOSINOPHIL NFR BLD: 2.1 % (ref 0–8)
ERYTHROCYTE [DISTWIDTH] IN BLOOD BY AUTOMATED COUNT: 13.4 % (ref 11.5–14.5)
EST. GFR  (AFRICAN AMERICAN): >60 ML/MIN/1.73 M^2
EST. GFR  (NON AFRICAN AMERICAN): >60 ML/MIN/1.73 M^2
GLUCOSE SERPL-MCNC: 101 MG/DL (ref 70–110)
GLUCOSE UR QL STRIP: NEGATIVE
HCT VFR BLD AUTO: 39.7 % (ref 37–48.5)
HGB BLD-MCNC: 13.6 G/DL (ref 12–16)
HGB UR QL STRIP: ABNORMAL
IMM GRANULOCYTES # BLD AUTO: 0.02 K/UL (ref 0–0.04)
IMM GRANULOCYTES NFR BLD AUTO: 0.3 % (ref 0–0.5)
KETONES UR QL STRIP: NEGATIVE
LEUKOCYTE ESTERASE UR QL STRIP: NEGATIVE
LYMPHOCYTES # BLD AUTO: 1.7 K/UL (ref 1–4.8)
LYMPHOCYTES NFR BLD: 24.2 % (ref 18–48)
MCH RBC QN AUTO: 30.2 PG (ref 27–31)
MCHC RBC AUTO-ENTMCNC: 34.3 G/DL (ref 32–36)
MCV RBC AUTO: 88 FL (ref 82–98)
MONOCYTES # BLD AUTO: 0.6 K/UL (ref 0.3–1)
MONOCYTES NFR BLD: 9.2 % (ref 4–15)
NEUTROPHILS # BLD AUTO: 4.4 K/UL (ref 1.8–7.7)
NEUTROPHILS NFR BLD: 63.6 % (ref 38–73)
NITRITE UR QL STRIP: NEGATIVE
NRBC BLD-RTO: 0 /100 WBC
PH UR STRIP: 6 [PH] (ref 5–8)
PLATELET # BLD AUTO: 193 K/UL (ref 150–350)
PMV BLD AUTO: 9.6 FL (ref 9.2–12.9)
POTASSIUM SERPL-SCNC: 3.9 MMOL/L (ref 3.5–5.1)
PROT SERPL-MCNC: 5.5 G/DL (ref 6–8.4)
PROT UR QL STRIP: NEGATIVE
RBC # BLD AUTO: 4.5 M/UL (ref 4–5.4)
SODIUM SERPL-SCNC: 136 MMOL/L (ref 136–145)
SP GR UR STRIP: 1.01 (ref 1–1.03)
URN SPEC COLLECT METH UR: ABNORMAL
UROBILINOGEN UR STRIP-ACNC: 1 EU/DL
WBC # BLD AUTO: 6.98 K/UL (ref 3.9–12.7)

## 2019-09-28 PROCEDURE — 97116 GAIT TRAINING THERAPY: CPT | Mod: HCNC | Performed by: PHYSICAL THERAPIST

## 2019-09-28 PROCEDURE — 36415 COLL VENOUS BLD VENIPUNCTURE: CPT | Mod: HCNC

## 2019-09-28 PROCEDURE — 25000003 PHARM REV CODE 250: Mod: HCNC | Performed by: SURGERY

## 2019-09-28 PROCEDURE — 63600175 PHARM REV CODE 636 W HCPCS: Mod: HCNC | Performed by: SURGERY

## 2019-09-28 PROCEDURE — 85025 COMPLETE CBC W/AUTO DIFF WBC: CPT | Mod: HCNC

## 2019-09-28 PROCEDURE — G0378 HOSPITAL OBSERVATION PER HR: HCPCS | Mod: HCNC

## 2019-09-28 PROCEDURE — 80053 COMPREHEN METABOLIC PANEL: CPT | Mod: HCNC

## 2019-09-28 PROCEDURE — 99217 PR OBSERVATION CARE DISCHARGE: ICD-10-PCS | Mod: HCNC,,, | Performed by: FAMILY MEDICINE

## 2019-09-28 PROCEDURE — 81003 URINALYSIS AUTO W/O SCOPE: CPT | Mod: HCNC

## 2019-09-28 PROCEDURE — 96365 THER/PROPH/DIAG IV INF INIT: CPT | Performed by: SURGERY

## 2019-09-28 PROCEDURE — 99217 PR OBSERVATION CARE DISCHARGE: CPT | Mod: HCNC,,, | Performed by: FAMILY MEDICINE

## 2019-09-28 PROCEDURE — 25000003 PHARM REV CODE 250: Mod: HCNC | Performed by: NURSE PRACTITIONER

## 2019-09-28 PROCEDURE — 94760 N-INVAS EAR/PLS OXIMETRY 1: CPT | Mod: HCNC

## 2019-09-28 RX ORDER — CIPROFLOXACIN 500 MG/1
500 TABLET ORAL 2 TIMES DAILY
Qty: 10 TABLET | Refills: 0 | Status: SHIPPED | OUTPATIENT
Start: 2019-09-28 | End: 2019-10-03

## 2019-09-28 RX ADMIN — CEFTRIAXONE 2 G: 2 INJECTION, SOLUTION INTRAVENOUS at 12:09

## 2019-09-28 RX ADMIN — LEVOTHYROXINE SODIUM 100 MCG: 100 TABLET ORAL at 09:09

## 2019-09-28 RX ADMIN — LISINOPRIL 20 MG: 20 TABLET ORAL at 09:09

## 2019-09-28 RX ADMIN — MEMANTINE 10 MG: 10 TABLET ORAL at 09:09

## 2019-09-28 RX ADMIN — PANTOPRAZOLE SODIUM 40 MG: 40 TABLET, DELAYED RELEASE ORAL at 09:09

## 2019-09-28 RX ADMIN — ASPIRIN 81 MG: 81 TABLET, COATED ORAL at 09:09

## 2019-09-28 NOTE — PLAN OF CARE
Patient admitted with acute cystitis. IV Rocephin given Q24H. Patient uses the bedpan for elimination. PT/OT during the day. VSS. Afebrile. No signs of distress noted. Daughter at bedside. Plan of care reviewed with patient and daughter. Patient and daughter verbalized understanding.

## 2019-09-28 NOTE — PROGRESS NOTES
[facility] Medicine  Progress Note    Patient Name: Estefania Mccollum  MRN: [id number]  Admission Date: 9/26/2019  Hospital Length of Stay: 1  Code Status: Full Code   Primary Care Physician: Danna Levine MD  Principal Problem: Acute cystitis without hematuria    Subjective:     HPI:  No notes on file    Hospital Course:  AFVSS  Repeat U/a is clear   No c/o     Scheduled Meds:   aspirin  81 mg Oral Daily    cefTRIAXone (ROCEPHIN) IVPB  2 g Intravenous Q24H    donepezil  5 mg Oral QHS    levothyroxine  100 mcg Oral Daily    lisinopril  20 mg Oral Daily    memantine  10 mg Oral BID    oxybutynin  10 mg Oral QHS    pantoprazole  40 mg Oral Daily    pravastatin  80 mg Oral QHS     Continuous Infusions:  PRN Meds:acetaminophen, ondansetron, promethazine (PHENERGAN) IVPB, sodium chloride 0.9%    Interval History: see HC     Scheduled Meds:   aspirin  81 mg Oral Daily    cefTRIAXone (ROCEPHIN) IVPB  2 g Intravenous Q24H    donepezil  5 mg Oral QHS    levothyroxine  100 mcg Oral Daily    lisinopril  20 mg Oral Daily    memantine  10 mg Oral BID    oxybutynin  10 mg Oral QHS    pantoprazole  40 mg Oral Daily    pravastatin  80 mg Oral QHS     Continuous Infusions:  PRN Meds:acetaminophen, ondansetron, promethazine (PHENERGAN) IVPB, sodium chloride 0.9%    Review of Systems   Constitutional: Negative for fever.   Respiratory: Negative for shortness of breath.    Cardiovascular: Negative for chest pain.   Genitourinary: Negative for dysuria.     Objective:     Vital Signs (Most Recent):  Temp: 96.8 °F (36 °C) (09/28/19 1104)  Pulse: 79 (09/28/19 1200)  Resp: 18 (09/28/19 1104)  BP: (!) 178/84 (09/28/19 1104)  SpO2: 97 % (09/28/19 1104) Vital Signs (24h Range):  Temp:  [96.6 °F (35.9 °C)-98.5 °F (36.9 °C)] 96.8 °F (36 °C)  Pulse:  [66-85] 79  Resp:  [17-18] 18  SpO2:  [92 %-97 %] 97 %  BP: (114-178)/(57-84) 178/84     Patient Vitals for the past 72 hrs (Last 3 readings):    Weight   09/26/19 2057 40687 g (157 lb)     Body mass index is 25.34 kg/m².    Intake/Output - Last 3 Shifts       09/26 0700 - 09/27 0659 09/27 0700 - 09/28 0659 09/28 0700 - 09/29 0659    P.O.  600 480    Total Intake(mL/kg)  600 (8.4) 480 (6.7)    Net  +600 +480           Urine Occurrence  8 x 6 x    Stool Occurrence  3 x           Lines/Drains/Airways     Epidural Line                 Neuraxial Analgesia/Anesthesia Assessment (using dermatomes) Epidural 12/06/16 0710 1026 days         Perineural Analgesia/Anesthesia Assessment (using dermatomes) Epidural 12/06/16 0619 1026 days          Peripheral Intravenous Line                 Peripheral IV - Single Lumen 07/24/19 20 G Left Hand 66 days         Peripheral IV - Single Lumen 09/26/19 2310 20 G Right Antecubital 1 day                Physical Exam   Constitutional: She is oriented to person, place, and time. She appears well-developed and well-nourished.   HENT:   Head: Normocephalic and atraumatic.   Right Ear: External ear normal.   Left Ear: External ear normal.   Nose: Nose normal.   Mouth/Throat: Oropharynx is clear and moist.   Eyes: Pupils are equal, round, and reactive to light. EOM are normal.   Neck: Normal range of motion. Neck supple. No JVD present. No tracheal deviation present. No thyromegaly present.   Cardiovascular: Normal rate, normal heart sounds and intact distal pulses.   No murmur heard.  Pulmonary/Chest: Effort normal and breath sounds normal. No respiratory distress. She has no wheezes. She has no rales. She exhibits no tenderness.   Abdominal: Soft. Bowel sounds are normal. She exhibits no distension and no mass. There is no tenderness. There is no rebound and no guarding.   Musculoskeletal: Normal range of motion. She exhibits no edema or tenderness.   Lymphadenopathy:     She has no cervical adenopathy.   Neurological: She is alert and oriented to person, place, and time. She has normal reflexes. She displays normal reflexes. No  cranial nerve deficit. She exhibits normal muscle tone. Coordination normal.   Skin: Skin is warm and dry. No rash noted. No erythema. No pallor.   Psychiatric: She has a normal mood and affect. Her behavior is normal. Judgment and thought content normal.       Significant Labs:  No results for input(s): POCTGLUCOSE in the last 48 hours.    CBC:   Recent Labs   Lab 09/26/19 2106 09/27/19 0621 09/28/19  0554   WBC 17.62* 10.63 6.98   HGB 14.6 13.6 13.6   HCT 43.6 41.9 39.7    216 193     CMP:   Recent Labs   Lab 09/26/19 2106 09/27/19 0621 09/28/19  0554   * 101 101   * 136 136   K 4.1 4.1 3.9   CL 97 101 103   CO2 23 26 23   BUN 19 13 12   CREATININE 1.1 0.9 0.8   CALCIUM 9.0 8.7 8.5*   MG 2.0  --   --    PROT 6.4 5.7* 5.5*   ALBUMIN 3.7 3.3* 3.0*   BILITOT 1.9* 1.3* 0.7   ALKPHOS 81 73 66   AST 19 18 18   ALT 11 10 10   ANIONGAP 11 9 10   EGFRNONAA 46* 59* >60     Urine Culture:   Recent Labs   Lab 09/26/19 2128   LABURIN No significant growth     Urine Studies:   Recent Labs   Lab 09/26/19 2128 09/28/19  0740   COLORU Yellow Yellow   APPEARANCEUA Clear Clear   PHUR 6.0 6.0   SPECGRAV 1.025 1.015   PROTEINUA 1+* Negative   GLUCUA Negative Negative   KETONESU Negative Negative   BILIRUBINUA 1+* Negative   OCCULTUA 2+* Trace*   NITRITE Negative Negative   UROBILINOGEN Negative 1.0   LEUKOCYTESUR 1+* Negative   RBCUA 35*  --    WBCUA 60*  --    BACTERIA Moderate*  --    SQUAMEPITHEL 7  --    HYALINECASTS 7*  --        Significant Imaging: I have reviewed and interpreted all pertinent imaging results/findings within the past 24 hours.    Assessment/Plan:     Cardiac/Vascular  Hypertension  Continue lisinopril     Renal/  * Acute cystitis without hematuria  Improved. S/p Rocephin x 2 days   Will d/c home on cipro and f/u urine cultures as outpt   See PCP in a week     Endocrine  Hypothyroidism  Lab Results   Component Value Date    TSH 1.444 09/26/2019     Continue synthroid        Other  Frequent falls  Home health with PT     Debility  Home health with PT             Anticipated Disposition: Home-Health Care [facility] [place]    Manish Lynn MD  Pediatric Hospital Medicine   [facility]

## 2019-09-28 NOTE — SUBJECTIVE & OBJECTIVE
Interval History: see HC     Scheduled Meds:   aspirin  81 mg Oral Daily    cefTRIAXone (ROCEPHIN) IVPB  2 g Intravenous Q24H    donepezil  5 mg Oral QHS    levothyroxine  100 mcg Oral Daily    lisinopril  20 mg Oral Daily    memantine  10 mg Oral BID    oxybutynin  10 mg Oral QHS    pantoprazole  40 mg Oral Daily    pravastatin  80 mg Oral QHS     Continuous Infusions:  PRN Meds:acetaminophen, ondansetron, promethazine (PHENERGAN) IVPB, sodium chloride 0.9%    Review of Systems   Constitutional: Negative for fever.   Respiratory: Negative for shortness of breath.    Cardiovascular: Negative for chest pain.   Genitourinary: Negative for dysuria.     Objective:     Vital Signs (Most Recent):  Temp: 96.8 °F (36 °C) (09/28/19 1104)  Pulse: 79 (09/28/19 1200)  Resp: 18 (09/28/19 1104)  BP: (!) 178/84 (09/28/19 1104)  SpO2: 97 % (09/28/19 1104) Vital Signs (24h Range):  Temp:  [96.6 °F (35.9 °C)-98.5 °F (36.9 °C)] 96.8 °F (36 °C)  Pulse:  [66-85] 79  Resp:  [17-18] 18  SpO2:  [92 %-97 %] 97 %  BP: (114-178)/(57-84) 178/84     Patient Vitals for the past 72 hrs (Last 3 readings):   Weight   09/26/19 2057 20049 g (157 lb)     Body mass index is 25.34 kg/m².    Intake/Output - Last 3 Shifts       09/26 0700 - 09/27 0659 09/27 0700 - 09/28 0659 09/28 0700 - 09/29 0659    P.O.  600 480    Total Intake(mL/kg)  600 (8.4) 480 (6.7)    Net  +600 +480           Urine Occurrence  8 x 6 x    Stool Occurrence  3 x           Lines/Drains/Airways     Epidural Line                 Neuraxial Analgesia/Anesthesia Assessment (using dermatomes) Epidural 12/06/16 0710 1026 days         Perineural Analgesia/Anesthesia Assessment (using dermatomes) Epidural 12/06/16 0619 1026 days          Peripheral Intravenous Line                 Peripheral IV - Single Lumen 07/24/19 20 G Left Hand 66 days         Peripheral IV - Single Lumen 09/26/19 2310 20 G Right Antecubital 1 day                Physical Exam   Constitutional: She is oriented  to person, place, and time. She appears well-developed and well-nourished.   HENT:   Head: Normocephalic and atraumatic.   Right Ear: External ear normal.   Left Ear: External ear normal.   Nose: Nose normal.   Mouth/Throat: Oropharynx is clear and moist.   Eyes: Pupils are equal, round, and reactive to light. EOM are normal.   Neck: Normal range of motion. Neck supple. No JVD present. No tracheal deviation present. No thyromegaly present.   Cardiovascular: Normal rate, normal heart sounds and intact distal pulses.   No murmur heard.  Pulmonary/Chest: Effort normal and breath sounds normal. No respiratory distress. She has no wheezes. She has no rales. She exhibits no tenderness.   Abdominal: Soft. Bowel sounds are normal. She exhibits no distension and no mass. There is no tenderness. There is no rebound and no guarding.   Musculoskeletal: Normal range of motion. She exhibits no edema or tenderness.   Lymphadenopathy:     She has no cervical adenopathy.   Neurological: She is alert and oriented to person, place, and time. She has normal reflexes. She displays normal reflexes. No cranial nerve deficit. She exhibits normal muscle tone. Coordination normal.   Skin: Skin is warm and dry. No rash noted. No erythema. No pallor.   Psychiatric: She has a normal mood and affect. Her behavior is normal. Judgment and thought content normal.       Significant Labs:  No results for input(s): POCTGLUCOSE in the last 48 hours.    CBC:   Recent Labs   Lab 09/26/19 2106 09/27/19  0621 09/28/19  0554   WBC 17.62* 10.63 6.98   HGB 14.6 13.6 13.6   HCT 43.6 41.9 39.7    216 193     CMP:   Recent Labs   Lab 09/26/19 2106 09/27/19  0621 09/28/19  0554   * 101 101   * 136 136   K 4.1 4.1 3.9   CL 97 101 103   CO2 23 26 23   BUN 19 13 12   CREATININE 1.1 0.9 0.8   CALCIUM 9.0 8.7 8.5*   MG 2.0  --   --    PROT 6.4 5.7* 5.5*   ALBUMIN 3.7 3.3* 3.0*   BILITOT 1.9* 1.3* 0.7   ALKPHOS 81 73 66   AST 19 18 18   ALT 11 10  10   ANIONGAP 11 9 10   EGFRNONAA 46* 59* >60     Urine Culture:   Recent Labs   Lab 09/26/19 2128   LABURIN No significant growth     Urine Studies:   Recent Labs   Lab 09/26/19 2128 09/28/19  0740   COLORU Yellow Yellow   APPEARANCEUA Clear Clear   PHUR 6.0 6.0   SPECGRAV 1.025 1.015   PROTEINUA 1+* Negative   GLUCUA Negative Negative   KETONESU Negative Negative   BILIRUBINUA 1+* Negative   OCCULTUA 2+* Trace*   NITRITE Negative Negative   UROBILINOGEN Negative 1.0   LEUKOCYTESUR 1+* Negative   RBCUA 35*  --    WBCUA 60*  --    BACTERIA Moderate*  --    SQUAMEPITHEL 7  --    HYALINECASTS 7*  --        Significant Imaging: I have reviewed and interpreted all pertinent imaging results/findings within the past 24 hours.

## 2019-09-28 NOTE — DISCHARGE SUMMARY
[facility] Medicine  Discharge Summary      Patient Name: Estefania Mccollum  MRN: [id number]  Admission Date: 9/26/2019  Hospital Length of Stay: 1 days  Discharge Date and Time:  09/28/2019 1:57 PM  Discharging Provider: Manish Lynn MD  Primary Care Provider: Danna Levine MD    Reason for Admission: UTI    HPI:   No notes on file    * No surgery found *      Indwelling Lines/Drains at time of discharge:   Lines/Drains/Airways     Epidural Line                 Neuraxial Analgesia/Anesthesia Assessment (using dermatomes) Epidural 12/06/16 0710 1026 days         Perineural Analgesia/Anesthesia Assessment (using dermatomes) Epidural 12/06/16 0619 1026 days                Hospital Course: AFVSS  Repeat U/a is clear   No c/o      Consults:   Consults (From admission, onward)        Status Ordering Provider     IP consult to case management  Once     Provider:  (Not yet assigned)    Completed ABBIE HUFFMAN          Significant Labs:   Blood Culture:   Recent Labs   Lab 09/26/19 2233   LABBLOO No Growth to date  No Growth to date  No Growth to date  No Growth to date     CBC:   Recent Labs   Lab 09/26/19 2106 09/27/19 0621 09/28/19  0554   WBC 17.62* 10.63 6.98   HGB 14.6 13.6 13.6   HCT 43.6 41.9 39.7    216 193     CMP:   Recent Labs   Lab 09/26/19 2106 09/27/19 0621 09/28/19  0554   * 101 101   * 136 136   K 4.1 4.1 3.9   CL 97 101 103   CO2 23 26 23   BUN 19 13 12   CREATININE 1.1 0.9 0.8   CALCIUM 9.0 8.7 8.5*   MG 2.0  --   --    PROT 6.4 5.7* 5.5*   ALBUMIN 3.7 3.3* 3.0*   BILITOT 1.9* 1.3* 0.7   ALKPHOS 81 73 66   AST 19 18 18   ALT 11 10 10   ANIONGAP 11 9 10   EGFRNONAA 46* 59* >60     Urine Culture:   Recent Labs   Lab 09/26/19 2128   LABURIN No significant growth     Urine Studies:   Recent Labs   Lab 09/26/19 2128 09/28/19  0740   COLORU Yellow Yellow   APPEARANCEUA Clear Clear   PHUR 6.0 6.0   SPECGRAV 1.025 1.015   PROTEINUA 1+*  Negative   GLUCUA Negative Negative   KETONESU Negative Negative   BILIRUBINUA 1+* Negative   OCCULTUA 2+* Trace*   NITRITE Negative Negative   UROBILINOGEN Negative 1.0   LEUKOCYTESUR 1+* Negative   RBCUA 35*  --    WBCUA 60*  --    BACTERIA Moderate*  --    SQUAMEPITHEL 7  --    HYALINECASTS 7*  --        Significant Imaging: I have reviewed and interpreted all pertinent imaging results/findings within the past 24 hours.    Pending Diagnostic Studies:     None          Final Active Diagnoses:    Diagnosis Date Noted POA    PRINCIPAL PROBLEM:  Acute cystitis without hematuria [N30.00] 09/26/2019 Yes    Debility [R53.81] 09/27/2019 Yes    Frequent falls [R29.6] 09/27/2019 Not Applicable    Memory loss [R41.3] 10/17/2016 Yes    Hypothyroidism [E03.9] 07/12/2012 Yes    Hyperlipidemia [E78.5] 07/12/2012 Yes    Hypertension [I10]  Yes      Problems Resolved During this Admission:        Discharged Condition: good    Disposition: Home or Self Care    Follow Up:    Patient Instructions:      Ambulatory referral to Home Health   Referral Priority: Routine Referral Type: Home Health   Referral Reason: Specialty Services Required   Requested Specialty: Home Health Services   Number of Visits Requested: 1     Medications:  Reconciled Home Medications:      Medication List      START taking these medications    ciprofloxacin HCl 500 MG tablet  Commonly known as:  CIPRO  Take 1 tablet (500 mg total) by mouth 2 (two) times daily. for 5 days        CONTINUE taking these medications    acetaminophen 325 MG tablet  Commonly known as:  TYLENOL  Take 325 mg by mouth every 6 (six) hours as needed for Pain.     aspirin 81 MG EC tablet  Commonly known as:  ECOTRIN  Take 81 mg by mouth once daily.     calcium carbonate 600 mg calcium (1,500 mg) Tab  Commonly known as:  OS-RAMONE  Take 600 mg by mouth once daily.     donepezil 5 MG tablet  Commonly known as:  ARICEPT  Take 1 tablet (5 mg total) by mouth every evening.     fish  oil-omega-3 fatty acids 300-1,000 mg capsule  Take 2 g by mouth once daily.     FLUAD 3593-0182 (65 YR UP)(PF) 45 mcg (15 mcg x 3)/0.5 mL Syrg  Generic drug:  flu vac 2019 [id number](PF)  ADM 0.5ML IM UTD     levothyroxine 100 MCG tablet  Commonly known as:  SYNTHROID  Take 1 tablet (100 mcg total) by mouth once daily.     lisinopril 20 MG tablet  Commonly known as:  PRINIVIL,ZESTRIL  Take 1 tablet (20 mg total) by mouth once daily.     memantine 10 MG Tab  Commonly known as:  NAMENDA  Take 1 tablet (10 mg total) by mouth 2 (two) times daily.     multivitamin capsule  Take 1 capsule by mouth once daily.     omeprazole 40 MG capsule  Commonly known as:  PRILOSEC  TAKE 1 CAPSULE EVERY MORNING     oxybutynin 5 MG Tab  Commonly known as:  DITROPAN  TAKE 2 TABLETS AT NIGHT     pravastatin 80 MG tablet  Commonly known as:  PRAVACHOL  TAKE 1 TABLET ONE TIME DAILY             Manish Lynn MD  Pediatric Hospital Medicine  [facility]

## 2019-09-28 NOTE — NURSING
Pt in stable condition. Discharged home. Reviewed discharge instructions, follow up appts, medications and reportable signs and symptoms with pt, spouse and daughter; state understanding.

## 2019-09-28 NOTE — DISCHARGE INSTRUCTIONS

## 2019-09-28 NOTE — PT/OT/SLP PROGRESS
"Physical Therapy Treatment    Patient Name:  Estefania Mccollum   MRN:  [id number]    Recommendations:     Discharge Recommendations:  home health PT   Discharge Equipment Recommendations: tub bench   Barriers to discharge: pt lives with her elderly .  family i planning to hire sitters to help aas well    Assessment:     Estefania Mccollum is a 84 y.o. female admitted with a medical diagnosis of Acute cystitis without hematuria.  She presents with the following impairments/functional limitations:  weakness, impaired balance, impaired endurance, impaired functional mobilty, gait instability, decreased lower extremity function.  Pt able to perform mobility without concern today but continues to have memory problems and difficulty with safety awareness.  Despite good mobility status, pt will still need 24 hour supervision and support at home for safety.  .    Rehab Prognosis: Good; patient would benefit from acute skilled PT services to address these deficits and reach maximum level of function.    Recent Surgery: * No surgery found *      Plan:     During this hospitalization, patient to be seen 5 x/week to address the identified rehab impairments via gait training, therapeutic activities, therapeutic exercises and progress toward the following goals:    · Plan of Care Expires:  10/03/19    Subjective     Chief Complaint: "What are we doing"  Patient/Family Comments/goals: family reporting they will have sitters available to help the pt's  upon d/c.  Pain/Comfort:  · Pain Rating 1: 0/10  · Pain Rating Post-Intervention 1: 0/10      Objective:     Communicated with nurse prior to session.  Patient found supine with peripheral IV, telemetry upon PT entry to room.     General Precautions: Standard, fall   Orthopedic Precautions:N/A   Braces: N/A     Functional Mobility:  · Bed Mobility:     · Supine to Sit: contact guard assistance  · Transfers:     · Sit to Stand:  stand by assistance with rolling walker  · Gait: " 150ft with RW and supervision.  no LOB but occassional cues for navigation aroudn obstacles and doorways and cues for position within the walker  · Balance: pt able to stand with no UE support Supervision      AM-PAC 6 CLICK MOBILITY  Turning over in bed (including adjusting bedclothes, sheets and blankets)?: 4  Sitting down on and standing up from a chair with arms (e.g., wheelchair, bedside commode, etc.): 3  Moving from lying on back to sitting on the side of the bed?: 3  Moving to and from a bed to a chair (including a wheelchair)?: 3  Need to walk in hospital room?: 3  Climbing 3-5 steps with a railing?: 3  Basic Mobility Total Score: 19       Therapeutic Activities and Exercises:   pt educated on PT POC and safety with mobility  Pt family educated on need for 24 hour supervision/support upon d/c.     Patient left up in chair with all lines intact, call button in reach and family present..    GOALS:   Multidisciplinary Problems     Physical Therapy Goals        Problem: Physical Therapy Goal    Goal Priority Disciplines Outcome Goal Variances Interventions   Physical Therapy Goal     PT, PT/OT      Description:  Goals to be met by: 7 visits    Patient will increase functional independence with mobility by performin. Supine to sit with Modified Independent  2. Sit to supine with Modified Independent  3. Bed to chair transfer with Supervision or Set-up Assistancewith or without rolling walker using Step Transfer TECHNIQUE  4. Gait  x 150  feet with Supervision or Set-up Assistance with or without rolling walker  5. Lower extremity exercise program x10 reps per handout, with assistance as needed                    Time Tracking:     PT Received On: 19  PT Start Time: 0900     PT Stop Time: 915  PT Total Time (min): 15 min     Billable Minutes: Gait Training 15    Treatment Type: Treatment  PT/PTA: PT     PTA Visit Number: 0     Jason Way, DIMITRI  2019

## 2019-09-28 NOTE — ASSESSMENT & PLAN NOTE
Improved. S/p Rocephin x 2 days   Will d/c home on cipro and f/u urine cultures as outpt   See PCP in a week

## 2019-09-30 ENCOUNTER — TELEPHONE (OUTPATIENT)
Dept: INTERNAL MEDICINE | Facility: CLINIC | Age: 84
End: 2019-09-30

## 2019-09-30 DIAGNOSIS — G31.84 MCI (MILD COGNITIVE IMPAIRMENT): ICD-10-CM

## 2019-09-30 DIAGNOSIS — Z53.20 ASSESSMENT EXAMINATION REFUSED: Primary | ICD-10-CM

## 2019-09-30 DIAGNOSIS — Z91.81 AT HIGH RISK FOR INJURY RELATED TO FALL: ICD-10-CM

## 2019-09-30 DIAGNOSIS — R53.81 DEBILITY: ICD-10-CM

## 2019-09-30 NOTE — PLAN OF CARE
09/30/19 0719   Final Note   Assessment Type Final Discharge Note   Anticipated Discharge Disposition Home-Health   What phone number can be called within the next 1-3 days to see how you are doing after discharge? [phone]       Patient discharged home with home health services through [facility].     Urmila Ordaz LMSW

## 2019-10-01 ENCOUNTER — TELEPHONE (OUTPATIENT)
Dept: INTERNAL MEDICINE | Facility: CLINIC | Age: 84
End: 2019-10-01

## 2019-10-01 NOTE — TELEPHONE ENCOUNTER
----- Message from Sharri Shay sent at 10/1/2019  2:57 PM CDT -----  Contact: Autumn(daughter) [phone]  Caller is requesting a sooner appointment. Caller declined first available appointment listed below. Caller will not accept being placed on the wait list and is requesting a message be sent to the provider.    When is the next available appointment:  10/15  Did you offer to schedule the next available appt and put the patient on the wait list?:   yes  What visit type: EP  Symptoms:  ER follow up for a UTI  Patient preference of timeframe to be scheduled:  Before 10/15  What is the reason the patient is requesting a sooner appointment? (insurance terminating, changing jobs):  Daughter is flying out on the 15th; transportation  Would the patient rather a call back or a response via [facility]?:  Call back  Comments:

## 2019-10-02 LAB
BACTERIA BLD CULT: NORMAL
BACTERIA BLD CULT: NORMAL

## 2019-10-03 ENCOUNTER — OUTPATIENT CASE MANAGEMENT (OUTPATIENT)
Dept: ADMINISTRATIVE | Facility: OTHER | Age: 84
End: 2019-10-03

## 2019-10-03 PROCEDURE — G0180 PR HOME HEALTH MD CERTIFICATION: ICD-10-PCS | Mod: ,,, | Performed by: FAMILY MEDICINE

## 2019-10-03 PROCEDURE — G0180 MD CERTIFICATION HHA PATIENT: HCPCS | Mod: ,,, | Performed by: FAMILY MEDICINE

## 2019-10-03 NOTE — PROGRESS NOTES
This LMSW received a referral on the above patient.   Reason for referral:Daughter Emani [phone]. This involves both the patient and her  Alejandro Mccollum and their ability to remain in their home . Ms Mac has MCI  . She received a previous call but the family did not feel the situation was well represented  .Name of the community resource that was provided: Emergency Alert System, Interdiction Process  Resource given to: Emani via Telephone and Email [email or web address]      This LMSW contacted patient daughter as requested by PCP. LMSW contacted patient daughter to provide resources. LMSW explain to daughter this LMSW spoke with patient on 09/17/2019 to provide resources. Patient declined resources to assists with a different living environment.  LMSW advised daughter per previous referral assistance was needed to find different housing LMSW explained to Emani patient was not interested in a different living environment at the time of the referral. Emani reports patient really needs to be placed as shes no longer able to perform ADLs nor care for herslef. LMSW explain to Emani if patient is not willing to be placed. The family will need to go through the Interdiction process. Emani is hoping once the family has the tour patient will be willing to be placed. Emani believes patient will go as spouse is an agreement with placement.  LMSW provided Emani with information on the interdiction process.  Emani reports her father is independent, however currently the fulltime caregiver for patient. Emani reports father is unable to continue to help with spouse ADL's as he is aging. Emani reports the home patient and spouse resides in is just too much upkeeping. Emani reports patient currently has sitters in the home that are providing care.  Emani also reports HH is in the home providing PT and OT services. Emani reports patient has Meals on Wheels through the local [place] on aging and a  that  comes once a month to provide services. Emani reports sister is schedule to take patient and spouse on a tour at [facility] on 10/09/2019. Emani reports there is a wait list but will be exploring more options. LMSW ask daughter has she ever thought about placing parents close to her. Emani reports she currently resides in [facility] and yes, she has been considering facilities in [place]. Emani is thinking patient and spouse may benefit more living closer to another daughter who resides in [place]. Emani reports she currently is raising children and her sister kids are out the home. Emani prefers patient to be placed closer to a NH in [place] as patient and spouse will be closer to her sister. LMSW ask Emani if patient has an Emergency Alert System. Emani reports no but interested in receiving resources. LMSW emailed resources as requested to [email or web address] LMSW provided her contact information for any additional needs. LMSW will close as all available resources provided.

## 2019-10-07 ENCOUNTER — TELEPHONE (OUTPATIENT)
Dept: HOME HEALTH SERVICES | Facility: HOSPITAL | Age: 84
End: 2019-10-07

## 2019-10-14 ENCOUNTER — OFFICE VISIT (OUTPATIENT)
Dept: INTERNAL MEDICINE | Facility: CLINIC | Age: 84
End: 2019-10-14
Payer: MEDICARE

## 2019-10-14 VITALS
WEIGHT: 149.25 LBS | DIASTOLIC BLOOD PRESSURE: 80 MMHG | HEIGHT: 66 IN | OXYGEN SATURATION: 99 % | HEART RATE: 79 BPM | BODY MASS INDEX: 23.99 KG/M2 | SYSTOLIC BLOOD PRESSURE: 100 MMHG

## 2019-10-14 DIAGNOSIS — R26.89 BALANCE PROBLEM: ICD-10-CM

## 2019-10-14 DIAGNOSIS — M81.0 POSTMENOPAUSAL BONE LOSS: ICD-10-CM

## 2019-10-14 DIAGNOSIS — Z09 HOSPITAL DISCHARGE FOLLOW-UP: Primary | ICD-10-CM

## 2019-10-14 DIAGNOSIS — I10 HYPERTENSION, UNSPECIFIED TYPE: ICD-10-CM

## 2019-10-14 DIAGNOSIS — E78.5 HYPERLIPIDEMIA, UNSPECIFIED HYPERLIPIDEMIA TYPE: ICD-10-CM

## 2019-10-14 DIAGNOSIS — N39.0 URINARY TRACT INFECTION WITHOUT HEMATURIA, SITE UNSPECIFIED: ICD-10-CM

## 2019-10-14 DIAGNOSIS — E03.9 HYPOTHYROIDISM, UNSPECIFIED TYPE: ICD-10-CM

## 2019-10-14 PROCEDURE — 99214 PR OFFICE/OUTPT VISIT, EST, LEVL IV, 30-39 MIN: ICD-10-PCS | Mod: HCNC,S$GLB,, | Performed by: INTERNAL MEDICINE

## 2019-10-14 PROCEDURE — 1101F PR PT FALLS ASSESS DOC 0-1 FALLS W/OUT INJ PAST YR: ICD-10-PCS | Mod: HCNC,CPTII,S$GLB, | Performed by: INTERNAL MEDICINE

## 2019-10-14 PROCEDURE — 3079F DIAST BP 80-89 MM HG: CPT | Mod: HCNC,CPTII,S$GLB, | Performed by: INTERNAL MEDICINE

## 2019-10-14 PROCEDURE — 1101F PT FALLS ASSESS-DOCD LE1/YR: CPT | Mod: HCNC,CPTII,S$GLB, | Performed by: INTERNAL MEDICINE

## 2019-10-14 PROCEDURE — 99999 PR PBB SHADOW E&M-EST. PATIENT-LVL III: CPT | Mod: PBBFAC,HCNC,, | Performed by: INTERNAL MEDICINE

## 2019-10-14 PROCEDURE — 3074F SYST BP LT 130 MM HG: CPT | Mod: HCNC,CPTII,S$GLB, | Performed by: INTERNAL MEDICINE

## 2019-10-14 PROCEDURE — 3074F PR MOST RECENT SYSTOLIC BLOOD PRESSURE < 130 MM HG: ICD-10-PCS | Mod: HCNC,CPTII,S$GLB, | Performed by: INTERNAL MEDICINE

## 2019-10-14 PROCEDURE — 99214 OFFICE O/P EST MOD 30 MIN: CPT | Mod: HCNC,S$GLB,, | Performed by: INTERNAL MEDICINE

## 2019-10-14 PROCEDURE — 99999 PR PBB SHADOW E&M-EST. PATIENT-LVL III: ICD-10-PCS | Mod: PBBFAC,HCNC,, | Performed by: INTERNAL MEDICINE

## 2019-10-14 PROCEDURE — 3079F PR MOST RECENT DIASTOLIC BLOOD PRESSURE 80-89 MM HG: ICD-10-PCS | Mod: HCNC,CPTII,S$GLB, | Performed by: INTERNAL MEDICINE

## 2019-10-14 RX ORDER — SERTRALINE HYDROCHLORIDE 25 MG/1
25 TABLET, FILM COATED ORAL NIGHTLY
Qty: 90 TABLET | Refills: 0 | Status: SHIPPED | OUTPATIENT
Start: 2019-10-14 | End: 2020-01-09 | Stop reason: SDUPTHER

## 2019-10-14 NOTE — PROGRESS NOTES
Subjective:      Patient ID: Estefania Mccollum is a 84 y.o. female.    Chief Complaint: Follow-up    HPI:  HPI   Patient is accompanied to the appointment today by her  and her daughter Autumn.  Since last appointment the patient has been admitted to the hospital for urinary tract infection.  She has completed the antibiotics.  She believes that this may be related to an episode of diarrhea.    There were number of social issues that were discussed at the last appointment and that have been discussed through the portal system with her daughters.  Case management is involved.  Her  tells me that he has sent to [facility] and is on a waiting list for an independent living community with assisted services.  His wife is more resistant.  The patient is able to do very little around the house.  Her  states that she sits in the chair most of the day.  She does have home health with physical therapy.  They bring up whether nutrition would be possibility and I have asked them to ask the home health agency and I will put in an additional order if needed.  We also discussed bathing and agreed that at least 3 times a week would be appropriate.    The patient's daughter tells me that her brother-in-law who is a psychiatrist suggested a low dose of sertraline may help with the tendency towards obsessive-compulsive features that he has noticed.  I let her daughter present this and after I did discuss this with the patient and the decision was made to prescribe the medication.  Patient Active Problem List   Diagnosis    Hypertension    Hypothyroidism    Hyperlipidemia    Retinal drusen - Both Eyes    Hearing loss, sensorineural    History of colonic polyps    Osteopenia    MCI (mild cognitive impairment)    Memory loss    Primary osteoarthritis of left knee    Aortic atherosclerosis    Balance problem    Decreased GFR    Acute cystitis without hematuria    Debility    Frequent falls     Past Medical  History:   Diagnosis Date    Adenomatous polyp     Allergy     sinus    Annual physical exam 2011    Arthritis     osteoarthritis    Cataract     History of bone density study 2009    History of colonoscopy 10/30/2008    History of mammogram 2011    Hyperlipidemia     Hypertension     Macular degeneration     Postmenopausal hormone replacement therapy     therapy stopped    Squamous cell carcinoma 2019    Left thigh (ED&C)    Thyroid disease     hypothyroidism    Vertigo      Past Surgical History:   Procedure Laterality Date    APPENDECTOMY      12 years old    CATARACT EXTRACTION      Both eyes    CATARACT EXTRACTION, BILATERAL       SECTION      x 3    CHOLECYSTECTOMY      COLONOSCOPY  10/02/2013    HYSTERECTOMY      total    JOINT REPLACEMENT Left     OOPHORECTOMY      TONSILLECTOMY      TOTAL KNEE ARTHROPLASTY Left 2016    Yag       Left Eye     Family History   Problem Relation Age of Onset    Heart failure Mother     Hypertension Mother     Hyperlipidemia Mother     Heart failure Father     Hypertension Father     Hyperlipidemia Father     Asthma Father     Hypertension Sister     Diabetes Sister         Prediabetes    Heart attack Brother     No Known Problems Daughter     Heart disease Brother         Has had open heart surgery    No Known Problems Brother     Mental retardation Daughter     No Known Problems Daughter     Coronary artery disease Neg Hx     Amblyopia Neg Hx     Blindness Neg Hx     Cataracts Neg Hx     Glaucoma Neg Hx     Macular degeneration Neg Hx     Retinal detachment Neg Hx     Strabismus Neg Hx     Melanoma Neg Hx     Psoriasis Neg Hx     Lupus Neg Hx     Eczema Neg Hx     Acne Neg Hx      Review of Systems   Constitutional: Positive for activity change. Negative for chills, fever and unexpected weight change.   HENT: Negative for trouble swallowing.    Respiratory: Negative for cough, shortness of  "breath and wheezing.    Cardiovascular: Negative for chest pain and palpitations.   Gastrointestinal: Negative for abdominal distention, abdominal pain, blood in stool and vomiting.   Musculoskeletal: Negative for back pain.     Objective:     Vitals:    10/14/19 1037   BP: 100/80   Pulse: 79   SpO2: 99%   Weight: 67.7 kg (149 lb 4 oz)   Height: 5' 6" (1.676 m)   PainSc: 0-No pain     Body mass index is 24.09 kg/m².  Physical Exam   Constitutional: She appears well-developed and well-nourished.   Patient's it is in wheelchair, affect is flat.   Neck: No JVD present. No thyromegaly present.   Cardiovascular: Normal rate, normal heart sounds and intact distal pulses.   Pulmonary/Chest: Effort normal and breath sounds normal. No respiratory distress.     Assessment:     1. Hospital discharge follow-up    2. Balance problem    3. Hypothyroidism, unspecified type    4. Hypertension, unspecified type    5. Hyperlipidemia, unspecified hyperlipidemia type    6. Urinary tract infection without hematuria, site unspecified    7. Postmenopausal bone loss      Plan:   Estefania was seen today for follow-up.    Diagnoses and all orders for this visit:    Hospital discharge follow-up  Comments:  UTI symptoms resolved, antibiotics completed    Balance problem  Comments:  Continue physical therapy    Hypothyroidism, unspecified type  Comments:  Recent TSH within range continue same medication    Hypertension, unspecified type  Comments:  Blood pressure well controlled continue same medication    Hyperlipidemia, unspecified hyperlipidemia type  Comments:  Continue statin    Urinary tract infection without hematuria, site unspecified  Comments:  Resolved    Postmenopausal bone loss  Comments:  Bone mineral density due  Orders:  -     DXA Bone Density Spine And Hip; Future    Other orders  -     sertraline (ZOLOFT) 25 MG tablet; Take 1 tablet (25 mg total) by mouth every evening.        Problem List Items Addressed This Visit     " Hypertension    Hypothyroidism    Hyperlipidemia    Balance problem      Other Visit Diagnoses     Hospital discharge follow-up    -  Primary    UTI symptoms resolved, antibiotics completed    Urinary tract infection without hematuria, site unspecified        Resolved    Postmenopausal bone loss        Bone mineral density due    Relevant Orders    DXA Bone Density Spine And Hip        Orders Placed This Encounter   Procedures    DXA Bone Density Spine And Hip     Standing Status:   Future     Standing Expiration Date:   10/13/2020     Order Specific Question:   Reason for Exam:     Answer:   screening for osteoporosis     Follow up in about 3 months (around 1/14/2020) for Follow up.     Medication List           Accurate as of October 14, 2019 11:59 PM. If you have any questions, ask your nurse or doctor.               START taking these medications    sertraline 25 MG tablet  Commonly known as:  ZOLOFT  Take 1 tablet (25 mg total) by mouth every evening.  Started by:  Danna Levine MD        CONTINUE taking these medications    acetaminophen 325 MG tablet  Commonly known as:  TYLENOL     aspirin 81 MG EC tablet  Commonly known as:  ECOTRIN     calcium carbonate 600 mg calcium (1,500 mg) Tab  Commonly known as:  OS-RAMONE     donepezil 5 MG tablet  Commonly known as:  ARICEPT  Take 1 tablet (5 mg total) by mouth every evening.     fish oil-omega-3 fatty acids 300-1,000 mg capsule     FLUAD 9250-6999 (65 YR UP)(PF) 45 mcg (15 mcg x 3)/0.5 mL Syrg  Generic drug:  flu vac 2019 [id number]-brdGT01A(PF)     levothyroxine 100 MCG tablet  Commonly known as:  SYNTHROID  Take 1 tablet (100 mcg total) by mouth once daily.     lisinopril 20 MG tablet  Commonly known as:  PRINIVIL,ZESTRIL  Take 1 tablet (20 mg total) by mouth once daily.     memantine 10 MG Tab  Commonly known as:  NAMENDA  Take 1 tablet (10 mg total) by mouth 2 (two) times daily.     multivitamin capsule     omeprazole 40 MG capsule  Commonly known as:   PRILOSEC  TAKE 1 CAPSULE EVERY MORNING     oxybutynin 5 MG Tab  Commonly known as:  DITROPAN  TAKE 2 TABLETS AT NIGHT     pravastatin 80 MG tablet  Commonly known as:  PRAVACHOL  TAKE 1 TABLET ONE TIME DAILY           Where to Get Your Medications      These medications were sent to [facility] - [place], [place] - [address]  [address], [facility] [address]    Phone:  [phone]   · sertraline 25 MG tablet

## 2019-10-17 ENCOUNTER — EXTERNAL HOME HEALTH (OUTPATIENT)
Dept: HOME HEALTH SERVICES | Facility: HOSPITAL | Age: 84
End: 2019-10-17
Payer: MEDICARE

## 2019-10-28 ENCOUNTER — PATIENT MESSAGE (OUTPATIENT)
Dept: INTERNAL MEDICINE | Facility: CLINIC | Age: 84
End: 2019-10-28

## 2019-10-28 RX ORDER — LEVOTHYROXINE SODIUM 100 UG/1
100 TABLET ORAL DAILY
Qty: 90 TABLET | Refills: 1 | Status: SHIPPED | OUTPATIENT
Start: 2019-10-28 | End: 2020-02-06 | Stop reason: SDUPTHER

## 2019-11-14 ENCOUNTER — HOSPITAL ENCOUNTER (OUTPATIENT)
Dept: RADIOLOGY | Facility: HOSPITAL | Age: 84
Discharge: HOME | End: 2019-11-14
Attending: INTERNAL MEDICINE
Payer: MEDICARE

## 2019-11-14 DIAGNOSIS — Z12.31 ENCOUNTER FOR SCREENING MAMMOGRAM FOR BREAST CANCER: ICD-10-CM

## 2019-11-14 PROCEDURE — 77067 MAMMO DIGITAL SCREENING BILAT WITH CAD: ICD-10-PCS | Mod: 26,,, | Performed by: RADIOLOGY

## 2019-11-14 PROCEDURE — 77067 SCR MAMMO BI INCL CAD: CPT | Mod: TC

## 2019-11-14 PROCEDURE — 77067 SCR MAMMO BI INCL CAD: CPT | Mod: 26,,, | Performed by: RADIOLOGY

## 2019-11-30 ENCOUNTER — HOSPITAL ENCOUNTER (EMERGENCY)
Facility: HOSPITAL | Age: 84
Discharge: HOME | End: 2019-11-30
Attending: EMERGENCY MEDICINE
Payer: MEDICARE

## 2019-11-30 VITALS
TEMPERATURE: 98 F | RESPIRATION RATE: 18 BRPM | OXYGEN SATURATION: 98 % | HEART RATE: 85 BPM | DIASTOLIC BLOOD PRESSURE: 78 MMHG | SYSTOLIC BLOOD PRESSURE: 143 MMHG

## 2019-11-30 DIAGNOSIS — R00.2 PALPITATION: ICD-10-CM
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
## 2019-11-30 DIAGNOSIS — S50.00XA CONTUSION OF ELBOW, UNSPECIFIED LATERALITY, INITIAL ENCOUNTER: ICD-10-CM
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
## 2019-11-30 LAB
ALBUMIN SERPL BCP-MCNC: 3.7 G/DL (ref 3.5–5.2)
ALP SERPL-CCNC: 83 U/L (ref 55–135)
ALT SERPL W/O P-5'-P-CCNC: 12 U/L (ref 10–44)
ANION GAP SERPL CALC-SCNC: 9 MMOL/L (ref 8–16)
AST SERPL-CCNC: 22 U/L (ref 10–40)
BACTERIA #/AREA URNS HPF: ABNORMAL /HPF
BASOPHILS # BLD AUTO: 0.01 K/UL (ref 0–0.2)
BASOPHILS NFR BLD: 0.1 % (ref 0–1.9)
BILIRUB SERPL-MCNC: 1.9 MG/DL (ref 0.1–1)
BILIRUB UR QL STRIP: NEGATIVE
BUN SERPL-MCNC: 14 MG/DL (ref 8–23)
CALCIUM SERPL-MCNC: 9.1 MG/DL (ref 8.7–10.5)
CHLORIDE SERPL-SCNC: 98 MMOL/L (ref 95–110)
CK MB SERPL-MCNC: ABNORMAL NG/ML (ref 0.1–6.5)
CK MB SERPL-RTO: ABNORMAL % (ref 0–5)
CK SERPL-CCNC: 341 U/L (ref 20–180)
CK SERPL-CCNC: 341 U/L (ref 20–180)
CLARITY UR: CLEAR
CO2 SERPL-SCNC: 26 MMOL/L (ref 23–29)
COLOR UR: YELLOW
CREAT SERPL-MCNC: 0.9 MG/DL (ref 0.5–1.4)
DIFFERENTIAL METHOD: ABNORMAL
EOSINOPHIL # BLD AUTO: 0 K/UL (ref 0–0.5)
EOSINOPHIL NFR BLD: 0.1 % (ref 0–8)
ERYTHROCYTE [DISTWIDTH] IN BLOOD BY AUTOMATED COUNT: 13.2 % (ref 11.5–14.5)
EST. GFR  (AFRICAN AMERICAN): >60 ML/MIN/1.73 M^2
EST. GFR  (NON AFRICAN AMERICAN): 58 ML/MIN/1.73 M^2
GLUCOSE SERPL-MCNC: 143 MG/DL (ref 70–110)
GLUCOSE UR QL STRIP: NEGATIVE
HCT VFR BLD AUTO: 42.5 % (ref 37–48.5)
HGB BLD-MCNC: 14.2 G/DL (ref 12–16)
HGB UR QL STRIP: ABNORMAL
HYALINE CASTS #/AREA URNS LPF: 2 /LPF
IMM GRANULOCYTES # BLD AUTO: 0.03 K/UL (ref 0–0.04)
IMM GRANULOCYTES NFR BLD AUTO: 0.3 % (ref 0–0.5)
KETONES UR QL STRIP: NEGATIVE
LEUKOCYTE ESTERASE UR QL STRIP: NEGATIVE
LYMPHOCYTES # BLD AUTO: 0.9 K/UL (ref 1–4.8)
LYMPHOCYTES NFR BLD: 8.9 % (ref 18–48)
MCH RBC QN AUTO: 29.5 PG (ref 27–31)
MCHC RBC AUTO-ENTMCNC: 33.4 G/DL (ref 32–36)
MCV RBC AUTO: 88 FL (ref 82–98)
MICROSCOPIC COMMENT: ABNORMAL
MONOCYTES # BLD AUTO: 0.7 K/UL (ref 0.3–1)
MONOCYTES NFR BLD: 7.1 % (ref 4–15)
NEUTROPHILS # BLD AUTO: 8.8 K/UL (ref 1.8–7.7)
NEUTROPHILS NFR BLD: 83.5 % (ref 38–73)
NITRITE UR QL STRIP: NEGATIVE
NRBC BLD-RTO: 0 /100 WBC
PH UR STRIP: 6 [PH] (ref 5–8)
PLATELET # BLD AUTO: 251 K/UL (ref 150–350)
PMV BLD AUTO: 9.2 FL (ref 9.2–12.9)
POTASSIUM SERPL-SCNC: 4.1 MMOL/L (ref 3.5–5.1)
PROT SERPL-MCNC: 6.5 G/DL (ref 6–8.4)
PROT UR QL STRIP: NEGATIVE
RBC # BLD AUTO: 4.81 M/UL (ref 4–5.4)
RBC #/AREA URNS HPF: 4 /HPF (ref 0–4)
SODIUM SERPL-SCNC: 133 MMOL/L (ref 136–145)
SP GR UR STRIP: 1.01 (ref 1–1.03)
SQUAMOUS #/AREA URNS HPF: 1 /HPF
TROPONIN I SERPL DL<=0.01 NG/ML-MCNC: NORMAL NG/ML (ref 0–0.03)
URN SPEC COLLECT METH UR: ABNORMAL
UROBILINOGEN UR STRIP-ACNC: 1 EU/DL
WBC # BLD AUTO: 10.48 K/UL (ref 3.9–12.7)
WBC #/AREA URNS HPF: 1 /HPF (ref 0–5)

## 2019-11-30 PROCEDURE — 82550 ASSAY OF CK (CPK): CPT | Mod: HCNC

## 2019-11-30 PROCEDURE — 63600175 PHARM REV CODE 636 W HCPCS: Mod: HCNC | Performed by: EMERGENCY MEDICINE

## 2019-11-30 PROCEDURE — 81000 URINALYSIS NONAUTO W/SCOPE: CPT | Mod: HCNC

## 2019-11-30 PROCEDURE — 99285 EMERGENCY DEPT VISIT HI MDM: CPT | Mod: 25,HCNC

## 2019-11-30 PROCEDURE — 80053 COMPREHEN METABOLIC PANEL: CPT | Mod: HCNC

## 2019-11-30 PROCEDURE — 25000003 PHARM REV CODE 250: Mod: HCNC | Performed by: EMERGENCY MEDICINE

## 2019-11-30 PROCEDURE — 93010 EKG 12-LEAD: ICD-10-PCS | Mod: HCNC,,, | Performed by: INTERNAL MEDICINE

## 2019-11-30 PROCEDURE — 82553 CREATINE MB FRACTION: CPT | Mod: HCNC

## 2019-11-30 PROCEDURE — 85025 COMPLETE CBC W/AUTO DIFF WBC: CPT | Mod: HCNC

## 2019-11-30 PROCEDURE — 93010 ELECTROCARDIOGRAM REPORT: CPT | Mod: HCNC,,, | Performed by: INTERNAL MEDICINE

## 2019-11-30 PROCEDURE — 96360 HYDRATION IV INFUSION INIT: CPT | Mod: HCNC

## 2019-11-30 PROCEDURE — 93005 ELECTROCARDIOGRAM TRACING: CPT | Mod: HCNC

## 2019-11-30 PROCEDURE — 84484 ASSAY OF TROPONIN QUANT: CPT | Mod: HCNC

## 2019-11-30 RX ADMIN — BACITRACIN, NEOMYCIN, POLYMYXIN B 1 EACH: 400; 3.5; 5 OINTMENT TOPICAL at 01:11

## 2019-11-30 RX ADMIN — SODIUM CHLORIDE 500 ML: 0.9 INJECTION, SOLUTION INTRAVENOUS at 01:11

## 2019-11-30 NOTE — ED PROVIDER NOTES
Encounter Date: 2019       History     Chief Complaint   Patient presents with    Fall     Caregiver states no loss of consciousness but event not witnessed.   States similar event with patient diagnosis with a UTI    The history is provided by the patient and a caregiver.   Fall   The accident occurred yesterday. The fall occurred from a stool. She landed on carpet. The point of impact was the right elbow, left elbow and left wrist. The pain is present in the left elbow, right elbow and left wrist. The pain is at a severity of 2/10. Pertinent negatives include no neck pain, no back pain, no fever, no numbness, no abdominal pain, no nausea, no vomiting and no loss of consciousness. The symptoms are aggravated by activity.     Review of patient's allergies indicates:   Allergen Reactions    Tetracycline Other (See Comments)     Other reaction(s): Rash.. Pt states no drug allergie     Past Medical History:   Diagnosis Date    Adenomatous polyp     Allergy     sinus    Annual physical exam 2011    Arthritis     osteoarthritis    Cataract     History of bone density study 2009    History of colonoscopy 10/30/2008    History of mammogram 2011    Hyperlipidemia     Hypertension     Macular degeneration     Postmenopausal hormone replacement therapy     therapy stopped    Squamous cell carcinoma 2019    Left thigh (ED&C)    Thyroid disease     hypothyroidism    Vertigo      Past Surgical History:   Procedure Laterality Date    APPENDECTOMY      12 years old    CATARACT EXTRACTION      Both eyes    CATARACT EXTRACTION, BILATERAL       SECTION      x 3    CHOLECYSTECTOMY      COLONOSCOPY  10/02/2013    HYSTERECTOMY      total    JOINT REPLACEMENT Left     OOPHORECTOMY      TONSILLECTOMY      TOTAL KNEE ARTHROPLASTY Left 2016    Yag       Left Eye     Family History   Problem Relation Age of Onset    Heart failure Mother     Hypertension Mother      Hyperlipidemia Mother     Heart failure Father     Hypertension Father     Hyperlipidemia Father     Asthma Father     Hypertension Sister     Diabetes Sister         Prediabetes    Heart attack Brother     No Known Problems Daughter     Heart disease Brother         Has had open heart surgery    No Known Problems Brother     Mental retardation Daughter     No Known Problems Daughter     Coronary artery disease Neg Hx     Amblyopia Neg Hx     Blindness Neg Hx     Cataracts Neg Hx     Glaucoma Neg Hx     Macular degeneration Neg Hx     Retinal detachment Neg Hx     Strabismus Neg Hx     Melanoma Neg Hx     Psoriasis Neg Hx     Lupus Neg Hx     Eczema Neg Hx     Acne Neg Hx      Social History     Tobacco Use    Smoking status: Never Smoker    Smokeless tobacco: Never Used   Substance Use Topics    Alcohol use: Yes     Frequency: Monthly or less     Drinks per session: Patient refused     Binge frequency: Never     Comment: wine occasionally    Drug use: No     Review of Systems   Constitutional: Negative for fever.   HENT: Negative for ear discharge and ear pain.    Eyes: Negative for pain and redness.   Respiratory: Negative for cough and shortness of breath.    Cardiovascular: Negative for chest pain.   Gastrointestinal: Negative for abdominal pain, nausea and vomiting.   Genitourinary: Negative for dysuria and enuresis.   Musculoskeletal: Positive for joint swelling. Negative for back pain, neck pain and neck stiffness.   Skin: Negative for rash.   Neurological: Negative for loss of consciousness and numbness.       Physical Exam     Initial Vitals   BP Pulse Resp Temp SpO2   -- -- -- -- --      MAP       --         Physical Exam    Nursing note and vitals reviewed.  Constitutional: She appears well-developed and well-nourished. She is not diaphoretic. No distress.   HENT:   Head: Normocephalic and atraumatic.   Mouth/Throat: No oropharyngeal exudate.   Eyes: EOM are normal. Pupils are  equal, round, and reactive to light.   Neck: Normal range of motion. Neck supple. No JVD present.   Pulmonary/Chest: Breath sounds normal. No stridor. No respiratory distress. She has no wheezes. She has no rhonchi. She has no rales.   Abdominal: Soft. Bowel sounds are normal. She exhibits no distension. There is no tenderness. There is no rebound and no guarding.   Musculoskeletal: Normal range of motion. She exhibits tenderness. She exhibits no edema.   Neurological: She is alert and oriented to person, place, and time. No sensory deficit.   Skin:              ED Course   Procedures  Labs Reviewed   URINALYSIS          Imaging Results    None                                          Clinical Impression:       ICD-10-CM ICD-9-CM   1. Fall, initial encounter W19.XXXA E888.9   2. Fall W19.XXXA E888.9   3. Palpitation R00.2 785.1   4. Contusion of elbow, unspecified laterality, initial encounter S50.00XA 923.11         Disposition:   Disposition: Discharged  Condition: Stable                     Mario Varela MD  11/30/19 1800

## 2019-11-30 NOTE — ED TRIAGE NOTES
Arrives per self transport from home with family friend. Presents with complaints of left wrist/elbow pain after a fall last night. Friend at the bedside reports that patient and her 89 y/o  live alone and that she pulled her  to the floor with the fall last night. Also concern for UTI

## 2019-12-02 ENCOUNTER — TELEPHONE (OUTPATIENT)
Dept: INTERNAL MEDICINE | Facility: CLINIC | Age: 84
End: 2019-12-02

## 2019-12-02 NOTE — TELEPHONE ENCOUNTER
----- Message from Violette Benton MA sent at 12/2/2019  8:30 AM CST -----  Contact: Minerva Joaquin [phone]      ----- Message -----  From: Ronak Soliman  Sent: 12/2/2019   8:04 AM CST  To: Abner CORREIA Staff    Patient would like to get medical advice.    Comments: Daughter would like to speak with office regarding the ER visit of the patient, want to know if can be seen today, daughter is in town and would like to take if possible, call to discuss. Minerva Joaquin [phone]    Please call an advise  Thank you

## 2019-12-02 NOTE — TELEPHONE ENCOUNTER
I spoke to the daughter, please cancel the appt on Wed.    I discussed with her the:  [facility] program  Palliative Care  Neurology appt    In the past we had home health, , advised assisted living but she may be beyond this.

## 2019-12-03 ENCOUNTER — LAB VISIT (OUTPATIENT)
Dept: LAB | Facility: HOSPITAL | Age: 84
End: 2019-12-03
Attending: NURSE PRACTITIONER
Payer: MEDICARE

## 2019-12-03 ENCOUNTER — OFFICE VISIT (OUTPATIENT)
Dept: NEUROLOGY | Facility: CLINIC | Age: 84
End: 2019-12-03
Payer: MEDICARE

## 2019-12-03 VITALS
RESPIRATION RATE: 16 BRPM | WEIGHT: 154.75 LBS | DIASTOLIC BLOOD PRESSURE: 86 MMHG | BODY MASS INDEX: 24.29 KG/M2 | SYSTOLIC BLOOD PRESSURE: 148 MMHG | HEIGHT: 67 IN | HEART RATE: 88 BPM
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
## 2019-12-03 DIAGNOSIS — R53.1 WEAKNESS: ICD-10-CM

## 2019-12-03 DIAGNOSIS — R26.89 IMBALANCE: ICD-10-CM

## 2019-12-03 DIAGNOSIS — R26.9 GAIT ABNORMALITY: ICD-10-CM

## 2019-12-03 DIAGNOSIS — R41.3 MEMORY LOSS: ICD-10-CM

## 2019-12-03 DIAGNOSIS — R26.89 IMBALANCE: Primary | ICD-10-CM

## 2019-12-03 DIAGNOSIS — E78.5 HYPERLIPIDEMIA, UNSPECIFIED HYPERLIPIDEMIA TYPE: ICD-10-CM

## 2019-12-03 DIAGNOSIS — I10 HYPERTENSION, UNSPECIFIED TYPE: ICD-10-CM

## 2019-12-03 DIAGNOSIS — G31.84 MCI (MILD COGNITIVE IMPAIRMENT): ICD-10-CM

## 2019-12-03 DIAGNOSIS — E03.9 HYPOTHYROIDISM, UNSPECIFIED TYPE: ICD-10-CM

## 2019-12-03 PROBLEM — N30.00 ACUTE CYSTITIS WITHOUT HEMATURIA: Status: RESOLVED | Noted: 2019-09-26 | Resolved: 2019-12-03

## 2019-12-03 LAB
ALBUMIN SERPL BCP-MCNC: 3.6 G/DL (ref 3.5–5.2)
ALP SERPL-CCNC: 79 U/L (ref 55–135)
ALT SERPL W/O P-5'-P-CCNC: 15 U/L (ref 10–44)
ANION GAP SERPL CALC-SCNC: 8 MMOL/L (ref 8–16)
AST SERPL-CCNC: 28 U/L (ref 10–40)
BILIRUB SERPL-MCNC: 1.4 MG/DL (ref 0.1–1)
BUN SERPL-MCNC: 15 MG/DL (ref 8–23)
CALCIUM SERPL-MCNC: 9.2 MG/DL (ref 8.7–10.5)
CHLORIDE SERPL-SCNC: 98 MMOL/L (ref 95–110)
CO2 SERPL-SCNC: 27 MMOL/L (ref 23–29)
CREAT SERPL-MCNC: 0.9 MG/DL (ref 0.5–1.4)
EST. GFR  (AFRICAN AMERICAN): >60 ML/MIN/1.73 M^2
EST. GFR  (NON AFRICAN AMERICAN): 58 ML/MIN/1.73 M^2
GLUCOSE SERPL-MCNC: 117 MG/DL (ref 70–110)
POTASSIUM SERPL-SCNC: 4.4 MMOL/L (ref 3.5–5.1)
PROT SERPL-MCNC: 6.6 G/DL (ref 6–8.4)
SODIUM SERPL-SCNC: 133 MMOL/L (ref 136–145)
TSH SERPL DL<=0.005 MIU/L-ACNC: 2.58 UIU/ML (ref 0.4–4)

## 2019-12-03 PROCEDURE — 99999 PR PBB SHADOW E&M-EST. PATIENT-LVL IV: ICD-10-PCS | Mod: PBBFAC,HCNC,, | Performed by: NURSE PRACTITIONER

## 2019-12-03 PROCEDURE — 1101F PR PT FALLS ASSESS DOC 0-1 FALLS W/OUT INJ PAST YR: ICD-10-PCS | Mod: HCNC,CPTII,S$GLB, | Performed by: NURSE PRACTITIONER

## 2019-12-03 PROCEDURE — 1159F PR MEDICATION LIST DOCUMENTED IN MEDICAL RECORD: ICD-10-PCS | Mod: HCNC,S$GLB,, | Performed by: NURSE PRACTITIONER

## 2019-12-03 PROCEDURE — 84443 ASSAY THYROID STIM HORMONE: CPT | Mod: HCNC

## 2019-12-03 PROCEDURE — 3079F PR MOST RECENT DIASTOLIC BLOOD PRESSURE 80-89 MM HG: ICD-10-PCS | Mod: HCNC,CPTII,S$GLB, | Performed by: NURSE PRACTITIONER

## 2019-12-03 PROCEDURE — 1101F PT FALLS ASSESS-DOCD LE1/YR: CPT | Mod: HCNC,CPTII,S$GLB, | Performed by: NURSE PRACTITIONER

## 2019-12-03 PROCEDURE — 99214 PR OFFICE/OUTPT VISIT, EST, LEVL IV, 30-39 MIN: ICD-10-PCS | Mod: HCNC,S$GLB,, | Performed by: NURSE PRACTITIONER

## 2019-12-03 PROCEDURE — 3077F SYST BP >= 140 MM HG: CPT | Mod: HCNC,CPTII,S$GLB, | Performed by: NURSE PRACTITIONER

## 2019-12-03 PROCEDURE — 80053 COMPREHEN METABOLIC PANEL: CPT | Mod: HCNC

## 2019-12-03 PROCEDURE — 99214 OFFICE O/P EST MOD 30 MIN: CPT | Mod: HCNC,S$GLB,, | Performed by: NURSE PRACTITIONER

## 2019-12-03 PROCEDURE — 1159F MED LIST DOCD IN RCRD: CPT | Mod: HCNC,S$GLB,, | Performed by: NURSE PRACTITIONER

## 2019-12-03 PROCEDURE — 82607 VITAMIN B-12: CPT | Mod: HCNC

## 2019-12-03 PROCEDURE — 3077F PR MOST RECENT SYSTOLIC BLOOD PRESSURE >= 140 MM HG: ICD-10-PCS | Mod: HCNC,CPTII,S$GLB, | Performed by: NURSE PRACTITIONER

## 2019-12-03 PROCEDURE — 36415 COLL VENOUS BLD VENIPUNCTURE: CPT | Mod: HCNC

## 2019-12-03 PROCEDURE — 1126F PR PAIN SEVERITY QUANTIFIED, NO PAIN PRESENT: ICD-10-PCS | Mod: HCNC,S$GLB,, | Performed by: NURSE PRACTITIONER

## 2019-12-03 PROCEDURE — 99999 PR PBB SHADOW E&M-EST. PATIENT-LVL IV: CPT | Mod: PBBFAC,HCNC,, | Performed by: NURSE PRACTITIONER

## 2019-12-03 PROCEDURE — 1126F AMNT PAIN NOTED NONE PRSNT: CPT | Mod: HCNC,S$GLB,, | Performed by: NURSE PRACTITIONER

## 2019-12-03 PROCEDURE — 3079F DIAST BP 80-89 MM HG: CPT | Mod: HCNC,CPTII,S$GLB, | Performed by: NURSE PRACTITIONER

## 2019-12-03 NOTE — PROGRESS NOTES
HPI: Estefania Mccollum is a 85 y.o. female with Vertigo. Responds to PT. Some vague symptoms at times, but improved with normal MRI brain prior. Also has mild cognitive impairment like symptoms vs Early Alzheimer's disease. She has hypothyroidism, HTN, and HLD.     She presents today before her scheduled follow up visit, per Dr. Levine, with complaint of weakness, increased gait imbalance, and veering toward the right when she walks. This has been progressive over the past several months per daughter, and did not start acutely, but she has had more falls recently.     She was advised to continue PT for vestibular rehab at her last visit, and OT was added as well, given her difficulty with ADL's, due to arthritis/stiffness complaints. Dizziness is resolved. Daughter states that     She fell on 11/30/19 and was seen in the ER for this. Mildly reduced sodium and elevated CK on labs; UA overall unremarkable. CT Head was unremarkable.     Daughter lives out of town, and is asking if this office can admit her to a rehab facility to get stronger. She had PT with home health, but reportedly did not progress with this-she could not recall instructions per the PT.     Memory overall unchanged-continues with short term memory loss.     Review of Systems   Constitutional: Positive for malaise/fatigue. Negative for fever.   HENT: Negative for nosebleeds.    Eyes: Negative for double vision.   Respiratory: Negative for shortness of breath.    Cardiovascular: Negative for chest pain.   Gastrointestinal: Negative for blood in stool.   Genitourinary: Negative for hematuria.   Musculoskeletal: Positive for falls.   Skin: Negative for rash.   Neurological: Positive for weakness. Negative for dizziness, tremors, sensory change, focal weakness, seizures and headaches.   Psychiatric/Behavioral: Positive for memory loss.       Objective:      Physical Exam      Exam:  Gen Appearance, well developed/nourished in no apparent distress  CV: 2+  distal pulses with no edema or swelling  Neuro:  MS: Awake, alert, sustains attention. She is  oriented to all except exact date today, and knows today is Tuesday (consistent with prior).   Recent recall is 2/3 at 5 minutes /remote memory intact, Language is full to spontaneous speech/comprehension. Fund of Knowledge is full  Visual spatial skill are good   She is able to name the current and 2 past presidents.   CN: Optic discs are flat with normal vasculature, PERRL, Extraoccular movements and visual fields are full; fast beat horizontal nystagmus with left gaze noted. Normal facial sensation and strength, Hearing symmetric, Tongue and Palate are midline and strong. Shoulder Shrug symmetric and strong.  Motor: Normal bulk, tone, no abnormal movements. 5/5 strength bilateral upper/lower extremities with 2+ reflexes and no clonus  Sensory:  Intact to temp and vibration Romberg negative  Cerebellar: Finger-nose,Rapid alternating movements intact  Gait: ambulates with walker for stability    Imaging:   CT Head 11/30/2019:   Unremarkable    CT head 10/2016:   1.  No CT evidence of an acute intracranial abnormality.  2.  Atrophy and small vessel ischemic changes of the periventricular white matter    CT Head 7/2019:   No acute intracranial findings    Age-appropriate cerebral volume loss with mild moderate patchy decreased attenuation supratentorial white matter while nonspecific suggestive for chronic ischemic change.    No evidence for acute intracranial hemorrhage.  Clinical correlation and further evaluation as warranted.    Labs:   10/2016 TSH and B12 normal  1/2017 CMP, CBC unremarkable  11/2019 CK elevated, sodium mildly reduced  Assessment:     Estefania Mccollum is a 85 y.o. female with Vertigo. Responds to PT. Some vague symptoms at times, but improved with normal MRI brain prior. Also has mild cognitive impairment like symptoms vs Early Alzheimer's disease. She has hypothyroidism and HLD.     Plan:   1. Will  follow with an MRI Brain to rule out recent CVA as the cause of her gait changes and recurrent falls; however, I suspect general decline, as this has been progressive over several months.   2. CMP, B12, TSh. UA unremarkable per ER.   3. Advised that this office cannot admit patient to a nursing home or rehab facility and that her PCP would need to be consulted for this.   -I am concerned about the 's ability to provide care to her regarding her gait issues, as he has health issues of his own. Assistance is needed.   4. She has not progressed in her ADL's with PT/OT, and was discharged from home health.   -her memory issues impair her ability to recall suggestions per PT/OT.   5. Discussed assisted living possibility vs rehab with a nursing home with family.   6. Rx written for rolling walker, which is needed for fall prevention.   7. Fall precautions were discussed.   8. Continue Namenda to 10mg BID  - Discussed the further  treatment being good diet and physical and memory exercise.   9. Aricept to continue at current dose  10. No driving, given gait changes/physical decline.   11. PT PRN vertigo helps. She has seen ENT and continues to follow with ENT about otalgia/ hearing loss/ prior infections.   -Head CT reassuring in 7/2019 for dizziness She had + vertigo with head thrust in 7/2019 and + nystagmus consistent with vertigo.   -Referred to ENT as well, who she saw for vertigo prior.   -She has difficulty tolerating larger doses of meclizine  12 Previously, referred to silver sneakers program given her difficulty with balance which may be age related- helped  13. Insomnia resolved with better sleep hygeine. Melatonin 3mg OTC to use PRN     CC: Dr. Levine    RTC 6 months

## 2019-12-04 ENCOUNTER — HOSPITAL ENCOUNTER (OUTPATIENT)
Dept: RADIOLOGY | Facility: HOSPITAL | Age: 84
Discharge: HOME | End: 2019-12-04
Attending: NURSE PRACTITIONER
Payer: MEDICARE

## 2019-12-04 ENCOUNTER — TELEPHONE (OUTPATIENT)
Dept: NEUROLOGY | Facility: CLINIC | Age: 84
End: 2019-12-04

## 2019-12-04 DIAGNOSIS — R26.9 GAIT ABNORMALITY: ICD-10-CM

## 2019-12-04 DIAGNOSIS — R26.89 IMBALANCE: ICD-10-CM
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
## 2019-12-04 DIAGNOSIS — R53.1 WEAKNESS: ICD-10-CM

## 2019-12-04 LAB — VIT B12 SERPL-MCNC: 366 PG/ML (ref 210–950)

## 2019-12-04 PROCEDURE — 70551 MRI BRAIN WITHOUT CONTRAST: ICD-10-PCS | Mod: 26,HCNC,, | Performed by: RADIOLOGY

## 2019-12-04 PROCEDURE — 70551 MRI BRAIN STEM W/O DYE: CPT | Mod: TC,HCNC

## 2019-12-04 PROCEDURE — 70551 MRI BRAIN STEM W/O DYE: CPT | Mod: 26,HCNC,, | Performed by: RADIOLOGY

## 2019-12-04 NOTE — TELEPHONE ENCOUNTER
[facility] [place] the new walker without a medically necessary note. Clinic Note was sent over to DME.

## 2019-12-04 NOTE — TELEPHONE ENCOUNTER
----- Message from Shayna Chavez MA sent at 2019  3:56 PM CST -----  Contact: Daughter-- Emani Mccollum  MRN: [id number]  : 10/31/1934  PCP: Danna Levine  Home Phone      [phone]  Work Phone      Not on file.  Mobile          [phone]      MESSAGE:  Is asking to speak to you in reference to orders for D [facility]. She can be reached at [phone].

## 2019-12-05 ENCOUNTER — TELEPHONE (OUTPATIENT)
Dept: INTERNAL MEDICINE | Facility: CLINIC | Age: 84
End: 2019-12-05

## 2019-12-05 NOTE — TELEPHONE ENCOUNTER
----- Message from Danna Levine MD sent at 12/4/2019  7:27 PM CST -----  Contact: Danna Levine  I wonder if this is a patient you may help with. She has cognitive decline and functionally is not able to care for herself. Her daughters live out of state. She has had multiple falls, UTIs, Home Health, ER visits, Case Management referral. The general concensus is that she and her  need to live in assisted living which they refuse. I did get them to put there name on a list though. They do allow someone to come in several hours a day.    A daughter would like for her mother to be admitted to rehab for intensive therapy. I am not certain that this is appropriate or not and if so how to do it. She recently had a MRI with negative neuro eval except for physical deconditioning.    Thank you,  Danna

## 2019-12-05 NOTE — TELEPHONE ENCOUNTER
Zoe- Please reach out to family and patient and explain the clinic and that Dr Levine is recommending our involvement.    Does patient want a home visit? Which area is she in?    Thanks,  KJ

## 2019-12-06 ENCOUNTER — TELEPHONE (OUTPATIENT)
Dept: PRIMARY CARE CLINIC | Facility: CLINIC | Age: 84
End: 2019-12-06

## 2019-12-06 NOTE — TELEPHONE ENCOUNTER
----- Message from August Looney MD sent at 12/5/2019 11:27 AM CST -----  Contact: Danna Vassar  Since she lives over an hour away, a message has been sent to see if any home NP are able to follow her.  If not, will have to have her brought in to clinic by family.  Another option is finding a provider closer to her home for routine care and if she could come to clinic, we could eval her and discuss realistic goals of care.      ----- Message -----  From: Inga Magdaleno MD  Sent: 12/5/2019   8:38 AM CST  To: Danna Levine MD, August Looney MD    She seems perfect. The rehab admit is a common request from families of declining patients. We can assess and help the family make a realistic plan.    Sounds like a home visit may be appropriate. Our staff will reach out today. Is there a particular family member that we should interact with primarily?    Thanks for the referral,  KJ    ----- Message -----  From: Danna Levnie MD  Sent: 12/4/2019   7:27 PM CST  To: Inga Magdaleno MD, August Looney MD    I wonder if this is a patient you may help with. She has cognitive decline and functionally is not able to care for herself. Her daughters live out of state. She has had multiple falls, UTIs, Home Health, ER visits, Case Management referral. The general concensus is that she and her  need to live in assisted living which they refuse. I did get them to put there name on a list though. They do allow someone to come in several hours a day.    A daughter would like for her mother to be admitted to rehab for intensive therapy. I am not certain that this is appropriate or not and if so how to do it. She recently had a MRI with negative neuro eval except for physical deconditioning.    Thank you,  Danna

## 2019-12-06 NOTE — TELEPHONE ENCOUNTER
Spoke to pt's spouse who said Telemedicine would be too confusing for him but he will try to reach one of his children who lives out of town to try to make some arrangements. I asked if I could call his son for him and he said he has to find the book with the number. He has my number to call when he finds his son's number.

## 2019-12-06 NOTE — TELEPHONE ENCOUNTER
Explain to patient's children that Dr Levine got me involved because of their barriers to care and complex medical needs.    Please talk to patient's children to see if someone is able to help them do telemedicine in the home or discuss with me goals of care for both patient and her .        Thanks,  KJ

## 2019-12-06 NOTE — TELEPHONE ENCOUNTER
I spoke to the patient, her  and their daughter Emani ( [phone]). I will be transferring her care over to you.

## 2019-12-09 ENCOUNTER — TELEPHONE (OUTPATIENT)
Dept: PRIMARY CARE CLINIC | Facility: CLINIC | Age: 84
End: 2019-12-09

## 2019-12-09 ENCOUNTER — TELEPHONE (OUTPATIENT)
Dept: INTERNAL MEDICINE | Facility: CLINIC | Age: 84
End: 2019-12-09

## 2019-12-09 NOTE — TELEPHONE ENCOUNTER
This is FYI...    Pt's daughter Emani lives out of town in [place] ( currently at a conference for work ) and she said she will have her sister Autumn call from [place] to discuss their options further. They are working with  to have both parents placed in [facility] in [place], [place].      She explained they need a physical, CXR, TB skin testing prior to admission but need help w/ arranging physical since Mom is homebound due to immobility.      Autumn called for  already this morning and is supposed to call me soon.      Pt is currntly seen by [facility] and has been seen by  for over 30yrs  She has difficulty walking. Pt's spouse called me today to give an update on their family plans.    They want a PCP in or near [place].    Call Autumn Benavides @ [phone] or    Call Emani Mustafa @ [phone]

## 2019-12-09 NOTE — TELEPHONE ENCOUNTER
Pt's daughter Emani lives out of town in [place] ( currently at a conference for work ) and she said she will have her sister Autumn call from [place] to discuss their options further. They are working with  to have both parents placed in [facility] in [place], [place].      She explained they need a physical, CXR, TB skin testing prior to admission but need help w/ arranging physical since Mom is homebound due to immobility.      Autumn called for  already this morning and is supposed to call me soon. She has notified them of needed paperwork for [facility] placement.

## 2019-12-09 NOTE — TELEPHONE ENCOUNTER
----- Message from Ronak Soliman sent at 12/9/2019 11:32 AM CST -----  Contact: Daughter [phone]  Patient would like to get medical advice.    Comments: daughter of patient calling would like to know if can come and drop off the forms for the patient to be admitted into living facility, please call to give best options, stating is needing the forms filled out asap. Also would like to get the patient with a provider that will be closer to facility. Stating should the patient come in to have filled out? Please contact asap Daughter [phone]      Please call an advise  Thank you

## 2019-12-16 ENCOUNTER — PATIENT MESSAGE (OUTPATIENT)
Dept: INTERNAL MEDICINE | Facility: CLINIC | Age: 84
End: 2019-12-16

## 2019-12-20 ENCOUNTER — TELEPHONE (OUTPATIENT)
Dept: INTERNAL MEDICINE | Facility: CLINIC | Age: 84
End: 2019-12-20

## 2019-12-20 NOTE — TELEPHONE ENCOUNTER
----- Message from Jesusita Lara sent at 12/20/2019  9:50 AM CST -----  Contact: Spouse [phone]  HH has not come out since last week. Received call that someone else was taking over but have not heard back from them.      [facility].    Request call back    Please call and advise  Thank you

## 2020-01-02 ENCOUNTER — TELEPHONE (OUTPATIENT)
Dept: INTERNAL MEDICINE | Facility: CLINIC | Age: 85
End: 2020-01-02

## 2020-01-02 NOTE — TELEPHONE ENCOUNTER
----- Message from Quiana Osullivan sent at 1/2/2020  2:04 PM CST -----  Contact: /bijan/[phone]  Pt  called in regards to talking to the dr about the company they suppose to be switched to.       Please advise

## 2020-01-07 ENCOUNTER — TELEPHONE (OUTPATIENT)
Dept: INTERNAL MEDICINE | Facility: CLINIC | Age: 85
End: 2020-01-07

## 2020-01-07 NOTE — TELEPHONE ENCOUNTER
----- Message from Nina Powell sent at 1/7/2020  1:48 PM CST -----  Type:  Needs Medical Advice    Who Called: Estefania       Would the patient rather a call back or a response via [facility]? Call back     Best Call Back Number: [phone]    Additional Information:Estefanai would like to know if her upcoming appt is important.

## 2020-01-08 ENCOUNTER — TELEPHONE (OUTPATIENT)
Dept: PRIMARY CARE CLINIC | Facility: CLINIC | Age: 85
End: 2020-01-08

## 2020-01-08 NOTE — TELEPHONE ENCOUNTER
I would love you to try and see if you could help this family out. I think driving into [place] as they have for many years is not possible.

## 2020-01-08 NOTE — TELEPHONE ENCOUNTER
Caller: Unspecified (Today,  8:57 AM)         I just confirmed with [facility] that we do not have staff in that area, so no NPs can go to the home.     Dinora- please keep us updated on the placement status of the patients. Scarlett can help the family find a place if they will accept that.     Thanks,

## 2020-01-08 NOTE — TELEPHONE ENCOUNTER
Estefania and Alejandro Mccollum's daughter Emani called to confirm she received the message stating pt's are out of [facility] visit range.

## 2020-01-09 ENCOUNTER — PATIENT MESSAGE (OUTPATIENT)
Dept: INTERNAL MEDICINE | Facility: CLINIC | Age: 85
End: 2020-01-09

## 2020-01-09 RX ORDER — SERTRALINE HYDROCHLORIDE 25 MG/1
25 TABLET, FILM COATED ORAL NIGHTLY
Qty: 90 TABLET | Refills: 1 | Status: SHIPPED | OUTPATIENT
Start: 2020-01-09 | End: 2020-06-11

## 2020-01-09 NOTE — TELEPHONE ENCOUNTER
Approved Medications      sertraline (ZOLOFT) 25 MG tablet         Sig: Take 1 tablet (25 mg total) by mouth every evening.    Disp:  90 tablet    Refills:  1    Start: 1/9/2020 - 1/8/2021    Class: Normal    Authorized by: Danna Levine MD        To be filled at: [facility] - [facility] [address]        Spoke with patient to inform them that medication has been approved.

## 2020-01-10 NOTE — TELEPHONE ENCOUNTER
Please follow-up with this family. Do they need help with the application process?    Scarlett can meet with the daughter in clinic, or we can talk on the phone and help her.    Thanks,  KJ

## 2020-01-10 NOTE — TELEPHONE ENCOUNTER
The daughter said they don't need anything else at this time. JUAN JOSÉ Pantoja was sent a message w/them to follow up with them.

## 2020-02-06 RX ORDER — LEVOTHYROXINE SODIUM 100 UG/1
100 TABLET ORAL
Qty: 7 TABLET | Refills: 0 | Status: ON HOLD | OUTPATIENT
Start: 2020-02-06 | End: 2020-08-19 | Stop reason: HOSPADM

## 2020-02-06 RX ORDER — LEVOTHYROXINE SODIUM 100 UG/1
100 TABLET ORAL DAILY
Qty: 90 TABLET | Refills: 1 | Status: SHIPPED | OUTPATIENT
Start: 2020-02-06 | End: 2020-10-28

## 2020-02-06 NOTE — TELEPHONE ENCOUNTER
----- Message from Quiana Osullivan sent at 2/6/2020 10:14 AM CST -----  Contact: /bijan/[phone]  Pt  called in regards to getting a Rx refill for .     levothyroxine (SYNTHROID) 100 MCG tablet 90 tablet 1 10/28/2019  No Take 1 tablet (100 mcg total) by mouth once daily.     They would a weeks worth sent to [facility] and [facility] for 90 day supply.     [facility]/pharmacy [phone]   [facility] [phone]    Please advise

## 2020-02-06 NOTE — TELEPHONE ENCOUNTER
Please advise. Pt would like:    7-day supply send to [facility] on [facility] in [facility], [place].  and  90-day supply to [facility].    Bob Rose

## 2020-02-10 NOTE — TELEPHONE ENCOUNTER
We have a group who can help families with placement called [facility].    Please share this info with the daughter.    Jesusita Kessler  Certified   [phone]  [email or web address]

## 2020-02-12 ENCOUNTER — OFFICE VISIT (OUTPATIENT)
Dept: INTERNAL MEDICINE | Facility: CLINIC | Age: 85
End: 2020-02-12
Payer: MEDICARE

## 2020-02-12 ENCOUNTER — IMMUNIZATION (OUTPATIENT)
Dept: PHARMACY | Facility: CLINIC | Age: 85
End: 2020-02-12
Payer: MEDICARE

## 2020-02-12 ENCOUNTER — LAB VISIT (OUTPATIENT)
Dept: LAB | Facility: HOSPITAL | Age: 85
End: 2020-02-12
Attending: INTERNAL MEDICINE
Payer: MEDICARE

## 2020-02-12 VITALS
WEIGHT: 148.38 LBS | SYSTOLIC BLOOD PRESSURE: 100 MMHG | TEMPERATURE: 97 F | DIASTOLIC BLOOD PRESSURE: 62 MMHG | HEART RATE: 89 BPM | OXYGEN SATURATION: 99 % | HEIGHT: 67 IN | BODY MASS INDEX: 23.29 KG/M2

## 2020-02-12 DIAGNOSIS — R94.4 DECREASED GFR: ICD-10-CM

## 2020-02-12 DIAGNOSIS — I70.0 AORTIC ATHEROSCLEROSIS: ICD-10-CM

## 2020-02-12 DIAGNOSIS — I10 ESSENTIAL HYPERTENSION: Primary | ICD-10-CM

## 2020-02-12 DIAGNOSIS — E78.5 HYPERLIPIDEMIA, UNSPECIFIED HYPERLIPIDEMIA TYPE: ICD-10-CM

## 2020-02-12 DIAGNOSIS — I10 ESSENTIAL HYPERTENSION: ICD-10-CM

## 2020-02-12 DIAGNOSIS — E03.9 HYPOTHYROIDISM, UNSPECIFIED TYPE: ICD-10-CM

## 2020-02-12 DIAGNOSIS — R53.81 DEBILITY: ICD-10-CM

## 2020-02-12 LAB
BASOPHILS # BLD AUTO: 0.05 K/UL (ref 0–0.2)
BASOPHILS NFR BLD: 0.6 % (ref 0–1.9)
DIFFERENTIAL METHOD: NORMAL
EOSINOPHIL # BLD AUTO: 0.2 K/UL (ref 0–0.5)
EOSINOPHIL NFR BLD: 1.9 % (ref 0–8)
ERYTHROCYTE [DISTWIDTH] IN BLOOD BY AUTOMATED COUNT: 13.1 % (ref 11.5–14.5)
HCT VFR BLD AUTO: 47.5 % (ref 37–48.5)
HGB BLD-MCNC: 15.5 G/DL (ref 12–16)
IMM GRANULOCYTES # BLD AUTO: 0.02 K/UL (ref 0–0.04)
IMM GRANULOCYTES NFR BLD AUTO: 0.2 % (ref 0–0.5)
LYMPHOCYTES # BLD AUTO: 1.8 K/UL (ref 1–4.8)
LYMPHOCYTES NFR BLD: 21 % (ref 18–48)
MCH RBC QN AUTO: 30 PG (ref 27–31)
MCHC RBC AUTO-ENTMCNC: 32.6 G/DL (ref 32–36)
MCV RBC AUTO: 92 FL (ref 82–98)
MONOCYTES # BLD AUTO: 0.5 K/UL (ref 0.3–1)
MONOCYTES NFR BLD: 5.3 % (ref 4–15)
NEUTROPHILS # BLD AUTO: 6.1 K/UL (ref 1.8–7.7)
NEUTROPHILS NFR BLD: 71 % (ref 38–73)
NRBC BLD-RTO: 0 /100 WBC
PLATELET # BLD AUTO: 293 K/UL (ref 150–350)
PMV BLD AUTO: 10.1 FL (ref 9.2–12.9)
RBC # BLD AUTO: 5.16 M/UL (ref 4–5.4)
WBC # BLD AUTO: 8.54 K/UL (ref 3.9–12.7)

## 2020-02-12 PROCEDURE — 3078F PR MOST RECENT DIASTOLIC BLOOD PRESSURE < 80 MM HG: ICD-10-PCS | Mod: HCNC,CPTII,S$GLB, | Performed by: INTERNAL MEDICINE

## 2020-02-12 PROCEDURE — 99499 UNLISTED E&M SERVICE: CPT | Mod: HCNC,S$GLB,, | Performed by: INTERNAL MEDICINE

## 2020-02-12 PROCEDURE — 80061 LIPID PANEL: CPT | Mod: HCNC

## 2020-02-12 PROCEDURE — 1159F PR MEDICATION LIST DOCUMENTED IN MEDICAL RECORD: ICD-10-PCS | Mod: HCNC,S$GLB,, | Performed by: INTERNAL MEDICINE

## 2020-02-12 PROCEDURE — 1126F AMNT PAIN NOTED NONE PRSNT: CPT | Mod: HCNC,S$GLB,, | Performed by: INTERNAL MEDICINE

## 2020-02-12 PROCEDURE — 99214 OFFICE O/P EST MOD 30 MIN: CPT | Mod: HCNC,S$GLB,, | Performed by: INTERNAL MEDICINE

## 2020-02-12 PROCEDURE — 99214 PR OFFICE/OUTPT VISIT, EST, LEVL IV, 30-39 MIN: ICD-10-PCS | Mod: HCNC,S$GLB,, | Performed by: INTERNAL MEDICINE

## 2020-02-12 PROCEDURE — 85025 COMPLETE CBC W/AUTO DIFF WBC: CPT | Mod: HCNC

## 2020-02-12 PROCEDURE — 99499 RISK ADDL DX/OHS AUDIT: ICD-10-PCS | Mod: HCNC,S$GLB,, | Performed by: INTERNAL MEDICINE

## 2020-02-12 PROCEDURE — 3074F SYST BP LT 130 MM HG: CPT | Mod: HCNC,CPTII,S$GLB, | Performed by: INTERNAL MEDICINE

## 2020-02-12 PROCEDURE — 1159F MED LIST DOCD IN RCRD: CPT | Mod: HCNC,S$GLB,, | Performed by: INTERNAL MEDICINE

## 2020-02-12 PROCEDURE — 1126F PR PAIN SEVERITY QUANTIFIED, NO PAIN PRESENT: ICD-10-PCS | Mod: HCNC,S$GLB,, | Performed by: INTERNAL MEDICINE

## 2020-02-12 PROCEDURE — 36415 COLL VENOUS BLD VENIPUNCTURE: CPT | Mod: HCNC

## 2020-02-12 PROCEDURE — 1101F PT FALLS ASSESS-DOCD LE1/YR: CPT | Mod: HCNC,CPTII,S$GLB, | Performed by: INTERNAL MEDICINE

## 2020-02-12 PROCEDURE — 80053 COMPREHEN METABOLIC PANEL: CPT | Mod: HCNC

## 2020-02-12 PROCEDURE — 99999 PR PBB SHADOW E&M-EST. PATIENT-LVL III: ICD-10-PCS | Mod: PBBFAC,HCNC,, | Performed by: INTERNAL MEDICINE

## 2020-02-12 PROCEDURE — 1101F PR PT FALLS ASSESS DOC 0-1 FALLS W/OUT INJ PAST YR: ICD-10-PCS | Mod: HCNC,CPTII,S$GLB, | Performed by: INTERNAL MEDICINE

## 2020-02-12 PROCEDURE — 3074F PR MOST RECENT SYSTOLIC BLOOD PRESSURE < 130 MM HG: ICD-10-PCS | Mod: HCNC,CPTII,S$GLB, | Performed by: INTERNAL MEDICINE

## 2020-02-12 PROCEDURE — 3078F DIAST BP <80 MM HG: CPT | Mod: HCNC,CPTII,S$GLB, | Performed by: INTERNAL MEDICINE

## 2020-02-12 PROCEDURE — 99999 PR PBB SHADOW E&M-EST. PATIENT-LVL III: CPT | Mod: PBBFAC,HCNC,, | Performed by: INTERNAL MEDICINE

## 2020-02-12 NOTE — PROGRESS NOTES
Subjective:      Patient ID: Estefania Mccollum is a 85 y.o. female.    Chief Complaint: Follow-up    HPI:  HPI   Patient has a history of hypertension and hypothyroidism.  She is on medication which she tolerates for both.  She also is on medication for hyperlipidemia.  She has an advanced mild cognitive impairment and is followed in Neurology.  She has had frequent falls and debility.  We had worked hard to see if she would like to change her environment to an assisted living or in independent living with assistance but she has decided she did not wish this.  Her  still is able to manage her medications.  She does get Meals on wheels.  They have a care giver who comes in 3 mornings a week and nightly to help her get ready for bed as well as bathe her.  She tells me that the only activity she has is generally sitting in her chair all day.  She has had a number of falls.  Patient Active Problem List   Diagnosis    Hypertension    Hypothyroidism    Hyperlipidemia    Retinal drusen - Both Eyes    Hearing loss, sensorineural    History of colonic polyps    Osteopenia    MCI (mild cognitive impairment)    Memory loss    Primary osteoarthritis of left knee    Aortic atherosclerosis    Balance problem    Decreased GFR    Debility    Frequent falls     Past Medical History:   Diagnosis Date    Adenomatous polyp     Allergy     sinus    Annual physical exam 8/30/2011    Arthritis     osteoarthritis    Cataract     History of bone density study 8/6/2009    History of colonoscopy 10/30/2008    History of mammogram 8/19/2011    Hyperlipidemia     Hypertension     Macular degeneration     Postmenopausal hormone replacement therapy     therapy stopped    Squamous cell carcinoma 05/27/2019    Left thigh (ED&C)    Thyroid disease     hypothyroidism    Vertigo      Past Surgical History:   Procedure Laterality Date    APPENDECTOMY      12 years old    CATARACT EXTRACTION      Both eyes     "CATARACT EXTRACTION, BILATERAL       SECTION      x 3    CHOLECYSTECTOMY      COLONOSCOPY  10/02/2013    HYSTERECTOMY      total    JOINT REPLACEMENT Left     OOPHORECTOMY      TONSILLECTOMY      TOTAL KNEE ARTHROPLASTY Left 2016    Yag       Left Eye     Family History   Problem Relation Age of Onset    Heart failure Mother     Hypertension Mother     Hyperlipidemia Mother     Heart failure Father     Hypertension Father     Hyperlipidemia Father     Asthma Father     Hypertension Sister     Diabetes Sister         Prediabetes    Heart attack Brother     No Known Problems Daughter     Heart disease Brother         Has had open heart surgery    No Known Problems Brother     Mental retardation Daughter     No Known Problems Daughter     Coronary artery disease Neg Hx     Amblyopia Neg Hx     Blindness Neg Hx     Cataracts Neg Hx     Glaucoma Neg Hx     Macular degeneration Neg Hx     Retinal detachment Neg Hx     Strabismus Neg Hx     Melanoma Neg Hx     Psoriasis Neg Hx     Lupus Neg Hx     Eczema Neg Hx     Acne Neg Hx      Review of Systems   Constitutional: Negative for chills, fever and unexpected weight change.   HENT: Negative for trouble swallowing.    Respiratory: Negative for cough, shortness of breath and wheezing.    Cardiovascular: Negative for chest pain and palpitations.   Gastrointestinal: Negative for abdominal distention, abdominal pain, blood in stool and vomiting.   Musculoskeletal: Negative for back pain.     Objective:     Vitals:    20 1104   BP: 100/62   Pulse: 89   Temp: 97.4 °F (36.3 °C)   TempSrc: Oral   SpO2: 99%   Weight: 67.3 kg (148 lb 5.9 oz)   Height: 5' 7" (1.702 m)   PainSc: 0-No pain     Body mass index is 23.24 kg/m².  Physical Exam   Constitutional: She is oriented to person, place, and time. She appears well-developed and well-nourished. No distress.   Neck: Carotid bruit is not present. No thyromegaly present. " "  Cardiovascular: Normal rate, regular rhythm and normal heart sounds. PMI is not displaced.   Pulmonary/Chest: Effort normal and breath sounds normal. No respiratory distress.   Abdominal: Soft. Bowel sounds are normal. She exhibits no distension. There is no tenderness.   Musculoskeletal: She exhibits no edema.   Neurological: She is alert and oriented to person, place, and time.     Assessment:     1. Essential hypertension    2. Aortic atherosclerosis    3. Debility    4. Decreased GFR    5. Hyperlipidemia, unspecified hyperlipidemia type    6. Hypothyroidism, unspecified type      Plan:   Estefania was seen today for follow-up.    Diagnoses and all orders for this visit:    Essential hypertension  Comments:  Well controlled same medication  Orders:  -     CBC auto differential; Future  -     Comprehensive metabolic panel; Future  -     Lipid panel; Future    Aortic atherosclerosis  Comments:  Patient on aspirin and statin    Debility  Comments:  Currently the patient is in a state of decline    Decreased GFR  Comments:  Monitored lab ordered    Hyperlipidemia, unspecified hyperlipidemia type  Comments:  Stable continue statin lab ordered    Hypothyroidism, unspecified type  Comments:  Monitored, lab ordered continue same med        Problem List Items Addressed This Visit     Hypertension - Primary    Relevant Orders    CBC auto differential    Comprehensive metabolic panel    Lipid panel    Hypothyroidism    Hyperlipidemia    Aortic atherosclerosis    Overview     Location in Record and Date: X-Ray Chest-12/9/2008   "There are some calcifications at the level of the aortic arch."   Other Chronic Conditions: HLD   Medications: aspirin 325 mg, fish oil-ome  ga-3 fatty acids 300-1,000 mg, Pravachol 80 mg     Please document in your clinic note if the patient has:     Aortic Atherosclerosis   Other   Clinically Undetermined          Decreased GFR    Overview     x1 on 03/07/19 gfr of 59         Debility        Orders " Placed This Encounter   Procedures    CBC auto differential     Standing Status:   Future     Number of Occurrences:   1     Standing Expiration Date:   5/12/2021    Comprehensive metabolic panel     Standing Status:   Future     Number of Occurrences:   1     Standing Expiration Date:   5/12/2021    Lipid panel     Standing Status:   Future     Number of Occurrences:   1     Standing Expiration Date:   5/12/2021     Follow up in about 6 months (around 8/12/2020) for Follow up.     Medication List           Accurate as of February 12, 2020  6:28 PM. If you have any questions, ask your nurse or doctor.               START taking these medications    Shingrix (PF) 50 mcg/0.5 mL injection  Generic drug:  varicella-zoster [id number] (PF)  Inject 0.5 mLs into the muscle once. For one dose. for 1 dose        CONTINUE taking these medications    acetaminophen 325 MG tablet  Commonly known as:  TYLENOL     aspirin 81 MG EC tablet  Commonly known as:  ECOTRIN     calcium carbonate 600 mg calcium (1,500 mg) Tab  Commonly known as:  OS-RAMONE     donepezil 5 MG tablet  Commonly known as:  ARICEPT  Take 1 tablet (5 mg total) by mouth every evening.     fish oil-omega-3 fatty acids 300-1,000 mg capsule     Fluad [id number] (65 yr up)(PF) 45 mcg (15 mcg x 3)/0.5 mL Syrg  Generic drug:  flu vac 2019 [id number](PF)     * levothyroxine 100 MCG tablet  Commonly known as:  SYNTHROID  Take 1 tablet (100 mcg total) by mouth once daily.     * levothyroxine 100 MCG tablet  Commonly known as:  SYNTHROID  Take 1 tablet (100 mcg total) by mouth before breakfast.     lisinopril 20 MG tablet  Commonly known as:  PRINIVIL,ZESTRIL  Take 1 tablet (20 mg total) by mouth once daily.     memantine 10 MG Tab  Commonly known as:  NAMENDA  Take 1 tablet (10 mg total) by mouth 2 (two) times daily.     multivitamin capsule     omeprazole 40 MG capsule  Commonly known as:  PRILOSEC  TAKE 1 CAPSULE EVERY MORNING     oxybutynin 5 MG Tab  Commonly known as:   DITROPAN  TAKE 2 TABLETS AT NIGHT     pravastatin 80 MG tablet  Commonly known as:  PRAVACHOL  TAKE 1 TABLET ONE TIME DAILY     sertraline 25 MG tablet  Commonly known as:  ZOLOFT  Take 1 tablet (25 mg total) by mouth every evening.         * This list has 2 medication(s) that are the same as other medications prescribed for you. Read the directions carefully, and ask your doctor or other care provider to review them with you.               Where to Get Your Medications      These medications were sent to [facility]  [address] [place] [address]    Hours:  Mon-Fri, 8a-5:30p Phone:  [phone]   · Shingrix (PF) 50 mcg/0.5 mL injection

## 2020-02-13 LAB
ALBUMIN SERPL BCP-MCNC: 3.9 G/DL (ref 3.5–5.2)
ALP SERPL-CCNC: 127 U/L (ref 55–135)
ALT SERPL W/O P-5'-P-CCNC: 11 U/L (ref 10–44)
ANION GAP SERPL CALC-SCNC: 8 MMOL/L (ref 8–16)
AST SERPL-CCNC: 19 U/L (ref 10–40)
BILIRUB SERPL-MCNC: 0.8 MG/DL (ref 0.1–1)
BUN SERPL-MCNC: 13 MG/DL (ref 8–23)
CALCIUM SERPL-MCNC: 9.3 MG/DL (ref 8.7–10.5)
CHLORIDE SERPL-SCNC: 98 MMOL/L (ref 95–110)
CHOLEST SERPL-MCNC: 145 MG/DL (ref 120–199)
CHOLEST/HDLC SERPL: 2.4 {RATIO} (ref 2–5)
CO2 SERPL-SCNC: 27 MMOL/L (ref 23–29)
CREAT SERPL-MCNC: 1 MG/DL (ref 0.5–1.4)
EST. GFR  (AFRICAN AMERICAN): 59.4 ML/MIN/1.73 M^2
EST. GFR  (NON AFRICAN AMERICAN): 51.5 ML/MIN/1.73 M^2
GLUCOSE SERPL-MCNC: 119 MG/DL (ref 70–110)
HDLC SERPL-MCNC: 60 MG/DL (ref 40–75)
HDLC SERPL: 41.4 % (ref 20–50)
LDLC SERPL CALC-MCNC: 63.8 MG/DL (ref 63–159)
NONHDLC SERPL-MCNC: 85 MG/DL
POTASSIUM SERPL-SCNC: 5.1 MMOL/L (ref 3.5–5.1)
PROT SERPL-MCNC: 6.8 G/DL (ref 6–8.4)
SODIUM SERPL-SCNC: 133 MMOL/L (ref 136–145)
TRIGL SERPL-MCNC: 106 MG/DL (ref 30–150)

## 2020-02-13 RX ORDER — LISINOPRIL 20 MG/1
TABLET ORAL
Qty: 90 TABLET | Refills: 3 | Status: SHIPPED | OUTPATIENT
Start: 2020-02-13 | End: 2020-12-28 | Stop reason: DRUGHIGH

## 2020-02-21 ENCOUNTER — TELEPHONE (OUTPATIENT)
Dept: PRIMARY CARE CLINIC | Facility: CLINIC | Age: 85
End: 2020-02-21

## 2020-02-21 NOTE — TELEPHONE ENCOUNTER
Scarlett- Have you followed-up with this family? They need to be connected to placement resources ([facility] or [facility]). Please follow-up.    Thanks,  KJ

## 2020-03-02 ENCOUNTER — PES CALL (OUTPATIENT)
Dept: ADMINISTRATIVE | Facility: CLINIC | Age: 85
End: 2020-03-02

## 2020-05-04 ENCOUNTER — TELEPHONE (OUTPATIENT)
Dept: INTERNAL MEDICINE | Facility: CLINIC | Age: 85
End: 2020-05-04

## 2020-05-04 NOTE — TELEPHONE ENCOUNTER
Spoke to pt's  about levothyroxine medication. Informed to use one bottle now ,put away 2nd bottle until completion of 1st bottle, pt's  repeated instruction with understanding. Pt requested to consult with daughter.

## 2020-05-04 NOTE — TELEPHONE ENCOUNTER
----- Message from Sybil Gaytan sent at 5/4/2020 10:25 AM CDT -----  Contact: ()  Alejandro [phone]  Patient's  would like to speak to the nurse for advise on patient's current medications. Please call and advise.   Referred To Oculoplastics For Closure Text (Leave Blank If You Do Not Want): After obtaining clear surgical margins the patient was sent to oculoplastics for surgical repair.  The patient understands they will receive post-surgical care and follow-up from the referring physician's office.

## 2020-06-19 NOTE — TELEPHONE ENCOUNTER
----- Message from Rosey Loya sent at 2020  2:46 PM CDT -----  Regarding: Self  Estefania Mccollum  MRN: [id number]  : 10/31/1934  PCP: Danna Levine  Home Phone      [phone]  Work Phone      Not on file.  Mobile          [phone]      MESSAGE:   Pt needs a refill on Donepezile 5mg [facility]. Please return call @ [phone]. Thanks.

## 2020-06-22 RX ORDER — DONEPEZIL HYDROCHLORIDE 5 MG/1
5 TABLET, FILM COATED ORAL NIGHTLY
Qty: 90 TABLET | Refills: 3 | Status: SHIPPED | OUTPATIENT
Start: 2020-06-22 | End: 2021-08-23 | Stop reason: SDUPTHER

## 2020-07-29 ENCOUNTER — HOSPITAL ENCOUNTER (OUTPATIENT)
Facility: HOSPITAL | Age: 85
Discharge: NURSING, REHAB OR LONG-TERM CARE | End: 2020-07-31
Attending: SURGERY | Admitting: FAMILY MEDICINE
Payer: MEDICARE

## 2020-07-29 DIAGNOSIS — I25.10 CARDIOVASCULAR DISEASE: ICD-10-CM

## 2020-07-29 DIAGNOSIS — T14.90XA TRAUMA: ICD-10-CM

## 2020-07-29 DIAGNOSIS — R41.82 ALTERED MENTAL STATUS, UNSPECIFIED ALTERED MENTAL STATUS TYPE: ICD-10-CM

## 2020-07-29 DIAGNOSIS — N30.01 ACUTE CYSTITIS WITH HEMATURIA: Primary | ICD-10-CM

## 2020-07-29 LAB
ALBUMIN SERPL BCP-MCNC: 3.4 G/DL (ref 3.5–5.2)
ALP SERPL-CCNC: 70 U/L (ref 55–135)
ALT SERPL W/O P-5'-P-CCNC: 51 U/L (ref 10–44)
ANION GAP SERPL CALC-SCNC: 10 MMOL/L (ref 8–16)
AST SERPL-CCNC: 44 U/L (ref 10–40)
BACTERIA #/AREA URNS HPF: ABNORMAL /HPF
BASOPHILS # BLD AUTO: 0.02 K/UL (ref 0–0.2)
BASOPHILS NFR BLD: 0.1 % (ref 0–1.9)
BILIRUB SERPL-MCNC: 1.4 MG/DL (ref 0.1–1)
BILIRUB UR QL STRIP: NEGATIVE
BUN SERPL-MCNC: 17 MG/DL (ref 8–23)
CALCIUM SERPL-MCNC: 8.6 MG/DL (ref 8.7–10.5)
CHLORIDE SERPL-SCNC: 96 MMOL/L (ref 95–110)
CLARITY UR: ABNORMAL
CO2 SERPL-SCNC: 25 MMOL/L (ref 23–29)
COLOR UR: YELLOW
CREAT SERPL-MCNC: 1.2 MG/DL (ref 0.5–1.4)
DIFFERENTIAL METHOD: ABNORMAL
EOSINOPHIL # BLD AUTO: 0 K/UL (ref 0–0.5)
EOSINOPHIL NFR BLD: 0.1 % (ref 0–8)
ERYTHROCYTE [DISTWIDTH] IN BLOOD BY AUTOMATED COUNT: 12.9 % (ref 11.5–14.5)
EST. GFR  (AFRICAN AMERICAN): 48 ML/MIN/1.73 M^2
EST. GFR  (NON AFRICAN AMERICAN): 41 ML/MIN/1.73 M^2
GLUCOSE SERPL-MCNC: 129 MG/DL (ref 70–110)
GLUCOSE UR QL STRIP: NEGATIVE
HCT VFR BLD AUTO: 41.9 % (ref 37–48.5)
HGB BLD-MCNC: 14.1 G/DL (ref 12–16)
HGB UR QL STRIP: ABNORMAL
HYALINE CASTS #/AREA URNS LPF: 0 /LPF
IMM GRANULOCYTES # BLD AUTO: 0.09 K/UL (ref 0–0.04)
IMM GRANULOCYTES NFR BLD AUTO: 0.6 % (ref 0–0.5)
KETONES UR QL STRIP: ABNORMAL
LEUKOCYTE ESTERASE UR QL STRIP: ABNORMAL
LYMPHOCYTES # BLD AUTO: 0.7 K/UL (ref 1–4.8)
LYMPHOCYTES NFR BLD: 4.9 % (ref 18–48)
MCH RBC QN AUTO: 30.5 PG (ref 27–31)
MCHC RBC AUTO-ENTMCNC: 33.7 G/DL (ref 32–36)
MCV RBC AUTO: 91 FL (ref 82–98)
MICROSCOPIC COMMENT: ABNORMAL
MONOCYTES # BLD AUTO: 1.1 K/UL (ref 0.3–1)
MONOCYTES NFR BLD: 7.4 % (ref 4–15)
NEUTROPHILS # BLD AUTO: 13.2 K/UL (ref 1.8–7.7)
NEUTROPHILS NFR BLD: 86.9 % (ref 38–73)
NITRITE UR QL STRIP: POSITIVE
NRBC BLD-RTO: 0 /100 WBC
PH UR STRIP: 7 [PH] (ref 5–8)
PLATELET # BLD AUTO: 253 K/UL (ref 150–350)
PMV BLD AUTO: 9.2 FL (ref 9.2–12.9)
POTASSIUM SERPL-SCNC: 4.1 MMOL/L (ref 3.5–5.1)
PROT SERPL-MCNC: 5.9 G/DL (ref 6–8.4)
PROT UR QL STRIP: ABNORMAL
RBC # BLD AUTO: 4.63 M/UL (ref 4–5.4)
RBC #/AREA URNS HPF: 10 /HPF (ref 0–4)
SARS-COV-2 RDRP RESP QL NAA+PROBE: NEGATIVE
SODIUM SERPL-SCNC: 131 MMOL/L (ref 136–145)
SP GR UR STRIP: 1.02 (ref 1–1.03)
URN SPEC COLLECT METH UR: ABNORMAL
UROBILINOGEN UR STRIP-ACNC: 1 EU/DL
WBC # BLD AUTO: 15.19 K/UL (ref 3.9–12.7)
WBC #/AREA URNS HPF: >100 /HPF (ref 0–5)

## 2020-07-29 PROCEDURE — 99220 PR INITIAL OBSERVATION CARE,LEVL III: CPT | Mod: HCNC,,, | Performed by: FAMILY MEDICINE

## 2020-07-29 PROCEDURE — 63600175 PHARM REV CODE 636 W HCPCS: Mod: HCNC | Performed by: SURGERY

## 2020-07-29 PROCEDURE — 87088 URINE BACTERIA CULTURE: CPT | Mod: HCNC

## 2020-07-29 PROCEDURE — 87186 SC STD MICRODIL/AGAR DIL: CPT | Mod: HCNC

## 2020-07-29 PROCEDURE — 99220 PR INITIAL OBSERVATION CARE,LEVL III: ICD-10-PCS | Mod: HCNC,,, | Performed by: FAMILY MEDICINE

## 2020-07-29 PROCEDURE — 80053 COMPREHEN METABOLIC PANEL: CPT | Mod: HCNC

## 2020-07-29 PROCEDURE — G0378 HOSPITAL OBSERVATION PER HR: HCPCS | Mod: HCNC

## 2020-07-29 PROCEDURE — 25000003 PHARM REV CODE 250: Mod: HCNC | Performed by: FAMILY MEDICINE

## 2020-07-29 PROCEDURE — 87040 BLOOD CULTURE FOR BACTERIA: CPT | Mod: HCNC

## 2020-07-29 PROCEDURE — 36415 COLL VENOUS BLD VENIPUNCTURE: CPT | Mod: HCNC

## 2020-07-29 PROCEDURE — 96365 THER/PROPH/DIAG IV INF INIT: CPT | Mod: HCNC

## 2020-07-29 PROCEDURE — U0002 COVID-19 LAB TEST NON-CDC: HCPCS | Mod: HCNC

## 2020-07-29 PROCEDURE — 99285 EMERGENCY DEPT VISIT HI MDM: CPT | Mod: 25,HCNC

## 2020-07-29 PROCEDURE — 94760 N-INVAS EAR/PLS OXIMETRY 1: CPT | Mod: HCNC

## 2020-07-29 PROCEDURE — 85025 COMPLETE CBC W/AUTO DIFF WBC: CPT | Mod: HCNC

## 2020-07-29 PROCEDURE — 87086 URINE CULTURE/COLONY COUNT: CPT | Mod: HCNC

## 2020-07-29 PROCEDURE — 81000 URINALYSIS NONAUTO W/SCOPE: CPT | Mod: HCNC

## 2020-07-29 PROCEDURE — 87077 CULTURE AEROBIC IDENTIFY: CPT | Mod: HCNC

## 2020-07-29 RX ORDER — PANTOPRAZOLE SODIUM 40 MG/1
40 TABLET, DELAYED RELEASE ORAL DAILY
Status: DISCONTINUED | OUTPATIENT
Start: 2020-07-30 | End: 2020-07-29 | Stop reason: SDUPTHER

## 2020-07-29 RX ORDER — MAG HYDROX/ALUMINUM HYD/SIMETH 200-200-20
30 SUSPENSION, ORAL (FINAL DOSE FORM) ORAL
Status: DISCONTINUED | OUTPATIENT
Start: 2020-07-29 | End: 2020-07-31 | Stop reason: HOSPADM

## 2020-07-29 RX ORDER — PANTOPRAZOLE SODIUM 40 MG/1
40 TABLET, DELAYED RELEASE ORAL DAILY
Status: DISCONTINUED | OUTPATIENT
Start: 2020-07-30 | End: 2020-07-31 | Stop reason: HOSPADM

## 2020-07-29 RX ORDER — HYDROCODONE BITARTRATE AND ACETAMINOPHEN 5; 325 MG/1; MG/1
1 TABLET ORAL EVERY 4 HOURS PRN
Status: DISCONTINUED | OUTPATIENT
Start: 2020-07-29 | End: 2020-07-31 | Stop reason: HOSPADM

## 2020-07-29 RX ORDER — LEVOFLOXACIN 5 MG/ML
500 INJECTION, SOLUTION INTRAVENOUS
Status: DISCONTINUED | OUTPATIENT
Start: 2020-07-30 | End: 2020-07-29 | Stop reason: SDUPTHER

## 2020-07-29 RX ORDER — ASPIRIN 81 MG/1
81 TABLET ORAL DAILY
Status: DISCONTINUED | OUTPATIENT
Start: 2020-07-30 | End: 2020-07-31 | Stop reason: HOSPADM

## 2020-07-29 RX ORDER — PRAVASTATIN SODIUM 40 MG/1
80 TABLET ORAL DAILY
Status: DISCONTINUED | OUTPATIENT
Start: 2020-07-30 | End: 2020-07-31 | Stop reason: HOSPADM

## 2020-07-29 RX ORDER — SODIUM CHLORIDE 0.9 % (FLUSH) 0.9 %
10 SYRINGE (ML) INJECTION
Status: DISCONTINUED | OUTPATIENT
Start: 2020-07-29 | End: 2020-07-31 | Stop reason: HOSPADM

## 2020-07-29 RX ORDER — SUCRALFATE 1 G/10ML
1 SUSPENSION ORAL EVERY 6 HOURS
Status: DISCONTINUED | OUTPATIENT
Start: 2020-07-30 | End: 2020-07-31 | Stop reason: HOSPADM

## 2020-07-29 RX ORDER — LEVOTHYROXINE SODIUM 100 UG/1
100 TABLET ORAL DAILY
Status: DISCONTINUED | OUTPATIENT
Start: 2020-07-30 | End: 2020-07-29 | Stop reason: SDUPTHER

## 2020-07-29 RX ORDER — SERTRALINE HYDROCHLORIDE 25 MG/1
25 TABLET, FILM COATED ORAL NIGHTLY
Status: DISCONTINUED | OUTPATIENT
Start: 2020-07-29 | End: 2020-07-31 | Stop reason: HOSPADM

## 2020-07-29 RX ORDER — LEVOTHYROXINE SODIUM 100 UG/1
100 TABLET ORAL
Status: DISCONTINUED | OUTPATIENT
Start: 2020-07-30 | End: 2020-07-31 | Stop reason: HOSPADM

## 2020-07-29 RX ORDER — LEVOFLOXACIN 5 MG/ML
500 INJECTION, SOLUTION INTRAVENOUS
Status: DISCONTINUED | OUTPATIENT
Start: 2020-07-29 | End: 2020-07-31 | Stop reason: HOSPADM

## 2020-07-29 RX ORDER — ACETAMINOPHEN 325 MG/1
650 TABLET ORAL EVERY 8 HOURS PRN
Status: DISCONTINUED | OUTPATIENT
Start: 2020-07-29 | End: 2020-07-31 | Stop reason: HOSPADM

## 2020-07-29 RX ORDER — MEMANTINE HYDROCHLORIDE 10 MG/1
10 TABLET ORAL 2 TIMES DAILY
Status: DISCONTINUED | OUTPATIENT
Start: 2020-07-29 | End: 2020-07-31 | Stop reason: HOSPADM

## 2020-07-29 RX ORDER — OXYMETAZOLINE HCL 0.05 %
2 SPRAY, NON-AEROSOL (ML) NASAL 2 TIMES DAILY
Status: DISCONTINUED | OUTPATIENT
Start: 2020-07-29 | End: 2020-07-29

## 2020-07-29 RX ORDER — ONDANSETRON 2 MG/ML
4 INJECTION INTRAMUSCULAR; INTRAVENOUS EVERY 8 HOURS PRN
Status: DISCONTINUED | OUTPATIENT
Start: 2020-07-29 | End: 2020-07-31 | Stop reason: HOSPADM

## 2020-07-29 RX ORDER — LISINOPRIL 20 MG/1
20 TABLET ORAL DAILY
Status: DISCONTINUED | OUTPATIENT
Start: 2020-07-30 | End: 2020-07-31 | Stop reason: HOSPADM

## 2020-07-29 RX ORDER — DONEPEZIL HYDROCHLORIDE 5 MG/1
5 TABLET, FILM COATED ORAL NIGHTLY
Status: DISCONTINUED | OUTPATIENT
Start: 2020-07-29 | End: 2020-07-31 | Stop reason: HOSPADM

## 2020-07-29 RX ADMIN — LEVOFLOXACIN 500 MG: 500 INJECTION, SOLUTION INTRAVENOUS at 01:07

## 2020-07-29 RX ADMIN — ALUMINUM HYDROXIDE, MAGNESIUM HYDROXIDE, AND SIMETHICONE 30 ML: 200; 200; 20 SUSPENSION ORAL at 08:07

## 2020-07-29 RX ADMIN — SERTRALINE HYDROCHLORIDE 25 MG: 25 TABLET ORAL at 08:07

## 2020-07-29 RX ADMIN — DONEPEZIL HYDROCHLORIDE 5 MG: 5 TABLET, FILM COATED ORAL at 08:07

## 2020-07-29 RX ADMIN — SUCRALFATE 1 G: 1 SUSPENSION ORAL at 11:07

## 2020-07-29 RX ADMIN — MEMANTINE 10 MG: 10 TABLET ORAL at 08:07

## 2020-07-29 NOTE — H&P
[facility] Medicine  History & Physical    Patient Name: Estefania Mccollum  MRN: [id number]  Admission Date: 2020  Attending Physician: Manish Lynn MD   Primary Care Provider: Danna Levine MD         Patient information was obtained from ER records.     Subjective:     Principal Problem:Acute cystitis with hematuria    Chief Complaint:   Chief Complaint   Patient presents with    Fall        HPI: 85 year old female presents to ED with  for some confusion.   Found to have a dirty urine and WBC of [id number]  Admitted for inpatient treatment of complicated UTI since  is elderly and frail. Unable to properly care for her in her weakened and confused state       Past Medical History:   Diagnosis Date    Adenomatous polyp     Allergy     sinus    Annual physical exam 2011    Arthritis     osteoarthritis    Cataract     History of bone density study 2009    History of colonoscopy 10/30/2008    History of mammogram 2011    Hyperlipidemia     Hypertension     Macular degeneration     Postmenopausal hormone replacement therapy     therapy stopped    Squamous cell carcinoma 2019    Left thigh (ED&C)    Thyroid disease     hypothyroidism    Vertigo        Past Surgical History:   Procedure Laterality Date    APPENDECTOMY      12 years old    CATARACT EXTRACTION      Both eyes    CATARACT EXTRACTION, BILATERAL       SECTION      x 3    CHOLECYSTECTOMY      COLONOSCOPY  10/02/2013    HYSTERECTOMY      total    JOINT REPLACEMENT Left     OOPHORECTOMY      TONSILLECTOMY      TOTAL KNEE ARTHROPLASTY Left 2016    Yag       Left Eye       Review of patient's allergies indicates:   Allergen Reactions    Tetracycline Other (See Comments)     Other reaction(s): Rash.. Pt states no drug allergie       No current facility-administered medications on file prior to encounter.      Current Outpatient Medications on File Prior to  Encounter   Medication Sig    acetaminophen (TYLENOL) 325 MG tablet Take 325 mg by mouth every 6 (six) hours as needed for Pain.    aspirin (ECOTRIN) 81 MG EC tablet Take 81 mg by mouth once daily.    calcium carbonate (OS-RAMONE) 600 mg (1,500 mg) Tab Take 600 mg by mouth once daily.     donepeziL (ARICEPT) 5 MG tablet Take 1 tablet (5 mg total) by mouth every evening.    fish oil-omega-3 fatty acids 300-1,000 mg capsule Take 2 g by mouth once daily.    FLUAD 5073-6565, 65 YR UP,,PF, 45 mcg (15 mcg x 3)/0.5 mL Syrg ADM 0.5ML IM UTD    levothyroxine (SYNTHROID) 100 MCG tablet Take 1 tablet (100 mcg total) by mouth once daily.    levothyroxine (SYNTHROID) 100 MCG tablet Take 1 tablet (100 mcg total) by mouth before breakfast.    lisinopril (PRINIVIL,ZESTRIL) 20 MG tablet TAKE 1 TABLET EVERY DAY    memantine (NAMENDA) 10 MG Tab TAKE 1 TABLET TWICE DAILY    multivitamin capsule Take 1 capsule by mouth once daily.    omeprazole (PRILOSEC) 40 MG capsule TAKE 1 CAPSULE EVERY MORNING    oxybutynin (DITROPAN) 5 MG Tab TAKE 2 TABLETS AT NIGHT    pravastatin (PRAVACHOL) 80 MG tablet TAKE 1 TABLET EVERY DAY    sertraline (ZOLOFT) 25 MG tablet TAKE 1 TABLET (25 MG TOTAL) BY MOUTH EVERY EVENING.     Family History     Problem Relation (Age of Onset)    Asthma Father    Diabetes Sister    Heart attack Brother    Heart disease Brother    Heart failure Mother, Father    Hyperlipidemia Mother, Father    Hypertension Mother, Father, Sister    Mental retardation Daughter    No Known Problems Daughter, Brother, Daughter        Tobacco Use    Smoking status: Never Smoker    Smokeless tobacco: Never Used   Substance and Sexual Activity    Alcohol use: Yes     Frequency: Monthly or less     Drinks per session: Patient refused     Binge frequency: Never     Comment: wine occasionally    Drug use: No    Sexual activity: Yes     Partners: Male     Review of Systems   Unable to perform ROS: Mental status change     Objective:      Vital Signs (Most Recent):  Temp: 97.6 °F (36.4 °C) (07/29/20 1614)  Pulse: 87 (07/29/20 1815)  Resp: 18 (07/29/20 1614)  BP: (!) 170/70 (07/29/20 1614)  SpO2: 95 % (07/29/20 1614) Vital Signs (24h Range):  Temp:  [97.3 °F (36.3 °C)-97.6 °F (36.4 °C)] 97.6 °F (36.4 °C)  Pulse:  [82-93] 87  Resp:  [16-20] 18  SpO2:  [95 %-100 %] 95 %  BP: (108-170)/(51-80) 170/70     Weight: 67.1 kg (148 lb)  Body mass index is 22.5 kg/m².    Physical Exam  Constitutional:       Appearance: She is well-developed.   HENT:      Head: Normocephalic and atraumatic.      Right Ear: External ear normal.      Left Ear: External ear normal.      Nose: Nose normal.   Eyes:      Pupils: Pupils are equal, round, and reactive to light.   Neck:      Musculoskeletal: Normal range of motion and neck supple.      Thyroid: No thyromegaly.      Vascular: No JVD.      Trachea: No tracheal deviation.   Cardiovascular:      Rate and Rhythm: Normal rate.      Heart sounds: Normal heart sounds. No murmur.   Pulmonary:      Effort: Pulmonary effort is normal. No respiratory distress.      Breath sounds: Normal breath sounds. No wheezing or rales.   Chest:      Chest wall: No tenderness.   Abdominal:      General: Bowel sounds are normal. There is no distension.      Palpations: Abdomen is soft. There is no mass.      Tenderness: There is no abdominal tenderness. There is no guarding or rebound.   Musculoskeletal: Normal range of motion.         General: No tenderness.   Lymphadenopathy:      Cervical: No cervical adenopathy.   Skin:     General: Skin is warm and dry.      Coloration: Skin is not pale.      Findings: No erythema or rash.   Neurological:      Mental Status: She is alert and oriented to person, place, and time.      Cranial Nerves: No cranial nerve deficit.      Motor: No abnormal muscle tone.      Coordination: Coordination normal.      Deep Tendon Reflexes: Reflexes are normal and symmetric. Reflexes normal.   Psychiatric:          Behavior: Behavior normal.         Thought Content: Thought content normal.         Judgment: Judgment normal.           CRANIAL NERVES     CN III, IV, VI   Pupils are equal, round, and reactive to light.       Significant Labs:   Blood Culture: No results for input(s): LABBLOO in the last 48 hours.  CBC:   Recent Labs   Lab 07/29/20  1119   WBC 15.19*   HGB 14.1   HCT 41.9        CMP:   Recent Labs   Lab 07/29/20  1119   *   K 4.1   CL 96   CO2 25   *   BUN 17   CREATININE 1.2   CALCIUM 8.6*   PROT 5.9*   ALBUMIN 3.4*   BILITOT 1.4*   ALKPHOS 70   AST 44*   ALT 51*   ANIONGAP 10   EGFRNONAA 41*     Urine Culture: No results for input(s): LABURIN in the last 48 hours.  Urine Studies:   Recent Labs   Lab 07/29/20  1154   COLORU Yellow   APPEARANCEUA Cloudy*   PHUR 7.0   SPECGRAV 1.020   PROTEINUA 2+*   GLUCUA Negative   KETONESU Trace*   BILIRUBINUA Negative   OCCULTUA 2+*   NITRITE Positive*   UROBILINOGEN 1.0   LEUKOCYTESUR 3+*   RBCUA 10*   WBCUA >100*   BACTERIA Many*   HYALINECASTS 0       Significant Imaging: I have reviewed and interpreted all pertinent imaging results/findings within the past 24 hours.    Assessment/Plan:     * Acute cystitis with hematuria  Continue IV levaquin  Blood and urine cultures         Memory loss  Continue aricept and namenda         Hyperlipidemia  Continue pravastatin 80mg         Hypothyroidism  continue synthroid 100mcg  Lab Results   Component Value Date    TSH 2.578 12/03/2019           Hypertension  Continue lisinopril         VTE Risk Mitigation (From admission, onward)         Ordered     IP VTE HIGH RISK PATIENT  Once      07/29/20 1619     Place sequential compression device  Until discontinued      07/29/20 1619                   Manish Lynn MD  Department of Hospital Medicine   [facility]

## 2020-07-29 NOTE — HPI
85 year old female presents to ED with  for some confusion.   Found to have a dirty urine and WBC of [id number]  Admitted for inpatient treatment of complicated UTI since  is elderly and frail. Unable to properly care for her in her weakened and confused state

## 2020-07-29 NOTE — SUBJECTIVE & OBJECTIVE
Past Medical History:   Diagnosis Date    Adenomatous polyp     Allergy     sinus    Annual physical exam 2011    Arthritis     osteoarthritis    Cataract     History of bone density study 2009    History of colonoscopy 10/30/2008    History of mammogram 2011    Hyperlipidemia     Hypertension     Macular degeneration     Postmenopausal hormone replacement therapy     therapy stopped    Squamous cell carcinoma 2019    Left thigh (ED&C)    Thyroid disease     hypothyroidism    Vertigo        Past Surgical History:   Procedure Laterality Date    APPENDECTOMY      12 years old    CATARACT EXTRACTION      Both eyes    CATARACT EXTRACTION, BILATERAL       SECTION      x 3    CHOLECYSTECTOMY      COLONOSCOPY  10/02/2013    HYSTERECTOMY      total    JOINT REPLACEMENT Left     OOPHORECTOMY      TONSILLECTOMY      TOTAL KNEE ARTHROPLASTY Left 2016    Yag       Left Eye       Review of patient's allergies indicates:   Allergen Reactions    Tetracycline Other (See Comments)     Other reaction(s): Rash.. Pt states no drug allergie       No current facility-administered medications on file prior to encounter.      Current Outpatient Medications on File Prior to Encounter   Medication Sig    acetaminophen (TYLENOL) 325 MG tablet Take 325 mg by mouth every 6 (six) hours as needed for Pain.    aspirin (ECOTRIN) 81 MG EC tablet Take 81 mg by mouth once daily.    calcium carbonate (OS-RAMONE) 600 mg (1,500 mg) Tab Take 600 mg by mouth once daily.     donepeziL (ARICEPT) 5 MG tablet Take 1 tablet (5 mg total) by mouth every evening.    fish oil-omega-3 fatty acids 300-1,000 mg capsule Take 2 g by mouth once daily.    FLUAD 4994-0033, 65 YR UP,,PF, 45 mcg (15 mcg x 3)/0.5 mL Syrg ADM 0.5ML IM UTD    levothyroxine (SYNTHROID) 100 MCG tablet Take 1 tablet (100 mcg total) by mouth once daily.    levothyroxine (SYNTHROID) 100 MCG tablet Take 1 tablet (100 mcg total) by mouth  before breakfast.    lisinopril (PRINIVIL,ZESTRIL) 20 MG tablet TAKE 1 TABLET EVERY DAY    memantine (NAMENDA) 10 MG Tab TAKE 1 TABLET TWICE DAILY    multivitamin capsule Take 1 capsule by mouth once daily.    omeprazole (PRILOSEC) 40 MG capsule TAKE 1 CAPSULE EVERY MORNING    oxybutynin (DITROPAN) 5 MG Tab TAKE 2 TABLETS AT NIGHT    pravastatin (PRAVACHOL) 80 MG tablet TAKE 1 TABLET EVERY DAY    sertraline (ZOLOFT) 25 MG tablet TAKE 1 TABLET (25 MG TOTAL) BY MOUTH EVERY EVENING.     Family History     Problem Relation (Age of Onset)    Asthma Father    Diabetes Sister    Heart attack Brother    Heart disease Brother    Heart failure Mother, Father    Hyperlipidemia Mother, Father    Hypertension Mother, Father, Sister    Mental retardation Daughter    No Known Problems Daughter, Brother, Daughter        Tobacco Use    Smoking status: Never Smoker    Smokeless tobacco: Never Used   Substance and Sexual Activity    Alcohol use: Yes     Frequency: Monthly or less     Drinks per session: Patient refused     Binge frequency: Never     Comment: wine occasionally    Drug use: No    Sexual activity: Yes     Partners: Male     Review of Systems   Unable to perform ROS: Mental status change     Objective:     Vital Signs (Most Recent):  Temp: 97.6 °F (36.4 °C) (07/29/20 1614)  Pulse: 87 (07/29/20 1815)  Resp: 18 (07/29/20 1614)  BP: (!) 170/70 (07/29/20 1614)  SpO2: 95 % (07/29/20 1614) Vital Signs (24h Range):  Temp:  [97.3 °F (36.3 °C)-97.6 °F (36.4 °C)] 97.6 °F (36.4 °C)  Pulse:  [82-93] 87  Resp:  [16-20] 18  SpO2:  [95 %-100 %] 95 %  BP: (108-170)/(51-80) 170/70     Weight: 67.1 kg (148 lb)  Body mass index is 22.5 kg/m².    Physical Exam  Constitutional:       Appearance: She is well-developed.   HENT:      Head: Normocephalic and atraumatic.      Right Ear: External ear normal.      Left Ear: External ear normal.      Nose: Nose normal.   Eyes:      Pupils: Pupils are equal, round, and reactive to light.    Neck:      Musculoskeletal: Normal range of motion and neck supple.      Thyroid: No thyromegaly.      Vascular: No JVD.      Trachea: No tracheal deviation.   Cardiovascular:      Rate and Rhythm: Normal rate.      Heart sounds: Normal heart sounds. No murmur.   Pulmonary:      Effort: Pulmonary effort is normal. No respiratory distress.      Breath sounds: Normal breath sounds. No wheezing or rales.   Chest:      Chest wall: No tenderness.   Abdominal:      General: Bowel sounds are normal. There is no distension.      Palpations: Abdomen is soft. There is no mass.      Tenderness: There is no abdominal tenderness. There is no guarding or rebound.   Musculoskeletal: Normal range of motion.         General: No tenderness.   Lymphadenopathy:      Cervical: No cervical adenopathy.   Skin:     General: Skin is warm and dry.      Coloration: Skin is not pale.      Findings: No erythema or rash.   Neurological:      Mental Status: She is alert and oriented to person, place, and time.      Cranial Nerves: No cranial nerve deficit.      Motor: No abnormal muscle tone.      Coordination: Coordination normal.      Deep Tendon Reflexes: Reflexes are normal and symmetric. Reflexes normal.   Psychiatric:         Behavior: Behavior normal.         Thought Content: Thought content normal.         Judgment: Judgment normal.           CRANIAL NERVES     CN III, IV, VI   Pupils are equal, round, and reactive to light.       Significant Labs:   Blood Culture: No results for input(s): LABBLOO in the last 48 hours.  CBC:   Recent Labs   Lab 07/29/20  1119   WBC 15.19*   HGB 14.1   HCT 41.9        CMP:   Recent Labs   Lab 07/29/20  1119   *   K 4.1   CL 96   CO2 25   *   BUN 17   CREATININE 1.2   CALCIUM 8.6*   PROT 5.9*   ALBUMIN 3.4*   BILITOT 1.4*   ALKPHOS 70   AST 44*   ALT 51*   ANIONGAP 10   EGFRNONAA 41*     Urine Culture: No results for input(s): LABURIN in the last 48 hours.  Urine Studies:   Recent Labs    Lab 07/29/20  1154   COLORU Yellow   APPEARANCEUA Cloudy*   PHUR 7.0   SPECGRAV 1.020   PROTEINUA 2+*   GLUCUA Negative   KETONESU Trace*   BILIRUBINUA Negative   OCCULTUA 2+*   NITRITE Positive*   UROBILINOGEN 1.0   LEUKOCYTESUR 3+*   RBCUA 10*   WBCUA >100*   BACTERIA Many*   HYALINECASTS 0       Significant Imaging: I have reviewed and interpreted all pertinent imaging results/findings within the past 24 hours.

## 2020-07-29 NOTE — ED TRIAGE NOTES
85 y.o. female presents to ER ED 06/ED 06   Chief Complaint   Patient presents with    Fall   .   Here per [facility] EM with reports of multiple falls at home, caretaker reports pt hasn't been eating or drinking, hx of dementia

## 2020-07-29 NOTE — PLAN OF CARE
Patient admitted to room 311 from ER with a DX: of UTI, Assessment complete per flow sheet, AAOX2, some confusion noted when answering questions. RR even unlabored BBS clear,on RA. NRS with a first degree block on Cardiac monitor. Abdomen soft, non distended, with active bowel sounds x 4 quads. Diaper in use due to incontinence of bowel and bladder. Tolerating diet well. 22 G PIV SL Dsg C/D/I. Safety precautions initiated, bed alarm on, free from falls/ injury, call bell in reach, instructed to call for needs, voiced understanding, oriented to room and floor.

## 2020-07-29 NOTE — ED PROVIDER NOTES
Encounter Date: 2020       History     Chief Complaint   Patient presents with    Fall     Patient is 85-year-old female, brought in by  who reports that she likely had a fall yesterday evening.  Patient complains of no specific pain, but was noted on examination in the triage to have abrasions involving the right forearm and on the lower aspect of the right elbow.  No other complaints are registered.        Review of patient's allergies indicates:   Allergen Reactions    Tetracycline Other (See Comments)     Other reaction(s): Rash.. Pt states no drug allergie     Past Medical History:   Diagnosis Date    Adenomatous polyp     Allergy     sinus    Annual physical exam 2011    Arthritis     osteoarthritis    Cataract     History of bone density study 2009    History of colonoscopy 10/30/2008    History of mammogram 2011    Hyperlipidemia     Hypertension     Macular degeneration     Postmenopausal hormone replacement therapy     therapy stopped    Squamous cell carcinoma 2019    Left thigh (ED&C)    Thyroid disease     hypothyroidism    Vertigo      Past Surgical History:   Procedure Laterality Date    APPENDECTOMY      12 years old    CATARACT EXTRACTION      Both eyes    CATARACT EXTRACTION, BILATERAL       SECTION      x 3    CHOLECYSTECTOMY      COLONOSCOPY  10/02/2013    HYSTERECTOMY      total    JOINT REPLACEMENT Left     OOPHORECTOMY      TONSILLECTOMY      TOTAL KNEE ARTHROPLASTY Left 2016    Yag       Left Eye     Family History   Problem Relation Age of Onset    Heart failure Mother     Hypertension Mother     Hyperlipidemia Mother     Heart failure Father     Hypertension Father     Hyperlipidemia Father     Asthma Father     Hypertension Sister     Diabetes Sister         Prediabetes    Heart attack Brother     No Known Problems Daughter     Heart disease Brother         Has had open heart surgery    No Known Problems  Brother     Mental retardation Daughter     No Known Problems Daughter     Coronary artery disease Neg Hx     Amblyopia Neg Hx     Blindness Neg Hx     Cataracts Neg Hx     Glaucoma Neg Hx     Macular degeneration Neg Hx     Retinal detachment Neg Hx     Strabismus Neg Hx     Melanoma Neg Hx     Psoriasis Neg Hx     Lupus Neg Hx     Eczema Neg Hx     Acne Neg Hx      Social History     Tobacco Use    Smoking status: Never Smoker    Smokeless tobacco: Never Used   Substance Use Topics    Alcohol use: Yes     Frequency: Monthly or less     Drinks per session: Patient refused     Binge frequency: Never     Comment: wine occasionally    Drug use: No     Review of Systems   Musculoskeletal:        Right forearm abrasion   All other systems reviewed and are negative.      Physical Exam     Initial Vitals   BP Pulse Resp Temp SpO2   07/29/20 1037 07/29/20 1030 07/29/20 1037 07/29/20 1027 07/29/20 1037   (!) 108/51 93 16 97.3 °F (36.3 °C) 96 %      MAP       --                Physical Exam    Nursing note and vitals reviewed.  Constitutional: She appears well-developed and well-nourished. No distress.   HENT:   Head: Normocephalic and atraumatic.   Nose: Nose normal.   Mouth/Throat: Oropharynx is clear and moist.   Eyes: Conjunctivae and EOM are normal. Pupils are equal, round, and reactive to light.   Neck: Normal range of motion. Neck supple.   Cardiovascular: Normal rate, regular rhythm, normal heart sounds and intact distal pulses.   Pulmonary/Chest: Breath sounds normal. No respiratory distress. She has no wheezes.   Abdominal: Soft. Bowel sounds are normal. There is no abdominal tenderness.   Musculoskeletal: Normal range of motion.      Comments: Abrasion Right forearm   Neurological: She is alert and oriented to person, place, and time. She has normal strength.   Skin: Skin is warm and dry.         ED Course   Procedures  Labs Reviewed - No data to display       Imaging Results    None                                           Clinical Impression:       ICD-10-CM ICD-9-CM   1. Acute cystitis with hematuria  N30.01 595.0   2. Trauma  T14.90XA 959.9   3. Altered mental status, unspecified altered mental status type  R41.82 780.97         Disposition:   Disposition: Placed in Observation  Condition: Stable                        Ronn Dougherty Jr., MD  07/29/20 1454

## 2020-07-30 LAB
ALBUMIN SERPL BCP-MCNC: 3.2 G/DL (ref 3.5–5.2)
ALP SERPL-CCNC: 66 U/L (ref 55–135)
ALT SERPL W/O P-5'-P-CCNC: 46 U/L (ref 10–44)
ANION GAP SERPL CALC-SCNC: 10 MMOL/L (ref 8–16)
AST SERPL-CCNC: 43 U/L (ref 10–40)
BASOPHILS # BLD AUTO: 0.03 K/UL (ref 0–0.2)
BASOPHILS NFR BLD: 0.3 % (ref 0–1.9)
BILIRUB SERPL-MCNC: 1.3 MG/DL (ref 0.1–1)
BUN SERPL-MCNC: 16 MG/DL (ref 8–23)
CALCIUM SERPL-MCNC: 8.5 MG/DL (ref 8.7–10.5)
CHLORIDE SERPL-SCNC: 95 MMOL/L (ref 95–110)
CO2 SERPL-SCNC: 26 MMOL/L (ref 23–29)
CREAT SERPL-MCNC: 0.9 MG/DL (ref 0.5–1.4)
DIFFERENTIAL METHOD: ABNORMAL
EOSINOPHIL # BLD AUTO: 0.1 K/UL (ref 0–0.5)
EOSINOPHIL NFR BLD: 1 % (ref 0–8)
ERYTHROCYTE [DISTWIDTH] IN BLOOD BY AUTOMATED COUNT: 12.5 % (ref 11.5–14.5)
EST. GFR  (AFRICAN AMERICAN): >60 ML/MIN/1.73 M^2
EST. GFR  (NON AFRICAN AMERICAN): 58 ML/MIN/1.73 M^2
GLUCOSE SERPL-MCNC: 110 MG/DL (ref 70–110)
HCT VFR BLD AUTO: 39.8 % (ref 37–48.5)
HGB BLD-MCNC: 13.4 G/DL (ref 12–16)
IMM GRANULOCYTES # BLD AUTO: 0.04 K/UL (ref 0–0.04)
IMM GRANULOCYTES NFR BLD AUTO: 0.4 % (ref 0–0.5)
LYMPHOCYTES # BLD AUTO: 1.5 K/UL (ref 1–4.8)
LYMPHOCYTES NFR BLD: 15.9 % (ref 18–48)
MCH RBC QN AUTO: 30.2 PG (ref 27–31)
MCHC RBC AUTO-ENTMCNC: 33.7 G/DL (ref 32–36)
MCV RBC AUTO: 90 FL (ref 82–98)
MONOCYTES # BLD AUTO: 0.8 K/UL (ref 0.3–1)
MONOCYTES NFR BLD: 8.4 % (ref 4–15)
NEUTROPHILS # BLD AUTO: 6.9 K/UL (ref 1.8–7.7)
NEUTROPHILS NFR BLD: 74 % (ref 38–73)
NRBC BLD-RTO: 0 /100 WBC
PLATELET # BLD AUTO: 240 K/UL (ref 150–350)
PMV BLD AUTO: 9.5 FL (ref 9.2–12.9)
POTASSIUM SERPL-SCNC: 4 MMOL/L (ref 3.5–5.1)
PROT SERPL-MCNC: 5.8 G/DL (ref 6–8.4)
RBC # BLD AUTO: 4.44 M/UL (ref 4–5.4)
SODIUM SERPL-SCNC: 131 MMOL/L (ref 136–145)
WBC # BLD AUTO: 9.29 K/UL (ref 3.9–12.7)

## 2020-07-30 PROCEDURE — G0378 HOSPITAL OBSERVATION PER HR: HCPCS | Mod: HCNC

## 2020-07-30 PROCEDURE — 36415 COLL VENOUS BLD VENIPUNCTURE: CPT | Mod: HCNC

## 2020-07-30 PROCEDURE — 93005 ELECTROCARDIOGRAM TRACING: CPT | Mod: HCNC

## 2020-07-30 PROCEDURE — 97162 PT EVAL MOD COMPLEX 30 MIN: CPT | Mod: HCNC

## 2020-07-30 PROCEDURE — 80053 COMPREHEN METABOLIC PANEL: CPT | Mod: HCNC

## 2020-07-30 PROCEDURE — 25000003 PHARM REV CODE 250: Mod: HCNC | Performed by: SURGERY

## 2020-07-30 PROCEDURE — 93010 ELECTROCARDIOGRAM REPORT: CPT | Mod: HCNC,,, | Performed by: INTERNAL MEDICINE

## 2020-07-30 PROCEDURE — 25000003 PHARM REV CODE 250: Mod: HCNC | Performed by: FAMILY MEDICINE

## 2020-07-30 PROCEDURE — 94760 N-INVAS EAR/PLS OXIMETRY 1: CPT | Mod: HCNC

## 2020-07-30 PROCEDURE — 99225 PR SUBSEQUENT OBSERVATION CARE,LEVEL II: ICD-10-PCS | Mod: HCNC,,, | Performed by: FAMILY MEDICINE

## 2020-07-30 PROCEDURE — 99225 PR SUBSEQUENT OBSERVATION CARE,LEVEL II: CPT | Mod: HCNC,,, | Performed by: FAMILY MEDICINE

## 2020-07-30 PROCEDURE — 85025 COMPLETE CBC W/AUTO DIFF WBC: CPT | Mod: HCNC

## 2020-07-30 PROCEDURE — 93010 EKG 12-LEAD: ICD-10-PCS | Mod: HCNC,,, | Performed by: INTERNAL MEDICINE

## 2020-07-30 RX ADMIN — ALUMINUM HYDROXIDE, MAGNESIUM HYDROXIDE, AND SIMETHICONE 30 ML: 200; 200; 20 SUSPENSION ORAL at 05:07

## 2020-07-30 RX ADMIN — MEMANTINE 10 MG: 10 TABLET ORAL at 09:07

## 2020-07-30 RX ADMIN — ALUMINUM HYDROXIDE, MAGNESIUM HYDROXIDE, AND SIMETHICONE 30 ML: 200; 200; 20 SUSPENSION ORAL at 04:07

## 2020-07-30 RX ADMIN — DONEPEZIL HYDROCHLORIDE 5 MG: 5 TABLET, FILM COATED ORAL at 09:07

## 2020-07-30 RX ADMIN — SUCRALFATE 1 G: 1 SUSPENSION ORAL at 05:07

## 2020-07-30 RX ADMIN — LEVOTHYROXINE SODIUM 100 MCG: 100 TABLET ORAL at 05:07

## 2020-07-30 RX ADMIN — SERTRALINE HYDROCHLORIDE 25 MG: 25 TABLET ORAL at 09:07

## 2020-07-30 RX ADMIN — ALUMINUM HYDROXIDE, MAGNESIUM HYDROXIDE, AND SIMETHICONE 30 ML: 200; 200; 20 SUSPENSION ORAL at 09:07

## 2020-07-30 RX ADMIN — MEMANTINE 10 MG: 10 TABLET ORAL at 07:07

## 2020-07-30 RX ADMIN — SUCRALFATE 1 G: 1 SUSPENSION ORAL at 04:07

## 2020-07-30 RX ADMIN — PANTOPRAZOLE SODIUM 40 MG: 40 TABLET, DELAYED RELEASE ORAL at 07:07

## 2020-07-30 RX ADMIN — SUCRALFATE 1 G: 1 SUSPENSION ORAL at 11:07

## 2020-07-30 RX ADMIN — LISINOPRIL 20 MG: 20 TABLET ORAL at 07:07

## 2020-07-30 RX ADMIN — ALUMINUM HYDROXIDE, MAGNESIUM HYDROXIDE, AND SIMETHICONE 30 ML: 200; 200; 20 SUSPENSION ORAL at 10:07

## 2020-07-30 RX ADMIN — PRAVASTATIN SODIUM 80 MG: 40 TABLET ORAL at 07:07

## 2020-07-30 RX ADMIN — ASPIRIN 81 MG: 81 TABLET, COATED ORAL at 07:07

## 2020-07-30 NOTE — HOSPITAL COURSE
Pt was admitted yesterday for complicated UTI. Started on levaquin. Afebrile. WBC [id number]. Renal function also improved 1.2>0.9. urine and blood cultures pending,.    7/31 She has done well with levaquin. Urine culture showing e coli sensitivity pending. t max 99. She was not very mobile with PT. She attempted 1/2 short steps and required max assist. She reports that she at home was able to walk with walker

## 2020-07-30 NOTE — PT/OT/SLP EVAL
"Physical Therapy Evaluation    Patient Name:  Estefania Mccollum   MRN:  [id number]    Recommendations:     Discharge Recommendations:  nursing facility, skilled, home with home health, home health PT   Discharge Equipment Recommendations:     Barriers to discharge: Decreased caregiver support    Assessment:     Estefania Mccollum is a 85 y.o. female admitted with a medical diagnosis of Acute cystitis with hematuria.  She presents with the following impairments/functional limitations:  weakness, gait instability, impaired endurance, impaired balance, impaired self care skills, impaired functional mobilty, impaired cognition, decreased safety awareness. Patient seen on bed with (frail) at bedside with altered mental status. Patient had hx of frequent falls and recently fell at home prior to hospital admission. Needs coaxing to follow and complete task. Decrease body posture with cervical kyphosis, leans to right side and off balance during sitting on side of the bed and standing with R Walker for support. Requires moderate Assistance and cues upon sit to stand. Provided max Assistance and cues to take 1 -2 short steps using RW. Noted being inpatient and insisted to go back to bed. Patient will benefit from skilled PT tx to inc safety, inc mobility and decrease burden of care.    Rehab Prognosis: Fair; patient would benefit from acute skilled PT services to address these deficits and reach maximum level of function.    Recent Surgery: * No surgery found *      Plan:     During this hospitalization, patient to be seen 5 x/week to address the identified rehab impairments via gait training, therapeutic activities, therapeutic exercises and progress toward the following goals:    · Plan of Care Expires:  08/05/20    Subjective     Chief Complaint: altered mental status, increase assistance with care  Patient/Family Comments/goals: "To get better and to return home with ".  Pain/Comfort:  · Pain Rating 1: 0/10  · Pain " Rating Post-Intervention 1: 0/10    Patients cultural, spiritual, Temple conflicts given the current situation: no    Living Environment:  Patient lives with  in a H with no steps to enter  Prior to admission, patients level of function was able ambulate using Rollator with assistance (per ). Requires assistance with mobility and self care tasks  Equipment used at home: walker, standard, rollator, wheelchair, bedside commode.  DME owned (not currently used): none.  Upon discharge, patient will have assistance from .    Objective:     Communicated with patient and  prior to session.  Patient found supine with telemetry, peripheral IV  upon PT entry to room.    General Precautions: Standard, fall   Orthopedic Precautions:N/A   Braces: N/A     Exams:  · Gross Motor Coordination:  WFL  · Postural Exam:  Patient presented with the following abnormalities:    · -       Rounded shoulders  · -       Forward head  · -       Cervical Kyphosis and always lean on right side  · Skin Integrity/Edema:      · -       Skin integrity: Tear of skin on left arm covered with dressing  · RUE ROM: WFL  · RUE Strength: WFL  · LUE ROM: WFL  · LUE Strength: WFL  · RLE ROM: WFL  · RLE Strength: 3-/5  · LLE ROM: WFL  · LLE Strength: 3-/5    Functional Mobility:  · Bed Mobility:     · Rolling Left:  minimal assistance and cues  · Rolling Right: minimal assistance and cues  · Transfers:     · Sit to Stand:  moderate assistance and cues with rolling walker  · Gait: Attempted to take 1-2 short steps with max assistance and constant verbal and tactile cues. Cervical kyphotic, leans and falls to right side and limited tolerance.  · Balance: Sitting Static: Poor- ( leans and falls to right side and also falls backward; Standing Static: Poor-  leans and falls to right side.    Therapeutic Activities and Exercises:   Completed PT evaluation. Educated and trained patient with body sequence on bed mobility and out of  bed activity.    AM-PAC 6 CLICK MOBILITY  Total Score:12     Patient left supine with all lines intact, call button in reach, bed alarm on, nursing notified and  present.    GOALS:   Multidisciplinary Problems     Physical Therapy Goals        Problem: Physical Therapy Goal    Goal Priority Disciplines Outcome Goal Variances Interventions   Physical Therapy Goal     PT, PT/OT      Description: Goals to be met by: 20    Patient will increase functional independence with mobility by performin. Supine to sit with Contact Guard Assistance  2. Sit to supine with Contact Guard Assistance  3. Bed to chair transfer with Minimal/Contact Guard Assistancewith or without rolling walker using Stand Pivot TECHNIQUE  4. Gait  x ~50  feet with Minimal Assistance with or without rolling walker  5. Lower extremity exercise program x10 reps per handout, with assistance as needed                      History:     Past Medical History:   Diagnosis Date    Adenomatous polyp     Allergy     sinus    Annual physical exam 2011    Arthritis     osteoarthritis    Cataract     History of bone density study 2009    History of colonoscopy 10/30/2008    History of mammogram 2011    Hyperlipidemia     Hypertension     Macular degeneration     Postmenopausal hormone replacement therapy     therapy stopped    Squamous cell carcinoma 2019    Left thigh (ED&C)    Thyroid disease     hypothyroidism    Vertigo        Past Surgical History:   Procedure Laterality Date    APPENDECTOMY      12 years old    CATARACT EXTRACTION      Both eyes    CATARACT EXTRACTION, BILATERAL       SECTION      x 3    CHOLECYSTECTOMY      COLONOSCOPY  10/02/2013    HYSTERECTOMY      total    JOINT REPLACEMENT Left     OOPHORECTOMY      TONSILLECTOMY      TOTAL KNEE ARTHROPLASTY Left 2016    Yag       Left Eye       Time Tracking:     PT Received On: 20  PT Start Time: 1215     PT Stop Time:  1230  PT Total Time (min): 15 min     Billable Minutes: Evaluation 15      Cricket Gaytan, PT  07/30/2020

## 2020-07-30 NOTE — SUBJECTIVE & OBJECTIVE
Review of Systems   Unable to perform ROS: Mental status change   Genitourinary: Negative.         UTI on admit noted   Neurological:        Falling and sustained some skin abrtasions     Objective:     Vital Signs (Most Recent):  Temp: 96.9 °F (36.1 °C) (07/30/20 0722)  Pulse: 91 (07/30/20 0802)  Resp: 18 (07/30/20 0722)  BP: 129/60 (07/30/20 0722)  SpO2: (!) 94 % (07/30/20 0822) Vital Signs (24h Range):  Temp:  [96.9 °F (36.1 °C)-97.9 °F (36.6 °C)] 96.9 °F (36.1 °C)  Pulse:  [75-93] 91  Resp:  [15-20] 18  SpO2:  [94 %-100 %] 94 %  BP: (108-170)/(51-80) 129/60     Weight: 67.1 kg (148 lb)  Body mass index is 22.5 kg/m².    Physical Exam  Constitutional:       Appearance: She is well-developed.   HENT:      Head: Normocephalic and atraumatic.      Right Ear: External ear normal.      Left Ear: External ear normal.      Nose: Nose normal.   Eyes:      Pupils: Pupils are equal, round, and reactive to light.   Neck:      Musculoskeletal: Normal range of motion and neck supple.      Thyroid: No thyromegaly.      Vascular: No JVD.      Trachea: No tracheal deviation.   Cardiovascular:      Rate and Rhythm: Normal rate.      Heart sounds: Normal heart sounds. No murmur.   Pulmonary:      Effort: Pulmonary effort is normal. No respiratory distress.      Breath sounds: Normal breath sounds. No wheezing or rales.   Chest:      Chest wall: No tenderness.   Abdominal:      General: Bowel sounds are normal. There is no distension.      Palpations: Abdomen is soft. There is no mass.      Tenderness: There is no abdominal tenderness. There is no guarding or rebound.   Musculoskeletal: Normal range of motion.         General: No tenderness.   Lymphadenopathy:      Cervical: No cervical adenopathy.   Skin:     General: Skin is warm and dry.      Coloration: Skin is not pale.      Findings: No erythema or rash.   Neurological:      Mental Status: She is alert and oriented to person, place, and time.      Cranial Nerves: No cranial  nerve deficit.      Motor: No abnormal muscle tone.      Coordination: Coordination normal.      Deep Tendon Reflexes: Reflexes are normal and symmetric. Reflexes normal.   Psychiatric:         Behavior: Behavior normal.         Thought Content: Thought content normal.         Judgment: Judgment normal.           CRANIAL NERVES     CN III, IV, VI   Pupils are equal, round, and reactive to light.       Significant Labs: covid negative     Blood Culture: in process    CBC:   Recent Labs   Lab 07/29/20  1119 07/30/20  0544   WBC 15.19* 9.29   HGB 14.1 13.4   HCT 41.9 39.8    240     CMP:   Recent Labs   Lab 07/29/20  1119 07/30/20  0544   * 131*   K 4.1 4.0   CL 96 95   CO2 25 26   * 110   BUN 17 16   CREATININE 1.2 0.9   CALCIUM 8.6* 8.5*   PROT 5.9* 5.8*   ALBUMIN 3.4* 3.2*   BILITOT 1.4* 1.3*   ALKPHOS 70 66   AST 44* 43*   ALT 51* 46*   ANIONGAP 10 10   EGFRNONAA 41* 58*     Urine Culture: in process  Urine Studies:   Recent Labs   Lab 07/29/20  1154   COLORU Yellow   APPEARANCEUA Cloudy*   PHUR 7.0   SPECGRAV 1.020   PROTEINUA 2+*   GLUCUA Negative   KETONESU Trace*   BILIRUBINUA Negative   OCCULTUA 2+*   NITRITE Positive*   UROBILINOGEN 1.0   LEUKOCYTESUR 3+*   RBCUA 10*   WBCUA >100*   BACTERIA Many*   HYALINECASTS 0       Significant Imaging:     CXR The cardiac silhouette is within normal limits.  Atherosclerotic changes of the aorta.  Stable chronic lung changes.  Mild elevation of the right diaphragm.  No focal infiltrate or effusion.  Degenerative changes of the spine.    X ray right forearm There is no evidence of fracture or acute osseous abnormality.  No focal soft tissue abnormality.  No radiopaque foreign body.     EKG Sinus rhythm with 1st degree A-V block  Otherwise normal ECG  When compared with ECG of 30-NOV-2019 13:03,  No significant change was found

## 2020-07-30 NOTE — PLAN OF CARE
07/30/20 0941   Discharge Assessment   Assessment Type Discharge Planning Assessment   Confirmed/corrected address and phone number on facesheet? Yes   Assessment information obtained from? Patient;Caregiver   Expected Length of Stay (days) 2   Communicated expected length of stay with patient/caregiver yes   Prior to hospitilization cognitive status: Alert/Oriented   Prior to hospitalization functional status: Assistive Equipment  (uses rolling walker)   Current cognitive status: Not Oriented to Time   Current Functional Status: Assistive Equipment   Facility Arrived From: home   Lives With spouse   Able to Return to Prior Arrangements yes   Is patient able to care for self after discharge? Unable to determine at this time (comments)   Who are your caregiver(s) and their phone number(s)? bijan Davidson --[phone]   Patient's perception of discharge disposition home or selfcare   Readmission Within the Last 30 Days no previous admission in last 30 days   Patient currently being followed by outpatient case management? No   Equipment Currently Used at Home walker, rolling;bedside commode;wheelchair   Part D Coverage [employer]   Do you have any problems affording any of your prescribed medications? No   Is the patient taking medications as prescribed? yes   Does the patient have transportation home? Yes   Transportation Anticipated family or friend will provide   Does the patient receive services at the [facility]? No   Discharge Plan A Home   Discharge Plan B Home Health   DME Needed Upon Discharge  none   Patient/Family in Agreement with Plan yes     Ms Davidson is here with a UTI. She plans to go home with her  when stable. CM spoke to Bijan davidson, , and he confirms she will discharge home with him. Both deny and post acute care needs. CM contact information and discharge brochure checklist reviewed with patient and all questions answered. Discharge information sheet for bladder infection  given including signs and symptoms indicating return to ED or call to PCP. Compliance and understanding voiced.

## 2020-07-30 NOTE — PROGRESS NOTES
[facility] Hospital Medicine  Progress Note    Patient Name: Estefania Mccollum  MRN: [id number]  Patient Class: OP- Observation   Admission Date: 7/29/2020  Length of Stay: 0 days  Attending Physician: Manish Lynn MD  Primary Care Provider: Danna Levine MD        Subjective:     Principal Problem:Acute cystitis with hematuria        HPI:  85 year old female presents to ED with  for some confusion.   Found to have a dirty urine and WBC of 77854  Admitted for inpatient treatment of complicated UTI since  is elderly and frail. Unable to properly care for her in her weakened and confused state       Overview/Hospital Course:  Pt was admitted yesterday for complicated UTI. Started on levaquin. Afebrile. WBC 83193>9290. Renal function also improved 1.2>0.9. urine and blood cultures pending,.      Review of Systems   Unable to perform ROS: Mental status change   Genitourinary: Negative.         UTI on admit noted   Neurological:        Falling and sustained some skin abrtasions     Objective:     Vital Signs (Most Recent):  Temp: 96.9 °F (36.1 °C) (07/30/20 0722)  Pulse: 91 (07/30/20 0802)  Resp: 18 (07/30/20 0722)  BP: 129/60 (07/30/20 0722)  SpO2: (!) 94 % (07/30/20 0822) Vital Signs (24h Range):  Temp:  [96.9 °F (36.1 °C)-97.9 °F (36.6 °C)] 96.9 °F (36.1 °C)  Pulse:  [75-93] 91  Resp:  [15-20] 18  SpO2:  [94 %-100 %] 94 %  BP: (108-170)/(51-80) 129/60     Weight: 67.1 kg (148 lb)  Body mass index is 22.5 kg/m².    Physical Exam  Constitutional:       Appearance: She is well-developed.   HENT:      Head: Normocephalic and atraumatic.      Right Ear: External ear normal.      Left Ear: External ear normal.      Nose: Nose normal.   Eyes:      Pupils: Pupils are equal, round, and reactive to light.   Neck:      Musculoskeletal: Normal range of motion and neck supple.      Thyroid: No thyromegaly.      Vascular: No JVD.      Trachea: No tracheal deviation.   Cardiovascular:      Rate  and Rhythm: Normal rate.      Heart sounds: Normal heart sounds. No murmur.   Pulmonary:      Effort: Pulmonary effort is normal. No respiratory distress.      Breath sounds: Normal breath sounds. No wheezing or rales.   Chest:      Chest wall: No tenderness.   Abdominal:      General: Bowel sounds are normal. There is no distension.      Palpations: Abdomen is soft. There is no mass.      Tenderness: There is no abdominal tenderness. There is no guarding or rebound.   Musculoskeletal: Normal range of motion.         General: No tenderness.   Lymphadenopathy:      Cervical: No cervical adenopathy.   Skin:     General: Skin is warm and dry.      Coloration: Skin is not pale.      Findings: No erythema or rash.   Neurological:      Mental Status: She is alert and oriented to person, place, and time.      Cranial Nerves: No cranial nerve deficit.      Motor: No abnormal muscle tone.      Coordination: Coordination normal.      Deep Tendon Reflexes: Reflexes are normal and symmetric. Reflexes normal.   Psychiatric:         Behavior: Behavior normal.         Thought Content: Thought content normal.         Judgment: Judgment normal.           CRANIAL NERVES     CN III, IV, VI   Pupils are equal, round, and reactive to light.       Significant Labs: covid negative     Blood Culture: in process    CBC:   Recent Labs   Lab 07/29/20  1119 07/30/20  0544   WBC 15.19* 9.29   HGB 14.1 13.4   HCT 41.9 39.8    240     CMP:   Recent Labs   Lab 07/29/20  1119 07/30/20  0544   * 131*   K 4.1 4.0   CL 96 95   CO2 25 26   * 110   BUN 17 16   CREATININE 1.2 0.9   CALCIUM 8.6* 8.5*   PROT 5.9* 5.8*   ALBUMIN 3.4* 3.2*   BILITOT 1.4* 1.3*   ALKPHOS 70 66   AST 44* 43*   ALT 51* 46*   ANIONGAP 10 10   EGFRNONAA 41* 58*     Urine Culture: in process  Urine Studies:   Recent Labs   Lab 07/29/20  1154   COLORU Yellow   APPEARANCEUA Cloudy*   PHUR 7.0   SPECGRAV 1.020   PROTEINUA 2+*   GLUCUA Negative   KETONESU Trace*    BILIRUBINUA Negative   OCCULTUA 2+*   NITRITE Positive*   UROBILINOGEN 1.0   LEUKOCYTESUR 3+*   RBCUA 10*   WBCUA >100*   BACTERIA Many*   HYALINECASTS 0       Significant Imaging:     CXR The cardiac silhouette is within normal limits.  Atherosclerotic changes of the aorta.  Stable chronic lung changes.  Mild elevation of the right diaphragm.  No focal infiltrate or effusion.  Degenerative changes of the spine.    X ray right forearm There is no evidence of fracture or acute osseous abnormality.  No focal soft tissue abnormality.  No radiopaque foreign body.     EKG Sinus rhythm with 1st degree A-V block  Otherwise normal ECG  When compared with ECG of 30-NOV-2019 13:03,  No significant change was found      Assessment/Plan:      * Acute cystitis with hematuria  Continue IV levaquin  Blood and urine cultures         Memory loss  Continue aricept and namenda         Hyperlipidemia  Continue pravastatin 80mg         Hypothyroidism  continue synthroid 100mcg  Lab Results   Component Value Date    TSH 2.578 12/03/2019           Hypertension  Continue lisinopril   bp 129/60      VTE Risk Mitigation (From admission, onward)         Ordered     IP VTE HIGH RISK PATIENT  Once      07/29/20 1619     Place sequential compression device  Until discontinued      07/29/20 1619                      Sagar Reddy MD  Department of Hospital Medicine   [facility]

## 2020-07-30 NOTE — PLAN OF CARE
Patient is compliant with fluid and medication management. Patient is understanding and compliant with lab draws and procedures. Patient is free from falls and injury. VSS. Patient remains pleasantly confused to time and situation. Has slept well tonight. Plan of care reviewed and agreed upon with patient. Will continue to monitor.

## 2020-07-30 NOTE — PLAN OF CARE
07/30/20 0946   PALOMO Message   Medicare Outpatient and Observation Notification regarding financial responsibility Given to patient/caregiver;Explained to patient/caregiver;Signed/date by patient/caregiver   Date PALOMO was signed 07/30/20   Time PALOMO was signed 0935

## 2020-07-30 NOTE — PLAN OF CARE
07/30/20 0934   Advance Directives (For Healthcare)   Advance Directive  (If Adv Dir status is received, view document under Adv Dir in header or Chart Review Media tab) Patient does not have Advance Directive, declines information.     Ms Mccollum is not clear about whether or not she has a living will or what it says. I spoke with Alejandro, her , and he states he thinks one was completed at [facility] but it is not found in [facility]. He states they have decided that a resucsitation  was ok and he has family who would make the decision to remove life support.  He also states they trust their doctor to decide that the vent is no longer helpful. Ms Mccollum is a full code.

## 2020-07-31 ENCOUNTER — HOSPITAL ENCOUNTER (INPATIENT)
Facility: HOSPITAL | Age: 85
LOS: 19 days | Discharge: HOME HEALTH | DRG: 690 | End: 2020-08-19
Attending: FAMILY MEDICINE | Admitting: FAMILY MEDICINE
Payer: MEDICARE

## 2020-07-31 VITALS
TEMPERATURE: 97 F | RESPIRATION RATE: 18 BRPM | DIASTOLIC BLOOD PRESSURE: 74 MMHG | HEART RATE: 91 BPM | OXYGEN SATURATION: 96 % | BODY MASS INDEX: 22.43 KG/M2 | HEIGHT: 68 IN | SYSTOLIC BLOOD PRESSURE: 157 MMHG | WEIGHT: 148 LBS

## 2020-07-31 DIAGNOSIS — R53.81 DEBILITY: ICD-10-CM

## 2020-07-31 DIAGNOSIS — N30.01 ACUTE CYSTITIS WITH HEMATURIA: ICD-10-CM

## 2020-07-31 LAB — BACTERIA UR CULT: ABNORMAL

## 2020-07-31 PROCEDURE — 25000003 PHARM REV CODE 250: Mod: HCNC | Performed by: NURSE PRACTITIONER

## 2020-07-31 PROCEDURE — G0378 HOSPITAL OBSERVATION PER HR: HCPCS | Mod: HCNC

## 2020-07-31 PROCEDURE — 63600175 PHARM REV CODE 636 W HCPCS: Mod: HCNC | Performed by: SURGERY

## 2020-07-31 PROCEDURE — 96366 THER/PROPH/DIAG IV INF ADDON: CPT

## 2020-07-31 PROCEDURE — 99217 PR OBSERVATION CARE DISCHARGE: ICD-10-PCS | Mod: HCNC,,, | Performed by: FAMILY MEDICINE

## 2020-07-31 PROCEDURE — 11000004 HC SNF PRIVATE: Mod: HCNC

## 2020-07-31 PROCEDURE — 97110 THERAPEUTIC EXERCISES: CPT | Mod: HCNC

## 2020-07-31 PROCEDURE — 25000003 PHARM REV CODE 250: Mod: HCNC | Performed by: FAMILY MEDICINE

## 2020-07-31 PROCEDURE — 99217 PR OBSERVATION CARE DISCHARGE: CPT | Mod: HCNC,,, | Performed by: FAMILY MEDICINE

## 2020-07-31 PROCEDURE — 97530 THERAPEUTIC ACTIVITIES: CPT | Mod: HCNC

## 2020-07-31 PROCEDURE — 25000003 PHARM REV CODE 250: Mod: HCNC | Performed by: SURGERY

## 2020-07-31 RX ORDER — DONEPEZIL HYDROCHLORIDE 5 MG/1
5 TABLET, FILM COATED ORAL NIGHTLY
Status: CANCELLED | OUTPATIENT
Start: 2020-07-31

## 2020-07-31 RX ORDER — HYDROCODONE BITARTRATE AND ACETAMINOPHEN 5; 325 MG/1; MG/1
1 TABLET ORAL EVERY 4 HOURS PRN
Status: DISCONTINUED | OUTPATIENT
Start: 2020-07-31 | End: 2020-08-19 | Stop reason: HOSPADM

## 2020-07-31 RX ORDER — MAG HYDROX/ALUMINUM HYD/SIMETH 200-200-20
30 SUSPENSION, ORAL (FINAL DOSE FORM) ORAL
Status: DISCONTINUED | OUTPATIENT
Start: 2020-07-31 | End: 2020-08-02

## 2020-07-31 RX ORDER — SUCRALFATE 1 G/10ML
1 SUSPENSION ORAL EVERY 6 HOURS
Status: DISCONTINUED | OUTPATIENT
Start: 2020-07-31 | End: 2020-08-02

## 2020-07-31 RX ORDER — LEVOFLOXACIN 5 MG/ML
500 INJECTION, SOLUTION INTRAVENOUS
Status: DISCONTINUED | OUTPATIENT
Start: 2020-08-02 | End: 2020-08-08

## 2020-07-31 RX ORDER — SERTRALINE HYDROCHLORIDE 25 MG/1
25 TABLET, FILM COATED ORAL NIGHTLY
Status: CANCELLED | OUTPATIENT
Start: 2020-07-31

## 2020-07-31 RX ORDER — DONEPEZIL HYDROCHLORIDE 5 MG/1
5 TABLET, FILM COATED ORAL NIGHTLY
Status: DISCONTINUED | OUTPATIENT
Start: 2020-07-31 | End: 2020-08-19 | Stop reason: HOSPADM

## 2020-07-31 RX ORDER — SUCRALFATE 1 G/10ML
1 SUSPENSION ORAL EVERY 6 HOURS
Status: CANCELLED | OUTPATIENT
Start: 2020-07-31

## 2020-07-31 RX ORDER — MAG HYDROX/ALUMINUM HYD/SIMETH 200-200-20
30 SUSPENSION, ORAL (FINAL DOSE FORM) ORAL
Status: CANCELLED | OUTPATIENT
Start: 2020-07-31

## 2020-07-31 RX ORDER — PRAVASTATIN SODIUM 40 MG/1
80 TABLET ORAL DAILY
Status: CANCELLED | OUTPATIENT
Start: 2020-08-01

## 2020-07-31 RX ORDER — LEVOFLOXACIN 5 MG/ML
500 INJECTION, SOLUTION INTRAVENOUS
Status: CANCELLED | OUTPATIENT
Start: 2020-08-02

## 2020-07-31 RX ORDER — ONDANSETRON 2 MG/ML
4 INJECTION INTRAMUSCULAR; INTRAVENOUS EVERY 8 HOURS PRN
Status: CANCELLED | OUTPATIENT
Start: 2020-07-31

## 2020-07-31 RX ORDER — AMOXICILLIN 250 MG
1 CAPSULE ORAL 2 TIMES DAILY
Status: DISCONTINUED | OUTPATIENT
Start: 2020-07-31 | End: 2020-08-19 | Stop reason: HOSPADM

## 2020-07-31 RX ORDER — AMOXICILLIN 250 MG
1 CAPSULE ORAL 2 TIMES DAILY
Status: CANCELLED | OUTPATIENT
Start: 2020-07-31

## 2020-07-31 RX ORDER — MEMANTINE HYDROCHLORIDE 10 MG/1
10 TABLET ORAL 2 TIMES DAILY
Status: DISCONTINUED | OUTPATIENT
Start: 2020-07-31 | End: 2020-08-19 | Stop reason: HOSPADM

## 2020-07-31 RX ORDER — LEVOTHYROXINE SODIUM 100 UG/1
100 TABLET ORAL
Status: DISCONTINUED | OUTPATIENT
Start: 2020-08-01 | End: 2020-08-19 | Stop reason: HOSPADM

## 2020-07-31 RX ORDER — CALCIUM CARBONATE 200(500)MG
500 TABLET,CHEWABLE ORAL 2 TIMES DAILY PRN
Status: DISCONTINUED | OUTPATIENT
Start: 2020-07-31 | End: 2020-08-19 | Stop reason: HOSPADM

## 2020-07-31 RX ORDER — ACETAMINOPHEN 325 MG/1
650 TABLET ORAL EVERY 6 HOURS PRN
Status: CANCELLED | OUTPATIENT
Start: 2020-07-31

## 2020-07-31 RX ORDER — TALC
6 POWDER (GRAM) TOPICAL NIGHTLY PRN
Status: DISCONTINUED | OUTPATIENT
Start: 2020-07-31 | End: 2020-08-19 | Stop reason: HOSPADM

## 2020-07-31 RX ORDER — SODIUM CHLORIDE 0.9 % (FLUSH) 0.9 %
10 SYRINGE (ML) INJECTION
Status: CANCELLED | OUTPATIENT
Start: 2020-07-31

## 2020-07-31 RX ORDER — ASPIRIN 81 MG/1
81 TABLET ORAL DAILY
Status: CANCELLED | OUTPATIENT
Start: 2020-08-01

## 2020-07-31 RX ORDER — SERTRALINE HYDROCHLORIDE 25 MG/1
25 TABLET, FILM COATED ORAL NIGHTLY
Status: DISCONTINUED | OUTPATIENT
Start: 2020-07-31 | End: 2020-08-19 | Stop reason: HOSPADM

## 2020-07-31 RX ORDER — HYDROCODONE BITARTRATE AND ACETAMINOPHEN 5; 325 MG/1; MG/1
1 TABLET ORAL EVERY 4 HOURS PRN
Status: CANCELLED | OUTPATIENT
Start: 2020-07-31

## 2020-07-31 RX ORDER — LISINOPRIL 20 MG/1
20 TABLET ORAL DAILY
Status: DISCONTINUED | OUTPATIENT
Start: 2020-08-01 | End: 2020-08-19 | Stop reason: HOSPADM

## 2020-07-31 RX ORDER — ONDANSETRON 2 MG/ML
4 INJECTION INTRAMUSCULAR; INTRAVENOUS EVERY 8 HOURS PRN
Status: DISCONTINUED | OUTPATIENT
Start: 2020-07-31 | End: 2020-08-19 | Stop reason: HOSPADM

## 2020-07-31 RX ORDER — MEMANTINE HYDROCHLORIDE 10 MG/1
10 TABLET ORAL 2 TIMES DAILY
Status: CANCELLED | OUTPATIENT
Start: 2020-07-31

## 2020-07-31 RX ORDER — LISINOPRIL 20 MG/1
20 TABLET ORAL DAILY
Status: CANCELLED | OUTPATIENT
Start: 2020-08-01

## 2020-07-31 RX ORDER — SODIUM CHLORIDE 0.9 % (FLUSH) 0.9 %
10 SYRINGE (ML) INJECTION
Status: DISCONTINUED | OUTPATIENT
Start: 2020-07-31 | End: 2020-08-19 | Stop reason: HOSPADM

## 2020-07-31 RX ORDER — PRAVASTATIN SODIUM 40 MG/1
80 TABLET ORAL DAILY
Status: DISCONTINUED | OUTPATIENT
Start: 2020-08-01 | End: 2020-08-19 | Stop reason: HOSPADM

## 2020-07-31 RX ORDER — ACETAMINOPHEN 325 MG/1
650 TABLET ORAL EVERY 6 HOURS PRN
Status: DISCONTINUED | OUTPATIENT
Start: 2020-07-31 | End: 2020-08-19 | Stop reason: HOSPADM

## 2020-07-31 RX ORDER — CALCIUM CARBONATE 200(500)MG
500 TABLET,CHEWABLE ORAL 2 TIMES DAILY PRN
Status: CANCELLED | OUTPATIENT
Start: 2020-07-31

## 2020-07-31 RX ORDER — ASPIRIN 81 MG/1
81 TABLET ORAL DAILY
Status: DISCONTINUED | OUTPATIENT
Start: 2020-08-01 | End: 2020-08-19 | Stop reason: HOSPADM

## 2020-07-31 RX ORDER — ACETAMINOPHEN 325 MG/1
650 TABLET ORAL EVERY 8 HOURS PRN
Status: DISCONTINUED | OUTPATIENT
Start: 2020-07-31 | End: 2020-08-19 | Stop reason: HOSPADM

## 2020-07-31 RX ORDER — TALC
6 POWDER (GRAM) TOPICAL NIGHTLY PRN
Status: CANCELLED | OUTPATIENT
Start: 2020-07-31

## 2020-07-31 RX ORDER — ACETAMINOPHEN 325 MG/1
650 TABLET ORAL EVERY 8 HOURS PRN
Status: CANCELLED | OUTPATIENT
Start: 2020-07-31

## 2020-07-31 RX ORDER — LEVOTHYROXINE SODIUM 100 UG/1
100 TABLET ORAL
Status: CANCELLED | OUTPATIENT
Start: 2020-08-01

## 2020-07-31 RX ADMIN — LEVOFLOXACIN 500 MG: 500 INJECTION, SOLUTION INTRAVENOUS at 01:07

## 2020-07-31 RX ADMIN — MEMANTINE 10 MG: 10 TABLET ORAL at 09:07

## 2020-07-31 RX ADMIN — PRAVASTATIN SODIUM 80 MG: 40 TABLET ORAL at 09:07

## 2020-07-31 RX ADMIN — DONEPEZIL HYDROCHLORIDE 5 MG: 5 TABLET, FILM COATED ORAL at 09:07

## 2020-07-31 RX ADMIN — STANDARDIZED SENNA CONCENTRATE AND DOCUSATE SODIUM 1 TABLET: 8.6; 5 TABLET ORAL at 09:07

## 2020-07-31 RX ADMIN — ASPIRIN 81 MG: 81 TABLET, COATED ORAL at 09:07

## 2020-07-31 RX ADMIN — SUCRALFATE 1 G: 1 SUSPENSION ORAL at 11:07

## 2020-07-31 RX ADMIN — ALUMINUM HYDROXIDE, MAGNESIUM HYDROXIDE, AND SIMETHICONE 30 ML: 200; 200; 20 SUSPENSION ORAL at 11:07

## 2020-07-31 RX ADMIN — SUCRALFATE 1 G: 1 SUSPENSION ORAL at 05:07

## 2020-07-31 RX ADMIN — LISINOPRIL 20 MG: 20 TABLET ORAL at 09:07

## 2020-07-31 RX ADMIN — SERTRALINE HYDROCHLORIDE 25 MG: 25 TABLET ORAL at 09:07

## 2020-07-31 RX ADMIN — ALUMINUM HYDROXIDE, MAGNESIUM HYDROXIDE, AND SIMETHICONE 30 ML: 200; 200; 20 SUSPENSION ORAL at 09:07

## 2020-07-31 RX ADMIN — LEVOTHYROXINE SODIUM 100 MCG: 100 TABLET ORAL at 05:07

## 2020-07-31 RX ADMIN — PANTOPRAZOLE SODIUM 40 MG: 40 TABLET, DELAYED RELEASE ORAL at 09:07

## 2020-07-31 RX ADMIN — ALUMINUM HYDROXIDE, MAGNESIUM HYDROXIDE, AND SIMETHICONE 30 ML: 200; 200; 20 SUSPENSION ORAL at 05:07

## 2020-07-31 NOTE — PLAN OF CARE
Patient is compliant with fluid and medication management. Takes medication without difficulty. Patient is understanding and compliant with lab draws and procedures. Patient is free from falls and injury. VSS. Patient remains pleasantly confused to time and situation. Has slept well tonight. Plan of care reviewed and agreed upon with patient. Will continue to monitor.

## 2020-07-31 NOTE — PROGRESS NOTES
[facility] Hospital Medicine  Progress Note    Patient Name: Estefania Mccollum  MRN: [id number]  Patient Class: OP- Observation   Admission Date: 7/29/2020  Length of Stay: 0 days  Attending Physician: Manish Lynn MD  Primary Care Provider: Danna Levine MD        Subjective:     Principal Problem:Acute cystitis with hematuria        HPI:  85 year old female presents to ED with  for some confusion.   Found to have a dirty urine and WBC of 31715  Admitted for inpatient treatment of complicated UTI since  is elderly and frail. Unable to properly care for her in her weakened and confused state       Overview/Hospital Course:  Pt was admitted yesterday for complicated UTI. Started on levaquin. Afebrile. WBC 49742>9290. Renal function also improved 1.2>0.9. urine and blood cultures pending,.  7/31 She has done well with levaquin. Urine culture showing e coli sensitivity pending. t max 99. She was not very mobile with PT. She attempted 1/2 short steps and required max assist. She reports that she at home was able to walk with walker        Review of Systems   Unable to perform ROS: Mental status change   Genitourinary: Negative.         UTI on admit noted   Neurological:        Falling and sustained some skin abrtasions     Objective:     Vital Signs (Most Recent):  Temp: 97.9 °F (36.6 °C) (07/31/20 0702)  Pulse: 85 (07/31/20 0800)  Resp: 18 (07/31/20 0702)  BP: 131/61 (07/31/20 0702)  SpO2: (!) 93 % (07/31/20 0715) Vital Signs (24h Range):  Temp:  [97.9 °F (36.6 °C)-99 °F (37.2 °C)] 97.9 °F (36.6 °C)  Pulse:  [75-94] 85  Resp:  [16-20] 18  SpO2:  [93 %-96 %] 93 %  BP: (127-170)/(60-76) 131/61     Weight: 67.1 kg (148 lb)  Body mass index is 22.5 kg/m².    Physical Exam  Constitutional:       Appearance: She is well-developed.   HENT:      Head: Normocephalic and atraumatic.      Right Ear: External ear normal.      Left Ear: External ear normal.      Nose: Nose normal.   Eyes:       Pupils: Pupils are equal, round, and reactive to light.   Neck:      Musculoskeletal: Normal range of motion and neck supple.      Thyroid: No thyromegaly.      Vascular: No JVD.      Trachea: No tracheal deviation.   Cardiovascular:      Rate and Rhythm: Normal rate.      Heart sounds: Normal heart sounds. No murmur.   Pulmonary:      Effort: Pulmonary effort is normal. No respiratory distress.      Breath sounds: Normal breath sounds. No wheezing or rales.   Chest:      Chest wall: No tenderness.   Abdominal:      General: Bowel sounds are normal. There is no distension.      Palpations: Abdomen is soft. There is no mass.      Tenderness: There is no abdominal tenderness. There is no guarding or rebound.   Musculoskeletal: Normal range of motion.         General: No tenderness.   Lymphadenopathy:      Cervical: No cervical adenopathy.   Skin:     General: Skin is warm and dry.      Coloration: Skin is not pale.      Findings: No erythema or rash.   Neurological:      Mental Status: She is alert and oriented to person, place, and time.      Cranial Nerves: No cranial nerve deficit.      Motor: No abnormal muscle tone.      Coordination: Coordination normal.      Deep Tendon Reflexes: Reflexes are normal and symmetric. Reflexes normal.   Psychiatric:         Behavior: Behavior normal.         Thought Content: Thought content normal.         Judgment: Judgment normal.           CRANIAL NERVES     CN III, IV, VI   Pupils are equal, round, and reactive to light.       Significant Labs: covid negative     Blood Culture: in process    CBC:   Recent Labs   Lab 07/29/20  1119 07/30/20  0544   WBC 15.19* 9.29   HGB 14.1 13.4   HCT 41.9 39.8    240     CMP:   Recent Labs   Lab 07/29/20  1119 07/30/20  0544   * 131*   K 4.1 4.0   CL 96 95   CO2 25 26   * 110   BUN 17 16   CREATININE 1.2 0.9   CALCIUM 8.6* 8.5*   PROT 5.9* 5.8*   ALBUMIN 3.4* 3.2*   BILITOT 1.4* 1.3*   ALKPHOS 70 66   AST 44* 43*   ALT 51*  46*   ANIONGAP 10 10   EGFRNONAA 41* 58*     Urine Culture: GRAM NEGATIVE FREDI   >100,000 cfu/ml   Identification and susceptibility pending    Urine Studies:   Recent Labs   Lab 07/29/20  1154   COLORU Yellow   APPEARANCEUA Cloudy*   PHUR 7.0   SPECGRAV 1.020   PROTEINUA 2+*   GLUCUA Negative   KETONESU Trace*   BILIRUBINUA Negative   OCCULTUA 2+*   NITRITE Positive*   UROBILINOGEN 1.0   LEUKOCYTESUR 3+*   RBCUA 10*   WBCUA >100*   BACTERIA Many*   HYALINECASTS 0       Significant Imaging:     CXR The cardiac silhouette is within normal limits.  Atherosclerotic changes of the aorta.  Stable chronic lung changes.  Mild elevation of the right diaphragm.  No focal infiltrate or effusion.  Degenerative changes of the spine.    X ray right forearm There is no evidence of fracture or acute osseous abnormality.  No focal soft tissue abnormality.  No radiopaque foreign body.     EKG Sinus rhythm with 1st degree A-V block  Otherwise normal ECG  When compared with ECG of 30-NOV-2019 13:03,  No significant change was found      Assessment/Plan:      * Acute cystitis with hematuria  Continue IV levaquin  Blood and urine cultures pending. Fever has resolved and WBC improved        Debility  With her hx of recurrent falls at home and limited mobility with PT will need to discuss her safety with  prior to d/c plans. PT today      Memory loss  Continue aricept and namenda         Hyperlipidemia  Continue pravastatin 80mg and asa        Hypothyroidism  continue synthroid 100mcg  Lab Results   Component Value Date    TSH 2.578 12/03/2019           Hypertension  Continue lisinopril   bp 131/61- well controlled      VTE Risk Mitigation (From admission, onward)         Ordered     IP VTE HIGH RISK PATIENT  Once      07/29/20 1619     Place sequential compression device  Until discontinued      07/29/20 1619                      Manish Lynn MD  Department of Hospital Medicine   [facility]

## 2020-07-31 NOTE — DISCHARGE SUMMARY
[facility] Medicine  Discharge Summary      Patient Name: Estefania Mccollum  MRN: [id number]  Admission Date: 7/29/2020  Hospital Length of Stay: 0 days  Discharge Date and Time:  07/31/2020 3:08 PM  Attending Physician: Dayana Shah MD   Discharging Provider: Savanna Gruber NP  Primary Care Provider: Danna Levine MD      HPI:   85 year old female presents to ED with  for some confusion.   Found to have a dirty urine and WBC of 30728  Admitted for inpatient treatment of complicated UTI since  is elderly and frail. Unable to properly care for her in her weakened and confused state       * No surgery found *      Hospital Course:   Pt was admitted yesterday for complicated UTI. Started on levaquin. Afebrile. WBC 90329>9290. Renal function also improved 1.2>0.9. urine and blood cultures pending,.  7/31 She has done well with levaquin. Urine culture showing e coli sensitivity pending. t max 99. She was not very mobile with PT. She attempted 1/2 short steps and required max assist. She reports that she at home was able to walk with walker       Consults:   Consults (From admission, onward)        Status Ordering Provider     Inpatient consult to Social Work  Once     Provider:  (Not yet assigned)    Acknowledged DAYANA SHAH          * Acute cystitis with hematuria  Continue IV levaquin  Blood and urine cultures pending. Fever has resolved and WBC improved        Debility  With her hx of recurrent falls at home and limited mobility with PT will need to discuss her safety with  prior to d/c plans. PT today  PT does not feel that she is safe to d/c home with  in her weakened state. Also believes that she has rehab potential. SKILL therapy approved. Will Swing today    Memory loss  Continue aricept and namenda         Hyperlipidemia  Continue pravastatin 80mg and asa        Hypothyroidism  continue synthroid 100mcg  Lab Results   Component Value Date     TSH 2.578 12/03/2019           Hypertension  Continue lisinopril   bp 131/61- well controlled      Final Active Diagnoses:    Diagnosis Date Noted POA    PRINCIPAL PROBLEM:  Acute cystitis with hematuria [N30.01] 07/29/2020 Yes    Debility [R53.81] 09/27/2019 Yes    Memory loss [R41.3] 10/17/2016 Yes    Hypothyroidism [E03.9] 07/12/2012 Yes    Hyperlipidemia [E78.5] 07/12/2012 Yes    Hypertension [I10]  Yes      Problems Resolved During this Admission:       Discharged Condition: good    Disposition: Swing Bed    Follow Up:    Patient Instructions:   No discharge procedures on file.    Significant Diagnostic Studies:     covid negative      Blood Culture: in process     CBC:        Recent Labs   Lab 07/29/20  1119 07/30/20  0544   WBC 15.19* 9.29   HGB 14.1 13.4   HCT 41.9 39.8    240      CMP:        Recent Labs   Lab 07/29/20  1119 07/30/20  0544   * 131*   K 4.1 4.0   CL 96 95   CO2 25 26   * 110   BUN 17 16   CREATININE 1.2 0.9   CALCIUM 8.6* 8.5*   PROT 5.9* 5.8*   ALBUMIN 3.4* 3.2*   BILITOT 1.4* 1.3*   ALKPHOS 70 66   AST 44* 43*   ALT 51* 46*   ANIONGAP 10 10   EGFRNONAA 41* 58*      Urine Culture: GRAM NEGATIVE FREDI   >100,000 cfu/ml   Identification and susceptibility pending     Urine Studies:       Recent Labs   Lab 07/29/20  1154   COLORU Yellow   APPEARANCEUA Cloudy*   PHUR 7.0   SPECGRAV 1.020   PROTEINUA 2+*   GLUCUA Negative   KETONESU Trace*   BILIRUBINUA Negative   OCCULTUA 2+*   NITRITE Positive*   UROBILINOGEN 1.0   LEUKOCYTESUR 3+*   RBCUA 10*   WBCUA >100*   BACTERIA Many*   HYALINECASTS 0         Significant Imaging:      CXR The cardiac silhouette is within normal limits.  Atherosclerotic changes of the aorta.  Stable chronic lung changes.  Mild elevation of the right diaphragm.  No focal infiltrate or effusion.  Degenerative changes of the spine.     X ray right forearm There is no evidence of fracture or acute osseous abnormality.  No focal soft tissue abnormality.   No radiopaque foreign body.     EKG Sinus rhythm with 1st degree A-V block  Otherwise normal ECG  When compared with ECG of 30-NOV-2019 13:03,  No significant change was found      Pending Diagnostic Studies:     None         Medications:  Transfer Medications (for Discharge Readmit only):   Current Facility-Administered Medications   Medication Dose Route Frequency Provider Last Rate Last Dose    acetaminophen tablet 650 mg  650 mg Oral Q8H PRN Ronn Dougherty Jr., MD        aluminum-magnesium hydroxide-simethicone 200-200-20 mg/5 mL suspension 30 mL  30 mL Oral QID (AC & HS) Manish Lynn MD   30 mL at 07/31/20 1123    aspirin EC tablet 81 mg  81 mg Oral Daily Manish Lynn MD   81 mg at 07/31/20 0905    donepeziL tablet 5 mg  5 mg Oral QHS Manish Lynn MD   5 mg at 07/30/20 2106    HYDROcodone-acetaminophen 5-325 mg per tablet 1 tablet  1 tablet Oral Q4H PRN Ronn Dougherty Jr., MD        levoFLOXacin 500 mg/100 mL IVPB 500 mg  500 mg Intravenous Q48H Ronn Dougherty Jr., MD   Stopped at 07/31/20 1457    levothyroxine tablet 100 mcg  100 mcg Oral Before breakfast Manish Lynn MD   100 mcg at 07/31/20 0509    lisinopriL tablet 20 mg  20 mg Oral Daily Manish Lynn MD   20 mg at 07/31/20 0905    memantine tablet 10 mg  10 mg Oral BID Manish Lynn MD   10 mg at 07/31/20 0905    ondansetron injection 4 mg  4 mg Intravenous Q8H PRN Ronn Dougherty Jr., MD        pantoprazole EC tablet 40 mg  40 mg Oral Daily Ronn Dougherty Jr., MD   40 mg at 07/31/20 0905    pravastatin tablet 80 mg  80 mg Oral Daily Manish Lynn MD   80 mg at 07/31/20 0905    promethazine (PHENERGAN) 12.5 mg in dextrose 5 % 50 mL IVPB  12.5 mg Intravenous Q6H PRN Ronn Dougherty Jr., MD        sertraline tablet 25 mg  25 mg Oral QHS Manish Lynn MD   25 mg at 07/30/20 2107    sodium chloride 0.9% flush 10 mL  10 mL Intravenous PRN Ronn Dougherty Jr., MD        sucralfate 100 mg/mL  suspension 1 g  1 g Oral Q6H Manish Lynn MD   1 g at 07/31/20 1123       Indwelling Lines/Drains at time of discharge:   Lines/Drains/Airways     Epidural Line                 Neuraxial Analgesia/Anesthesia Assessment (using dermatomes) Epidural 12/06/16 0710 1333 days         Perineural Analgesia/Anesthesia Assessment (using dermatomes) Epidural 12/06/16 0619 1333 days                Time spent on the discharge of patient: 30 minutes  Patient was seen and examined on the date of discharge and determined to be suitable for discharge.         Savanna Gruber NP  Department of Hospital Medicine  [facility]

## 2020-07-31 NOTE — PLAN OF CARE
07/31/20 1401   Discharge Assessment   Assessment Type Discharge Planning Reassessment   Confirmed/corrected address and phone number on facesheet? Yes   Assessment information obtained from? Patient;Caregiver  (Alejandro Mccollum is present.)   Expected Length of Stay (days) 2   Prior to hospitilization cognitive status: Alert/Oriented   Prior to hospitalization functional status: Assistive Equipment   Current cognitive status: Alert/Oriented   Current Functional Status: Assistive Equipment;Partially Dependent   Facility Arrived From: home   Lives With spouse   Able to Return to Prior Arrangements other (see comments)  (too frail to return home at this time.)   Is patient able to care for self after discharge? No   Who are your caregiver(s) and their phone number(s)? [phone]   Patient's perception of discharge disposition skilled nursing facility   Readmission Within the Last 30 Days no previous admission in last 30 days   Patient currently being followed by outpatient case management? No   Patient currently receives any other outside agency services? No   Part D Coverage [employer]   Do you have any problems affording any of your prescribed medications? No   Is the patient taking medications as prescribed? yes   Does the patient have transportation home? Yes   Transportation Anticipated family or friend will provide   Does the patient receive services at the [facility]? No   Discharge Plan A Skilled Nursing Facility   Discharge Plan B Home Health   Patient/Family in Agreement with Plan yes     Ms Mccollum needs extensive help with mobility today. She is not at her baseline of walking to her bathroom with her rolling walker prior to admit. Her  is here and voices she needs therapy to improve well enough to take her home. CM spoke at length to Glen, her daughter, who states she is debilitated as well. Her mental status has been changed recently and that has altered her mobility. Her mental  status change has most likely resulted from her UTI which is being treated and has improved. Dr Lynn feels she needs skilled nursing care and therapy to safely return home with prior or improved function.

## 2020-07-31 NOTE — PLAN OF CARE
Approval from [facility] for 7 days skilled nursing care. 7/31 to 8/6 with review on 8/7. Auth number is [id number]. JAMES Viera and family updated. House Supervisor, Naomi to build pending admit. Jenna unit manager notified.

## 2020-07-31 NOTE — PT/OT/SLP PROGRESS
"Physical Therapy Treatment    Patient Name:  Estefania Mccollum   MRN:  [id number]    Recommendations:     Discharge Recommendations:  nursing facility, skilled, home with home health   Discharge Equipment Recommendations: none   Barriers to discharge: Decreased caregiver support    Assessment:     Estefania Mccollum is a 85 y.o. female admitted with a medical diagnosis of Acute cystitis with hematuria.  She presents with the following impairments/functional limitations:  weakness, gait instability, impaired self care skills, impaired functional mobilty, decreased coordination, decreased upper extremity function, decreased lower extremity function, decreased safety awareness. Pt able to tolerate bed mobility, sitting at  Edge of bed, OOB activity with mobility task limited by pt's fear of falling. Attempts on standing and walking limited by pt's concern that she "might fall" and needed a lot of coaxing and reassurance that she is supported by therapy staff. MD and Nursing inquired about pt's ability to go home at this time, PT discussed with MD that pt requires moderate assistance in mobility task with pt needing assistance due to poor sitting and standing balance that further intensive PT might benefit patient to decrease burden of care for caregivers when pt is discharged home. PT plans to focus interventions in improving pt's bed mobility and transfer skills to assist caregiver when pt is discharged home eventually.   .    Rehab Prognosis: Fair; patient would benefit from acute skilled PT services to address these deficits and reach maximum level of function.    Recent Surgery: * No surgery found *      Plan:     During this hospitalization, patient to be seen 5 x/week to address the identified rehab impairments via gait training, therapeutic activities, therapeutic exercises and progress toward the following goals:    · Plan of Care Expires:  08/05/20    Subjective     Chief Complaint: Pt states "don't let me " "fall"  Patient/Family Comments/goals: None from patient aside from concern about falling.    Pain/Comfort:  · Pain Rating 1: 0/10  · Pain Rating Post-Intervention 1: 0/10      Objective:     Communicated with nursing and patient prior to session.  Patient found supine with telemetry, peripheral IV upon PT entry to room.     General Precautions: Standard, fall   Orthopedic Precautions:N/A   Braces: N/A     Functional Mobility:  · Bed Mobility:     · Rolling Left:  minimum assistance  · Rolling Right: minimum assistance  · Scooting: minimum assistance and moderate assistance  · Bridging: moderate assistance  · Supine to Sit: moderate assistance  · Sit to Supine: moderate assistance  · Transfers:     · Sit to Stand:  moderate assistance and maximal assistance with rolling walker  · Bed to Chair: maximal assistance with  rolling walker  using  Scoot Pivot   Balance:   Static Sit: POOR+: Needs MINIMAL assist to maintain  Dynamic Sit: POOR: N/A  Static Stand: 0: Needs MAXIMAL assist to maintain   Dynamic stand: 0: N/A        AM-PAC 6 CLICK MOBILITY  Turning over in bed (including adjusting bedclothes, sheets and blankets)?: 3  Sitting down on and standing up from a chair with arms (e.g., wheelchair, bedside commode, etc.): 2  Moving from lying on back to sitting on the side of the bed?: 2  Moving to and from a bed to a chair (including a wheelchair)?: 2  Need to walk in hospital room?: 2  Climbing 3-5 steps with a railing?: 1  Basic Mobility Total Score: 12       Therapeutic Activities and Exercises:   Pt performed B LE exercises consisting of active assisted range of motion exercises x 10 reps consisting of Ankle DF, Ankle PF, Heel slides, ABD/ADD, LAQ with pt able to tolerate activities . PT noted soft tissue tightness to B Knee extension at end ROM.   PT discussed importance of patient's performance of Home Exercise Program (HEP) with pt verbalizing understanding.       Patient left supine with all lines intact, call " button in reach and bed alarm on..    GOALS:   Multidisciplinary Problems     Physical Therapy Goals        Problem: Physical Therapy Goal    Goal Priority Disciplines Outcome Goal Variances Interventions   Physical Therapy Goal     PT, PT/OT Ongoing, Progressing     Description: Goals to be met by: 20    Patient will increase functional independence with mobility by performin. Supine to sit with Contact Guard Assistance  2. Sit to supine with Contact Guard Assistance  3. Bed to chair transfer with Minimal/Contact Guard Assistancewith or without rolling walker using Stand Pivot TECHNIQUE  4. Gait  x ~50  feet with Minimal Assistance with or without rolling walker  5. Lower extremity exercise program x10 reps per handout, with assistance as needed                      Time Tracking:     PT Received On: 20  PT Start Time: 1000     PT Stop Time: 1030  PT Total Time (min): 30 min     Billable Minutes: Therapeutic Activity 15 and Therapeutic Exercise 10    Treatment Type: Treatment              Aramsi Kent, PT  2020

## 2020-07-31 NOTE — PLAN OF CARE
07/31/20 1508   Post-Acute Status   Post-Acute Authorization Placement  (patient chooses skilled nursing care at [facility] swing bed.)   Post-Acute Placement Status Set-up Complete   Patient choice form signed by patient/caregiver List from System Post-Acute Care   Discharge Plan   Discharge Plan A Skilled Nursing Facility     Ms Mccollum will receive skilled nursing care at [facility] in SWING status.

## 2020-07-31 NOTE — PLAN OF CARE
Request for skilled nursing admit to [facility] for swing bed placement here at [facility] as this is patient choice. Process and payment explained to patient,  and daughter, Glen. Understanding voiced.

## 2020-07-31 NOTE — PLAN OF CARE
Problem: Physical Therapy Goal  Goal: Physical Therapy Goal  Description: Goals to be met by: 20    Patient will increase functional independence with mobility by performin. Supine to sit with Contact Guard Assistance  2. Sit to supine with Contact Guard Assistance  3. Bed to chair transfer with Minimal/Contact Guard Assistancewith or without rolling walker using Stand Pivot TECHNIQUE  4. Gait  x ~50  feet with Minimal Assistance with or without rolling walker  5. Lower extremity exercise program x10 reps per handout, with assistance as needed     Outcome: Ongoing, Progressing

## 2020-07-31 NOTE — PLAN OF CARE
Pt admitted with UTI; urine culture growing gram negative rods; susceptibility pending. On levaquin every 48hr.  Blood cultures NGTD. Afebrile. Complains of weakness. Admitted to swing bed for debility; PT and OT consulted. Vitals stable. Sinus rhythm with first degree AVB on telemetry. [place] at bedside. Remains free from fall/injury. Reviewed plan of care with pt and ; states agreement.

## 2020-07-31 NOTE — SUBJECTIVE & OBJECTIVE
Review of Systems   Unable to perform ROS: Mental status change   Genitourinary: Negative.         UTI on admit noted   Neurological:        Falling and sustained some skin abrtasions     Objective:     Vital Signs (Most Recent):  Temp: 97.9 °F (36.6 °C) (07/31/20 0702)  Pulse: 85 (07/31/20 0800)  Resp: 18 (07/31/20 0702)  BP: 131/61 (07/31/20 0702)  SpO2: (!) 93 % (07/31/20 0715) Vital Signs (24h Range):  Temp:  [97.9 °F (36.6 °C)-99 °F (37.2 °C)] 97.9 °F (36.6 °C)  Pulse:  [75-94] 85  Resp:  [16-20] 18  SpO2:  [93 %-96 %] 93 %  BP: (127-170)/(60-76) 131/61     Weight: 67.1 kg (148 lb)  Body mass index is 22.5 kg/m².    Physical Exam  Constitutional:       Appearance: She is well-developed.   HENT:      Head: Normocephalic and atraumatic.      Right Ear: External ear normal.      Left Ear: External ear normal.      Nose: Nose normal.   Eyes:      Pupils: Pupils are equal, round, and reactive to light.   Neck:      Musculoskeletal: Normal range of motion and neck supple.      Thyroid: No thyromegaly.      Vascular: No JVD.      Trachea: No tracheal deviation.   Cardiovascular:      Rate and Rhythm: Normal rate.      Heart sounds: Normal heart sounds. No murmur.   Pulmonary:      Effort: Pulmonary effort is normal. No respiratory distress.      Breath sounds: Normal breath sounds. No wheezing or rales.   Chest:      Chest wall: No tenderness.   Abdominal:      General: Bowel sounds are normal. There is no distension.      Palpations: Abdomen is soft. There is no mass.      Tenderness: There is no abdominal tenderness. There is no guarding or rebound.   Musculoskeletal: Normal range of motion.         General: No tenderness.   Lymphadenopathy:      Cervical: No cervical adenopathy.   Skin:     General: Skin is warm and dry.      Coloration: Skin is not pale.      Findings: No erythema or rash.   Neurological:      Mental Status: She is alert and oriented to person, place, and time.      Cranial Nerves: No cranial  nerve deficit.      Motor: No abnormal muscle tone.      Coordination: Coordination normal.      Deep Tendon Reflexes: Reflexes are normal and symmetric. Reflexes normal.   Psychiatric:         Behavior: Behavior normal.         Thought Content: Thought content normal.         Judgment: Judgment normal.           CRANIAL NERVES     CN III, IV, VI   Pupils are equal, round, and reactive to light.       Significant Labs: covid negative     Blood Culture: in process    CBC:   Recent Labs   Lab 07/29/20  1119 07/30/20  0544   WBC 15.19* 9.29   HGB 14.1 13.4   HCT 41.9 39.8    240     CMP:   Recent Labs   Lab 07/29/20  1119 07/30/20  0544   * 131*   K 4.1 4.0   CL 96 95   CO2 25 26   * 110   BUN 17 16   CREATININE 1.2 0.9   CALCIUM 8.6* 8.5*   PROT 5.9* 5.8*   ALBUMIN 3.4* 3.2*   BILITOT 1.4* 1.3*   ALKPHOS 70 66   AST 44* 43*   ALT 51* 46*   ANIONGAP 10 10   EGFRNONAA 41* 58*     Urine Culture: GRAM NEGATIVE FREDI   >100,000 cfu/ml   Identification and susceptibility pending    Urine Studies:   Recent Labs   Lab 07/29/20  1154   COLORU Yellow   APPEARANCEUA Cloudy*   PHUR 7.0   SPECGRAV 1.020   PROTEINUA 2+*   GLUCUA Negative   KETONESU Trace*   BILIRUBINUA Negative   OCCULTUA 2+*   NITRITE Positive*   UROBILINOGEN 1.0   LEUKOCYTESUR 3+*   RBCUA 10*   WBCUA >100*   BACTERIA Many*   HYALINECASTS 0       Significant Imaging:     CXR The cardiac silhouette is within normal limits.  Atherosclerotic changes of the aorta.  Stable chronic lung changes.  Mild elevation of the right diaphragm.  No focal infiltrate or effusion.  Degenerative changes of the spine.    X ray right forearm There is no evidence of fracture or acute osseous abnormality.  No focal soft tissue abnormality.  No radiopaque foreign body.     EKG Sinus rhythm with 1st degree A-V block  Otherwise normal ECG  When compared with ECG of 30-NOV-2019 13:03,  No significant change was found

## 2020-07-31 NOTE — ASSESSMENT & PLAN NOTE
With her hx of recurrent falls at home and limited mobility with PT will need to discuss her safety with  prior to d/c plans. PT today

## 2020-07-31 NOTE — ASSESSMENT & PLAN NOTE
With her hx of recurrent falls at home and limited mobility with PT will need to discuss her safety with  prior to d/c plans. PT today  PT does not feel that she is safe to d/c home with  in her weakened state. Also believes that she has rehab potential. SKILL therapy approved. Will Swing today

## 2020-07-31 NOTE — PLAN OF CARE
07/31/20 1511   Final Note   Assessment Type Final Discharge Note   Anticipated Discharge Disposition Swing Bed   What phone number can be called within the next 1-3 days to see how you are doing after discharge? [phone]   Hospital Follow Up  Appt(s) scheduled? No  (not discharging home)   Discharge plans and expectations educations in teach back method with documentation complete? Yes   Right Care Referral Info   Post Acute Recommendation SNF / Sub-Acute Rehab   Facility Name [facility], State [place] [address]

## 2020-08-01 PROCEDURE — 94761 N-INVAS EAR/PLS OXIMETRY MLT: CPT | Mod: HCNC

## 2020-08-01 PROCEDURE — 11000004 HC SNF PRIVATE: Mod: HCNC

## 2020-08-01 PROCEDURE — 99222 1ST HOSP IP/OBS MODERATE 55: CPT | Mod: AI,HCNC,, | Performed by: FAMILY MEDICINE

## 2020-08-01 PROCEDURE — 25000003 PHARM REV CODE 250: Mod: HCNC | Performed by: NURSE PRACTITIONER

## 2020-08-01 PROCEDURE — 97802 MEDICAL NUTRITION INDIV IN: CPT | Mod: HCNC

## 2020-08-01 PROCEDURE — 97162 PT EVAL MOD COMPLEX 30 MIN: CPT | Mod: HCNC

## 2020-08-01 PROCEDURE — 99222 PR INITIAL HOSPITAL CARE,LEVL II: ICD-10-PCS | Mod: AI,HCNC,, | Performed by: FAMILY MEDICINE

## 2020-08-01 RX ADMIN — STANDARDIZED SENNA CONCENTRATE AND DOCUSATE SODIUM 1 TABLET: 8.6; 5 TABLET ORAL at 09:08

## 2020-08-01 RX ADMIN — SUCRALFATE 1 G: 1 SUSPENSION ORAL at 06:08

## 2020-08-01 RX ADMIN — LISINOPRIL 20 MG: 20 TABLET ORAL at 09:08

## 2020-08-01 RX ADMIN — LEVOTHYROXINE SODIUM 100 MCG: 100 TABLET ORAL at 09:08

## 2020-08-01 RX ADMIN — DONEPEZIL HYDROCHLORIDE 5 MG: 5 TABLET, FILM COATED ORAL at 09:08

## 2020-08-01 RX ADMIN — ASPIRIN 81 MG: 81 TABLET, COATED ORAL at 09:08

## 2020-08-01 RX ADMIN — SERTRALINE HYDROCHLORIDE 25 MG: 25 TABLET ORAL at 09:08

## 2020-08-01 RX ADMIN — ALUMINUM HYDROXIDE, MAGNESIUM HYDROXIDE, AND SIMETHICONE 30 ML: 200; 200; 20 SUSPENSION ORAL at 09:08

## 2020-08-01 RX ADMIN — MEMANTINE 10 MG: 10 TABLET ORAL at 09:08

## 2020-08-01 RX ADMIN — ALUMINUM HYDROXIDE, MAGNESIUM HYDROXIDE, AND SIMETHICONE 30 ML: 200; 200; 20 SUSPENSION ORAL at 05:08

## 2020-08-01 RX ADMIN — SUCRALFATE 1 G: 1 SUSPENSION ORAL at 12:08

## 2020-08-01 RX ADMIN — PRAVASTATIN SODIUM 80 MG: 40 TABLET ORAL at 09:08

## 2020-08-01 RX ADMIN — SUCRALFATE 1 G: 1 SUSPENSION ORAL at 09:08

## 2020-08-01 NOTE — H&P
[facility] Medicine  History & Physical    Patient Name: Estefania Mccollum  MRN: [id number]  Admission Date: 2020  Attending Physician: Manish Lynn MD   Primary Care Provider: Danna Levine MD         Patient information was obtained from patient and ER records.     Subjective:     Principal Problem:<principal problem not specified>    Chief Complaint: No chief complaint on file.       HPI: 85 year old female admitted with UTI being admitted to [facility] for PT. She lives at home with only her 91 year old , who is unable to lift her or transfer her safely. She is weak. She needs to be strong enough to perform her ADL's on her own. If she is unable to reach this goal, she may need nursing home placement.     Past Medical History:   Diagnosis Date    Adenomatous polyp     Allergy     sinus    Annual physical exam 2011    Arthritis     osteoarthritis    Cataract     History of bone density study 2009    History of colonoscopy 10/30/2008    History of mammogram 2011    Hyperlipidemia     Hypertension     Macular degeneration     Postmenopausal hormone replacement therapy     therapy stopped    Squamous cell carcinoma 2019    Left thigh (ED&C)    Thyroid disease     hypothyroidism    Vertigo        Past Surgical History:   Procedure Laterality Date    APPENDECTOMY      12 years old    CATARACT EXTRACTION      Both eyes    CATARACT EXTRACTION, BILATERAL       SECTION      x 3    CHOLECYSTECTOMY      COLONOSCOPY  10/02/2013    HYSTERECTOMY      total    JOINT REPLACEMENT Left     OOPHORECTOMY      TONSILLECTOMY      TOTAL KNEE ARTHROPLASTY Left 2016    Yag       Left Eye       Review of patient's allergies indicates:   Allergen Reactions    Tetracycline Other (See Comments)     Other reaction(s): Rash.. Pt states no drug allergie       Current Facility-Administered Medications on File Prior to Encounter   Medication     [DISCONTINUED] acetaminophen tablet 650 mg    [DISCONTINUED] aluminum-magnesium hydroxide-simethicone 200-200-20 mg/5 mL suspension 30 mL    [DISCONTINUED] aspirin EC tablet 81 mg    [DISCONTINUED] donepeziL tablet 5 mg    [DISCONTINUED] HYDROcodone-acetaminophen 5-325 mg per tablet 1 tablet    [DISCONTINUED] levoFLOXacin 500 mg/100 mL IVPB 500 mg    [DISCONTINUED] levothyroxine tablet 100 mcg    [DISCONTINUED] lisinopriL tablet 20 mg    [DISCONTINUED] memantine tablet 10 mg    [DISCONTINUED] ondansetron injection 4 mg    [DISCONTINUED] pantoprazole EC tablet 40 mg    [DISCONTINUED] pravastatin tablet 80 mg    [DISCONTINUED] promethazine (PHENERGAN) 12.5 mg in dextrose 5 % 50 mL IVPB    [DISCONTINUED] sertraline tablet 25 mg    [DISCONTINUED] sodium chloride 0.9% flush 10 mL    [DISCONTINUED] sucralfate 100 mg/mL suspension 1 g     Current Outpatient Medications on File Prior to Encounter   Medication Sig    acetaminophen (TYLENOL) 325 MG tablet Take 325 mg by mouth every 6 (six) hours as needed for Pain.    aspirin (ECOTRIN) 81 MG EC tablet Take 81 mg by mouth once daily.    calcium carbonate (OS-RAMONE) 600 mg (1,500 mg) Tab Take 600 mg by mouth once daily.     donepeziL (ARICEPT) 5 MG tablet Take 1 tablet (5 mg total) by mouth every evening.    fish oil-omega-3 fatty acids 300-1,000 mg capsule Take 2 g by mouth once daily.    FLUAD 2968-0500, 65 YR UP,,PF, 45 mcg (15 mcg x 3)/0.5 mL Syrg ADM 0.5ML IM UTD    levothyroxine (SYNTHROID) 100 MCG tablet Take 1 tablet (100 mcg total) by mouth once daily.    levothyroxine (SYNTHROID) 100 MCG tablet Take 1 tablet (100 mcg total) by mouth before breakfast.    lisinopril (PRINIVIL,ZESTRIL) 20 MG tablet TAKE 1 TABLET EVERY DAY    memantine (NAMENDA) 10 MG Tab TAKE 1 TABLET TWICE DAILY    multivitamin capsule Take 1 capsule by mouth once daily.    omeprazole (PRILOSEC) 40 MG capsule TAKE 1 CAPSULE EVERY MORNING    oxybutynin (DITROPAN) 5 MG Tab TAKE 2  TABLETS AT NIGHT    pravastatin (PRAVACHOL) 80 MG tablet TAKE 1 TABLET EVERY DAY    sertraline (ZOLOFT) 25 MG tablet TAKE 1 TABLET (25 MG TOTAL) BY MOUTH EVERY EVENING.     Family History     Problem Relation (Age of Onset)    Asthma Father    Diabetes Sister    Heart attack Brother    Heart disease Brother    Heart failure Mother, Father    Hyperlipidemia Mother, Father    Hypertension Mother, Father, Sister    Mental retardation Daughter    No Known Problems Daughter, Brother, Daughter        Tobacco Use    Smoking status: Never Smoker    Smokeless tobacco: Never Used   Substance and Sexual Activity    Alcohol use: Yes     Frequency: Monthly or less     Drinks per session: Patient refused     Binge frequency: Never     Comment: wine occasionally    Drug use: No    Sexual activity: Yes     Partners: Male     Review of Systems   Unable to perform ROS: Dementia     Objective:     Vital Signs (Most Recent):  Temp: 98.8 °F (37.1 °C) (08/01/20 0702)  Pulse: 91 (08/01/20 1200)  Resp: 17 (08/01/20 0702)  BP: 133/60 (08/01/20 0702)  SpO2: (!) 93 % (08/01/20 0705) Vital Signs (24h Range):  Temp:  [98.3 °F (36.8 °C)-99.4 °F (37.4 °C)] 98.8 °F (37.1 °C)  Pulse:  [] 91  Resp:  [16-18] 17  SpO2:  [93 %-95 %] 93 %  BP: (106-133)/(53-63) 133/60     Weight: 67 kg (147 lb 11.3 oz)  Body mass index is 22.46 kg/m².    Physical Exam  Constitutional:       Appearance: She is well-developed.   HENT:      Head: Normocephalic and atraumatic.      Right Ear: External ear normal.      Left Ear: External ear normal.      Nose: Nose normal.   Eyes:      Pupils: Pupils are equal, round, and reactive to light.   Neck:      Musculoskeletal: Normal range of motion and neck supple.      Thyroid: No thyromegaly.      Vascular: No JVD.      Trachea: No tracheal deviation.   Cardiovascular:      Rate and Rhythm: Normal rate.      Heart sounds: Normal heart sounds. No murmur.   Pulmonary:      Effort: Pulmonary effort is normal. No  respiratory distress.      Breath sounds: Normal breath sounds. No wheezing or rales.   Chest:      Chest wall: No tenderness.   Abdominal:      General: Bowel sounds are normal. There is no distension.      Palpations: Abdomen is soft. There is no mass.      Tenderness: There is no abdominal tenderness. There is no guarding or rebound.   Musculoskeletal: Normal range of motion.         General: No tenderness.   Lymphadenopathy:      Cervical: No cervical adenopathy.   Skin:     General: Skin is warm and dry.      Coloration: Skin is not pale.      Findings: No erythema or rash.   Neurological:      Mental Status: She is alert.      Cranial Nerves: No cranial nerve deficit.      Motor: No abnormal muscle tone.      Coordination: Coordination normal.      Deep Tendon Reflexes: Reflexes are normal and symmetric. Reflexes normal.      Comments: Alert. Oriented to place and person    Psychiatric:         Behavior: Behavior normal.         Thought Content: Thought content normal.         Judgment: Judgment normal.           CRANIAL NERVES     CN III, IV, VI   Pupils are equal, round, and reactive to light.       Significant Labs: CBC: No results for input(s): WBC, HGB, HCT, PLT in the last 48 hours.  CMP: No results for input(s): NA, K, CL, CO2, GLU, BUN, CREATININE, CALCIUM, PROT, ALBUMIN, BILITOT, ALKPHOS, AST, ALT, ANIONGAP, EGFRNONAA in the last 48 hours.    Invalid input(s): [place]    Significant Imaging: I have reviewed and interpreted all pertinent imaging results/findings within the past 24 hours.    Assessment/Plan:     Acute cystitis with hematuria  Continue levaquin for 7 days total for complicated UTI       Frequent falls  Swing for PT.   Consider nursing home placement if she does not progress       Debility  Swing for PT       Memory loss  Continue donepezil and namenda       Hyperlipidemia  Continue pravastatin         Hypothyroidism  Continue synthroid 100mcg   Lab Results   Component Value Date     TSH 2.578 12/03/2019           Hypertension  Continue lisinopril         VTE Risk Mitigation (From admission, onward)         Ordered     IP VTE HIGH RISK PATIENT  Once      07/31/20 1636     Place sequential compression device  Until discontinued      07/31/20 1636                   Manish Lynn MD  Department of Hospital Medicine   [facility]

## 2020-08-01 NOTE — SUBJECTIVE & OBJECTIVE
Past Medical History:   Diagnosis Date    Adenomatous polyp     Allergy     sinus    Annual physical exam 2011    Arthritis     osteoarthritis    Cataract     History of bone density study 2009    History of colonoscopy 10/30/2008    History of mammogram 2011    Hyperlipidemia     Hypertension     Macular degeneration     Postmenopausal hormone replacement therapy     therapy stopped    Squamous cell carcinoma 2019    Left thigh (ED&C)    Thyroid disease     hypothyroidism    Vertigo        Past Surgical History:   Procedure Laterality Date    APPENDECTOMY      12 years old    CATARACT EXTRACTION      Both eyes    CATARACT EXTRACTION, BILATERAL       SECTION      x 3    CHOLECYSTECTOMY      COLONOSCOPY  10/02/2013    HYSTERECTOMY      total    JOINT REPLACEMENT Left     OOPHORECTOMY      TONSILLECTOMY      TOTAL KNEE ARTHROPLASTY Left 2016    Yag       Left Eye       Review of patient's allergies indicates:   Allergen Reactions    Tetracycline Other (See Comments)     Other reaction(s): Rash.. Pt states no drug allergie       Current Facility-Administered Medications on File Prior to Encounter   Medication    [DISCONTINUED] acetaminophen tablet 650 mg    [DISCONTINUED] aluminum-magnesium hydroxide-simethicone 200-200-20 mg/5 mL suspension 30 mL    [DISCONTINUED] aspirin EC tablet 81 mg    [DISCONTINUED] donepeziL tablet 5 mg    [DISCONTINUED] HYDROcodone-acetaminophen 5-325 mg per tablet 1 tablet    [DISCONTINUED] levoFLOXacin 500 mg/100 mL IVPB 500 mg    [DISCONTINUED] levothyroxine tablet 100 mcg    [DISCONTINUED] lisinopriL tablet 20 mg    [DISCONTINUED] memantine tablet 10 mg    [DISCONTINUED] ondansetron injection 4 mg    [DISCONTINUED] pantoprazole EC tablet 40 mg    [DISCONTINUED] pravastatin tablet 80 mg    [DISCONTINUED] promethazine (PHENERGAN) 12.5 mg in dextrose 5 % 50 mL IVPB    [DISCONTINUED] sertraline tablet 25 mg     [DISCONTINUED] sodium chloride 0.9% flush 10 mL    [DISCONTINUED] sucralfate 100 mg/mL suspension 1 g     Current Outpatient Medications on File Prior to Encounter   Medication Sig    acetaminophen (TYLENOL) 325 MG tablet Take 325 mg by mouth every 6 (six) hours as needed for Pain.    aspirin (ECOTRIN) 81 MG EC tablet Take 81 mg by mouth once daily.    calcium carbonate (OS-RAMONE) 600 mg (1,500 mg) Tab Take 600 mg by mouth once daily.     donepeziL (ARICEPT) 5 MG tablet Take 1 tablet (5 mg total) by mouth every evening.    fish oil-omega-3 fatty acids 300-1,000 mg capsule Take 2 g by mouth once daily.    FLUAD 1392-9733, 65 YR UP,,PF, 45 mcg (15 mcg x 3)/0.5 mL Syrg ADM 0.5ML IM UTD    levothyroxine (SYNTHROID) 100 MCG tablet Take 1 tablet (100 mcg total) by mouth once daily.    levothyroxine (SYNTHROID) 100 MCG tablet Take 1 tablet (100 mcg total) by mouth before breakfast.    lisinopril (PRINIVIL,ZESTRIL) 20 MG tablet TAKE 1 TABLET EVERY DAY    memantine (NAMENDA) 10 MG Tab TAKE 1 TABLET TWICE DAILY    multivitamin capsule Take 1 capsule by mouth once daily.    omeprazole (PRILOSEC) 40 MG capsule TAKE 1 CAPSULE EVERY MORNING    oxybutynin (DITROPAN) 5 MG Tab TAKE 2 TABLETS AT NIGHT    pravastatin (PRAVACHOL) 80 MG tablet TAKE 1 TABLET EVERY DAY    sertraline (ZOLOFT) 25 MG tablet TAKE 1 TABLET (25 MG TOTAL) BY MOUTH EVERY EVENING.     Family History     Problem Relation (Age of Onset)    Asthma Father    Diabetes Sister    Heart attack Brother    Heart disease Brother    Heart failure Mother, Father    Hyperlipidemia Mother, Father    Hypertension Mother, Father, Sister    Mental retardation Daughter    No Known Problems Daughter, Brother, Daughter        Tobacco Use    Smoking status: Never Smoker    Smokeless tobacco: Never Used   Substance and Sexual Activity    Alcohol use: Yes     Frequency: Monthly or less     Drinks per session: Patient refused     Binge frequency: Never     Comment: wine  occasionally    Drug use: No    Sexual activity: Yes     Partners: Male     Review of Systems   Unable to perform ROS: Dementia     Objective:     Vital Signs (Most Recent):  Temp: 98.8 °F (37.1 °C) (08/01/20 0702)  Pulse: 91 (08/01/20 1200)  Resp: 17 (08/01/20 0702)  BP: 133/60 (08/01/20 0702)  SpO2: (!) 93 % (08/01/20 0705) Vital Signs (24h Range):  Temp:  [98.3 °F (36.8 °C)-99.4 °F (37.4 °C)] 98.8 °F (37.1 °C)  Pulse:  [] 91  Resp:  [16-18] 17  SpO2:  [93 %-95 %] 93 %  BP: (106-133)/(53-63) 133/60     Weight: 67 kg (147 lb 11.3 oz)  Body mass index is 22.46 kg/m².    Physical Exam  Constitutional:       Appearance: She is well-developed.   HENT:      Head: Normocephalic and atraumatic.      Right Ear: External ear normal.      Left Ear: External ear normal.      Nose: Nose normal.   Eyes:      Pupils: Pupils are equal, round, and reactive to light.   Neck:      Musculoskeletal: Normal range of motion and neck supple.      Thyroid: No thyromegaly.      Vascular: No JVD.      Trachea: No tracheal deviation.   Cardiovascular:      Rate and Rhythm: Normal rate.      Heart sounds: Normal heart sounds. No murmur.   Pulmonary:      Effort: Pulmonary effort is normal. No respiratory distress.      Breath sounds: Normal breath sounds. No wheezing or rales.   Chest:      Chest wall: No tenderness.   Abdominal:      General: Bowel sounds are normal. There is no distension.      Palpations: Abdomen is soft. There is no mass.      Tenderness: There is no abdominal tenderness. There is no guarding or rebound.   Musculoskeletal: Normal range of motion.         General: No tenderness.   Lymphadenopathy:      Cervical: No cervical adenopathy.   Skin:     General: Skin is warm and dry.      Coloration: Skin is not pale.      Findings: No erythema or rash.   Neurological:      Mental Status: She is alert.      Cranial Nerves: No cranial nerve deficit.      Motor: No abnormal muscle tone.      Coordination: Coordination  normal.      Deep Tendon Reflexes: Reflexes are normal and symmetric. Reflexes normal.      Comments: Alert. Oriented to place and person    Psychiatric:         Behavior: Behavior normal.         Thought Content: Thought content normal.         Judgment: Judgment normal.           CRANIAL NERVES     CN III, IV, VI   Pupils are equal, round, and reactive to light.       Significant Labs: CBC: No results for input(s): WBC, HGB, HCT, PLT in the last 48 hours.  CMP: No results for input(s): NA, K, CL, CO2, GLU, BUN, CREATININE, CALCIUM, PROT, ALBUMIN, BILITOT, ALKPHOS, AST, ALT, ANIONGAP, EGFRNONAA in the last 48 hours.    Invalid input(s): [place]GF[place]    Significant Imaging: I have reviewed and interpreted all pertinent imaging results/findings within the past 24 hours.

## 2020-08-01 NOTE — PLAN OF CARE
Pt admitted with complicated UTI; Ecoli; sensitive to levaquin. Afebrile. Admitted to swing bed for debility. PT and OT consulted. Vitals stable. Turning every 2 hours. Esitter at bedside. Remains free from injury/falls. Reviewed plan of care with pt and ; states agreement.

## 2020-08-01 NOTE — PLAN OF CARE
Discharge Assessment   Assessment Type Discharge Planning Reassessment   Confirmed/corrected address and phone number on facesheet? Yes   Assessment information obtained from? Patient;Caregiver  (Alejandro Mccollum is present.)   Expected Length of Stay (days) 2   Prior to hospitilization cognitive status: Alert/Oriented   Prior to hospitalization functional status: Assistive Equipment   Current cognitive status: Alert/Oriented   Current Functional Status: Assistive Equipment;Partially Dependent   Facility Arrived From: home   Lives With spouse   Able to Return to Prior Arrangements other (see comments)  (too frail to return home at this time.)   Is patient able to care for self after discharge? No   Who are your caregiver(s) and their phone number(s)? [phone]   Patient's perception of discharge disposition Home with home health   Readmission Within the Last 30 Days no previous admission in last 30 days   Patient currently being followed by outpatient case management? No   Patient currently receives any other outside agency services? No   Part D Coverage [employer]   Do you have any problems affording any of your prescribed medications? No   Is the patient taking medications as prescribed? yes   Does the patient have transportation home? Yes   Transportation Anticipated family or friend will provide   Does the patient receive services at the [facility]? No   Discharge Plan A Home health   Discharge Plan B Home with family   Patient/Family in Agreement with Plan yes     Ms Mccollum is here on Swing for PT/OT due to debility. She is approved 7 days and her discharge plan is home with home health.

## 2020-08-01 NOTE — PT/OT/SLP EVAL
"Physical Therapy Evaluation    Patient Name:  Estefania Mccollum   MRN:  [id number]    Recommendations:     Discharge Recommendations:  nursing facility, skilled   Discharge Equipment Recommendations: none   Barriers to discharge: Decreased caregiver support    Assessment:     Estefania Mccollum is a 85 y.o. female admitted with a medical diagnosis of <principal problem not specified>.  She presents with the following impairments/functional limitations:  weakness, impaired endurance, impaired self care skills, impaired functional mobilty, gait instability, impaired balance, decreased lower extremity function, decreased safety awareness, impaired cognition, decreased ROM, impaired coordination, impaired fine motor that are causing the pt to have decreased independence and safety with all gait and mobility tasks.  PT requires skilled PT treatment to increase independence and safety with all gait and mobility tasks to increase current functional status    Rehab Prognosis: Fair; patient would benefit from acute skilled PT services to address these deficits and reach maximum level of function.    Recent Surgery: * No surgery found *      Plan:     During this hospitalization, patient to be seen daily to address the identified rehab impairments via gait training, therapeutic activities, therapeutic exercises and progress toward the following goals:    · Plan of Care Expires:  08/28/20    Subjective     Chief Complaint: altered mental status, increase assistance with care "I live with my parents"  Patient/Family Comments/goals: "To get better and to return home with ".  Pain/Comfort:  Pain Rating 1: 0/10    Patients cultural, spiritual, Buddhist conflicts given the current situation:      Living Environment:  Patient lives with  in a [facility] with no steps to enter  Prior to admission, patients level of function was able ambulate using Rollator with assistance (per ). Requires assistance with mobility and self " care tasks  Equipment used at home: walker, standard, wheelchair, bedside commode.  DME owned (not currently used): none.  Upon discharge, patient will have assistance from .    Objective:     Communicated with patient prior to session.  Patient found supine with telemetry, peripheral IV  upon PT entry to room.    General Precautions: Standard, fall   Orthopedic Precautions:N/A   Braces: N/A     Exams:  · Gross Motor Coordination:  WFL  · Postural Exam:  Patient presented with the following abnormalities:    · -       Rounded shoulders  · -       Forward head  · -       Cervical Kyphosis and always lean on right side  · Skin Integrity/Edema:      · -       Skin integrity: Tear of skin on left arm covered with dressing  · RUE ROM: WFL  · RUE Strength: WFL  · LUE ROM: WFL  · LUE Strength: WFL  · RLE ROM: WFL  · RLE Strength: 3-/5 grossly  · LLE ROM: WFL expect loss of full extension by 10 degrees  · LLE Strength: 3-/5 grossly    Functional Mobility:  · Bed Mobility:     · Rolling Left:  minimal assistance and cues  · Rolling Right: minimal assistance and cues    AM-PAC 6 CLICK MOBILITY  Total Score:10     Patient left supine with all lines intact, call button in reach, bed alarm on, nursing notified.    GOALS:   Multidisciplinary Problems     Physical Therapy Goals        Problem: Physical Therapy Goal    Goal Priority Disciplines Outcome Goal Variances Interventions   Physical Therapy Goal     PT, PT/OT Ongoing, Progressing     Description: Patient will increase functional independence with mobility by performin. Supine to sit with Contact Guard Assistance  2. Sit to supine with Contact Guard Assistance  3. Bed to chair transfer with Minimal/Contact Guard Assistancewith or without rolling walker using Stand Pivot TECHNIQUE  4. Gait  x ~50  feet with Minimal Assistance with or without rolling walker  5. Lower extremity exercise program x10 reps per handout, with assistance as needed                    History:     Past Medical History:   Diagnosis Date    Adenomatous polyp     Allergy     sinus    Annual physical exam 2011    Arthritis     osteoarthritis    Cataract     History of bone density study 2009    History of colonoscopy 10/30/2008    History of mammogram 2011    Hyperlipidemia     Hypertension     Macular degeneration     Postmenopausal hormone replacement therapy     therapy stopped    Squamous cell carcinoma 2019    Left thigh (ED&C)    Thyroid disease     hypothyroidism    Vertigo        Past Surgical History:   Procedure Laterality Date    APPENDECTOMY      12 years old    CATARACT EXTRACTION      Both eyes    CATARACT EXTRACTION, BILATERAL       SECTION      x 3    CHOLECYSTECTOMY      COLONOSCOPY  10/02/2013    HYSTERECTOMY      total    JOINT REPLACEMENT Left     OOPHORECTOMY      TONSILLECTOMY      TOTAL KNEE ARTHROPLASTY Left 2016    Yag       Left Eye       Time Tracking:     PT Received On: 20  PT Start Time: 30     PT Stop Time: 0945  PT Total Time (min): 15 min     Billable Minutes: Evaluation 15      Abdoul Gong, PT  2020

## 2020-08-01 NOTE — HPI
85 year old female admitted with UTI being admitted to [facility] for PT. She lives at home with only her 91 year old , who is unable to lift her or transfer her safely. She is weak. She needs to be strong enough to perform her ADL's on her own. If she is unable to reach this goal, she may need nursing home placement.

## 2020-08-01 NOTE — CONSULTS
Consult for advance directive planning completed by RN case manager, Riana Ordaz. See related note - 8/1/2020 at 5:46 PM.      to remain available as needed.

## 2020-08-01 NOTE — PLAN OF CARE
Recommendations  1. Add Boost Plus BID.   2. Encourage good PO intake.  Goals: Pt to consume > 50% of meals.  Nutrition Goal Status: new

## 2020-08-01 NOTE — CONSULTS
"  [facility]  Adult Nutrition  Consult Note    SUMMARY     Recommendations  1. Add Boost Plus BID.   2. Encourage good PO intake.  Goals: Pt to consume > 50% of meals.  Nutrition Goal Status: new  Communication of RD Recs: (plan of care)    Reason for Assessment  Reason For Assessment: consult(new admit)  Diagnosis: (acute cystitis with hematuria)  Relevant Medical History: osteoarthritis, cataract, colonoscopy, HLD, HTN, thyroid disease, vertigo  General Information Comments:  brought in pt 2/2 confused state. Treatment for UTI. Currently on a Regular diet. Chart records show intake of 25%. NFPE unable to be completed 2/2 RD assessing remotely.  Nutrition Discharge Planning: Adequate PO intake via Regular diet.    Nutrition Risk Screen  Nutrition Risk Screen: no indicators present    Nutrition/Diet History  Spiritual, Cultural Beliefs, Pentecostal Practices, Values that Affect Care: no  Factors Affecting Nutritional Intake: impaired cognitive status/motor control    Anthropometrics  Temp: 98.8 °F (37.1 °C)  Height Method: Stated  Height: 5' 8" (172.7 cm)  Height (inches): 68 in  Weight Method: Bed Scale  Weight: 67 kg (147 lb 11.3 oz)  Weight (lb): 147.71 lb  Ideal Body Weight (IBW), Female: 140 lb  % Ideal Body Weight, Female (lb): 105.51 %  BMI (Calculated): 22.5  BMI Grade: 18.5-24.9 - normal    Lab/Procedures/Meds  Pertinent Labs Comments: Na 131, Ish 8.5, Alb 3.2  Pertinent Medications Comments: Levothyroxine, pravastatin, senna-docusate    Estimated/Assessed Needs  Weight Used For Calorie Calculations: 67 kg (147 lb 11.3 oz)  Energy Calorie Requirements (kcal): 1454 kcal daily  Energy Need Method: Drea-[place]idy  Protein Requirements: 54 gm daily  Weight Used For Protein Calculations: 67 kg (147 lb 11.3 oz)(0.8 gm/kg)  Fluid Requirements (mL): 1 mL/kcal or per MD  Estimated Fluid Requirement Method: RDA Method  RDA Method (mL): 1454  CHO Requirement: 182 gm daily    Nutrition " Prescription Ordered  Current Diet Order: Regular    Evaluation of Received Nutrient/Fluid Intake  Comments: LBM 7/29  % Intake of Estimated Energy Needs: 25 - 50 %  % Meal Intake: 25 - 50 %    Nutrition Risk  Level of Risk/Frequency of Follow-up: (f/u 1x/weekly)     Assessment and Plan  Nutrition Problem  Inadequate oral intake    Related to (etiology):   Mental confusion    Signs and Symptoms (as evidenced by):   Chart records of 25% intake    Interventions (treatment strategy):  Collaboration with other provided    Nutrition Diagnosis Status:   New    Monitor and Evaluation  Food and Nutrient Intake: food and beverage intake  Food and Nutrient Adminstration: diet order  Knowledge/Beliefs/Attitudes: food and nutrition knowledge/skill  Physical Activity and Function: nutrition-related ADLs and IADLs  Anthropometric Measurements: weight, weight change  Biochemical Data, Medical Tests and Procedures: electrolyte and renal panel, gastrointestinal profile, glucose/endocrine profile, inflammatory profile, lipid profile  Nutrition-Focused Physical Findings: overall appearance     Malnutrition Assessment  NFPE unable to be completed 2/2 RD assessing remotely.    Nutrition Follow-Up  RD Follow-up?: Yes

## 2020-08-01 NOTE — PLAN OF CARE
08/01/20 1745   Advance Directives (For Healthcare)   Advance Directive  (If Adv Dir status is received, view document under Adv Dir in header or Chart Review Media tab) Advance Directive currently in Epic.     Patient confirms she is a full code.  agrees as she is not terminal at this time.

## 2020-08-01 NOTE — PLAN OF CARE
Patient Agrees and Compliant with Plan of Care;  Monitor Vital Signs;Patient Temp. 99.4F.  Monitor Breathing Pattern; No c/o shortness of breath noted this shift; Bilateral Breath sounds auscultated Posteriorly with Coarse crackles RML RLL. Oxygen Saturation  93-94% on room air.  Monitor skin Integrity; Patient repositioned Q 2 hrs ; Incontinent in diapers; Diaper changed twice and soaked with urine. Note bruises/ abrasions to arms; Telfa to Right arm intact.  Monitor Neuro ; Patient Awake and Alert; Disoriented to time .   Administer IV antibiotic as ordered; Patient received IV Levofloxacin Q day; Dose given on Day Shift.  Monitor Cardiac Rhythm; Patient on Tele; in NSR with first degree AV block.  Fall Precautions; Maintain Patient Safety; Bed Alarm in use.No falls or injury noted this shift.

## 2020-08-02 PROCEDURE — 94761 N-INVAS EAR/PLS OXIMETRY MLT: CPT | Mod: HCNC

## 2020-08-02 PROCEDURE — 11000004 HC SNF PRIVATE: Mod: HCNC

## 2020-08-02 PROCEDURE — 25000003 PHARM REV CODE 250: Mod: HCNC | Performed by: NURSE PRACTITIONER

## 2020-08-02 PROCEDURE — 97530 THERAPEUTIC ACTIVITIES: CPT | Mod: HCNC,CQ

## 2020-08-02 PROCEDURE — 63600175 PHARM REV CODE 636 W HCPCS: Mod: HCNC | Performed by: NURSE PRACTITIONER

## 2020-08-02 RX ADMIN — SERTRALINE HYDROCHLORIDE 25 MG: 25 TABLET ORAL at 09:08

## 2020-08-02 RX ADMIN — ASPIRIN 81 MG: 81 TABLET, COATED ORAL at 08:08

## 2020-08-02 RX ADMIN — SUCRALFATE 1 G: 1 SUSPENSION ORAL at 02:08

## 2020-08-02 RX ADMIN — LEVOTHYROXINE SODIUM 100 MCG: 100 TABLET ORAL at 05:08

## 2020-08-02 RX ADMIN — DONEPEZIL HYDROCHLORIDE 5 MG: 5 TABLET, FILM COATED ORAL at 09:08

## 2020-08-02 RX ADMIN — STANDARDIZED SENNA CONCENTRATE AND DOCUSATE SODIUM 1 TABLET: 8.6; 5 TABLET ORAL at 08:08

## 2020-08-02 RX ADMIN — MEMANTINE 10 MG: 10 TABLET ORAL at 08:08

## 2020-08-02 RX ADMIN — LEVOFLOXACIN 500 MG: 500 INJECTION, SOLUTION INTRAVENOUS at 03:08

## 2020-08-02 RX ADMIN — LISINOPRIL 20 MG: 20 TABLET ORAL at 08:08

## 2020-08-02 RX ADMIN — STANDARDIZED SENNA CONCENTRATE AND DOCUSATE SODIUM 1 TABLET: 8.6; 5 TABLET ORAL at 09:08

## 2020-08-02 RX ADMIN — SUCRALFATE 1 G: 1 SUSPENSION ORAL at 05:08

## 2020-08-02 RX ADMIN — MEMANTINE 10 MG: 10 TABLET ORAL at 09:08

## 2020-08-02 RX ADMIN — PRAVASTATIN SODIUM 80 MG: 40 TABLET ORAL at 08:08

## 2020-08-02 NOTE — PT/OT/SLP PROGRESS
"Physical Therapy Treatment    Patient Name:  Estefania Mccollum   MRN:  [id number]    Recommendations:     Discharge Recommendations:  nursing facility, skilled   Discharge Equipment Recommendations: none   Barriers to discharge: Decreased caregiver support    Assessment:     Estefania Mccollum is a 85 y.o. female admitted with a medical diagnosis of <principal problem not specified>.  She presents with the following impairments/functional limitations:  weakness, impaired endurance, impaired self care skills, impaired functional mobilty, gait instability, impaired balance, impaired cognition, decreased upper extremity function, decreased lower extremity function, decreased safety awareness, decreased ROM . Patient demonstrates generalized weakness and confused at times in therapy today. Patient was able to hold a short conversations with therapist and suddenly gets confused again. Patient sat up at EOB for few minutes with Mod Assist. Noticed patient tends to demonstrates increase leaning on Right side. Verbal and tactile cues to weight shift towards Left side and upright posture. Patient reports she has a fear of falling and kept stating "I don't want to fall". Therapist explained to patient about fall risks and ensure patient that she will not fall. Patient will continue to work on PT goals for increase independence in all functional mobility.     Rehab Prognosis: Fair; patient would benefit from acute skilled PT services to address these deficits and reach maximum level of function.    Recent Surgery: * No surgery found *      Plan:     During this hospitalization, patient to be seen daily to address the identified rehab impairments via gait training, therapeutic activities, therapeutic exercises and progress toward the following goals:    · Plan of Care Expires:  08/28/20    Subjective     Chief Complaint: none stated  Patient/Family Comments/goals: "I'm ready to go home".  Pain/Comfort:  · Pain Rating 1: " 0/10      Objective:     Communicated with nursing and patient prior to session.  Patient found HOB elevated with AVASYS in room, telemetry, peripheral IV upon PT entry to room.     General Precautions: Standard, fall   Orthopedic Precautions:N/A   Braces: N/A     Functional Mobility:  · Bed Mobility:     · Rolling Right: minimum assistance  · Scooting: minimum assistance  · Supine to Sit: moderate assistance  · Sit to Supine: moderate assistance  · Balance: sitting balance at EOB with Mod Assist. Patient tends to lean on Right side. Verbal and tactile cues to weight shift towards Left side and upright posture.       AM-PAC 6 CLICK MOBILITY  Turning over in bed (including adjusting bedclothes, sheets and blankets)?: 2  Sitting down on and standing up from a chair with arms (e.g., wheelchair, bedside commode, etc.): 2  Moving from lying on back to sitting on the side of the bed?: 2  Moving to and from a bed to a chair (including a wheelchair)?: 2  Need to walk in hospital room?: 2  Climbing 3-5 steps with a railing?: 1  Basic Mobility Total Score: 11       Therapeutic Activities and Exercises:  Supine <> sit 2x  Supine AAROM BLE knee flexion 10x each  Supine AAROM BLE ankle pumps 10x each  Sitting at EOB for 5 minutes with Mod Assist.    Patient left HOB elevated with all lines intact, call button in reach, nurse RN notified and patient's spouse present..    GOALS:   Multidisciplinary Problems     Physical Therapy Goals        Problem: Physical Therapy Goal    Goal Priority Disciplines Outcome Goal Variances Interventions   Physical Therapy Goal     PT, PT/OT Ongoing, Progressing     Description: Patient will increase functional independence with mobility by performin. Supine to sit with Contact Guard Assistance  2. Sit to supine with Contact Guard Assistance  3. Bed to chair transfer with Minimal/Contact Guard Assistancewith or without rolling walker using Stand Pivot TECHNIQUE  4. Gait  x ~50  feet with  Minimal Assistance with or without rolling walker  5. Lower extremity exercise program x10 reps per handout, with assistance as needed                   Time Tracking:     PT Received On: 08/02/20  PT Start Time: 0956     PT Stop Time: 1016  PT Total Time (min): 20 min     Billable Minutes: Therapeutic Activity 20    Treatment Type: Treatment  PT/PTA: PTA     PTA Visit Number: 1     Radha Peraza, PTA  08/02/2020

## 2020-08-02 NOTE — PLAN OF CARE
Disoriented to time, Oriented to person, place and situation. Fall precautions in place discussed with patient voices understanding. Urine output is good, incontinent.  PO fluids encouraged with good results.NSR with first degree AV block on the monitor.

## 2020-08-03 PROBLEM — E87.1 HYPONATREMIA: Status: ACTIVE | Noted: 2020-08-03

## 2020-08-03 LAB
ANION GAP SERPL CALC-SCNC: 9 MMOL/L (ref 8–16)
BASOPHILS # BLD AUTO: 0.03 K/UL (ref 0–0.2)
BASOPHILS NFR BLD: 0.4 % (ref 0–1.9)
BUN SERPL-MCNC: 10 MG/DL (ref 8–23)
CALCIUM SERPL-MCNC: 8.1 MG/DL (ref 8.7–10.5)
CHLORIDE SERPL-SCNC: 91 MMOL/L (ref 95–110)
CO2 SERPL-SCNC: 25 MMOL/L (ref 23–29)
CREAT SERPL-MCNC: 0.7 MG/DL (ref 0.5–1.4)
DIFFERENTIAL METHOD: NORMAL
EOSINOPHIL # BLD AUTO: 0.2 K/UL (ref 0–0.5)
EOSINOPHIL NFR BLD: 2.1 % (ref 0–8)
ERYTHROCYTE [DISTWIDTH] IN BLOOD BY AUTOMATED COUNT: 12.3 % (ref 11.5–14.5)
EST. GFR  (AFRICAN AMERICAN): >60 ML/MIN/1.73 M^2
EST. GFR  (NON AFRICAN AMERICAN): >60 ML/MIN/1.73 M^2
GLUCOSE SERPL-MCNC: 104 MG/DL (ref 70–110)
HCT VFR BLD AUTO: 38.2 % (ref 37–48.5)
HGB BLD-MCNC: 13.3 G/DL (ref 12–16)
IMM GRANULOCYTES # BLD AUTO: 0.03 K/UL (ref 0–0.04)
IMM GRANULOCYTES NFR BLD AUTO: 0.4 % (ref 0–0.5)
LYMPHOCYTES # BLD AUTO: 1.6 K/UL (ref 1–4.8)
LYMPHOCYTES NFR BLD: 19.9 % (ref 18–48)
MAGNESIUM SERPL-MCNC: 1.9 MG/DL (ref 1.6–2.6)
MCH RBC QN AUTO: 30.4 PG (ref 27–31)
MCHC RBC AUTO-ENTMCNC: 34.8 G/DL (ref 32–36)
MCV RBC AUTO: 87 FL (ref 82–98)
MONOCYTES # BLD AUTO: 0.8 K/UL (ref 0.3–1)
MONOCYTES NFR BLD: 9.7 % (ref 4–15)
NEUTROPHILS # BLD AUTO: 5.5 K/UL (ref 1.8–7.7)
NEUTROPHILS NFR BLD: 67.5 % (ref 38–73)
NRBC BLD-RTO: 0 /100 WBC
PHOSPHATE SERPL-MCNC: 3.2 MG/DL (ref 2.7–4.5)
PLATELET # BLD AUTO: 250 K/UL (ref 150–350)
PMV BLD AUTO: 9.3 FL (ref 9.2–12.9)
POTASSIUM SERPL-SCNC: 4.4 MMOL/L (ref 3.5–5.1)
RBC # BLD AUTO: 4.38 M/UL (ref 4–5.4)
SODIUM SERPL-SCNC: 125 MMOL/L (ref 136–145)
WBC # BLD AUTO: 8.13 K/UL (ref 3.9–12.7)

## 2020-08-03 PROCEDURE — 11000004 HC SNF PRIVATE: Mod: HCNC

## 2020-08-03 PROCEDURE — 97166 OT EVAL MOD COMPLEX 45 MIN: CPT | Mod: HCNC

## 2020-08-03 PROCEDURE — 84100 ASSAY OF PHOSPHORUS: CPT | Mod: HCNC

## 2020-08-03 PROCEDURE — 85025 COMPLETE CBC W/AUTO DIFF WBC: CPT | Mod: HCNC

## 2020-08-03 PROCEDURE — 83735 ASSAY OF MAGNESIUM: CPT | Mod: HCNC

## 2020-08-03 PROCEDURE — 97530 THERAPEUTIC ACTIVITIES: CPT | Mod: HCNC

## 2020-08-03 PROCEDURE — 94761 N-INVAS EAR/PLS OXIMETRY MLT: CPT | Mod: HCNC

## 2020-08-03 PROCEDURE — 80048 BASIC METABOLIC PNL TOTAL CA: CPT | Mod: HCNC

## 2020-08-03 PROCEDURE — 99232 PR SUBSEQUENT HOSPITAL CARE,LEVL II: ICD-10-PCS | Mod: HCNC,,, | Performed by: FAMILY MEDICINE

## 2020-08-03 PROCEDURE — 36415 COLL VENOUS BLD VENIPUNCTURE: CPT | Mod: HCNC

## 2020-08-03 PROCEDURE — 99232 SBSQ HOSP IP/OBS MODERATE 35: CPT | Mod: HCNC,,, | Performed by: FAMILY MEDICINE

## 2020-08-03 PROCEDURE — 25000003 PHARM REV CODE 250: Mod: HCNC | Performed by: NURSE PRACTITIONER

## 2020-08-03 PROCEDURE — 94799 UNLISTED PULMONARY SVC/PX: CPT | Mod: HCNC

## 2020-08-03 PROCEDURE — 25500020 PHARM REV CODE 255: Mod: HCNC | Performed by: FAMILY MEDICINE

## 2020-08-03 RX ORDER — SODIUM CHLORIDE 9 MG/ML
INJECTION, SOLUTION INTRAVENOUS CONTINUOUS
Status: DISCONTINUED | OUTPATIENT
Start: 2020-08-03 | End: 2020-08-08

## 2020-08-03 RX ORDER — MIRTAZAPINE 15 MG/1
15 TABLET, ORALLY DISINTEGRATING ORAL NIGHTLY
Status: DISCONTINUED | OUTPATIENT
Start: 2020-08-03 | End: 2020-08-19 | Stop reason: HOSPADM

## 2020-08-03 RX ADMIN — IOHEXOL 30 ML: 350 INJECTION, SOLUTION INTRAVENOUS at 12:08

## 2020-08-03 RX ADMIN — IOHEXOL 75 ML: 350 INJECTION, SOLUTION INTRAVENOUS at 12:08

## 2020-08-03 RX ADMIN — MEMANTINE 10 MG: 10 TABLET ORAL at 08:08

## 2020-08-03 RX ADMIN — MIRTAZAPINE 15 MG: 15 TABLET, ORALLY DISINTEGRATING ORAL at 09:08

## 2020-08-03 RX ADMIN — LEVOTHYROXINE SODIUM 100 MCG: 100 TABLET ORAL at 06:08

## 2020-08-03 RX ADMIN — SODIUM CHLORIDE 75 ML/HR: 0.9 INJECTION, SOLUTION INTRAVENOUS at 11:08

## 2020-08-03 RX ADMIN — ASPIRIN 81 MG: 81 TABLET, COATED ORAL at 08:08

## 2020-08-03 RX ADMIN — SERTRALINE HYDROCHLORIDE 25 MG: 25 TABLET ORAL at 09:08

## 2020-08-03 RX ADMIN — MEMANTINE 10 MG: 10 TABLET ORAL at 09:08

## 2020-08-03 RX ADMIN — STANDARDIZED SENNA CONCENTRATE AND DOCUSATE SODIUM 1 TABLET: 8.6; 5 TABLET ORAL at 08:08

## 2020-08-03 RX ADMIN — DONEPEZIL HYDROCHLORIDE 5 MG: 5 TABLET, FILM COATED ORAL at 09:08

## 2020-08-03 RX ADMIN — LISINOPRIL 20 MG: 20 TABLET ORAL at 08:08

## 2020-08-03 RX ADMIN — PRAVASTATIN SODIUM 80 MG: 40 TABLET ORAL at 08:08

## 2020-08-03 RX ADMIN — STANDARDIZED SENNA CONCENTRATE AND DOCUSATE SODIUM 1 TABLET: 8.6; 5 TABLET ORAL at 09:08

## 2020-08-03 NOTE — PLAN OF CARE
Assessment complete per flow sheet, AAOX2, some confusion noted when answering questions. RR even unlabored BBS clear,on RA. NRS with a first degree block on Cardiac monitor. Abdomen soft, non distended, with active bowel sounds x 4 quads. Diaper in use due to incontinence of bowel and bladder. Tolerating diet fair, wants  to eat food instead of her eating it, was able to get her to eat 50% of meal. 22 G PIV SL Dsg C/D/I. Safety precautions maintained, bed alarm on, free from falls/ injury, call bell in reach, instructed to call for needs, voiced understanding, agrees with plan of care.  MD rounding, new orders noted.  Patient taken to imaging.  Resting in bed, NAD noted.  Problem: Adult Inpatient Plan of Care  Goal: Plan of Care Review  Outcome: Ongoing, Progressing  Goal: Patient-Specific Goal (Individualization)  Outcome: Ongoing, Progressing  Goal: Absence of Hospital-Acquired Illness or Injury  Outcome: Ongoing, Progressing  Goal: Optimal Comfort and Wellbeing  Outcome: Ongoing, Progressing  Goal: Readiness for Transition of Care  Outcome: Ongoing, Progressing  Goal: Rounds/Family Conference  Outcome: Ongoing, Progressing     Problem: Fall Injury Risk  Goal: Absence of Fall and Fall-Related Injury  Outcome: Ongoing, Progressing     Problem: Skin Injury Risk Increased  Goal: Skin Health and Integrity  Outcome: Ongoing, Progressing     Problem: Balance Impairment (Functional Deficit)  Goal: Improved Balance and Postural Control  Outcome: Ongoing, Progressing     Problem: Coordination Impairment (Functional Deficit)  Goal: Optimal Coordination  Outcome: Ongoing, Progressing     Problem: Muscle Strength Impairment  Goal: Improved Muscle Strength  Outcome: Ongoing, Progressing     Problem: Range of Motion Impairment (Functional Deficit)  Goal: Optimal Range of Motion  Outcome: Ongoing, Progressing

## 2020-08-03 NOTE — PLAN OF CARE
Problem: Physical Therapy Goal  Goal: Physical Therapy Goal  Description: Patient will increase functional independence with mobility by performin. Supine to sit with Contact Guard Assistance  2. Sit to supine with Contact Guard Assistance  3. Bed to chair transfer with Minimal/Contact Guard Assistancewith or without rolling walker using Stand Pivot TECHNIQUE  4. Gait  x ~50  feet with Minimal Assistance with or without rolling walker  5. Lower extremity exercise program x10 reps per handout, with assistance as needed  Outcome: Ongoing, Not Progressing

## 2020-08-03 NOTE — ASSESSMENT & PLAN NOTE
Swing for PT.   Consider nursing home placement if she does not progress   Will have case management start CRISTOPHER smart  Check CT spine and pelvis today

## 2020-08-03 NOTE — PLAN OF CARE
Discharge plan at this time:    Discharge plan A:  Home with home health and family    Discharge plan B:  New nursing home-[facility] care    LOCET called in and PASRR faxed into [place] department of Aging and Adult services in the event patient would need nursing home placement after SNF.    1100: 142 received.      08/03/20 0946   Post-Acute Status   Post-Acute Authorization Other   Part D Coverage [employer]   Other Status See Comments  (LOCET competed.)   Discharge Plan   Discharge Plan A Home Health;Home with family   Discharge Plan B New Nursing Home placement - [facility] care facility

## 2020-08-03 NOTE — PLAN OF CARE
Pt remains on swing for debility. PT and OT consulted. Generalized weakness remains; pt favors right side and has a constant lean that way. CT head today negative. Pleasantly confused at times. Vitals stable. Sinus on telemetry. Turning every 2 hours. Remains free from injury/falls. Reviewed plan of care with pt and spouse; state agreement but further reinforcement needed.

## 2020-08-03 NOTE — PLAN OF CARE
Patient resting while being pleasantly confused at times and no complaints other than wanting to go home. VS stable. Alert to person/place. E-Sitter in place. Free from falls/injury. No acute changes noted. Plan of care reviewed and followed.

## 2020-08-03 NOTE — PT/OT/SLP PROGRESS
Physical Therapy Treatment    Patient Name:  Estefania Mccollum   MRN:  [id number]    Recommendations:     Discharge Recommendations:  nursing facility, skilled, nursing facility, basic   Discharge Equipment Recommendations: none   Barriers to discharge: Decreased caregiver support    Assessment:     Estefania Mccollum is a 85 y.o. female admitted with a medical diagnosis of <principal problem not specified>.  She presents with the following impairments/functional limitations:  weakness, impaired endurance, impaired functional mobilty, impaired self care skills, impaired cognition, impaired balance, gait instability, decreased safety awareness, decreased lower extremity function, decreased upper extremity function. Patient noted with difficulty to comprehend and stay focus in performing tasks due to impaired cognition. Patient continue to show resistive movement during transitional activity  such as supine<>sit  And stand pivot transfers. Transported patient to the Therapy room to perform pre Gait trng inside the parallel bars. Noted with difficulty to stand up and lasted only for ~5 seconds with multiple attempts with maximum assistance. Patient remains leaning to right side with cervical kyphotic posture with no sign of correcting body alignment in spite of providing patient with constant verbal and manual cues. Attempted to train patient with wheelchair propulsion but no sign of interest and no initiation to learn.   Patient noted being in patient and insisted to go back to bed.    Rehab Prognosis: Fair; patient would benefit from acute skilled PT services to address these deficits and reach maximum level of function.    Recent Surgery: * No surgery found *      Plan:     During this hospitalization, patient to be seen daily to address the identified rehab impairments via therapeutic activities, therapeutic exercises, gait training and progress toward the following goals:    · Plan of Care Expires:  08/28/20    Subjective  "    Chief Complaint: decline in functions  Patient/Family Comments/goals: "To learn to walk again".  Pain/Comfort:  · Pain Rating 1: 0/10  · Pain Rating Post-Intervention 1: 0/10      Objective:     Communicated with patient and  prior to session.  Patient found supine with peripheral IV, telemetry upon PT entry to room.     General Precautions: Standard, fall   Orthopedic Precautions:N/A   Braces: N/A     Functional Mobility:  · Bed Mobility:     · Rolling Left:  moderate assistance  · Rolling Right: moderate assistance  · Scooting: moderate assistance  · Supine to Sit: moderate assistance  · Sit to Supine: moderate assistance  · Transfers:     · Sit to Stand:  maximal assistance with inside the parallel bars  · Bed to Chair: maximal assistance with  hand-held assist  using  Squat Pivot  · Gait: Unable  · Balance: Sitting Static: Poor (leans and falls to right side); Standing Static inside the parallel bars: Poor-( leans and falls to right side with limited tolerance : 5 seconds upon multiple attempts      AM-PAC 6 CLICK MOBILITY  Turning over in bed (including adjusting bedclothes, sheets and blankets)?: 2  Sitting down on and standing up from a chair with arms (e.g., wheelchair, bedside commode, etc.): 2  Moving from lying on back to sitting on the side of the bed?: 2  Moving to and from a bed to a chair (including a wheelchair)?: 2  Need to walk in hospital room?: 1  Climbing 3-5 steps with a railing?: 1  Basic Mobility Total Score: 10       Therapeutic Activities and Exercises:   Worked on body sequence with constant coaxing and verbal and manual cues during bed mobility, transfers and sitting and standing balance/postural  trng    Patient left supine with all lines intact, call button in reach, bed alarm on, nursing notified and  present..    GOALS:   Multidisciplinary Problems     Physical Therapy Goals        Problem: Physical Therapy Goal    Goal Priority Disciplines Outcome Goal Variances " Interventions   Physical Therapy Goal     PT, PT/OT Ongoing, Not Progressing     Description: Patient will increase functional independence with mobility by performin. Supine to sit with Contact Guard Assistance  2. Sit to supine with Contact Guard Assistance  3. Bed to chair transfer with Minimal/Contact Guard Assistancewith or without rolling walker using Stand Pivot TECHNIQUE  4. Gait  x ~50  feet with Minimal Assistance with or without rolling walker  5. Lower extremity exercise program x10 reps per handout, with assistance as needed                   Time Tracking:     PT Received On: 20  PT Start Time: 1346     PT Stop Time: 1425  PT Total Time (min): 39 min     Billable Minutes: Therapeutic Activity 30    Treatment Type: Treatment  PT/PTA: PT     PTA Visit Number: 1     Cricket Gaytan, PT  2020

## 2020-08-03 NOTE — PROGRESS NOTES
[facility] Medicine  Progress Note    Patient Name: Estefania Mccollum  MRN: [id number]  Patient Class: IP- Swing   Admission Date: 7/31/2020  Length of Stay: 3 days  Attending Physician: Manish Lynn MD  Primary Care Provider: Danna Levine MD        Subjective:     Principal Problem:<principal problem not specified>        HPI:  85 year old female admitted with UTI being admitted to Swing for PT. She lives at home with only her 91 year old , who is unable to lift her or transfer her safely. She is weak. She needs to be strong enough to perform her ADL's on her own. If she is unable to reach this goal, she may need nursing home placement.     Overview/Hospital Course:  This patient was placed on SKILL unit for cont abx to treat UTI as well as PT. She has become debilitated and weak at home resulting in multiple falls even with walker. Lives at home with elderly frail . Her goal is to ambulate 50 ft with min ass using RW. Yesterday she did bed mobility with min assist. Sat she was max assist to transfer.       Review of Systems   Unable to perform ROS: Dementia     Objective:     Vital Signs (Most Recent):  Temp: 97.6 °F (36.4 °C) (08/03/20 0804)  Pulse: 82 (08/03/20 0804)  Resp: 18 (08/03/20 0804)  BP: (!) 147/67 (08/03/20 0804)  SpO2: (!) 93 % (08/03/20 0804) Vital Signs (24h Range):  Temp:  [96.1 °F (35.6 °C)-98.8 °F (37.1 °C)] 97.6 °F (36.4 °C)  Pulse:  [78-89] 82  Resp:  [16-20] 18  SpO2:  [91 %-95 %] 93 %  BP: (124-147)/(62-70) 147/67     Weight: 67 kg (147 lb 11.3 oz)  Body mass index is 22.46 kg/m².    Physical Exam  Constitutional:       Appearance: She is well-developed.   HENT:      Head: Normocephalic and atraumatic.      Right Ear: External ear normal.      Left Ear: External ear normal.      Nose: Nose normal.   Eyes:      Pupils: Pupils are equal, round, and reactive to light.   Neck:      Musculoskeletal: Normal range of motion and neck supple.       Thyroid: No thyromegaly.      Vascular: No JVD.      Trachea: No tracheal deviation.   Cardiovascular:      Rate and Rhythm: Normal rate.      Heart sounds: Normal heart sounds. No murmur.   Pulmonary:      Effort: Pulmonary effort is normal. No respiratory distress.      Breath sounds: Normal breath sounds. No wheezing or rales.   Chest:      Chest wall: No tenderness.   Abdominal:      General: Bowel sounds are normal. There is no distension.      Palpations: Abdomen is soft. There is no mass.      Tenderness: There is no abdominal tenderness. There is no guarding or rebound.   Musculoskeletal: Normal range of motion.         General: No tenderness.   Lymphadenopathy:      Cervical: No cervical adenopathy.   Skin:     General: Skin is warm and dry.      Coloration: Skin is not pale.      Findings: No erythema or rash.   Neurological:      Mental Status: She is alert.      Cranial Nerves: No cranial nerve deficit.      Motor: No abnormal muscle tone.      Coordination: Coordination normal.      Deep Tendon Reflexes: Reflexes are normal and symmetric. Reflexes normal.      Comments: Alert. Oriented to place and person    Psychiatric:         Behavior: Behavior normal.         Thought Content: Thought content normal.         Judgment: Judgment normal.           CRANIAL NERVES     CN III, IV, VI   Pupils are equal, round, and reactive to light.       Significant Labs: CBC:   Recent Labs   Lab 08/03/20  0600   WBC 8.13   HGB 13.3   HCT 38.2        CMP:   Recent Labs   Lab 08/03/20  0600   *   K 4.4   CL 91*   CO2 25      BUN 10   CREATININE 0.7   CALCIUM 8.1*   ANIONGAP 9   EGFRNONAA >60     Blood cultures NGTD    Urine culture   Escherichia coli       CULTURE, URINE     Amox/K Clav'ate <=8/4 mcg/mL Sensitive     Amp/Sulbactam <=8/4 mcg/mL Sensitive     Ampicillin <=8 mcg/mL Sensitive     Cefazolin <=2 mcg/mL Sensitive     Cefepime <=2 mcg/mL Sensitive     Ceftriaxone <=1 mcg/mL Sensitive      Ciprofloxacin <=1 mcg/mL Sensitive     Ertapenem <=0.5 mcg/mL Sensitive     Gentamicin <=4 mcg/mL Sensitive     Levofloxacin <=2 mcg/mL Sensitive     Meropenem <=1 mcg/mL Sensitive     Nitrofurantoin <=32 mcg/mL Sensitive     Piperacillin/Tazo <=16 mcg/mL Sensitive     Tetracycline <=4 mcg/mL Sensitive     Tobramycin <=4 mcg/mL Sensitive     Trimeth/Sulfa <=2/38 mcg/mL Sensitive        Significant Imaging:       CT head No acute abnormality.      Assessment/Plan:      Hyponatremia  I think this is a reflection of her very poor diet  Start remeron  Ns at 75ml/hr       Acute cystitis with hematuria  Continue levaquin for 7 days total for complicated UTI   Getting dose q 48hr 7/29, 7/31, 8/2      Frequent falls  Swing for PT.   Consider nursing home placement if she does not progress   Will have case management start PASSAR [facility]  Check CT spine and pelvis today    Debility  Swing for PT ; currently max assist with transfer   GOAL>>Gait  x ~50  feet with Minimal Assistance with or without rolling walker  Check CT spine and pelvis today    Memory loss  Continue donepezil and namenda       Hyperlipidemia  Continue pravastatin         Hypothyroidism  Continue synthroid 100mcg   Lab Results   Component Value Date    TSH 2.578 12/03/2019           Hypertension  Continue lisinopril   bp 124//67      VTE Risk Mitigation (From admission, onward)         Ordered     IP VTE HIGH RISK PATIENT  Once      07/31/20 1636     Place sequential compression device  Until discontinued      07/31/20 1636                      Manish Lynn MD  Department of Hospital Medicine   [facility]

## 2020-08-03 NOTE — PT/OT/SLP EVAL
"Occupational Therapy   Evaluation    Name: Estefania Mccollum  MRN: [id number]  Admitting Diagnosis:  <principal problem not specified>      Recommendations:     Discharge Recommendations: nursing facility, basic, nursing facility, skilled  Discharge Equipment Recommendations:  none  Barriers to discharge:  Decreased caregiver support    Assessment:     Estefania Mccollum is a 85 y.o. female with a medical diagnosis of <principal problem not specified>.  She presents with confusion but able to follow most one step commands, noted able to sit EOB with Mod A. She would benefit from OT services at this time. Performance deficits affecting function: weakness, impaired endurance, impaired cognition, impaired self care skills, impaired functional mobilty, decreased safety awareness, gait instability, impaired balance.      Rehab Prognosis: Fair; patient would benefit from acute skilled OT services to address these deficits and reach maximum level of function.       Plan:     Patient to be seen 5 x/week to address the above listed problems via self-care/home management, therapeutic activities, therapeutic exercises  · Plan of Care Expires: 08/17/20  · Plan of Care Reviewed with: patient, spouse    Subjective     Chief Complaint: "Im tired"  Patient/Family Comments/goals:  states "she needs to get stronger to go back home"    Occupational Profile:  Living Environment: lives in home with , patient poor historian    Previous level of function: required assistance for ADLs and mobility  Roles and Routines: wife  Equipment Used at Home:  walker, rolling, rollator, wheelchair, bedside commode, bath bench  Assistance upon Discharge:      Pain/Comfort:  · Pain Rating 1: 0/10    Patients cultural, spiritual, Orthodox conflicts given the current situation:      Objective:     Communicated with: Nursing prior to session.  Patient found supine with peripheral IV, telemetry upon OT entry to room.    General Precautions: " Standard, fall   Orthopedic Precautions:N/A   Braces: N/A     Occupational Performance:    Bed Mobility:    · Patient completed Rolling/Turning to Right with moderate assistance  · Patient completed Supine to Sit with moderate assistance  · Patient completed Sit to Supine with moderate assistance    Functional Mobility/Transfers:  · Pt refused OOB tasks     Activities of Daily Living:  · Lower Body Dressing: maximal assistance don/doff socks    Cognitive/Visual Perceptual:  Cognitive/Psychosocial Skills:     -       Oriented to: Person and Place   -       Follows Commands/attention:Inattentive and Easily distracted  -       Communication: clear/fluent  -       Memory: Poor immediate recall    Physical Exam:  Upper Extremity Range of Motion:     -       Right Upper Extremity: WFL  -       Left Upper Extremity: WFL  Upper Extremity Strength:    -       Right Upper Extremity: WFL  -       Left Upper Extremity: WFL   Strength:    -       Right Upper Extremity: WFL  -       Left Upper Extremity: WFL    AMPAC 6 Click ADL:  AMPAC Total Score: 12    Treatment & Education:  Therapist educated patient and  on role of OT  Education:    Patient left supine with all lines intact, call button in reach and  present    GOALS:   Multidisciplinary Problems     Occupational Therapy Goals        Problem: Occupational Therapy Goal    Goal Priority Disciplines Outcome Interventions   Occupational Therapy Goal     OT, PT/OT Ongoing, Progressing    Description: Goals to be met by: 8/17/2020     Patient will increase functional independence with ADLs by performing:    Feeding with Modified [place].  UE Dressing with Modified [place].  LE Dressing with Minimal Assistance.  Grooming while seated with Modified [place].  Toileting from bedside commode with Minimal Assistance for hygiene and clothing management.   Sitting at edge of bed x5 minutes with Stand-by Assistance.  Supine to sit with Stand-by  Assistance.  Step transfer with Contact Guard Assistance  Toilet transfer to bedside commode with Minimal Assistance.                     History:     Past Medical History:   Diagnosis Date    Adenomatous polyp     Allergy     sinus    Annual physical exam 2011    Arthritis     osteoarthritis    Cataract     History of bone density study 2009    History of colonoscopy 10/30/2008    History of mammogram 2011    Hyperlipidemia     Hypertension     Macular degeneration     Postmenopausal hormone replacement therapy     therapy stopped    Squamous cell carcinoma 2019    Left thigh (ED&C)    Thyroid disease     hypothyroidism    Vertigo        Past Surgical History:   Procedure Laterality Date    APPENDECTOMY      12 years old    CATARACT EXTRACTION      Both eyes    CATARACT EXTRACTION, BILATERAL       SECTION      x 3    CHOLECYSTECTOMY      COLONOSCOPY  10/02/2013    HYSTERECTOMY      total    JOINT REPLACEMENT Left     OOPHORECTOMY      TONSILLECTOMY      TOTAL KNEE ARTHROPLASTY Left 2016    Yag       Left Eye       Time Tracking:     OT Date of Treatment: 20  OT Start Time: 1805  OT Stop Time: 1826  OT Total Time (min): 21 min    Billable Minutes:Evaluation Mod complexity 21 min    Gilles Bernard OT  8/3/2020

## 2020-08-03 NOTE — HOSPITAL COURSE
8/3 This patient was placed on SKILL unit for cont abx to treat UTI as well as PT. She has become debilitated and weak at home resulting in multiple falls even with walker. Lives at home with elderly frail . Her goal is to ambulate 50 ft with min ass using RW. Yesterday she did bed mobility with min assist. Sat she was max assist to transfer.     8/4 this patient is on skill for cont therapy as she was not strong enough to be safely discharged to home with her elderly frail . She is demented and has no complaints except she wants to go home. She complains of no pain but has not been walking and always laying on right side. CT head done that did not show any acute path. NA was low yesterday so started on NS at 75ml/hr. This has improved 125>132. Ct of abd and pelvis as well as lumbar spine done yesterday showing 4-11 right rib fractures with right sided effusion and poss liver laceration. H/H stable. Ct of spine shows no fractures or dislocation. She remains max assist for any transfers and mod assist with bed mobility. Remains on levaquin for UTI and maintaining sats on RA 93-96% NAD.     8/5 pt remains on SKILL for therapy. Noted rib fractures from fall yesterday and discussed her condition with daughter Emani as well as . With PT she remains mod assist with rolling in bed and max assist to get up in bed /stand/transfer. This is very concerning for her safety when going home with feeble . Had long discussion with Emani who was calling her sister Autumn to discuss possible placement at d/c as home with sitters may be a challenge goven her current condition.  on case and PASSAR completed     Na better with NS fluids. Na 125>134    8/7 Pt  remains on SKILL for therapy. Noted rib fractures from fall 2 days ago.  With PT she remains mod assist with rolling in bed and max assist to get up in bed /stand/transfer but has progressed .   Stand pivot bed<>w/c trng; Sit<>Stand x4; Sitting  "tolerance and Standing balance trng with RW; Provided tactile and verbal cues for postural trng; Gait trng with RW x ~250 feet with min assistance and cues; LE bike ex x 5 mins  Concerns over home safety have been discussed with family; lives wht 92 YO feeble . SW/case management following.   MAAMEAR completed    8/10/20 Pt  remains on SKILL for therapy due to debility s/p recent rib fractures, Awake and alert this am,   · Gait: Patient ambulated 150 feet with RW and CGA. One rest break after 75 feet. Patient requries manual assistance to keep walker on straight path due to deviation to the Left.   · Balance: Sitting Fair, Standing Fair-/poor  VSS, afebrile , O2 sat 92% on RA   Urine Cx resulted - + Ecoli- resistant to Levaquin, has allergy to tetracycline, adding  augmentin with probiotics     8/12/20  Remains on skilled for PT/OT, for debility recent rib Fx,   Minimal Assistance and cues x ~225 feet (2 times rest periods)  with RW  Afebrile , VSS O2 sat stable on RA , Day 3 Augmentin for UTI   Planned for D/C to home with HH and sitters; maybe Friday 8/14/20 States " I feel fine"   Remains on skilled for PT/OT, for debility recent rib Fx,   Contact guard assistane and cues x ~300 feet with RW  Afebrile , VSS O2 sat stable on RA , Day 4 Augmentin for UTI   Large BM yesterday   Planned for D/C to home with HH and sitters; CW asking for more days for therapy .    8/17/20  Estefania Mccollum is a 85 y.o. female   remains on SKILL for PT/OT. She has surpassed her goal of 50ft and now ambulating  With supervision x ~300 feet with RW. Planned for D/C to home with HH and sitters; she is planned d/c Wednesday .    8/19/20  Pt is doing well. VSS/afebrile. Labs stable. Doing well with PT>>Gait: Supervision x ~300 feet( 2 hallways) with RW. Slouch forward, flexed neck and kyphotic posture. She has met/exceded her goals. Plan is for d/c today to home with home health  "

## 2020-08-03 NOTE — SUBJECTIVE & OBJECTIVE
Review of Systems   Unable to perform ROS: Dementia     Objective:     Vital Signs (Most Recent):  Temp: 97.6 °F (36.4 °C) (08/03/20 0804)  Pulse: 82 (08/03/20 0804)  Resp: 18 (08/03/20 0804)  BP: (!) 147/67 (08/03/20 0804)  SpO2: (!) 93 % (08/03/20 0804) Vital Signs (24h Range):  Temp:  [96.1 °F (35.6 °C)-98.8 °F (37.1 °C)] 97.6 °F (36.4 °C)  Pulse:  [78-89] 82  Resp:  [16-20] 18  SpO2:  [91 %-95 %] 93 %  BP: (124-147)/(62-70) 147/67     Weight: 67 kg (147 lb 11.3 oz)  Body mass index is 22.46 kg/m².    Physical Exam  Constitutional:       Appearance: She is well-developed.   HENT:      Head: Normocephalic and atraumatic.      Right Ear: External ear normal.      Left Ear: External ear normal.      Nose: Nose normal.   Eyes:      Pupils: Pupils are equal, round, and reactive to light.   Neck:      Musculoskeletal: Normal range of motion and neck supple.      Thyroid: No thyromegaly.      Vascular: No JVD.      Trachea: No tracheal deviation.   Cardiovascular:      Rate and Rhythm: Normal rate.      Heart sounds: Normal heart sounds. No murmur.   Pulmonary:      Effort: Pulmonary effort is normal. No respiratory distress.      Breath sounds: Normal breath sounds. No wheezing or rales.   Chest:      Chest wall: No tenderness.   Abdominal:      General: Bowel sounds are normal. There is no distension.      Palpations: Abdomen is soft. There is no mass.      Tenderness: There is no abdominal tenderness. There is no guarding or rebound.   Musculoskeletal: Normal range of motion.         General: No tenderness.   Lymphadenopathy:      Cervical: No cervical adenopathy.   Skin:     General: Skin is warm and dry.      Coloration: Skin is not pale.      Findings: No erythema or rash.   Neurological:      Mental Status: She is alert.      Cranial Nerves: No cranial nerve deficit.      Motor: No abnormal muscle tone.      Coordination: Coordination normal.      Deep Tendon Reflexes: Reflexes are normal and symmetric.  Reflexes normal.      Comments: Alert. Oriented to place and person    Psychiatric:         Behavior: Behavior normal.         Thought Content: Thought content normal.         Judgment: Judgment normal.           CRANIAL NERVES     CN III, IV, VI   Pupils are equal, round, and reactive to light.       Significant Labs: CBC:   Recent Labs   Lab 08/03/20  0600   WBC 8.13   HGB 13.3   HCT 38.2        CMP:   Recent Labs   Lab 08/03/20  0600   *   K 4.4   CL 91*   CO2 25      BUN 10   CREATININE 0.7   CALCIUM 8.1*   ANIONGAP 9   EGFRNONAA >60     Blood cultures NGTD    Urine culture   Escherichia coli       CULTURE, URINE     Amox/K Clav'ate <=8/4 mcg/mL Sensitive     Amp/Sulbactam <=8/4 mcg/mL Sensitive     Ampicillin <=8 mcg/mL Sensitive     Cefazolin <=2 mcg/mL Sensitive     Cefepime <=2 mcg/mL Sensitive     Ceftriaxone <=1 mcg/mL Sensitive     Ciprofloxacin <=1 mcg/mL Sensitive     Ertapenem <=0.5 mcg/mL Sensitive     Gentamicin <=4 mcg/mL Sensitive     Levofloxacin <=2 mcg/mL Sensitive     Meropenem <=1 mcg/mL Sensitive     Nitrofurantoin <=32 mcg/mL Sensitive     Piperacillin/Tazo <=16 mcg/mL Sensitive     Tetracycline <=4 mcg/mL Sensitive     Tobramycin <=4 mcg/mL Sensitive     Trimeth/Sulfa <=2/38 mcg/mL Sensitive        Significant Imaging:       CT head No acute abnormality.

## 2020-08-03 NOTE — ASSESSMENT & PLAN NOTE
Swing for PT ; currently max assist with transfer   GOAL>>Gait  x ~50  feet with Minimal Assistance with or without rolling walker  Check CT spine and pelvis today

## 2020-08-04 PROBLEM — S36.112A LIVER CONTUSION: Status: ACTIVE | Noted: 2020-08-04

## 2020-08-04 PROBLEM — S22.41XD CLOSED FRACTURE OF MULTIPLE RIBS OF RIGHT SIDE WITH ROUTINE HEALING: Status: ACTIVE | Noted: 2020-08-04

## 2020-08-04 LAB
ALBUMIN SERPL BCP-MCNC: 2.7 G/DL (ref 3.5–5.2)
ALP SERPL-CCNC: 66 U/L (ref 55–135)
ALT SERPL W/O P-5'-P-CCNC: 24 U/L (ref 10–44)
ANION GAP SERPL CALC-SCNC: 8 MMOL/L (ref 8–16)
AST SERPL-CCNC: 35 U/L (ref 10–40)
BACTERIA BLD CULT: NORMAL
BILIRUB SERPL-MCNC: 0.8 MG/DL (ref 0.1–1)
BUN SERPL-MCNC: 10 MG/DL (ref 8–23)
CALCIUM SERPL-MCNC: 8.4 MG/DL (ref 8.7–10.5)
CHLORIDE SERPL-SCNC: 97 MMOL/L (ref 95–110)
CO2 SERPL-SCNC: 27 MMOL/L (ref 23–29)
CREAT SERPL-MCNC: 0.7 MG/DL (ref 0.5–1.4)
EST. GFR  (AFRICAN AMERICAN): >60 ML/MIN/1.73 M^2
EST. GFR  (NON AFRICAN AMERICAN): >60 ML/MIN/1.73 M^2
GLUCOSE SERPL-MCNC: 103 MG/DL (ref 70–110)
POTASSIUM SERPL-SCNC: 4.2 MMOL/L (ref 3.5–5.1)
PROT SERPL-MCNC: 5.5 G/DL (ref 6–8.4)
SODIUM SERPL-SCNC: 132 MMOL/L (ref 136–145)

## 2020-08-04 PROCEDURE — 99232 PR SUBSEQUENT HOSPITAL CARE,LEVL II: ICD-10-PCS | Mod: HCNC,,, | Performed by: INTERNAL MEDICINE

## 2020-08-04 PROCEDURE — 97530 THERAPEUTIC ACTIVITIES: CPT | Mod: HCNC

## 2020-08-04 PROCEDURE — 94799 UNLISTED PULMONARY SVC/PX: CPT | Mod: HCNC

## 2020-08-04 PROCEDURE — 25000003 PHARM REV CODE 250: Mod: HCNC | Performed by: NURSE PRACTITIONER

## 2020-08-04 PROCEDURE — 11000004 HC SNF PRIVATE: Mod: HCNC

## 2020-08-04 PROCEDURE — 97116 GAIT TRAINING THERAPY: CPT | Mod: HCNC

## 2020-08-04 PROCEDURE — 36415 COLL VENOUS BLD VENIPUNCTURE: CPT | Mod: HCNC

## 2020-08-04 PROCEDURE — 99232 SBSQ HOSP IP/OBS MODERATE 35: CPT | Mod: HCNC,,, | Performed by: INTERNAL MEDICINE

## 2020-08-04 PROCEDURE — 80053 COMPREHEN METABOLIC PANEL: CPT | Mod: HCNC

## 2020-08-04 PROCEDURE — 63600175 PHARM REV CODE 636 W HCPCS: Mod: HCNC | Performed by: NURSE PRACTITIONER

## 2020-08-04 PROCEDURE — 94761 N-INVAS EAR/PLS OXIMETRY MLT: CPT | Mod: HCNC

## 2020-08-04 RX ADMIN — MEMANTINE 10 MG: 10 TABLET ORAL at 08:08

## 2020-08-04 RX ADMIN — PRAVASTATIN SODIUM 80 MG: 40 TABLET ORAL at 10:08

## 2020-08-04 RX ADMIN — MEMANTINE 10 MG: 10 TABLET ORAL at 10:08

## 2020-08-04 RX ADMIN — LEVOTHYROXINE SODIUM 100 MCG: 100 TABLET ORAL at 05:08

## 2020-08-04 RX ADMIN — MIRTAZAPINE 15 MG: 15 TABLET, ORALLY DISINTEGRATING ORAL at 08:08

## 2020-08-04 RX ADMIN — ASPIRIN 81 MG: 81 TABLET, COATED ORAL at 10:08

## 2020-08-04 RX ADMIN — LEVOFLOXACIN 500 MG: 500 INJECTION, SOLUTION INTRAVENOUS at 02:08

## 2020-08-04 RX ADMIN — STANDARDIZED SENNA CONCENTRATE AND DOCUSATE SODIUM 1 TABLET: 8.6; 5 TABLET ORAL at 08:08

## 2020-08-04 RX ADMIN — SODIUM CHLORIDE: 0.9 INJECTION, SOLUTION INTRAVENOUS at 01:08

## 2020-08-04 RX ADMIN — SERTRALINE HYDROCHLORIDE 25 MG: 25 TABLET ORAL at 08:08

## 2020-08-04 RX ADMIN — DONEPEZIL HYDROCHLORIDE 5 MG: 5 TABLET, FILM COATED ORAL at 08:08

## 2020-08-04 RX ADMIN — STANDARDIZED SENNA CONCENTRATE AND DOCUSATE SODIUM 1 TABLET: 8.6; 5 TABLET ORAL at 10:08

## 2020-08-04 RX ADMIN — SODIUM CHLORIDE: 0.9 INJECTION, SOLUTION INTRAVENOUS at 02:08

## 2020-08-04 RX ADMIN — LISINOPRIL 20 MG: 20 TABLET ORAL at 10:08

## 2020-08-04 NOTE — PROGRESS NOTES
[facility] Medicine  Progress Note    Patient Name: Estefania Mccollum  MRN: [id number]  Patient Class: IP- Swing   Admission Date: 7/31/2020  Length of Stay: 4 days  Attending Physician: Manish Lynn MD  Primary Care Provider: Danna Levine MD        Subjective:     Principal Problem:<principal problem not specified>        HPI:  85 year old female admitted with UTI being admitted to Swing for PT. She lives at home with only her 91 year old , who is unable to lift her or transfer her safely. She is weak. She needs to be strong enough to perform her ADL's on her own. If she is unable to reach this goal, she may need nursing home placement.     Overview/Hospital Course:  8/3 This patient was placed on SKILL unit for cont abx to treat UTI as well as PT. She has become debilitated and weak at home resulting in multiple falls even with walker. Lives at home with elderly frail . Her goal is to ambulate 50 ft with min ass using RW. Yesterday she did bed mobility with min assist. Sat she was max assist to transfer.     8/4 this patient is on skill for cont therapy as she was not strong enough to be safely discharged to home with her elderly frail . She is demented and has no complaints except she wants to go home. She complains of no pain but has not been walking and always laying on right side. CT head done that did not show any acute path. NA was low yesterday so started on NS at 75ml/hr. This has improved 125>132. Ct of abd and pelvis as well as lumbar spine done yesterday showing 4-11 right rib fractures with right sided effusion and poss liver laceration. H/H stable. Ct of spine shows no fractures or dislocation. She remains max assist for any transfers and mod assist with bed mobility. Remains on levaquin for UTI and maintaining sats on RA 93-96% NAD.       Review of Systems   Unable to perform ROS: Dementia     Objective:     Vital Signs (Most Recent):  Temp: 97.6  °F (36.4 °C) (08/04/20 0732)  Pulse: 84 (08/04/20 0732)  Resp: 17 (08/04/20 0732)  BP: 130/60 (08/04/20 0732)  SpO2: 96 % (08/04/20 0814) Vital Signs (24h Range):  Temp:  [96.2 °F (35.7 °C)-97.6 °F (36.4 °C)] 97.6 °F (36.4 °C)  Pulse:  [77-90] 84  Resp:  [17-18] 17  SpO2:  [93 %-96 %] 96 %  BP: (130-145)/(60-71) 130/60     Weight: 67 kg (147 lb 11.3 oz)  Body mass index is 22.46 kg/m².    Physical Exam  Vitals signs and nursing note reviewed.   Constitutional:       Appearance: She is well-developed.   HENT:      Head: Normocephalic and atraumatic.      Right Ear: External ear normal.      Left Ear: External ear normal.      Nose: Nose normal.   Eyes:      General: No scleral icterus.     Conjunctiva/sclera: Conjunctivae normal.      Pupils: Pupils are equal, round, and reactive to light.   Neck:      Musculoskeletal: Normal range of motion and neck supple.      Thyroid: No thyromegaly.      Vascular: No JVD.      Trachea: No tracheal deviation.   Cardiovascular:      Rate and Rhythm: Normal rate.      Heart sounds: Normal heart sounds. No murmur.   Pulmonary:      Effort: Pulmonary effort is normal. No respiratory distress.      Breath sounds: Normal breath sounds. No wheezing or rales.   Chest:      Chest wall: No tenderness.   Abdominal:      General: Bowel sounds are normal. There is no distension.      Palpations: Abdomen is soft. There is no mass.      Tenderness: There is no abdominal tenderness. There is no guarding or rebound.   Musculoskeletal: Normal range of motion.         General: No tenderness.   Lymphadenopathy:      Cervical: No cervical adenopathy.   Skin:     General: Skin is warm and dry.   Neurological:      General: No focal deficit present.      Mental Status: She is alert.      Motor: No abnormal muscle tone.      Deep Tendon Reflexes: Reflexes are normal and symmetric.      Comments: Alert. Oriented to place and person    Psychiatric:         Behavior: Behavior normal.         Thought  Content: Thought content normal.         Judgment: Judgment normal.           CRANIAL NERVES     CN III, IV, VI   Pupils are equal, round, and reactive to light.       Significant Labs: CBC:   Recent Labs   Lab 20  0600   WBC 8.13   HGB 13.3   HCT 38.2        CMP:   Recent Labs   Lab 20  0600 20  0530   * 132*   K 4.4 4.2   CL 91* 97   CO2 25 27    103   BUN 10 10   CREATININE 0.7 0.7   CALCIUM 8.1* 8.4*   PROT  --  5.5*   ALBUMIN  --  2.7*   BILITOT  --  0.8   ALKPHOS  --  66   AST  --  35   ALT  --  24   ANIONGAP 9 8   EGFRNONAA >60 >60     Blood cultures NGTD    Urine culture   Escherichia coli       CULTURE, URINE     Amox/K Clav'ate <=8/4 mcg/mL Sensitive     Amp/Sulbactam <=8/4 mcg/mL Sensitive     Ampicillin <=8 mcg/mL Sensitive     Cefazolin <=2 mcg/mL Sensitive     Cefepime <=2 mcg/mL Sensitive     Ceftriaxone <=1 mcg/mL Sensitive     Ciprofloxacin <=1 mcg/mL Sensitive     Ertapenem <=0.5 mcg/mL Sensitive     Gentamicin <=4 mcg/mL Sensitive     Levofloxacin <=2 mcg/mL Sensitive     Meropenem <=1 mcg/mL Sensitive     Nitrofurantoin <=32 mcg/mL Sensitive     Piperacillin/Tazo <=16 mcg/mL Sensitive     Tetracycline <=4 mcg/mL Sensitive     Tobramycin <=4 mcg/mL Sensitive     Trimeth/Sulfa <=2/38 mcg/mL Sensitive        Significant Imagin/2 CT head No acute abnormality.  /3 ct lumbar spine Multilevel degenerative changes of the lumbar spine without evidence for fracture or subluxation.    8/3 CT abd and pelvis Acute fractures involving the right 6 through 11th ribs with associated moderate-sized right pleural effusion with adjacent passive atelectasis.  Some of the rib fractures are segmented.  Please note that all the ribs were not imaged and additional fractures of the ribs cannot be excluded.     There is a small amount of subcapsular fluid seen along the medial margin of the right hepatic lobe with a region of indeterminate hypoattenuation involving the  liver.  In the setting of trauma, underlying hepatic contusion/laceration not excluded.     Thickening of the walls of the urinary bladder with surrounding inflammation suggesting a cystitis.     Constipation.    8/3 cxr There is a layering large right-sided pleural effusion with passive atelectasis of the right lung base, seen to a better extent on previous CT.  The left lung is clear.  Heart size is normal.  Calcified atheromatous disease affects the aorta.  Right-sided rib fracture seen to a better extent on recent CT scan.  No pneumothorax.       8/3 right rib view FINDINGS:  There are acute fractures involving the right 4th through 11th ribs, some of these fractures are segment, seen to a better extent on recent CT scan.  No pneumothorax.           Assessment/Plan:      Closed fracture of multiple ribs of right side with routine healing  Ribs 4-11 noted on imaging  Denies pain  Impeding her progress however  Cont PT  PRN Tylenol for pain control      Liver contusion  Likely liver contusion from recent falls noted on CT scan  H/H stable  Non-tender abdomen  Monitor closely but no clinical evidence of true liver lac that would require surgical intervention      Hyponatremia  I think this is a reflection of her very poor diet  Start remeron  Ns at 75ml/hr   Na better 132 today    Acute cystitis with hematuria  Continue levaquin for 7 days total for complicated UTI   Getting dose q 48hr 7/29, 7/31, 8/2, 8/4 ct yesterday still showing cystitis, will cont levaquin      Frequent falls  Swing for PT.   Consider nursing home placement if she does not progress   Will have case management start PASSAR incase  Check CT spine and pelvis today  Noted right rib fractures    Debility  Swing for PT ; currently max assist with transfer   GOAL>>Gait  x ~50  feet with Minimal Assistance with or without rolling walker  Check CT spine and pelvis today    Noted rib fractures  She is not progressing but she will be slow due to the  newly found rib fractures, this also raises more concern for her safety at home;  has started PASSAR     Memory loss  Continue donepezil and namenda       Hyperlipidemia  Continue pravastatin         Hypothyroidism  Continue synthroid 100mcg   Lab Results   Component Value Date    TSH 2.578 12/03/2019           Hypertension  Continue lisinopril   bp 130//63      VTE Risk Mitigation (From admission, onward)         Ordered     IP VTE HIGH RISK PATIENT  Once      07/31/20 1636     Place sequential compression device  Until discontinued      07/31/20 1636                      Hiral Borden MD  Department of Hospital Medicine   [facility]

## 2020-08-04 NOTE — PLAN OF CARE
Problem: Physical Therapy Goal  Goal: Physical Therapy Goal  Description: Patient will increase functional independence with mobility by performin. Supine to sit with Contact Guard Assistance  2. Sit to supine with Contact Guard Assistance  3. Bed to chair transfer with Minimal/Contact Guard Assistancewith or without rolling walker using Stand Pivot TECHNIQUE  4. Gait  x ~50  feet with Minimal Assistance with or without rolling walker  5. Lower extremity exercise program x10 reps per handout, with assistance as needed  Outcome: Ongoing, Progressing      WDL

## 2020-08-04 NOTE — SUBJECTIVE & OBJECTIVE
Review of Systems   Unable to perform ROS: Dementia     Objective:     Vital Signs (Most Recent):  Temp: 97.6 °F (36.4 °C) (08/04/20 0732)  Pulse: 84 (08/04/20 0732)  Resp: 17 (08/04/20 0732)  BP: 130/60 (08/04/20 0732)  SpO2: 96 % (08/04/20 0814) Vital Signs (24h Range):  Temp:  [96.2 °F (35.7 °C)-97.6 °F (36.4 °C)] 97.6 °F (36.4 °C)  Pulse:  [77-90] 84  Resp:  [17-18] 17  SpO2:  [93 %-96 %] 96 %  BP: (130-145)/(60-71) 130/60     Weight: 67 kg (147 lb 11.3 oz)  Body mass index is 22.46 kg/m².    Physical Exam  Vitals signs and nursing note reviewed.   Constitutional:       Appearance: She is well-developed.   HENT:      Head: Normocephalic and atraumatic.      Right Ear: External ear normal.      Left Ear: External ear normal.      Nose: Nose normal.   Eyes:      General: No scleral icterus.     Conjunctiva/sclera: Conjunctivae normal.      Pupils: Pupils are equal, round, and reactive to light.   Neck:      Musculoskeletal: Normal range of motion and neck supple.      Thyroid: No thyromegaly.      Vascular: No JVD.      Trachea: No tracheal deviation.   Cardiovascular:      Rate and Rhythm: Normal rate.      Heart sounds: Normal heart sounds. No murmur.   Pulmonary:      Effort: Pulmonary effort is normal. No respiratory distress.      Breath sounds: Normal breath sounds. No wheezing or rales.   Chest:      Chest wall: No tenderness.   Abdominal:      General: Bowel sounds are normal. There is no distension.      Palpations: Abdomen is soft. There is no mass.      Tenderness: There is no abdominal tenderness. There is no guarding or rebound.   Musculoskeletal: Normal range of motion.         General: No tenderness.   Lymphadenopathy:      Cervical: No cervical adenopathy.   Skin:     General: Skin is warm and dry.   Neurological:      General: No focal deficit present.      Mental Status: She is alert.      Motor: No abnormal muscle tone.      Deep Tendon Reflexes: Reflexes are normal and symmetric.       Comments: Alert. Oriented to place and person    Psychiatric:         Behavior: Behavior normal.         Thought Content: Thought content normal.         Judgment: Judgment normal.           CRANIAL NERVES     CN III, IV, VI   Pupils are equal, round, and reactive to light.       Significant Labs: CBC:   Recent Labs   Lab 20  0600   WBC 8.13   HGB 13.3   HCT 38.2        CMP:   Recent Labs   Lab 20  0600 20  0530   * 132*   K 4.4 4.2   CL 91* 97   CO2 25 27    103   BUN 10 10   CREATININE 0.7 0.7   CALCIUM 8.1* 8.4*   PROT  --  5.5*   ALBUMIN  --  2.7*   BILITOT  --  0.8   ALKPHOS  --  66   AST  --  35   ALT  --  24   ANIONGAP 9 8   EGFRNONAA >60 >60     Blood cultures NGTD    Urine culture   Escherichia coli       CULTURE, URINE     Amox/K Clav'ate <=8/4 mcg/mL Sensitive     Amp/Sulbactam <=8/4 mcg/mL Sensitive     Ampicillin <=8 mcg/mL Sensitive     Cefazolin <=2 mcg/mL Sensitive     Cefepime <=2 mcg/mL Sensitive     Ceftriaxone <=1 mcg/mL Sensitive     Ciprofloxacin <=1 mcg/mL Sensitive     Ertapenem <=0.5 mcg/mL Sensitive     Gentamicin <=4 mcg/mL Sensitive     Levofloxacin <=2 mcg/mL Sensitive     Meropenem <=1 mcg/mL Sensitive     Nitrofurantoin <=32 mcg/mL Sensitive     Piperacillin/Tazo <=16 mcg/mL Sensitive     Tetracycline <=4 mcg/mL Sensitive     Tobramycin <=4 mcg/mL Sensitive     Trimeth/Sulfa <=2/38 mcg/mL Sensitive        Significant Imagin/2 CT head No acute abnormality.  8/3 ct lumbar spine Multilevel degenerative changes of the lumbar spine without evidence for fracture or subluxation.    8/3 CT abd and pelvis Acute fractures involving the right 6 through 11th ribs with associated moderate-sized right pleural effusion with adjacent passive atelectasis.  Some of the rib fractures are segmented.  Please note that all the ribs were not imaged and additional fractures of the ribs cannot be excluded.     There is a small amount of subcapsular fluid seen  along the medial margin of the right hepatic lobe with a region of indeterminate hypoattenuation involving the liver.  In the setting of trauma, underlying hepatic contusion/laceration not excluded.     Thickening of the walls of the urinary bladder with surrounding inflammation suggesting a cystitis.     Constipation.    8/3 cxr There is a layering large right-sided pleural effusion with passive atelectasis of the right lung base, seen to a better extent on previous CT.  The left lung is clear.  Heart size is normal.  Calcified atheromatous disease affects the aorta.  Right-sided rib fracture seen to a better extent on recent CT scan.  No pneumothorax.       8/3 right rib view FINDINGS:  There are acute fractures involving the right 4th through 11th ribs, some of these fractures are segment, seen to a better extent on recent CT scan.  No pneumothorax.

## 2020-08-04 NOTE — ASSESSMENT & PLAN NOTE
Ribs 4-11 noted on imaging  Denies pain  Impeding her progress however  Cont PT  PRN Tylenol for pain control

## 2020-08-04 NOTE — ASSESSMENT & PLAN NOTE
Swing for PT.   Consider nursing home placement if she does not progress   Will have case management start CRISTOPHER smart  Check CT spine and pelvis today  Noted right rib fractures

## 2020-08-04 NOTE — PT/OT/SLP PROGRESS
"Physical Therapy Treatment    Patient Name:  Estefania Mccollum   MRN:  [id number]    Recommendations:     Discharge Recommendations:  nursing facility, basic, nursing facility, skilled   Discharge Equipment Recommendations: hospital bed   Barriers to discharge: Decreased caregiver support    Assessment:     Estefania Mccollum is a 85 y.o. female admitted with a medical diagnosis of <principal problem not specified>.  She presents with the following impairments/functional limitations:  weakness, impaired endurance, impaired cognition, impaired self care skills, impaired functional mobilty, gait instability, impaired balance, decreased safety awareness, decreased coordination. Patient remains with poor posture with cervical kyphosis, leaning to right side during sitting at side of the bed and falls backward.  X- ray result yesterday 8/3/20 revealed + fracture Right Ribs 4th through 11 due to fall at home. Slight increased Static standing tolerance in the parallel bars(Therapy room) for ~10 seconds for 5 times attempt with max Assistance and constant verbal and tactile cues provided. Able to take 1 - 2 very short steps( 2 x attempt) inside the parallel bars with max Assistance and cues. Fatigue easily and patient insisted to go back to bed. Assisted patient back to bed with maximum assistance and cues. Patient placed at supine and immediately falls asleep with  at bedside.    Rehab Prognosis: Fair; patient would benefit from acute skilled PT services to address these deficits and reach maximum level of function.    Recent Surgery: * No surgery found *      Plan:     During this hospitalization, patient to be seen daily to address the identified rehab impairments via gait training, therapeutic activities, therapeutic exercises and progress toward the following goals:    · Plan of Care Expires:  08/28/20    Subjective     Chief Complaint: debility; inc assistance with mobility  Patient/Family Comments/goals: "To get better " "and to return home with family".  Pain/Comfort:  · Pain Rating 1: 0/10  · Pain Rating Post-Intervention 1: 0/10      Objective:     Communicated with patient and  prior to session.  Patient found supine with telemetry, peripheral IV upon PT entry to room.     General Precautions: Standard, fall   Orthopedic Precautions:N/A   Braces: N/A     Functional Mobility:  · Bed Mobility:     · Rolling Left:  moderate assistance  · Rolling Right: moderate assistance  · Scooting: maximal assistance  · Supine to Sit: maximal assistance  · Sit to Supine: maximal assistance  · Transfers:     · Sit to Stand:  maximal assistance with inside the paralle bars  · Bed to Chair: maximal assistance and verbal and tactile cues with  hand-held assist  using  Stand Pivot  · Gait: Attempted to take 1-2 short steps inside the parallel bars with max assistance and  constant verbal & tactile cues. Constantly lean to right side with cervical kyphotic posture. Fatigue easily with legs giving out.  · Balance: Sitting Static: Poor; Standing inside the parallel bars Static: Poor      AM-PAC 6 CLICK MOBILITY  Turning over in bed (including adjusting bedclothes, sheets and blankets)?: 2  Sitting down on and standing up from a chair with arms (e.g., wheelchair, bedside commode, etc.): 2  Moving from lying on back to sitting on the side of the bed?: 2  Moving to and from a bed to a chair (including a wheelchair)?: 2  Need to walk in hospital room?: 2  Climbing 3-5 steps with a railing?: 1  Basic Mobility Total Score: 11       Therapeutic Activities and Exercises:   Worked on body sequence with verbal and tactile cues on bed mobility, out of bed activity and transfers. Implemented balance trng at static sitting and static standing inside the parallel bars with constant verbal and tactile cues to correct body alignment. Performed pre gait trng inside the parallel bars with 1-2 short steps( twice attempt) with max assistance and cues.    Patient left " supine with all lines intact, call button in reach, bed alarm on, nursing notified and  present..    GOALS:   Multidisciplinary Problems     Physical Therapy Goals        Problem: Physical Therapy Goal    Goal Priority Disciplines Outcome Goal Variances Interventions   Physical Therapy Goal     PT, PT/OT Ongoing, Progressing     Description: Patient will increase functional independence with mobility by performin. Supine to sit with Contact Guard Assistance  2. Sit to supine with Contact Guard Assistance  3. Bed to chair transfer with Minimal/Contact Guard Assistancewith or without rolling walker using Stand Pivot TECHNIQUE  4. Gait  x ~50  feet with Minimal Assistance with or without rolling walker  5. Lower extremity exercise program x10 reps per handout, with assistance as needed                   Time Tracking:     PT Received On: 20  PT Start Time: 1305     PT Stop Time: 1332  PT Total Time (min): 27 min     Billable Minutes: Gait Training 12 and Therapeutic Activity 15    Treatment Type: Treatment  PT/PTA: PT     PTA Visit Number: 1     Cricket Gaytan, PT  2020

## 2020-08-04 NOTE — ASSESSMENT & PLAN NOTE
Likely liver contusion from recent falls noted on CT scan  H/H stable  Non-tender abdomen  Monitor closely but no clinical evidence of true liver lac that would require surgical intervention

## 2020-08-04 NOTE — PLAN OF CARE
AOX2 disoriented to time. AVASYS and bed alarm in use. Fall precautions in place. Incontinent of B/B. IVF infusing, turned q2hr. Antibiotic therapy continues. NSR first degree AVB on the monitor.

## 2020-08-04 NOTE — ASSESSMENT & PLAN NOTE
I think this is a reflection of her very poor diet  Start remeron  Ns at 75ml/hr   Na better 132 today

## 2020-08-04 NOTE — ASSESSMENT & PLAN NOTE
Swing for PT ; currently max assist with transfer   GOAL>>Gait  x ~50  feet with Minimal Assistance with or without rolling walker  Check CT spine and pelvis today    Noted rib fractures  She is not progressing but she will be slow due to the newly found rib fractures, this also raises more concern for her safety at home;  has started PASSAR

## 2020-08-04 NOTE — PT/OT/SLP PROGRESS
Occupational Therapy   Treatment    Name: Estefania Mccollum  MRN: [id number]  Admitting Diagnosis:  <principal problem not specified>       Recommendations:     Discharge Recommendations: nursing facility, basic  Discharge Equipment Recommendations:  hospital bed, lift device  Barriers to discharge:  Decreased caregiver support    Assessment:     Estefania Mccollum is a 85 y.o. female with a medical diagnosis of <principal problem not specified>.  She presents with decreased endurance and decreased ability to follow verbal cues with confusion noted. She was able to stand EOB with max A and Max encouragement before insisting to return to bed.      Performance deficits affecting function are weakness, gait instability, decreased upper extremity function, impaired endurance, impaired balance, decreased lower extremity function, decreased safety awareness, impaired self care skills, impaired cognition, impaired functional mobilty, decreased coordination.     Rehab Prognosis:  Fair; patient would benefit from acute skilled OT services to address these deficits and reach maximum level of function.       Plan:     Patient to be seen 5 x/week to address the above listed problems via self-care/home management, therapeutic activities, therapeutic exercises  · Plan of Care Expires: 08/17/20  · Plan of Care Reviewed with: patient, spouse    Subjective     Pain/Comfort:  · Pain Rating 1: 0/10    Objective:     Communicated with: Nursing prior to session.  Patient found supine with telemetry, peripheral IV upon OT entry to room.    General Precautions: Standard, fall   Orthopedic Precautions:N/A   Braces: N/A     Occupational Performance:     Bed Mobility:    · Patient completed Rolling/Turning to Left with  minimum assistance  · Patient completed Rolling/Turning to Right with minimum assistance  · Patient completed Scooting/Bridging with moderate assistance  · Patient completed Supine to Sit with moderate assistance  · Patient completed  Sit to Supine with moderate assistance     Functional Mobility/Transfers:  · Patient completed Sit <> Stand Transfer with maximal assistance  with  rolling walker   · Functional Mobility: not completed    Activities of Daily Living:  · Grooming: minimum assistance comb hair sitting EOB  · Toileting: dependence bed level      [facility] 6 Click ADL: 12    Treatment & Education:  Therapist facilitated rolling with Min A, supine to sit with Mod A with max verbal cues, sitting EOB with Min A for balance, sit to stand with RW with Max A with poor postural control, sitting EOB to comb hair with Min A, sit to supine with Mod A, toileting initiated with OT and completed with PCT bed level.    Patient left supine with all lines intact, call button in reach and PCT presentEducation:      GOALS:   Multidisciplinary Problems     Occupational Therapy Goals        Problem: Occupational Therapy Goal    Goal Priority Disciplines Outcome Interventions   Occupational Therapy Goal     OT, PT/OT Ongoing, Progressing    Description: Goals to be met by: 8/17/2020     Patient will increase functional independence with ADLs by performing:    Feeding with Modified [place].  UE Dressing with Modified [place].  LE Dressing with Minimal Assistance.  Grooming while seated with Modified [place].  Toileting from bedside commode with Minimal Assistance for hygiene and clothing management.   Sitting at edge of bed x5 minutes with Stand-by Assistance.  Supine to sit with Stand-by Assistance.  Step transfer with Contact Guard Assistance  Toilet transfer to bedside commode with Minimal Assistance.                     Time Tracking:     OT Date of Treatment: 08/04/20  OT Start Time: 1110  OT Stop Time: 1130  OT Total Time (min): 20 min    Billable Minutes:Therapeutic Activity 20 minutes    Gilles Bernard OT  8/4/2020

## 2020-08-04 NOTE — PATIENT CARE CONFERENCE
Skilled level of car patient care conference held.. Dr Borden will discuss nursing home placement with family. She is very limited in her therapy due to fractured ribs. CM to discuss discharge plan with family.

## 2020-08-04 NOTE — PROGRESS NOTES
Nursing Notes  Bedside report completed with JAMES Clay. Tele-sitter in use. Monitor in use. Fall prec maintained. Care assumed. Patient snoring loudly. NAD.       Huddle Comments

## 2020-08-04 NOTE — ASSESSMENT & PLAN NOTE
Continue levaquin for 7 days total for complicated UTI   Getting dose q 48hr 7/29, 7/31, 8/2, 8/4 ct yesterday still showing cystitis, will cont levaquin

## 2020-08-05 LAB
ALBUMIN SERPL BCP-MCNC: 2.5 G/DL (ref 3.5–5.2)
ALP SERPL-CCNC: 77 U/L (ref 55–135)
ALT SERPL W/O P-5'-P-CCNC: 21 U/L (ref 10–44)
ANION GAP SERPL CALC-SCNC: 6 MMOL/L (ref 8–16)
AST SERPL-CCNC: 27 U/L (ref 10–40)
BILIRUB SERPL-MCNC: 0.8 MG/DL (ref 0.1–1)
BUN SERPL-MCNC: 11 MG/DL (ref 8–23)
CALCIUM SERPL-MCNC: 8 MG/DL (ref 8.7–10.5)
CHLORIDE SERPL-SCNC: 100 MMOL/L (ref 95–110)
CO2 SERPL-SCNC: 28 MMOL/L (ref 23–29)
CREAT SERPL-MCNC: 0.7 MG/DL (ref 0.5–1.4)
EST. GFR  (AFRICAN AMERICAN): >60 ML/MIN/1.73 M^2
EST. GFR  (NON AFRICAN AMERICAN): >60 ML/MIN/1.73 M^2
GLUCOSE SERPL-MCNC: 98 MG/DL (ref 70–110)
POTASSIUM SERPL-SCNC: 4.5 MMOL/L (ref 3.5–5.1)
PROT SERPL-MCNC: 5.1 G/DL (ref 6–8.4)
SODIUM SERPL-SCNC: 134 MMOL/L (ref 136–145)

## 2020-08-05 PROCEDURE — 94799 UNLISTED PULMONARY SVC/PX: CPT | Mod: HCNC

## 2020-08-05 PROCEDURE — 97535 SELF CARE MNGMENT TRAINING: CPT | Mod: HCNC

## 2020-08-05 PROCEDURE — 99900035 HC TECH TIME PER 15 MIN (STAT): Mod: HCNC

## 2020-08-05 PROCEDURE — 99232 SBSQ HOSP IP/OBS MODERATE 35: CPT | Mod: HCNC,,, | Performed by: INTERNAL MEDICINE

## 2020-08-05 PROCEDURE — 25000003 PHARM REV CODE 250: Mod: HCNC | Performed by: NURSE PRACTITIONER

## 2020-08-05 PROCEDURE — 94761 N-INVAS EAR/PLS OXIMETRY MLT: CPT | Mod: HCNC

## 2020-08-05 PROCEDURE — 11000004 HC SNF PRIVATE: Mod: HCNC

## 2020-08-05 PROCEDURE — 80053 COMPREHEN METABOLIC PANEL: CPT | Mod: HCNC

## 2020-08-05 PROCEDURE — 97530 THERAPEUTIC ACTIVITIES: CPT | Mod: HCNC

## 2020-08-05 PROCEDURE — 36415 COLL VENOUS BLD VENIPUNCTURE: CPT | Mod: HCNC

## 2020-08-05 PROCEDURE — 99232 PR SUBSEQUENT HOSPITAL CARE,LEVL II: ICD-10-PCS | Mod: HCNC,,, | Performed by: INTERNAL MEDICINE

## 2020-08-05 PROCEDURE — 97116 GAIT TRAINING THERAPY: CPT | Mod: HCNC

## 2020-08-05 RX ADMIN — MIRTAZAPINE 15 MG: 15 TABLET, ORALLY DISINTEGRATING ORAL at 09:08

## 2020-08-05 RX ADMIN — SODIUM CHLORIDE: 0.9 INJECTION, SOLUTION INTRAVENOUS at 08:08

## 2020-08-05 RX ADMIN — DONEPEZIL HYDROCHLORIDE 5 MG: 5 TABLET, FILM COATED ORAL at 09:08

## 2020-08-05 RX ADMIN — LISINOPRIL 20 MG: 20 TABLET ORAL at 08:08

## 2020-08-05 RX ADMIN — SODIUM CHLORIDE: 0.9 INJECTION, SOLUTION INTRAVENOUS at 06:08

## 2020-08-05 RX ADMIN — SERTRALINE HYDROCHLORIDE 25 MG: 25 TABLET ORAL at 09:08

## 2020-08-05 RX ADMIN — ASPIRIN 81 MG: 81 TABLET, COATED ORAL at 08:08

## 2020-08-05 RX ADMIN — STANDARDIZED SENNA CONCENTRATE AND DOCUSATE SODIUM 1 TABLET: 8.6; 5 TABLET ORAL at 09:08

## 2020-08-05 RX ADMIN — MEMANTINE 10 MG: 10 TABLET ORAL at 08:08

## 2020-08-05 RX ADMIN — LEVOTHYROXINE SODIUM 100 MCG: 100 TABLET ORAL at 06:08

## 2020-08-05 RX ADMIN — MEMANTINE 10 MG: 10 TABLET ORAL at 09:08

## 2020-08-05 RX ADMIN — PRAVASTATIN SODIUM 80 MG: 40 TABLET ORAL at 08:08

## 2020-08-05 RX ADMIN — STANDARDIZED SENNA CONCENTRATE AND DOCUSATE SODIUM 1 TABLET: 8.6; 5 TABLET ORAL at 08:08

## 2020-08-05 NOTE — PT/OT/SLP PROGRESS
Occupational Therapy   Treatment    Name: Estefania Mccollum  MRN: [id number]  Admitting Diagnosis:  Debility       Recommendations:     Discharge Recommendations: nursing facility, basic  Discharge Equipment Recommendations:  hospital bed, lift device  Barriers to discharge:  Decreased caregiver support    Assessment:     Estefania Mccollum is a 85 y.o. female with a medical diagnosis of Debility.  She presents with good participation in therapy, noted able to complete sit to stand with Mod A and toilet transfer with Mod A, although requiring total A for toileting (brief management and wiping).     Performance deficits affecting function are gait instability, impaired endurance, weakness, impaired balance, decreased lower extremity function, impaired coordination, decreased safety awareness, impaired cognition, impaired self care skills, impaired functional mobilty, decreased coordination.     Rehab Prognosis:  Fair; patient would benefit from acute skilled OT services to address these deficits and reach maximum level of function.       Plan:     Patient to be seen 5 x/week to address the above listed problems via self-care/home management, therapeutic activities, therapeutic exercises  · Plan of Care Expires: 08/17/20  · Plan of Care Reviewed with: patient    Subjective     Pain/Comfort:  · Pain Rating 1: 0/10    Objective:     Communicated with: Nursing prior to session.  Patient found supine with telemetry, peripheral IV upon OT entry to room.    General Precautions: Standard, fall   Orthopedic Precautions:N/A   Braces:       Occupational Performance:     Bed Mobility:    · Patient completed Supine to Sit with minimum assistance  · Patient completed Sit to Supine with minimum assistance     Functional Mobility/Transfers:  · Patient completed Sit <> Stand Transfer with moderate assistance  with  rolling walker   · Patient completed Toilet Transfer Step Transfer technique with moderate assistance with  rolling walker and  bedside commode   · Upper body dressing: Min A to don/doff gown  · Functional Mobility: with RW with Min A to BSC    Activities of Daily Living:  · Feeding:  stand by assistance to take sip of water from bedside table  · Toileting: total assistance for wiping and brief management      [facility] 6 Click ADL: 14    Treatment & Education:  Therapist facilitated rolling with Min A, supine to sit with Min A, sit to stand with RW with Mod A, functional mobility with RW to BSC with Min A, toilet transfer with Mod A, toileting with total A with verbal cues for postural control during wiping, don/doff gown with min A, sit to supine with min A    Patient left supine with call button in reachEducation:  , all lines intact, nursing informed     GOALS:   Multidisciplinary Problems     Occupational Therapy Goals        Problem: Occupational Therapy Goal    Goal Priority Disciplines Outcome Interventions   Occupational Therapy Goal     OT, PT/OT Ongoing, Progressing    Description: Goals to be met by: 8/17/2020     Patient will increase functional independence with ADLs by performing:    Feeding with Modified [place].  UE Dressing with Modified [place].  LE Dressing with Minimal Assistance.  Grooming while seated with Modified [place].  Toileting from bedside commode with Minimal Assistance for hygiene and clothing management.   Sitting at edge of bed x5 minutes with Stand-by Assistance.  Supine to sit with Stand-by Assistance.  Step transfer with Contact Guard Assistance  Toilet transfer to bedside commode with Minimal Assistance.                     Time Tracking:     OT Date of Treatment: 08/05/20  OT Start Time: 1541  OT Stop Time: 1616  OT Total Time (min): 35 min    Billable Minutes:Self Care/Home Management 35 minutes    Gilles Bernard OT  8/5/2020

## 2020-08-05 NOTE — ASSESSMENT & PLAN NOTE
Swing for PT.   Consider nursing home placement if she does not progress   Will have case management start CRISTOPHER smart  Check CT spine and pelvis today  Noted right rib fractures  Consider placement at d/c

## 2020-08-05 NOTE — PLAN OF CARE
Problem: Fall Injury Risk  Goal: Absence of Fall and Fall-Related Injury  Outcome: Ongoing, Progressing   Precautions in place.    Problem: Skin Injury Risk Increased  Goal: Skin Health and Integrity  Outcome: Ongoing, Progressing   Turned q2hr.    Problem: Muscle Strength Impairment  Goal: Improved Muscle Strength  Outcome: Ongoing, Progressing   Very difficult for patient to follow commands this evening regarding moving legs to a left or right position while in bed.

## 2020-08-05 NOTE — ASSESSMENT & PLAN NOTE
I think this is a reflection of her very poor diet  Start remeron  Ns at 75ml/hr   Na better 134 today

## 2020-08-05 NOTE — ASSESSMENT & PLAN NOTE
Likely liver contusion from recent falls noted on CT scan  H/H stable  Non-tender abdomen  Monitor closely but no clinical evidence of true liver lac that would require surgical intervention more likely hematoma

## 2020-08-05 NOTE — PLAN OF CARE
Pt initially admitted with acute cystitis; transitioned to swing for debility. PT and OT consulted. Ambulated 200 ft with moderate assist with PT today. Up to BSC with OT. Generalized weakness; pt favors right side; CT head negative; CT abd and pelvis shows acute rib fractures. Confusion at times and incontinence. Turning every 2 hours to prevent pressure injury. Bed alarm set and Esitter at bedside. Vitals stable. Remains free from injury/falls. Reviewed plan of care with pt,  and daughter Autumn; states agreement but further reinforcement needed.

## 2020-08-05 NOTE — PROGRESS NOTES
[facility] Hospital Medicine  Progress Note    Patient Name: Estefania Mccollum  MRN: [id number]  Patient Class: IP- Swing   Admission Date: 7/31/2020  Length of Stay: 5 days  Attending Physician: Manish Lynn MD  Primary Care Provider: Danna Levine MD        Subjective:     Principal Problem:Debility        HPI:  85 year old female admitted with UTI being admitted to Swing for PT. She lives at home with only her 91 year old , who is unable to lift her or transfer her safely. She is weak. She needs to be strong enough to perform her ADL's on her own. If she is unable to reach this goal, she may need nursing home placement.     Overview/Hospital Course:  8/3 This patient was placed on SKILL unit for cont abx to treat UTI as well as PT. She has become debilitated and weak at home resulting in multiple falls even with walker. Lives at home with elderly frail . Her goal is to ambulate 50 ft with min ass using RW. Yesterday she did bed mobility with min assist. Sat she was max assist to transfer.     8/4 this patient is on skill for cont therapy as she was not strong enough to be safely discharged to home with her elderly frail . She is demented and has no complaints except she wants to go home. She complains of no pain but has not been walking and always laying on right side. CT head done that did not show any acute path. NA was low yesterday so started on NS at 75ml/hr. This has improved 125>132. Ct of abd and pelvis as well as lumbar spine done yesterday showing 4-11 right rib fractures with right sided effusion and poss liver laceration. H/H stable. Ct of spine shows no fractures or dislocation. She remains max assist for any transfers and mod assist with bed mobility. Remains on levaquin for UTI and maintaining sats on RA 93-96% NAD.     8/5 pt remains on SKILL for therapy. Noted rib fractures from fall yesterday and discussed her condition with daughter Emani as well as  . With PT she remains mod assist with rolling in bed and max assist to get up in bed /stand/transfer. This is very concerning for her safety when going home with feeble . Had long discussion with Emani who was calling her sister Autumn to discuss possible placement at d/c as home with sitters may be a challenge goven her current condition.  on case and PASSAR completed     Na better with NS fluids. Na 125>134      Review of Systems   Unable to perform ROS: Dementia     Objective:     Vital Signs (Most Recent):  Temp: 98.2 °F (36.8 °C) (08/05/20 0702)  Pulse: 76 (08/05/20 0702)  Resp: 17 (08/05/20 0702)  BP: (!) 145/60 (08/05/20 0702)  SpO2: (!) 93 % (08/05/20 0731) Vital Signs (24h Range):  Temp:  [98.2 °F (36.8 °C)-99.1 °F (37.3 °C)] 98.2 °F (36.8 °C)  Pulse:  [76-90] 76  Resp:  [17-18] 17  SpO2:  [93 %-97 %] 93 %  BP: (145-150)/(60-67) 145/60     Weight: 67 kg (147 lb 11.3 oz)  Body mass index is 22.46 kg/m².    Physical Exam  Vitals signs and nursing note reviewed.   Constitutional:       Appearance: She is well-developed.   HENT:      Head: Normocephalic and atraumatic.      Right Ear: External ear normal.      Left Ear: External ear normal.      Nose: Nose normal.   Eyes:      General: No scleral icterus.     Conjunctiva/sclera: Conjunctivae normal.      Pupils: Pupils are equal, round, and reactive to light.   Neck:      Musculoskeletal: Normal range of motion and neck supple.      Thyroid: No thyromegaly.      Vascular: No JVD.      Trachea: No tracheal deviation.   Cardiovascular:      Rate and Rhythm: Normal rate.      Heart sounds: Normal heart sounds. No murmur.   Pulmonary:      Effort: Pulmonary effort is normal. No respiratory distress.      Breath sounds: Normal breath sounds. No wheezing or rales.   Chest:      Chest wall: No tenderness.   Abdominal:      General: Bowel sounds are normal. There is no distension.      Palpations: Abdomen is soft. There is no mass.       Tenderness: There is no abdominal tenderness. There is no guarding or rebound.   Musculoskeletal: Normal range of motion.         General: No tenderness.   Lymphadenopathy:      Cervical: No cervical adenopathy.   Skin:     General: Skin is warm and dry.   Neurological:      General: No focal deficit present.      Mental Status: She is alert.      Motor: No abnormal muscle tone.      Deep Tendon Reflexes: Reflexes are normal and symmetric.      Comments: Alert. Oriented to place and person    Psychiatric:         Behavior: Behavior normal.         Thought Content: Thought content normal.         Judgment: Judgment normal.           CRANIAL NERVES     CN III, IV, VI   Pupils are equal, round, and reactive to light.            Significant Labs:  Lab Results   Component Value Date    WBC 8.13 2020    HGB 13.3 2020    HCT 38.2 2020    MCV 87 2020     2020         CMP:   Recent Labs   Lab 20  0530 20  0618   * 134*   K 4.2 4.5   CL 97 100   CO2 27 28    98   BUN 10 11   CREATININE 0.7 0.7   CALCIUM 8.4* 8.0*   PROT 5.5* 5.1*   ALBUMIN 2.7* 2.5*   BILITOT 0.8 0.8   ALKPHOS 66 77   AST 35 27   ALT 24 21   ANIONGAP 8 6*   EGFRNONAA >60 >60     Blood cultures NGTD    Urine culture   Escherichia coli       CULTURE, URINE     Amox/K Clav'ate <=8/4 mcg/mL Sensitive     Amp/Sulbactam <=8/4 mcg/mL Sensitive     Ampicillin <=8 mcg/mL Sensitive     Cefazolin <=2 mcg/mL Sensitive     Cefepime <=2 mcg/mL Sensitive     Ceftriaxone <=1 mcg/mL Sensitive     Ciprofloxacin <=1 mcg/mL Sensitive     Ertapenem <=0.5 mcg/mL Sensitive     Gentamicin <=4 mcg/mL Sensitive     Levofloxacin <=2 mcg/mL Sensitive     Meropenem <=1 mcg/mL Sensitive     Nitrofurantoin <=32 mcg/mL Sensitive     Piperacillin/Tazo <=16 mcg/mL Sensitive     Tetracycline <=4 mcg/mL Sensitive     Tobramycin <=4 mcg/mL Sensitive     Trimeth/Sulfa <=2/38 mcg/mL Sensitive        Significant Imagin/2 CT  head No acute abnormality.  8/3 ct lumbar spine Multilevel degenerative changes of the lumbar spine without evidence for fracture or subluxation.    8/3 CT abd and pelvis Acute fractures involving the right 6 through 11th ribs with associated moderate-sized right pleural effusion with adjacent passive atelectasis.  Some of the rib fractures are segmented.  Please note that all the ribs were not imaged and additional fractures of the ribs cannot be excluded.     There is a small amount of subcapsular fluid seen along the medial margin of the right hepatic lobe with a region of indeterminate hypoattenuation involving the liver.  In the setting of trauma, underlying hepatic contusion/laceration not excluded.     Thickening of the walls of the urinary bladder with surrounding inflammation suggesting a cystitis.     Constipation.    8/3 cxr There is a layering large right-sided pleural effusion with passive atelectasis of the right lung base, seen to a better extent on previous CT.  The left lung is clear.  Heart size is normal.  Calcified atheromatous disease affects the aorta.  Right-sided rib fracture seen to a better extent on recent CT scan.  No pneumothorax.       8/3 right rib view FINDINGS:  There are acute fractures involving the right 4th through 11th ribs, some of these fractures are segment, seen to a better extent on recent CT scan.  No pneumothorax.           Assessment/Plan:      * Debility  Swing for PT ; currently max assist with transfer   GOAL>>Gait  x ~50  feet with Minimal Assistance with or without rolling walker  Check CT spine and pelvis today    Noted rib fractures  She is not progressing but she will be slow due to the newly found rib fractures, this also raises more concern for her safety at home;  has started PASSAR     8/5 she is approved till Friday and will keep her here for rehab as long as she can make some progress and insurance approves her stay but did discuss d/c plans  with daughter yesterday. Will reach out to local nursing homes incase she does not progress and needs facility at d/c     Closed fracture of multiple ribs of right side with routine healing  Ribs 4-11 noted on imaging  Denies pain  Impeding her progress however  Cont PT  PRN Tylenol for pain control      Liver contusion  Likely liver contusion from recent falls noted on CT scan  H/H stable  Non-tender abdomen  Monitor closely but no clinical evidence of true liver lac that would require surgical intervention more likely hematoma       Hyponatremia  I think this is a reflection of her very poor diet  Start remeron  Ns at 75ml/hr   Na better 134 today    Acute cystitis with hematuria  Continue levaquin for 7 days total for complicated UTI   Getting dose q 48hr 7/29, 7/31, 8/2, 8/4 ct Monday still showing cystitis, will cont levaquin      Frequent falls  Swing for PT.   Consider nursing home placement if she does not progress   Will have case management start PASSAR incase  Check CT spine and pelvis today  Noted right rib fractures  Consider placement at d/c     Memory loss  Continue donepezil and namenda       Hyperlipidemia  Continue pravastatin         Hypothyroidism  Continue synthroid 100mcg   Lab Results   Component Value Date    TSH 2.578 12/03/2019           Hypertension  Continue lisinopril   bp 145//67      VTE Risk Mitigation (From admission, onward)         Ordered     IP VTE HIGH RISK PATIENT  Once      07/31/20 1636     Place sequential compression device  Until discontinued      07/31/20 1636                      Hiral Borden MD  Department of Hospital Medicine   [facility]

## 2020-08-05 NOTE — PLAN OF CARE
Spoke at length with Emani, patient's daughter, concerning discharge plan. She states they are reluctant to admit Ms Mac to a nursing home citing COVID concerns and strict visitor policies. We discussed the use of sitters at home with home health. I discussed the possibility of an AIM program. The family feels as tough her dementia is advanced and have asked about possible hospice care. With [facility] she can transition from home health to hospice when appropriate. We will talk again tomorrow.

## 2020-08-05 NOTE — SUBJECTIVE & OBJECTIVE
Review of Systems   Unable to perform ROS: Dementia     Objective:     Vital Signs (Most Recent):  Temp: 98.2 °F (36.8 °C) (08/05/20 0702)  Pulse: 76 (08/05/20 0702)  Resp: 17 (08/05/20 0702)  BP: (!) 145/60 (08/05/20 0702)  SpO2: (!) 93 % (08/05/20 0731) Vital Signs (24h Range):  Temp:  [98.2 °F (36.8 °C)-99.1 °F (37.3 °C)] 98.2 °F (36.8 °C)  Pulse:  [76-90] 76  Resp:  [17-18] 17  SpO2:  [93 %-97 %] 93 %  BP: (145-150)/(60-67) 145/60     Weight: 67 kg (147 lb 11.3 oz)  Body mass index is 22.46 kg/m².    Physical Exam  Vitals signs and nursing note reviewed.   Constitutional:       Appearance: She is well-developed.   HENT:      Head: Normocephalic and atraumatic.      Right Ear: External ear normal.      Left Ear: External ear normal.      Nose: Nose normal.   Eyes:      General: No scleral icterus.     Conjunctiva/sclera: Conjunctivae normal.      Pupils: Pupils are equal, round, and reactive to light.   Neck:      Musculoskeletal: Normal range of motion and neck supple.      Thyroid: No thyromegaly.      Vascular: No JVD.      Trachea: No tracheal deviation.   Cardiovascular:      Rate and Rhythm: Normal rate.      Heart sounds: Normal heart sounds. No murmur.   Pulmonary:      Effort: Pulmonary effort is normal. No respiratory distress.      Breath sounds: Normal breath sounds. No wheezing or rales.   Chest:      Chest wall: No tenderness.   Abdominal:      General: Bowel sounds are normal. There is no distension.      Palpations: Abdomen is soft. There is no mass.      Tenderness: There is no abdominal tenderness. There is no guarding or rebound.   Musculoskeletal: Normal range of motion.         General: No tenderness.   Lymphadenopathy:      Cervical: No cervical adenopathy.   Skin:     General: Skin is warm and dry.   Neurological:      General: No focal deficit present.      Mental Status: She is alert.      Motor: No abnormal muscle tone.      Deep Tendon Reflexes: Reflexes are normal and symmetric.       Comments: Alert. Oriented to place and person    Psychiatric:         Behavior: Behavior normal.         Thought Content: Thought content normal.         Judgment: Judgment normal.           CRANIAL NERVES     CN III, IV, VI   Pupils are equal, round, and reactive to light.            Significant Labs:  Lab Results   Component Value Date    WBC 8.13 2020    HGB 13.3 2020    HCT 38.2 2020    MCV 87 2020     2020         CMP:   Recent Labs   Lab 20  0530 20  0618   * 134*   K 4.2 4.5   CL 97 100   CO2 27 28    98   BUN 10 11   CREATININE 0.7 0.7   CALCIUM 8.4* 8.0*   PROT 5.5* 5.1*   ALBUMIN 2.7* 2.5*   BILITOT 0.8 0.8   ALKPHOS 66 77   AST 35 27   ALT 24 21   ANIONGAP 8 6*   EGFRNONAA >60 >60     Blood cultures NGTD    Urine culture   Escherichia coli       CULTURE, URINE     Amox/K Clav'ate <=8/4 mcg/mL Sensitive     Amp/Sulbactam <=8/4 mcg/mL Sensitive     Ampicillin <=8 mcg/mL Sensitive     Cefazolin <=2 mcg/mL Sensitive     Cefepime <=2 mcg/mL Sensitive     Ceftriaxone <=1 mcg/mL Sensitive     Ciprofloxacin <=1 mcg/mL Sensitive     Ertapenem <=0.5 mcg/mL Sensitive     Gentamicin <=4 mcg/mL Sensitive     Levofloxacin <=2 mcg/mL Sensitive     Meropenem <=1 mcg/mL Sensitive     Nitrofurantoin <=32 mcg/mL Sensitive     Piperacillin/Tazo <=16 mcg/mL Sensitive     Tetracycline <=4 mcg/mL Sensitive     Tobramycin <=4 mcg/mL Sensitive     Trimeth/Sulfa <=2/38 mcg/mL Sensitive        Significant Imagin/2 CT head No acute abnormality.  /3 ct lumbar spine Multilevel degenerative changes of the lumbar spine without evidence for fracture or subluxation.    /3 CT abd and pelvis Acute fractures involving the right 6 through 11th ribs with associated moderate-sized right pleural effusion with adjacent passive atelectasis.  Some of the rib fractures are segmented.  Please note that all the ribs were not imaged and additional fractures of the ribs cannot be  excluded.     There is a small amount of subcapsular fluid seen along the medial margin of the right hepatic lobe with a region of indeterminate hypoattenuation involving the liver.  In the setting of trauma, underlying hepatic contusion/laceration not excluded.     Thickening of the walls of the urinary bladder with surrounding inflammation suggesting a cystitis.     Constipation.    8/3 cxr There is a layering large right-sided pleural effusion with passive atelectasis of the right lung base, seen to a better extent on previous CT.  The left lung is clear.  Heart size is normal.  Calcified atheromatous disease affects the aorta.  Right-sided rib fracture seen to a better extent on recent CT scan.  No pneumothorax.       8/3 right rib view FINDINGS:  There are acute fractures involving the right 4th through 11th ribs, some of these fractures are segment, seen to a better extent on recent CT scan.  No pneumothorax.

## 2020-08-05 NOTE — PT/OT/SLP PROGRESS
"Physical Therapy Treatment    Patient Name:  Estefania Mccollum   MRN:  [id number]    Recommendations:     Discharge Recommendations:  nursing facility, basic   Discharge Equipment Recommendations: hospital bed, lift device   Barriers to discharge: Decreased caregiver support    Assessment:     Estefania Mccollum is a 85 y.o. female admitted with a medical diagnosis of Debility.  She presents with the following impairments/functional limitations:  weakness, gait instability, impaired self care skills, impaired endurance, impaired balance, impaired functional mobilty, impaired cognition, impaired coordination, decreased upper extremity function, decreased lower extremity function, decreased safety awareness, decreased coordination. Patient tolerated well and increased participation during PT tx. Able to hold balance with improve trunk control upon sitting up on side of the bed. Continue to lean on right side with cervical kyphotic posture. Improved ambulation by starting inside the parallel bars x 10 feet with moderate assistance and cues. Progressed gait trng using RW at the hallway x 200 feet ( 3 times rest periods) with moderate assistance and verbal and tactile cues to inc stride, correct posture and to keep on the tasks.    Rehab Prognosis: Fair; patient would benefit from acute skilled PT services to address these deficits and reach maximum level of function.    Recent Surgery: * No surgery found *      Plan:     During this hospitalization, patient to be seen daily to address the identified rehab impairments via gait training, therapeutic activities, therapeutic exercises and progress toward the following goals:    · Plan of Care Expires:  08/28/20    Subjective     Chief Complaint: weakness and inc assistance needed with mobility  Patient/Family Comments/goals: "to return home"  Pain/Comfort:  Pain Rating 1: 0/10  Location - Side 1: Right  Location - Orientation 1: lateral  Location 1: rib(s)  Pain Addressed 1: " Reposition, Cessation of Activity  Pain Rating Post-Intervention 1: 0/10      Objective:     Communicated with patient and  prior to session.  Patient found supine with telemetry, peripheral IV upon PT entry to room.     General Precautions: Standard, fall   Orthopedic Precautions:N/A   Braces: N/A     Functional Mobility:  · Bed Mobility:     · Rolling Left:  moderate assistance  · Rolling Right: moderate assistance  · Scooting: moderate assistance  · Supine to Sit: moderate assistance  · Sit to Supine: moderate assistance  · Transfers:     · Sit to Stand:  max/moderate assistance and cues with rolling walker  · Bed to Chair: max/moderate assistance and cues with  hand-held assist  using  Stand Pivot  · Gait: Moderate Asistance and cues x ~200 feet  at the hallway( 3 times rest period). Exhibits forward flexed posture, decreased stride length and dec daniel.   · Balance: Sitting Static: Fair; Standing with RW Static: Fair-      AM-PAC 6 CLICK MOBILITY  Turning over in bed (including adjusting bedclothes, sheets and blankets)?: 2  Sitting down on and standing up from a chair with arms (e.g., wheelchair, bedside commode, etc.): 2  Moving from lying on back to sitting on the side of the bed?: 2  Moving to and from a bed to a chair (including a wheelchair)?: 2  Need to walk in hospital room?: 2  Climbing 3-5 steps with a railing?: 1  Basic Mobility Total Score: 11       Therapeutic Activities and Exercises:   Rolling to sides x 4, supine<>sit, scooting x 4, stand pivot bed<>wheelchair transfers, sit<>stand ex with RW x 4; Sitting and standing balance trng with RW; Gait trng inside the parallel bars x 10 feet with moderate A and cues and progressed to RW Ambulation at the hallway x ~200 feet (3 times rest periods) with Moderate Assistance and cues.    Patient left supine with all lines intact, call button in reach, bed alarm on, nursing notified and  present..    GOALS:   Multidisciplinary Problems      Physical Therapy Goals        Problem: Physical Therapy Goal    Goal Priority Disciplines Outcome Goal Variances Interventions   Physical Therapy Goal     PT, PT/OT Ongoing, Progressing     Description: Patient will increase functional independence with mobility by performin. Supine to sit with Contact Guard Assistance  2. Sit to supine with Contact Guard Assistance  3. Bed to chair transfer with Minimal/Contact Guard Assistancewith or without rolling walker using Stand Pivot TECHNIQUE  4. Gait  x ~50  feet with Minimal Assistance with or without rolling walker  5. Lower extremity exercise program x10 reps per handout, with assistance as needed                   Time Tracking:     PT Received On: 20  PT Start Time: 1240     PT Stop Time: 1310  PT Total Time (min): 25 min     Billable Minutes: Gait Training 15 and Therapeutic Activity 10    Treatment Type: Treatment  PT/PTA: PT     PTA Visit Number: 1     Cricket Gaytan, PT  2020

## 2020-08-05 NOTE — ASSESSMENT & PLAN NOTE
Swing for PT ; currently max assist with transfer   GOAL>>Gait  x ~50  feet with Minimal Assistance with or without rolling walker  Check CT spine and pelvis today    Noted rib fractures  She is not progressing but she will be slow due to the newly found rib fractures, this also raises more concern for her safety at home;  has started PASSAR     8/5 she is approved till Friday and will keep her here for rehab as long as she can make some progress and insurance approves her stay but did discuss d/c plans with daughter yesterday. Will reach out to local nursing homes incase she does not progress and needs facility at d/c

## 2020-08-05 NOTE — ASSESSMENT & PLAN NOTE
Continue levaquin for 7 days total for complicated UTI   Getting dose q 48hr 7/29, 7/31, 8/2, 8/4 ct Monday still showing cystitis, will cont levaquin

## 2020-08-06 LAB
ALBUMIN SERPL BCP-MCNC: 2.6 G/DL (ref 3.5–5.2)
ALP SERPL-CCNC: 90 U/L (ref 55–135)
ALT SERPL W/O P-5'-P-CCNC: 22 U/L (ref 10–44)
ANION GAP SERPL CALC-SCNC: 6 MMOL/L (ref 8–16)
AST SERPL-CCNC: 23 U/L (ref 10–40)
BASOPHILS # BLD AUTO: 0.04 K/UL (ref 0–0.2)
BASOPHILS NFR BLD: 0.5 % (ref 0–1.9)
BILIRUB SERPL-MCNC: 0.6 MG/DL (ref 0.1–1)
BUN SERPL-MCNC: 10 MG/DL (ref 8–23)
CALCIUM SERPL-MCNC: 8 MG/DL (ref 8.7–10.5)
CHLORIDE SERPL-SCNC: 102 MMOL/L (ref 95–110)
CO2 SERPL-SCNC: 27 MMOL/L (ref 23–29)
CREAT SERPL-MCNC: 0.7 MG/DL (ref 0.5–1.4)
DIFFERENTIAL METHOD: ABNORMAL
EOSINOPHIL # BLD AUTO: 0.2 K/UL (ref 0–0.5)
EOSINOPHIL NFR BLD: 3 % (ref 0–8)
ERYTHROCYTE [DISTWIDTH] IN BLOOD BY AUTOMATED COUNT: 12.8 % (ref 11.5–14.5)
EST. GFR  (AFRICAN AMERICAN): >60 ML/MIN/1.73 M^2
EST. GFR  (NON AFRICAN AMERICAN): >60 ML/MIN/1.73 M^2
GLUCOSE SERPL-MCNC: 102 MG/DL (ref 70–110)
HCT VFR BLD AUTO: 37.5 % (ref 37–48.5)
HGB BLD-MCNC: 12.7 G/DL (ref 12–16)
IMM GRANULOCYTES # BLD AUTO: 0.02 K/UL (ref 0–0.04)
IMM GRANULOCYTES NFR BLD AUTO: 0.3 % (ref 0–0.5)
LYMPHOCYTES # BLD AUTO: 1.5 K/UL (ref 1–4.8)
LYMPHOCYTES NFR BLD: 19.4 % (ref 18–48)
MAGNESIUM SERPL-MCNC: 1.9 MG/DL (ref 1.6–2.6)
MCH RBC QN AUTO: 30.4 PG (ref 27–31)
MCHC RBC AUTO-ENTMCNC: 33.9 G/DL (ref 32–36)
MCV RBC AUTO: 90 FL (ref 82–98)
MONOCYTES # BLD AUTO: 0.6 K/UL (ref 0.3–1)
MONOCYTES NFR BLD: 7.3 % (ref 4–15)
NEUTROPHILS # BLD AUTO: 5.3 K/UL (ref 1.8–7.7)
NEUTROPHILS NFR BLD: 69.5 % (ref 38–73)
NRBC BLD-RTO: 0 /100 WBC
PHOSPHATE SERPL-MCNC: 3.3 MG/DL (ref 2.7–4.5)
PLATELET # BLD AUTO: 318 K/UL (ref 150–350)
PMV BLD AUTO: 8.8 FL (ref 9.2–12.9)
POTASSIUM SERPL-SCNC: 4.1 MMOL/L (ref 3.5–5.1)
PROT SERPL-MCNC: 5.3 G/DL (ref 6–8.4)
RBC # BLD AUTO: 4.18 M/UL (ref 4–5.4)
SODIUM SERPL-SCNC: 135 MMOL/L (ref 136–145)
WBC # BLD AUTO: 7.68 K/UL (ref 3.9–12.7)

## 2020-08-06 PROCEDURE — 85025 COMPLETE CBC W/AUTO DIFF WBC: CPT | Mod: HCNC

## 2020-08-06 PROCEDURE — 94761 N-INVAS EAR/PLS OXIMETRY MLT: CPT | Mod: HCNC

## 2020-08-06 PROCEDURE — 25000003 PHARM REV CODE 250: Mod: HCNC | Performed by: NURSE PRACTITIONER

## 2020-08-06 PROCEDURE — 97530 THERAPEUTIC ACTIVITIES: CPT | Mod: HCNC

## 2020-08-06 PROCEDURE — 83735 ASSAY OF MAGNESIUM: CPT | Mod: HCNC

## 2020-08-06 PROCEDURE — 11000004 HC SNF PRIVATE: Mod: HCNC

## 2020-08-06 PROCEDURE — 97535 SELF CARE MNGMENT TRAINING: CPT | Mod: HCNC

## 2020-08-06 PROCEDURE — 80053 COMPREHEN METABOLIC PANEL: CPT | Mod: HCNC

## 2020-08-06 PROCEDURE — 84100 ASSAY OF PHOSPHORUS: CPT | Mod: HCNC

## 2020-08-06 PROCEDURE — 99900035 HC TECH TIME PER 15 MIN (STAT): Mod: HCNC

## 2020-08-06 PROCEDURE — 97116 GAIT TRAINING THERAPY: CPT | Mod: HCNC

## 2020-08-06 PROCEDURE — 63600175 PHARM REV CODE 636 W HCPCS: Mod: HCNC | Performed by: NURSE PRACTITIONER

## 2020-08-06 PROCEDURE — 36415 COLL VENOUS BLD VENIPUNCTURE: CPT | Mod: HCNC

## 2020-08-06 RX ADMIN — SODIUM CHLORIDE: 0.9 INJECTION, SOLUTION INTRAVENOUS at 11:08

## 2020-08-06 RX ADMIN — DONEPEZIL HYDROCHLORIDE 5 MG: 5 TABLET, FILM COATED ORAL at 08:08

## 2020-08-06 RX ADMIN — MEMANTINE 10 MG: 10 TABLET ORAL at 08:08

## 2020-08-06 RX ADMIN — LEVOTHYROXINE SODIUM 100 MCG: 100 TABLET ORAL at 05:08

## 2020-08-06 RX ADMIN — ACETAMINOPHEN 650 MG: 325 TABLET ORAL at 02:08

## 2020-08-06 RX ADMIN — STANDARDIZED SENNA CONCENTRATE AND DOCUSATE SODIUM 1 TABLET: 8.6; 5 TABLET ORAL at 08:08

## 2020-08-06 RX ADMIN — LEVOFLOXACIN 500 MG: 500 INJECTION, SOLUTION INTRAVENOUS at 02:08

## 2020-08-06 RX ADMIN — LISINOPRIL 20 MG: 20 TABLET ORAL at 08:08

## 2020-08-06 RX ADMIN — PRAVASTATIN SODIUM 80 MG: 40 TABLET ORAL at 08:08

## 2020-08-06 RX ADMIN — MIRTAZAPINE 15 MG: 15 TABLET, ORALLY DISINTEGRATING ORAL at 08:08

## 2020-08-06 RX ADMIN — ASPIRIN 81 MG: 81 TABLET, COATED ORAL at 08:08

## 2020-08-06 RX ADMIN — SODIUM CHLORIDE: 0.9 INJECTION, SOLUTION INTRAVENOUS at 09:08

## 2020-08-06 RX ADMIN — SERTRALINE HYDROCHLORIDE 25 MG: 25 TABLET ORAL at 08:08

## 2020-08-06 NOTE — PLAN OF CARE
Recommendation:   1. Boost Plus vanilla at dinner time per pt request and need for intake support  2. RD to monitor    Interventions (treatment strategy):  General healthful diet  [employer]- Boost with Dinner  Collaboration with other providers    Goals: meet at least 50% of needs with meals and supplement  Nutrition Goal Status: new  Nutrition Discharge Planning: Adequate PO intake via Regular diet.

## 2020-08-06 NOTE — PT/OT/SLP PROGRESS
"Physical Therapy Treatment    Patient Name:  Estefania Mccollum   MRN:  [id number]    Recommendations:     Discharge Recommendations:  nursing facility, basic   Discharge Equipment Recommendations: hospital bed, lift device   Barriers to discharge: Decreased caregiver support    Assessment:     Estefania Mccollum is a 85 y.o. female admitted with a medical diagnosis of Debility.  She presents with the following impairments/functional limitations:  weakness, gait instability, impaired endurance, impaired self care skills, impaired functional mobilty, impaired balance, decreased coordination, decreased safety awareness, impaired cognition, pain. Patient participated and tolerated well PT session today. Decreased fear of falling. Increased gait distance using RW with IV pole at the hallway x ~250 feet with minimal assistance and constant verbal and tactile cues to correct body alignment, keeping head up and to keep pt on the tasks. Continue leaning on right side and anxious to go back to bed. No sign of fatigue and respiratory distress. Encouraged pt with  at bedside to stay up longer(stting up in a wheelchair) and tolerated up for more than an hour.    Rehab Prognosis: Fair; patient would benefit from acute skilled PT services to address these deficits and reach maximum level of function.    Recent Surgery: * No surgery found *      Plan:     During this hospitalization, patient to be seen daily to address the identified rehab impairments via gait training, therapeutic activities, therapeutic exercises and progress toward the following goals:    · Plan of Care Expires:  08/28/20    Subjective     Chief Complaint: weakness; patient likes to stay all the time on the bed.  Patient/Family Comments/goals: "to get better and to return home"  Pain/Comfort:  Pain Rating 1: 0/10  Location - Side 1: Right  Location - Orientation 1: lateral  Location 1: rib(s)  Pain Addressed 1: Reposition, Cessation of Activity  Pain Rating " Post-Intervention 1: 0/10      Objective:     Communicated with patient and  prior to session.  Patient found supine with peripheral IV, telemetry upon PT entry to room.     General Precautions: Standard, fall   Orthopedic Precautions:N/A   Braces: N/A     Functional Mobility:  · Bed Mobility:     · Rolling Left:  minimum assistance  · Rolling Right: minimum assistance  · Scooting: moderate assistance  · Supine to Sit: minimum assistance  · Sit to Supine: moderate assistance  · Transfers:     · Sit to Stand:  minimum assistance with rolling walker  · Bed to Chair: moderate assistance with  hand-held assist  using  Stand Pivot  · Gait: Minimal Assistance and verbal and tactile cues x ~250 feet (at the hallway) with RW.  Exhibits slouch forward posture with cervical kyphosis and leans to right side. Requires constant cueing to keep inc head up during gait trng.  · Balance: Standing with RW Static: Fair; Dynamic: Fair-      AM-PAC 6 CLICK MOBILITY  Turning over in bed (including adjusting bedclothes, sheets and blankets)?: 3  Sitting down on and standing up from a chair with arms (e.g., wheelchair, bedside commode, etc.): 3  Moving from lying on back to sitting on the side of the bed?: 2  Moving to and from a bed to a chair (including a wheelchair)?: 2  Need to walk in hospital room?: 3  Climbing 3-5 steps with a railing?: 1  Basic Mobility Total Score: 14       Therapeutic Activities and Exercises:   Stand pivot bed<>w/c trng; Sit<>Stand x4; Sitting tolerance and Standing balance trng with RW; Provided tactile and verbal cues for postural trng; Gait trng with RW x ~250 feet with min assistance and cues; LE bike ex x 5 mins    Patient left supine with all lines intact, call button in reach, bed alarm on, nursing notified and  present..    GOALS:   Multidisciplinary Problems     Physical Therapy Goals        Problem: Physical Therapy Goal    Goal Priority Disciplines Outcome Goal Variances Interventions    Physical Therapy Goal     PT, PT/OT Ongoing, Progressing     Description: Patient will increase functional independence with mobility by performin. Supine to sit with Contact Guard Assistance  2. Sit to supine with Contact Guard Assistance  3. Bed to chair transfer with Minimal/Contact Guard Assistancewith or without rolling walker using Stand Pivot TECHNIQUE  4. Gait  x ~50  feet with Minimal Assistance with or without rolling walker  5. Lower extremity exercise program x10 reps per handout, with assistance as needed                   Time Tracking:     PT Received On: 20  PT Start Time: 1230     PT Stop Time: 1310  PT Total Time (min): 40 min     Billable Minutes: Gait Training 20 and Therapeutic Activity 20    Treatment Type: Treatment  PT/PTA: PT     PTA Visit Number: 1     Cricket Gaytan, PT  2020

## 2020-08-06 NOTE — PLAN OF CARE
A/Ox3, fall precautions in place AVASYS in use, turned q2hr, IVF infusing. Urine output is good. Po fluids forced. NSR on the monitor.

## 2020-08-06 NOTE — PLAN OF CARE
08/06/20 6008   Discharge Reassessment   Assessment Type Discharge Planning Reassessment   Provided patient/caregiver education on the expected discharge date and the discharge plan Yes   Do you have any problems affording any of your prescribed medications? No   Discharge Plan A Home Health   Discharge Plan B New Nursing Home placement - nursing home care facility   DME Needed Upon Discharge  hospital bed;lift device  (family will wait and see if patient will need this close to discharge.)   Patient choice form signed by patient/caregiver N/A   Anticipated Discharge Disposition Home-Health   Can the patient/caregiver answer the patient profile reliably? No, cognitively impaired  (intermittantly impaired)   How does the patient rate their overall health at the present time? Fair   Describe the patient's ability to walk at the present time. Major restrictions/daily assistance from another person   How often would a person be available to care for the patient? Whenever needed   Number of comorbid conditions (as recorded on the chart) Three   During the past month, has the patient often been bothered by feeling down, depressed or hopeless? No   During the past month, has the patient often been bothered by little interest or pleasure in doing things? No     Ms Mccollum and her family have elected to take her home at discharge with home health.

## 2020-08-06 NOTE — PT/OT/SLP PROGRESS
"Occupational Therapy   Treatment    Name: Estefania Mccollum  MRN: [id number]  Admitting Diagnosis:  Debility       Recommendations:     Discharge Recommendations: nursing facility, basic  Discharge Equipment Recommendations:  hospital bed, lift device  Barriers to discharge:  Decreased caregiver support    Assessment:     Estefania Mccollum is a 85 y.o. female with a medical diagnosis of Debility.  She presents with confusion but able to complete requested tasks (sitting EOB, standing, grooming) although frequently requesting to lay back down and rest and reporting often, "I don't know what's going on" after several explanations.     Performance deficits affecting function are weakness, gait instability, impaired endurance, impaired balance, decreased lower extremity function, impaired coordination, decreased safety awareness, impaired fine motor, impaired self care skills, impaired cognition, impaired functional mobilty, decreased coordination.     Rehab Prognosis:  Fair; patient would benefit from acute skilled OT services to address these deficits and reach maximum level of function.       Plan:     Patient to be seen 5 x/week to address the above listed problems via self-care/home management, therapeutic activities, therapeutic exercises  · Plan of Care Expires: 08/17/20  · Plan of Care Reviewed with: patient    Subjective     Pain/Comfort:  ·      Objective:     Communicated with: Nursing prior to session.  Patient found supine with telemetry, peripheral IV upon OT entry to room.    General Precautions: Standard, fall   Orthopedic Precautions:N/A   Braces: N/A     Occupational Performance:     Bed Mobility:    · Patient completed Rolling/Turning to Left with  minimum assistance  · Patient completed Rolling/Turning to Right with minimum assistance  · Patient completed Supine to Sit with moderate assistance  · Patient completed Sit to Supine with moderate assistance     Functional Mobility/Transfers:  · Patient completed " Sit <> Stand Transfer with moderate assistance  with  rolling walker   · Functional Mobility: with rolling walker side step with Min A    Activities of Daily Living:  · Grooming: minimum assistance sitting EOB for teeth brushing and hair brushing       AMPAC 6 Click ADL: 15    Treatment & Education:  Therapist facilitated rolling with Min A, supine to sit with Mod A, sitting balance eob with Min A with frequent verbal and tactile cues to promote postural control with noted posterior lean, grooming with Min A to brush teeth and hair sitting EOB, sit to stand x 2 trials with RW with Mod A, side steps to HOB with max verbal cues and min A, sit to supine with Mod A    Patient left supine with all lines intact, call button in reach and  presentEducation:      GOALS:   Multidisciplinary Problems     Occupational Therapy Goals        Problem: Occupational Therapy Goal    Goal Priority Disciplines Outcome Interventions   Occupational Therapy Goal     OT, PT/OT Ongoing, Progressing    Description: Goals to be met by: 8/17/2020     Patient will increase functional independence with ADLs by performing:    Feeding with Modified [place].  UE Dressing with Modified [place].  LE Dressing with Minimal Assistance.  Grooming while seated with Modified [place].  Toileting from bedside commode with Minimal Assistance for hygiene and clothing management.   Sitting at edge of bed x5 minutes with Stand-by Assistance.  Supine to sit with Stand-by Assistance.  Step transfer with Contact Guard Assistance  Toilet transfer to bedside commode with Minimal Assistance.                     Time Tracking:     OT Date of Treatment: 08/06/20  OT Start Time: 1036  OT Stop Time: 1112  OT Total Time (min): 36 min    Billable Minutes:Self Care/Home Management 20 minutes  Therapeutic Activity 16 minutes    Gilles Bernard OT  8/6/2020

## 2020-08-06 NOTE — PLAN OF CARE
Problem: Physical Therapy Goal  Goal: Physical Therapy Goal  Description: Patient will increase functional independence with mobility by performin. Supine to sit with Contact Guard Assistance  2. Sit to supine with Contact Guard Assistance  3. Bed to chair transfer with Minimal/Contact Guard Assistancewith or without rolling walker using Stand Pivot TECHNIQUE  4. Gait  x ~50  feet with Minimal Assistance with or without rolling walker  5. Lower extremity exercise program x10 reps per handout, with assistance as needed  Outcome: Ongoing, Progressing

## 2020-08-06 NOTE — PLAN OF CARE
Pt remains on Swing for debility. PT and OT consulted. Up with assist and rolling walker. Turning every 2 hours. Levaquin IV every 48 hours for UTI. Afebrile today. Vitals stable. [place] at bedside. Remains free from injury/falls. Reviewed plan of care with pt and ; state agreement, further reinforcement may be needed.

## 2020-08-06 NOTE — PROGRESS NOTES
"[facility]  Adult Nutrition  Progress Note    SUMMARY       Recommendations    Recommendation:   1. Boost Plus vanilla at dinner time per pt request and need for intake support  2. RD to monitor    Interventions (treatment strategy):  General healthful diet  Commercial Bever[employer]- Boost with Dinner  Collaboration with other providers    Goals: meet at least 50% of needs with meals and supplement  Nutrition Goal Status: new  Communication of RD Recs: (POC, second sign)    Reason for Assessment    Reason For Assessment: RD follow-up  Diagnosis: (acute cystitis with hematuria)  Relevant Medical History: osteoarthritis, cataract, colonoscopy, HLD, HTN, thyroid disease, vertigo    General Information Comments: [facility]. Spoke with patient and her . Minimal intake of foods; no appetite which has been going on for "a couple of months." Typical food intake per : 1 egg at breakfast, 50% of meal brought to her by [employer], and 1 full Boost Plus for dinner. No significant weight loss. Per  UBW 160lbs. NFPE completed today, normal muscle/fat mass for person of advanced age.    Nutrition Discharge Planning: Adequate PO intake via Regular diet.    Nutrition Risk Screen    Nutrition Risk Screen: no indicators present    Nutrition/Diet History    Patient Reported Diet/Restrictions/Preferences: general  Typical Food/Fluid Intake: small breakfast (egg), meals on wheels, Bosot for dinner  Spiritual, Cultural Beliefs, Temple Practices, Values that Affect Care: no  Supplemental Drinks or Food Habits: Boost Plus  Food Allergies: NKFA  Factors Affecting Nutritional Intake: decreased appetite    Anthropometrics    Temp: 97.6 °F (36.4 °C)  Height Method: Stated  Height: 5' 8" (172.7 cm)  Height (inches): 68 in  Weight Method: Bed Scale  Weight: 67 kg (147 lb 11.3 oz)  Weight (lb): 147.71 lb  Ideal Body Weight (IBW), Female: 140 lb  % Ideal Body Weight, Female " (lb): 105.51 %  BMI (Calculated): 22.5  BMI Grade: 18.5-24.9 - normal     Lab/Procedures/Meds    Pertinent Labs Reviewed: reviewed  Pertinent Labs Comments: Na 135, protein total 5.3, albumin 2.6  Pertinent Medications Reviewed: reviewed  Pertinent Medications Comments: Levothyroxine, pravastatin, senna-docusate    Estimated/Assessed Needs    Weight Used For Calorie Calculations: 67 kg (147 lb 11.3 oz)  Energy Calorie Requirements (kcal): 1454 kcal daily  Energy Need Method: Tift-St Jeor  Protein Requirements: 54 gm daily  Weight Used For Protein Calculations: 67 kg (147 lb 11.3 oz)(0.8 gm/kg)  Fluid Requirements (mL): 1 mL/kcal or per MD  Estimated Fluid Requirement Method: RDA Method  RDA Method (mL): 1454  CHO Requirement: 182 gm daily    Nutrition Prescription Ordered    Current Diet Order: Regular    Evaluation of Received Nutrient/Fluid Intake    I/O: +5.6 L sicne admit  Comments: LBM 8/4  % Intake of Estimated Energy Needs: 0 - 25 %  % Meal Intake: 0 - 25 %    Nutrition Risk    Level of Risk/Frequency of Follow-up: low - moderate(1x/wk)     Assessment and Plan  Nutrition Problem  Inadequate oral intake     Related to (etiology):   Decreased appetite     Signs and Symptoms (as evidenced by):   <25% intake of meals, pt report of not feeling like eating      Interventions (treatment strategy):  General healthful diet  Commercial Beverage- Boost with Dinner  Collaboration with other providers     Nutrition Diagnosis Status:   New    Monitor and Evaluation    Food and Nutrient Intake: food and beverage intake  Food and Nutrient Adminstration: diet order  Knowledge/Beliefs/Attitudes: food and nutrition knowledge/skill  Physical Activity and Function: nutrition-related ADLs and IADLs  Anthropometric Measurements: weight, weight change  Biochemical Data, Medical Tests and Procedures: electrolyte and renal panel, gastrointestinal profile, glucose/endocrine profile, inflammatory profile, lipid  profile  Nutrition-Focused Physical Findings: overall appearance     Malnutrition Assessment  Patient with decreased intake but normal muscle and fat mass for person of advanced age:      Energy Intake (Malnutrition): less than or equal to 50% for greater than or equal to 1 month   Subcutaneous Fat Loss (Final Summary): well nourished  Muscle Loss Evaluation (Final Summary): well nourished         Nutrition Follow-Up    RD Follow-up?: Yes

## 2020-08-07 LAB
ALBUMIN SERPL BCP-MCNC: 2.7 G/DL (ref 3.5–5.2)
ALP SERPL-CCNC: 106 U/L (ref 55–135)
ALT SERPL W/O P-5'-P-CCNC: 22 U/L (ref 10–44)
ANION GAP SERPL CALC-SCNC: 6 MMOL/L (ref 8–16)
AST SERPL-CCNC: 23 U/L (ref 10–40)
BACTERIA #/AREA URNS HPF: ABNORMAL /HPF
BILIRUB SERPL-MCNC: 0.5 MG/DL (ref 0.1–1)
BILIRUB UR QL STRIP: NEGATIVE
BUN SERPL-MCNC: 8 MG/DL (ref 8–23)
CALCIUM SERPL-MCNC: 8 MG/DL (ref 8.7–10.5)
CHLORIDE SERPL-SCNC: 103 MMOL/L (ref 95–110)
CLARITY UR: ABNORMAL
CO2 SERPL-SCNC: 27 MMOL/L (ref 23–29)
COLOR UR: YELLOW
CREAT SERPL-MCNC: 0.7 MG/DL (ref 0.5–1.4)
EST. GFR  (AFRICAN AMERICAN): >60 ML/MIN/1.73 M^2
EST. GFR  (NON AFRICAN AMERICAN): >60 ML/MIN/1.73 M^2
GLUCOSE SERPL-MCNC: 94 MG/DL (ref 70–110)
GLUCOSE UR QL STRIP: NEGATIVE
HGB UR QL STRIP: ABNORMAL
KETONES UR QL STRIP: NEGATIVE
LEUKOCYTE ESTERASE UR QL STRIP: ABNORMAL
MICROSCOPIC COMMENT: ABNORMAL
NITRITE UR QL STRIP: POSITIVE
PH UR STRIP: 6 [PH] (ref 5–8)
POTASSIUM SERPL-SCNC: 4.1 MMOL/L (ref 3.5–5.1)
PROT SERPL-MCNC: 5.3 G/DL (ref 6–8.4)
PROT UR QL STRIP: NEGATIVE
RBC #/AREA URNS HPF: 5 /HPF (ref 0–4)
SODIUM SERPL-SCNC: 136 MMOL/L (ref 136–145)
SP GR UR STRIP: 1.02 (ref 1–1.03)
SQUAMOUS #/AREA URNS HPF: 25 /HPF
URN SPEC COLLECT METH UR: ABNORMAL
UROBILINOGEN UR STRIP-ACNC: NEGATIVE EU/DL
WBC #/AREA URNS HPF: 25 /HPF (ref 0–5)

## 2020-08-07 PROCEDURE — 87077 CULTURE AEROBIC IDENTIFY: CPT | Mod: HCNC

## 2020-08-07 PROCEDURE — 97116 GAIT TRAINING THERAPY: CPT | Mod: HCNC

## 2020-08-07 PROCEDURE — 99308 SBSQ NF CARE LOW MDM 20: CPT | Mod: HCNC,,, | Performed by: FAMILY MEDICINE

## 2020-08-07 PROCEDURE — 87086 URINE CULTURE/COLONY COUNT: CPT | Mod: HCNC

## 2020-08-07 PROCEDURE — 80053 COMPREHEN METABOLIC PANEL: CPT | Mod: HCNC

## 2020-08-07 PROCEDURE — 81000 URINALYSIS NONAUTO W/SCOPE: CPT | Mod: HCNC

## 2020-08-07 PROCEDURE — 25000003 PHARM REV CODE 250: Mod: HCNC | Performed by: NURSE PRACTITIONER

## 2020-08-07 PROCEDURE — 94760 N-INVAS EAR/PLS OXIMETRY 1: CPT | Mod: HCNC

## 2020-08-07 PROCEDURE — 87186 SC STD MICRODIL/AGAR DIL: CPT | Mod: 59,HCNC

## 2020-08-07 PROCEDURE — 94799 UNLISTED PULMONARY SVC/PX: CPT | Mod: HCNC

## 2020-08-07 PROCEDURE — 87088 URINE BACTERIA CULTURE: CPT | Mod: HCNC

## 2020-08-07 PROCEDURE — 97530 THERAPEUTIC ACTIVITIES: CPT | Mod: HCNC

## 2020-08-07 PROCEDURE — 11000004 HC SNF PRIVATE: Mod: HCNC

## 2020-08-07 PROCEDURE — 99900035 HC TECH TIME PER 15 MIN (STAT): Mod: HCNC

## 2020-08-07 PROCEDURE — 99308 PR NURSING FAC CARE, SUBSEQ, MINOR COMPLIC: ICD-10-PCS | Mod: HCNC,,, | Performed by: FAMILY MEDICINE

## 2020-08-07 PROCEDURE — 94761 N-INVAS EAR/PLS OXIMETRY MLT: CPT | Mod: HCNC

## 2020-08-07 PROCEDURE — 36415 COLL VENOUS BLD VENIPUNCTURE: CPT | Mod: HCNC

## 2020-08-07 RX ADMIN — DONEPEZIL HYDROCHLORIDE 5 MG: 5 TABLET, FILM COATED ORAL at 08:08

## 2020-08-07 RX ADMIN — STANDARDIZED SENNA CONCENTRATE AND DOCUSATE SODIUM 1 TABLET: 8.6; 5 TABLET ORAL at 08:08

## 2020-08-07 RX ADMIN — MEMANTINE 10 MG: 10 TABLET ORAL at 08:08

## 2020-08-07 RX ADMIN — PRAVASTATIN SODIUM 80 MG: 40 TABLET ORAL at 08:08

## 2020-08-07 RX ADMIN — ASPIRIN 81 MG: 81 TABLET, COATED ORAL at 08:08

## 2020-08-07 RX ADMIN — LISINOPRIL 20 MG: 20 TABLET ORAL at 08:08

## 2020-08-07 RX ADMIN — SERTRALINE HYDROCHLORIDE 25 MG: 25 TABLET ORAL at 08:08

## 2020-08-07 RX ADMIN — MIRTAZAPINE 15 MG: 15 TABLET, ORALLY DISINTEGRATING ORAL at 08:08

## 2020-08-07 RX ADMIN — SODIUM CHLORIDE: 0.9 INJECTION, SOLUTION INTRAVENOUS at 01:08

## 2020-08-07 RX ADMIN — LEVOTHYROXINE SODIUM 100 MCG: 100 TABLET ORAL at 05:08

## 2020-08-07 NOTE — PT/OT/SLP PROGRESS
Occupational Therapy   Treatment    Name: Estefania Mccollum  MRN: [id number]  Admitting Diagnosis:  Debility       Recommendations:     Discharge Recommendations: nursing facility, basic  Discharge Equipment Recommendations:  hospital bed, lift device  Barriers to discharge:  Decreased caregiver support    Assessment:     Estefania Mccollum is a 85 y.o. female with a medical diagnosis of Debility.  She presents with distress, sitting up in wheelchair, nursing requesting therapist return patient to bed after video monitor called as patient's frail  appeared to be attempting to return patient to bed from wheelchair. Although patient can walk several feet, when patient is in distress or fatigued patient requires increased amounts of assistance. Patient required Max A x 2 for wheelchair to bed transfer due to confusion/ distress/ fatigue after completing PT session and sitting in wheelchair. Patient with inconsistent levels of independence.     Performance deficits affecting function are weakness, impaired endurance, impaired cognition, decreased coordination, impaired coordination, impaired self care skills, decreased upper extremity function, impaired fine motor, impaired functional mobilty, decreased lower extremity function, impaired skin, gait instability, decreased safety awareness, impaired balance.     Rehab Prognosis:  Fair; patient would benefit from acute skilled OT services to address these deficits and reach maximum level of function.       Plan:     Patient to be seen 5 x/week to address the above listed problems via self-care/home management, therapeutic activities, therapeutic exercises  · Plan of Care Expires: 08/17/20  · Plan of Care Reviewed with: patient    Subjective     Pain/Comfort:  · Pain Rating 1: 0/10    Objective:     Communicated with: Nursing prior to session.  Patient found up in chair with telemetry, peripheral IV upon OT entry to room.    General Precautions: Standard, fall   Orthopedic  Precautions:N/A   Braces: N/A     Occupational Performance:     Bed Mobility:    · Patient completed Sit to Supine with maximal assistance     Functional Mobility/Transfers:  · Patient completed Bed <> Chair Transfer using Squat Pivot technique with maximal assistance and of 2 persons with no assistive device  · Functional Mobility: not completed, attempted, patient unable due to distress and fatigue     Activities of Daily Living:  · Not completed, patient returned to bed, nursing care then provided to get urine sample via cath      AMPAC 6 Click ADL: 15    Treatment & Education:  Therapist entered patient's room upon nursing request, video system called due to patient's frail  attempting to transfer patient back to bed as patient was requesting. Therapist attempted sit to stand transfer with RW to ambulate 5 feet to HOB, patient unable due to distress and fatigue, squat pivot transfer from wheelchair to bed with Max A x 2, scooting to HOB with Max A x 2, sit to supine with Max A    Patient left supine with all lines intact, call button in reach and nursing presentEducation:      GOALS:   Multidisciplinary Problems     Occupational Therapy Goals        Problem: Occupational Therapy Goal    Goal Priority Disciplines Outcome Interventions   Occupational Therapy Goal     OT, PT/OT Ongoing, Progressing    Description: Goals to be met by: 8/17/2020     Patient will increase functional independence with ADLs by performing:    Feeding with Modified [place].  UE Dressing with Modified [place].  LE Dressing with Minimal Assistance.  Grooming while seated with Modified [place].  Toileting from bedside commode with Minimal Assistance for hygiene and clothing management.   Sitting at edge of bed x5 minutes with Stand-by Assistance.  Supine to sit with Stand-by Assistance.  Step transfer with Contact Guard Assistance  Toilet transfer to bedside commode with Minimal Assistance.                     Time  Tracking:     OT Date of Treatment: 08/07/20  OT Start Time: 1502  OT Stop Time: 1511  OT Total Time (min): 9 min    Billable Minutes:Therapeutic Activity 9 minutes    Gilles Bernard OT  8/7/2020

## 2020-08-07 NOTE — ASSESSMENT & PLAN NOTE
Continue levaquin for 7 days total for complicated UTI   Getting dose q 48hr 7/29, 7/31, 8/2, 8/4 ct Monday still showing cystitis, will cont levaquin  8/7 levofloxacin dose continued 8/6; has had 5 doses  will repeat urinalysis today

## 2020-08-07 NOTE — ASSESSMENT & PLAN NOTE
I think this is a reflection of her very poor diet  Start remeron  Ns at 75ml/hr   Na better 134 today  8/7 NA normal today

## 2020-08-07 NOTE — PLAN OF CARE
Patient on IP-swing bed; here for 7 more days till 8/14. PT/OT. IVF's. IV Levaquin Q 48 H. Skin tears noted to right forearm; cleansed with saline and applied mepilex. Spouse visited today. Tele-sitter in use and monitor in view. Patient with bladder & stool incontinence. Straight cath performed to obtain UA ; reflex to culture. Adult diapers. Bed exit alarm set. Patient free of falls and incidence this shift.

## 2020-08-07 NOTE — PT/OT/SLP PROGRESS
Physical Therapy Treatment    Patient Name:  Estefania Mccollum   MRN:  [id number]    Recommendations:     Discharge Recommendations:  nursing facility, basic   Discharge Equipment Recommendations: hospital bed, lift device   Barriers to discharge: Decreased caregiver support    Assessment:     Estefania Mccollum is a 85 y.o. female admitted with a medical diagnosis of Debility.  She presents with the following impairments/functional limitations:  weakness, gait instability, impaired endurance, impaired self care skills, impaired functional mobilty, impaired balance, decreased coordination, decreased safety awareness, impaired cognition, pain. Patient tolerated well PT session today. Increased gait distance same as yesterday for ~250 feet at the hallway using RW with IV pole Moderate assistance and constant  cues.  Following with wheelchair behind for inc safety. Patient remains with decrease posture ( slouch forward and cervical kyphosis).    Rehab Prognosis: Fair; patient would benefit from acute skilled PT services to address these deficits and reach maximum level of function.    Recent Surgery: * No surgery found *      Plan:     During this hospitalization, patient to be seen daily to address the identified rehab impairments via gait training, therapeutic activities, therapeutic exercises and progress toward the following goals:    · Plan of Care Expires:  08/28/20    Subjective     Chief Complaint: weakness and inc assistance with mobility  Patient/Family Comments/goals: to walk better and to return home  Pain/Comfort:  · Pain Rating 1: 0/10  · Location - Side 1: Right  · Location - Orientation 1: lateral  · Location 1: rib(s)  · Pain Addressed 1: Reposition, Cessation of Activity  · Pain Rating Post-Intervention 1: 0/10      Objective:     Communicated with patient and  prior to session.  Patient found supine with telemetry, peripheral IV upon PT entry to room.     General Precautions: Standard, fall    Orthopedic Precautions:N/A   Braces: N/A     Functional Mobility:  · Bed Mobility:     · Rolling Left:  minimum assistance  · Rolling Right: minimum assistance  · Scooting: moderate assistance  · Supine to Sit: minimum assistance  · Sit to Supine: minimum assistance  · Transfers:     · Sit to Stand:  moderate assistance with rolling walker  · Bed to Chair: moderate assistance with  hand-held assist  using  Stand Pivot  · Gait:  RW Ambulation x ~250 feet Minimal Assistance with constant cues to correct posture, raising head up and pushing walker for advancement.  · Balance: Standing with RW Static: Fair-; Dynamic: Poor+      AM-PAC 6 CLICK MOBILITY  Turning over in bed (including adjusting bedclothes, sheets and blankets)?: 3  Sitting down on and standing up from a chair with arms (e.g., wheelchair, bedside commode, etc.): 3  Moving from lying on back to sitting on the side of the bed?: 2  Moving to and from a bed to a chair (including a wheelchair)?: 2  Need to walk in hospital room?: 3  Climbing 3-5 steps with a railing?: 1  Basic Mobility Total Score: 14       Therapeutic Activities and Exercises:   Body sequence on bed mobility, out of bed activity and transfers; Sitting and standing balance with postural trng; Gait  trng RW x ~250 feet with min A and constant cues.    Patient left up in chair with all lines intact, call button in reach, nursing notified and  present..    GOALS:   Multidisciplinary Problems     Physical Therapy Goals        Problem: Physical Therapy Goal    Goal Priority Disciplines Outcome Goal Variances Interventions   Physical Therapy Goal     PT, PT/OT Ongoing, Progressing     Description: Patient will increase functional independence with mobility by performin. Supine to sit with Contact Guard Assistance  2. Sit to supine with Contact Guard Assistance  3. Bed to chair transfer with Minimal/Contact Guard Assistancewith or without rolling walker using Stand Pivot TECHNIQUE  4.  Gait  x ~50  feet with Minimal Assistance with or without rolling walker  5. Lower extremity exercise program x10 reps per handout, with assistance as needed                   Time Tracking:     PT Received On: 08/07/20  PT Start Time: 1330     PT Stop Time: 1410  PT Total Time (min): 40 min     Billable Minutes: Gait Training 20 and Therapeutic Activity 20    Treatment Type: Treatment  PT/PTA: PT     PTA Visit Number: 1     Cricket Gaytan, PT  08/07/2020

## 2020-08-07 NOTE — PLAN OF CARE
Ms Mccollum will receive 7 more days at skilled level of care. Last covered day is 8/14. Autumn, her daughter, updated.

## 2020-08-07 NOTE — ASSESSMENT & PLAN NOTE
Swing for PT.   Consider nursing home placement if she does not progress   Will have case management start CRISTOPHER smart  Check CT spine and pelvis today  Noted right rib fractures  Consider placement at d/c   8/7 CT of lumbar spine stable

## 2020-08-07 NOTE — ASSESSMENT & PLAN NOTE
Pt observed. Pt doesn't c/o pain at this time  Pt AAOx4, no other signs or symptoms of distress, fall precautions in place, call light within reach, all questions answered, will continue to monitor.    Swing for PT ; currently max assist with transfer   GOAL>>Gait  x ~50  feet with Minimal Assistance with or without rolling walker  Check CT spine and pelvis today    Noted rib fractures  She is not progressing but she will be slow due to the newly found rib fractures, this also raises more concern for her safety at home;  has started PASSAR     8/5 she is approved till Friday and will keep her here for rehab as long as she can make some progress and insurance approves her stay but did discuss d/c plans with daughter yesterday. Will reach out to local nursing homes incase she does not progress and needs facility at d/c   8/7 Stand pivot bed<>w/c trng; Sit<>Stand x4; Sitting tolerance and Standing balance trng with RW; Provided tactile and verbal cues for postural trng; Gait trng with RW x ~250 feet with min assistance and cues; LE bike ex x 5 mins  Awaiting approval for more skilled days prior to D/C home with elderly  , family planning for sitters also; they defer nursing home , family making adjustments to home for safety    The patient is a 51y Female complaining of headache.

## 2020-08-07 NOTE — SUBJECTIVE & OBJECTIVE
Review of Systems   Unable to perform ROS: Dementia     Objective:     Vital Signs (Most Recent):  Temp: 97 °F (36.1 °C) (08/07/20 0716)  Pulse: 80 (08/07/20 0716)  Resp: 19 (08/07/20 0716)  BP: (!) 152/72 (08/07/20 0716)  SpO2: (!) 93 % (08/07/20 0716) Vital Signs (24h Range):  Temp:  [96.3 °F (35.7 °C)-97 °F (36.1 °C)] 97 °F (36.1 °C)  Pulse:  [70-84] 80  Resp:  [18-19] 19  SpO2:  [93 %-96 %] 93 %  BP: (148-152)/(72-75) 152/72     Weight: 67 kg (147 lb 11.3 oz)  Body mass index is 22.46 kg/m².    Physical Exam  Vitals signs and nursing note reviewed.   Constitutional:       Appearance: She is well-developed.   HENT:      Head: Normocephalic and atraumatic.      Right Ear: External ear normal.      Left Ear: External ear normal.      Nose: Nose normal.   Eyes:      General: No scleral icterus.     Conjunctiva/sclera: Conjunctivae normal.      Pupils: Pupils are equal, round, and reactive to light.   Neck:      Musculoskeletal: Normal range of motion and neck supple.      Thyroid: No thyromegaly.      Vascular: No JVD.      Trachea: No tracheal deviation.   Cardiovascular:      Rate and Rhythm: Normal rate.      Heart sounds: Normal heart sounds. No murmur.   Pulmonary:      Effort: Pulmonary effort is normal. No respiratory distress.      Breath sounds: Normal breath sounds. No wheezing or rales.   Chest:      Chest wall: No tenderness.   Abdominal:      General: Bowel sounds are normal. There is no distension.      Palpations: Abdomen is soft. There is no mass.      Tenderness: There is no abdominal tenderness. There is no guarding or rebound.   Musculoskeletal: Normal range of motion.         General: No tenderness.      Comments: VERY kyphotic   Lymphadenopathy:      Cervical: No cervical adenopathy.   Skin:     General: Skin is warm and dry.   Neurological:      General: No focal deficit present.      Mental Status: She is alert.      Motor: No abnormal muscle tone.      Deep Tendon Reflexes: Reflexes are  normal and symmetric.      Comments: Alert. Oriented to place and person    Psychiatric:         Behavior: Behavior normal.         Thought Content: Thought content normal.         Judgment: Judgment normal.           CRANIAL NERVES     CN III, IV, VI   Pupils are equal, round, and reactive to light.            Significant Labs:  Lab Results   Component Value Date    WBC 7.68 2020    HGB 12.7 2020    HCT 37.5 2020    MCV 90 2020     2020         CMP:   Recent Labs   Lab 20  0623 20  0619   * 136   K 4.1 4.1    103   CO2 27 27    94   BUN 10 8   CREATININE 0.7 0.7   CALCIUM 8.0* 8.0*   PROT 5.3* 5.3*   ALBUMIN 2.6* 2.7*   BILITOT 0.6 0.5   ALKPHOS 90 106   AST 23 23   ALT 22 22   ANIONGAP 6* 6*   EGFRNONAA >60 >60     Blood cultures NGTD    Urine culture   Escherichia coli       CULTURE, URINE     Amox/K Clav'ate <=8/4 mcg/mL Sensitive     Amp/Sulbactam <=8/4 mcg/mL Sensitive     Ampicillin <=8 mcg/mL Sensitive     Cefazolin <=2 mcg/mL Sensitive     Cefepime <=2 mcg/mL Sensitive     Ceftriaxone <=1 mcg/mL Sensitive     Ciprofloxacin <=1 mcg/mL Sensitive     Ertapenem <=0.5 mcg/mL Sensitive     Gentamicin <=4 mcg/mL Sensitive     Levofloxacin <=2 mcg/mL Sensitive     Meropenem <=1 mcg/mL Sensitive     Nitrofurantoin <=32 mcg/mL Sensitive     Piperacillin/Tazo <=16 mcg/mL Sensitive     Tetracycline <=4 mcg/mL Sensitive     Tobramycin <=4 mcg/mL Sensitive     Trimeth/Sulfa <=2/38 mcg/mL Sensitive        Significant Imagin/2 CT head No acute abnormality.  8/3 ct lumbar spine Multilevel degenerative changes of the lumbar spine without evidence for fracture or subluxation.    8/3 CT lumbar spine  Multilevel degenerative changes of the lumbar spine without evidence for fracture or subluxation    8/3 CT abd and pelvis Acute fractures involving the right 6 through 11th ribs with associated moderate-sized right pleural effusion with adjacent passive  atelectasis.  Some of the rib fractures are segmented.  Please note that all the ribs were not imaged and additional fractures of the ribs cannot be excluded.     There is a small amount of subcapsular fluid seen along the medial margin of the right hepatic lobe with a region of indeterminate hypoattenuation involving the liver.  In the setting of trauma, underlying hepatic contusion/laceration not excluded.     Thickening of the walls of the urinary bladder with surrounding inflammation suggesting a cystitis.     Constipation.    8/3 cxr There is a layering large right-sided pleural effusion with passive atelectasis of the right lung base, seen to a better extent on previous CT.  The left lung is clear.  Heart size is normal.  Calcified atheromatous disease affects the aorta.  Right-sided rib fracture seen to a better extent on recent CT scan.  No pneumothorax.       8/3 right rib view FINDINGS:  There are acute fractures involving the right 4th through 11th ribs, some of these fractures are segment, seen to a better extent on recent CT scan.  No pneumothorax.

## 2020-08-07 NOTE — PROGRESS NOTES
[facility] Medicine  Progress Note    Patient Name: Estefania Mccollum  MRN: [id number]  Patient Class: IP- Swing   Admission Date: 7/31/2020  Length of Stay: 7 days  Attending Physician: Manish Lynn MD  Primary Care Provider: Danna Levine MD        Subjective:     Principal Problem:Debility        HPI:  85 year old female admitted with UTI being admitted to Swing for PT. She lives at home with only her 91 year old , who is unable to lift her or transfer her safely. She is weak. She needs to be strong enough to perform her ADL's on her own. If she is unable to reach this goal, she may need nursing home placement.     Overview/Hospital Course:  8/3 This patient was placed on SKILL unit for cont abx to treat UTI as well as PT. She has become debilitated and weak at home resulting in multiple falls even with walker. Lives at home with elderly frail . Her goal is to ambulate 50 ft with min ass using RW. Yesterday she did bed mobility with min assist. Sat she was max assist to transfer.     8/4 this patient is on skill for cont therapy as she was not strong enough to be safely discharged to home with her elderly frail . She is demented and has no complaints except she wants to go home. She complains of no pain but has not been walking and always laying on right side. CT head done that did not show any acute path. NA was low yesterday so started on NS at 75ml/hr. This has improved 125>132. Ct of abd and pelvis as well as lumbar spine done yesterday showing 4-11 right rib fractures with right sided effusion and poss liver laceration. H/H stable. Ct of spine shows no fractures or dislocation. She remains max assist for any transfers and mod assist with bed mobility. Remains on levaquin for UTI and maintaining sats on RA 93-96% NAD.     8/5 pt remains on SKILL for therapy. Noted rib fractures from fall yesterday and discussed her condition with daughter Emani as well as  . With PT she remains mod assist with rolling in bed and max assist to get up in bed /stand/transfer. This is very concerning for her safety when going home with feeble . Had long discussion with Emani who was calling her sister Autumn to discuss possible placement at d/c as home with sitters may be a challenge goven her current condition.  on case and MAAMEAR completed     Na better with NS fluids. Na 125>134    8/7 Pt  remains on SKILL for therapy. Noted rib fractures from fall 2 days ago.  With PT she remains mod assist with rolling in bed and max assist to get up in bed /stand/transfer but has progressed .   Stand pivot bed<>w/c trng; Sit<>Stand x4; Sitting tolerance and Standing balance trng with RW; Provided tactile and verbal cues for postural trng; Gait trng with RW x ~250 feet with min assistance and cues; LE bike ex x 5 mins  Concerns over home safety have been discussed with family; lives wht 90 YO feeble . SW/case management following.   CRISTOPHER completed      Review of Systems   Unable to perform ROS: Dementia     Objective:     Vital Signs (Most Recent):  Temp: 97 °F (36.1 °C) (08/07/20 0716)  Pulse: 80 (08/07/20 0716)  Resp: 19 (08/07/20 0716)  BP: (!) 152/72 (08/07/20 0716)  SpO2: (!) 93 % (08/07/20 0716) Vital Signs (24h Range):  Temp:  [96.3 °F (35.7 °C)-97 °F (36.1 °C)] 97 °F (36.1 °C)  Pulse:  [70-84] 80  Resp:  [18-19] 19  SpO2:  [93 %-96 %] 93 %  BP: (148-152)/(72-75) 152/72     Weight: 67 kg (147 lb 11.3 oz)  Body mass index is 22.46 kg/m².    Physical Exam  Vitals signs and nursing note reviewed.   Constitutional:       Appearance: She is well-developed.   HENT:      Head: Normocephalic and atraumatic.      Right Ear: External ear normal.      Left Ear: External ear normal.      Nose: Nose normal.   Eyes:      General: No scleral icterus.     Conjunctiva/sclera: Conjunctivae normal.      Pupils: Pupils are equal, round, and reactive to light.   Neck:       Musculoskeletal: Normal range of motion and neck supple.      Thyroid: No thyromegaly.      Vascular: No JVD.      Trachea: No tracheal deviation.   Cardiovascular:      Rate and Rhythm: Normal rate.      Heart sounds: Normal heart sounds. No murmur.   Pulmonary:      Effort: Pulmonary effort is normal. No respiratory distress.      Breath sounds: Normal breath sounds. No wheezing or rales.   Chest:      Chest wall: No tenderness.   Abdominal:      General: Bowel sounds are normal. There is no distension.      Palpations: Abdomen is soft. There is no mass.      Tenderness: There is no abdominal tenderness. There is no guarding or rebound.   Musculoskeletal: Normal range of motion.         General: No tenderness.      Comments: VERY kyphotic   Lymphadenopathy:      Cervical: No cervical adenopathy.   Skin:     General: Skin is warm and dry.   Neurological:      General: No focal deficit present.      Mental Status: She is alert.      Motor: No abnormal muscle tone.      Deep Tendon Reflexes: Reflexes are normal and symmetric.      Comments: Alert. Oriented to place and person    Psychiatric:         Behavior: Behavior normal.         Thought Content: Thought content normal.         Judgment: Judgment normal.           CRANIAL NERVES     CN III, IV, VI   Pupils are equal, round, and reactive to light.            Significant Labs:  Lab Results   Component Value Date    WBC 7.68 08/06/2020    HGB 12.7 08/06/2020    HCT 37.5 08/06/2020    MCV 90 08/06/2020     08/06/2020         CMP:   Recent Labs   Lab 08/06/20  0623 08/07/20  0619   * 136   K 4.1 4.1    103   CO2 27 27    94   BUN 10 8   CREATININE 0.7 0.7   CALCIUM 8.0* 8.0*   PROT 5.3* 5.3*   ALBUMIN 2.6* 2.7*   BILITOT 0.6 0.5   ALKPHOS 90 106   AST 23 23   ALT 22 22   ANIONGAP 6* 6*   EGFRNONAA >60 >60     Blood cultures NGTD    Urine culture   Escherichia coli       CULTURE, URINE     Amox/K Clav'ate <=8/4 mcg/mL Sensitive      Amp/Sulbactam <=8/4 mcg/mL Sensitive     Ampicillin <=8 mcg/mL Sensitive     Cefazolin <=2 mcg/mL Sensitive     Cefepime <=2 mcg/mL Sensitive     Ceftriaxone <=1 mcg/mL Sensitive     Ciprofloxacin <=1 mcg/mL Sensitive     Ertapenem <=0.5 mcg/mL Sensitive     Gentamicin <=4 mcg/mL Sensitive     Levofloxacin <=2 mcg/mL Sensitive     Meropenem <=1 mcg/mL Sensitive     Nitrofurantoin <=32 mcg/mL Sensitive     Piperacillin/Tazo <=16 mcg/mL Sensitive     Tetracycline <=4 mcg/mL Sensitive     Tobramycin <=4 mcg/mL Sensitive     Trimeth/Sulfa <=2/38 mcg/mL Sensitive        Significant Imagin/2 CT head No acute abnormality.  8/3 ct lumbar spine Multilevel degenerative changes of the lumbar spine without evidence for fracture or subluxation.    8/3 CT lumbar spine  Multilevel degenerative changes of the lumbar spine without evidence for fracture or subluxation    8/3 CT abd and pelvis Acute fractures involving the right 6 through 11th ribs with associated moderate-sized right pleural effusion with adjacent passive atelectasis.  Some of the rib fractures are segmented.  Please note that all the ribs were not imaged and additional fractures of the ribs cannot be excluded.     There is a small amount of subcapsular fluid seen along the medial margin of the right hepatic lobe with a region of indeterminate hypoattenuation involving the liver.  In the setting of trauma, underlying hepatic contusion/laceration not excluded.     Thickening of the walls of the urinary bladder with surrounding inflammation suggesting a cystitis.     Constipation.    8/3 cxr There is a layering large right-sided pleural effusion with passive atelectasis of the right lung base, seen to a better extent on previous CT.  The left lung is clear.  Heart size is normal.  Calcified atheromatous disease affects the aorta.  Right-sided rib fracture seen to a better extent on recent CT scan.  No pneumothorax.       8/3 right rib view FINDINGS:  There  are acute fractures involving the right 4th through 11th ribs, some of these fractures are segment, seen to a better extent on recent CT scan.  No pneumothorax.           Assessment/Plan:      * Debility  Swing for PT ; currently max assist with transfer   GOAL>>Gait  x ~50  feet with Minimal Assistance with or without rolling walker  Check CT spine and pelvis today    Noted rib fractures  She is not progressing but she will be slow due to the newly found rib fractures, this also raises more concern for her safety at home;  has started PASSAR     8/5 she is approved till Friday and will keep her here for rehab as long as she can make some progress and insurance approves her stay but did discuss d/c plans with daughter yesterday. Will reach out to local nursing homes incase she does not progress and needs facility at d/c   8/7 Stand pivot bed<>w/c trng; Sit<>Stand x4; Sitting tolerance and Standing balance trng with RW; Provided tactile and verbal cues for postural trng; Gait trng with RW x ~250 feet with min assistance and cues; LE bike ex x 5 mins  Awaiting approval for more skilled days prior to D/C home with elderly  , family planning for sitters also; they defer nursing home , family making adjustments to home for safety     Closed fracture of multiple ribs of right side with routine healing  Ribs 4-11 noted on imaging  Denies pain  Impeding her progress however  Cont PT  PRN Tylenol for pain control      Liver contusion  Likely liver contusion from recent falls noted on CT scan  H/H stable  Non-tender abdomen  Monitor closely but no clinical evidence of true liver lac that would require surgical intervention more likely hematoma       Hyponatremia  I think this is a reflection of her very poor diet  Start remeron  Ns at 75ml/hr   Na better 134 today  8/7 NA normal today     Acute cystitis with hematuria  Continue levaquin for 7 days total for complicated UTI   Getting dose q 48hr 7/29, 7/31,  8/2, 8/4 ct Monday still showing cystitis, will cont levaquin  8/7 levofloxacin dose continued 8/6; has had 5 doses  will repeat urinalysis today       Frequent falls  Swing for PT.   Consider nursing home placement if she does not progress   Will have case management start PASSAR incase  Check CT spine and pelvis today  Noted right rib fractures  Consider placement at d/c   8/7 CT of lumbar spine stable     Memory loss  Continue donepezil and namenda       Hyperlipidemia  Continue pravastatin         Hypothyroidism  Continue synthroid 100mcg   Lab Results   Component Value Date    TSH 2.578 12/03/2019           Hypertension  Continue lisinopril   bp 145//67      VTE Risk Mitigation (From admission, onward)         Ordered     IP VTE HIGH RISK PATIENT  Once      07/31/20 1636     Place sequential compression device  Until discontinued      07/31/20 1636                      Mary Land MD  Department of Hospital Medicine   [facility]

## 2020-08-08 LAB
ALBUMIN SERPL BCP-MCNC: 2.6 G/DL (ref 3.5–5.2)
ALP SERPL-CCNC: 121 U/L (ref 55–135)
ALT SERPL W/O P-5'-P-CCNC: 19 U/L (ref 10–44)
ANION GAP SERPL CALC-SCNC: 8 MMOL/L (ref 8–16)
AST SERPL-CCNC: 21 U/L (ref 10–40)
BILIRUB SERPL-MCNC: 0.7 MG/DL (ref 0.1–1)
BUN SERPL-MCNC: 6 MG/DL (ref 8–23)
CALCIUM SERPL-MCNC: 8 MG/DL (ref 8.7–10.5)
CHLORIDE SERPL-SCNC: 103 MMOL/L (ref 95–110)
CO2 SERPL-SCNC: 24 MMOL/L (ref 23–29)
CREAT SERPL-MCNC: 0.7 MG/DL (ref 0.5–1.4)
EST. GFR  (AFRICAN AMERICAN): >60 ML/MIN/1.73 M^2
EST. GFR  (NON AFRICAN AMERICAN): >60 ML/MIN/1.73 M^2
GLUCOSE SERPL-MCNC: 97 MG/DL (ref 70–110)
POTASSIUM SERPL-SCNC: 4 MMOL/L (ref 3.5–5.1)
PROT SERPL-MCNC: 5 G/DL (ref 6–8.4)
SODIUM SERPL-SCNC: 135 MMOL/L (ref 136–145)

## 2020-08-08 PROCEDURE — 25000003 PHARM REV CODE 250: Mod: HCNC | Performed by: NURSE PRACTITIONER

## 2020-08-08 PROCEDURE — 94799 UNLISTED PULMONARY SVC/PX: CPT | Mod: HCNC

## 2020-08-08 PROCEDURE — 99900035 HC TECH TIME PER 15 MIN (STAT): Mod: HCNC

## 2020-08-08 PROCEDURE — 97530 THERAPEUTIC ACTIVITIES: CPT | Mod: HCNC

## 2020-08-08 PROCEDURE — 80053 COMPREHEN METABOLIC PANEL: CPT | Mod: HCNC

## 2020-08-08 PROCEDURE — 11000004 HC SNF PRIVATE: Mod: HCNC

## 2020-08-08 PROCEDURE — 36415 COLL VENOUS BLD VENIPUNCTURE: CPT | Mod: HCNC

## 2020-08-08 PROCEDURE — 94761 N-INVAS EAR/PLS OXIMETRY MLT: CPT | Mod: HCNC

## 2020-08-08 PROCEDURE — 94760 N-INVAS EAR/PLS OXIMETRY 1: CPT | Mod: HCNC

## 2020-08-08 RX ORDER — BISACODYL 10 MG
10 SUPPOSITORY, RECTAL RECTAL ONCE AS NEEDED
Status: COMPLETED | OUTPATIENT
Start: 2020-08-08 | End: 2020-08-11

## 2020-08-08 RX ADMIN — ASPIRIN 81 MG: 81 TABLET, COATED ORAL at 09:08

## 2020-08-08 RX ADMIN — MEMANTINE 10 MG: 10 TABLET ORAL at 09:08

## 2020-08-08 RX ADMIN — LEVOTHYROXINE SODIUM 100 MCG: 100 TABLET ORAL at 06:08

## 2020-08-08 RX ADMIN — SERTRALINE HYDROCHLORIDE 25 MG: 25 TABLET ORAL at 09:08

## 2020-08-08 RX ADMIN — STANDARDIZED SENNA CONCENTRATE AND DOCUSATE SODIUM 1 TABLET: 8.6; 5 TABLET ORAL at 09:08

## 2020-08-08 RX ADMIN — DONEPEZIL HYDROCHLORIDE 5 MG: 5 TABLET, FILM COATED ORAL at 09:08

## 2020-08-08 RX ADMIN — PRAVASTATIN SODIUM 80 MG: 40 TABLET ORAL at 09:08

## 2020-08-08 RX ADMIN — LISINOPRIL 20 MG: 20 TABLET ORAL at 09:08

## 2020-08-08 RX ADMIN — MIRTAZAPINE 15 MG: 15 TABLET, ORALLY DISINTEGRATING ORAL at 09:08

## 2020-08-08 NOTE — PLAN OF CARE
Patient needs frequent reassurance of your intentions when you are in room. Spouse visited today. PT/OT. Patient loss IV access today. Dr. Land aware of IV loss; plan to hold levaquin for now; pending . Telemetry discontinued. UA better. Patient free of falls and incidence this shift. Patient is cooperative and compliant with plan of care. Tele-sitter and monitor in use.

## 2020-08-08 NOTE — PROGRESS NOTES
Lost iv access.  ua much better.  Hold levaquin for now and await urine culture.  Okay to d/c tele.

## 2020-08-08 NOTE — PT/OT/SLP PROGRESS
"Physical Therapy Treatment    Patient Name:  Estefania Mccollum   MRN:  [id number]    Recommendations:     Discharge Recommendations:  nursing facility, basic   Discharge Equipment Recommendations: hospital bed, lift device   Barriers to discharge: None    Assessment:     Estefania Mccollum is a 85 y.o. female admitted with a medical diagnosis of Debility.  She presents with the following impairments/functional limitations:  weakness, impaired endurance, impaired self care skills, impaired functional mobilty, gait instability, impaired balance, decreased lower extremity function, decreased safety awareness. Patient agreed to transfer to stand and ambulate in hallway after much encouragement from PT and family. Patient able to ambulate approx 100 feet without rest break with RW and CGA with Technician following with wheel chair. At 100 feet jomar, patient began dragging the Left foot and had increased instability, therefore, ended gait training due to signs of fatigue.    Rehab Prognosis: Fair; patient would benefit from acute skilled PT services to address these deficits and reach maximum level of function.    Recent Surgery: * No surgery found *      Plan:     During this hospitalization, patient to be seen daily to address the identified rehab impairments via gait training, therapeutic activities, therapeutic exercises and progress toward the following goals:    · Plan of Care Expires:  08/14/20    Subjective     Chief Complaint: "I like the chair."  Patient/Family Comments/goals: "To go home."  Pain/Comfort:  · Pain Rating 1: 0/10  · Pain Rating Post-Intervention 1: 0/10      Objective:     Communicated with family prior to session.  Patient found HOB elevated with   upon PT entry to room.     General Precautions: Standard, fall   Orthopedic Precautions:N/A   Braces: N/A     Functional Mobility:  · Bed Mobility:     · Rolling Left:  minimum assistance  · Supine to Sit: moderate assistance  · Sit to Supine: moderate " assistance  · Transfers:     · Sit to Stand:  contact guard assistance with rolling walker  · Gait: 100 feet in hallway with CGA. some manual cues to keep walker straight. Patient drifting to the Left. VC to look forward.  · Balance: Sitting good, Standing Fair      AM-PAC 6 CLICK MOBILITY  Turning over in bed (including adjusting bedclothes, sheets and blankets)?: 3  Sitting down on and standing up from a chair with arms (e.g., wheelchair, bedside commode, etc.): 3  Moving from lying on back to sitting on the side of the bed?: 3  Moving to and from a bed to a chair (including a wheelchair)?: 3  Need to walk in hospital room?: 3  Climbing 3-5 steps with a railing?: 1  Basic Mobility Total Score: 16       Patient left HOB elevated with all lines intact, call button in reach and spouse present..    GOALS:   Multidisciplinary Problems     Physical Therapy Goals        Problem: Physical Therapy Goal    Goal Priority Disciplines Outcome Goal Variances Interventions   Physical Therapy Goal     PT, PT/OT Ongoing, Progressing     Description: Patient will increase functional independence with mobility by performin. Supine to sit with Contact Guard Assistance  2. Sit to supine with Contact Guard Assistance  3. Bed to chair transfer with Minimal/Contact Guard Assistancewith or without rolling walker using Stand Pivot TECHNIQUE  4. Gait  x ~50  feet with Minimal Assistance with or without rolling walker  5. Lower extremity exercise program x10 reps per handout, with assistance as needed                   Time Tracking:     PT Received On: 20  PT Start Time: 1110     PT Stop Time: 1123  PT Total Time (min): 13 min     Billable Minutes: Therapeutic Activity 13 minutes    Treatment Type: Treatment  PT/PTA: PT     PTA Visit Number: Any     Mu Varma, PT  2020

## 2020-08-08 NOTE — NURSING
Discussed with Dr. Land  previous attempts unsuccessful to restart IV; patient with fragile veins. Discussed telemetry. Order to discontinue telemetry. Patient is here for IP-swing.

## 2020-08-09 LAB
ALBUMIN SERPL BCP-MCNC: 2.7 G/DL (ref 3.5–5.2)
ALP SERPL-CCNC: 151 U/L (ref 55–135)
ALT SERPL W/O P-5'-P-CCNC: 18 U/L (ref 10–44)
ANION GAP SERPL CALC-SCNC: 9 MMOL/L (ref 8–16)
AST SERPL-CCNC: 21 U/L (ref 10–40)
BACTERIA UR CULT: ABNORMAL
BILIRUB SERPL-MCNC: 0.6 MG/DL (ref 0.1–1)
BUN SERPL-MCNC: 10 MG/DL (ref 8–23)
CALCIUM SERPL-MCNC: 8.2 MG/DL (ref 8.7–10.5)
CHLORIDE SERPL-SCNC: 100 MMOL/L (ref 95–110)
CO2 SERPL-SCNC: 26 MMOL/L (ref 23–29)
CREAT SERPL-MCNC: 0.7 MG/DL (ref 0.5–1.4)
EST. GFR  (AFRICAN AMERICAN): >60 ML/MIN/1.73 M^2
EST. GFR  (NON AFRICAN AMERICAN): >60 ML/MIN/1.73 M^2
GLUCOSE SERPL-MCNC: 104 MG/DL (ref 70–110)
POTASSIUM SERPL-SCNC: 3.9 MMOL/L (ref 3.5–5.1)
PROT SERPL-MCNC: 5.5 G/DL (ref 6–8.4)
SODIUM SERPL-SCNC: 135 MMOL/L (ref 136–145)

## 2020-08-09 PROCEDURE — 99900035 HC TECH TIME PER 15 MIN (STAT): Mod: HCNC

## 2020-08-09 PROCEDURE — 80053 COMPREHEN METABOLIC PANEL: CPT | Mod: HCNC

## 2020-08-09 PROCEDURE — 11000004 HC SNF PRIVATE: Mod: HCNC

## 2020-08-09 PROCEDURE — 97530 THERAPEUTIC ACTIVITIES: CPT | Mod: HCNC

## 2020-08-09 PROCEDURE — 94799 UNLISTED PULMONARY SVC/PX: CPT | Mod: HCNC

## 2020-08-09 PROCEDURE — 25000003 PHARM REV CODE 250: Mod: HCNC | Performed by: NURSE PRACTITIONER

## 2020-08-09 PROCEDURE — 94761 N-INVAS EAR/PLS OXIMETRY MLT: CPT | Mod: HCNC

## 2020-08-09 PROCEDURE — 36415 COLL VENOUS BLD VENIPUNCTURE: CPT | Mod: HCNC

## 2020-08-09 RX ADMIN — ASPIRIN 81 MG: 81 TABLET, COATED ORAL at 08:08

## 2020-08-09 RX ADMIN — LISINOPRIL 20 MG: 20 TABLET ORAL at 08:08

## 2020-08-09 RX ADMIN — MEMANTINE 10 MG: 10 TABLET ORAL at 09:08

## 2020-08-09 RX ADMIN — DONEPEZIL HYDROCHLORIDE 5 MG: 5 TABLET, FILM COATED ORAL at 09:08

## 2020-08-09 RX ADMIN — PRAVASTATIN SODIUM 80 MG: 40 TABLET ORAL at 08:08

## 2020-08-09 RX ADMIN — MIRTAZAPINE 15 MG: 15 TABLET, ORALLY DISINTEGRATING ORAL at 09:08

## 2020-08-09 RX ADMIN — MEMANTINE 10 MG: 10 TABLET ORAL at 08:08

## 2020-08-09 RX ADMIN — SERTRALINE HYDROCHLORIDE 25 MG: 25 TABLET ORAL at 09:08

## 2020-08-09 RX ADMIN — STANDARDIZED SENNA CONCENTRATE AND DOCUSATE SODIUM 1 TABLET: 8.6; 5 TABLET ORAL at 09:08

## 2020-08-09 RX ADMIN — LEVOTHYROXINE SODIUM 100 MCG: 100 TABLET ORAL at 06:08

## 2020-08-09 RX ADMIN — STANDARDIZED SENNA CONCENTRATE AND DOCUSATE SODIUM 1 TABLET: 8.6; 5 TABLET ORAL at 08:08

## 2020-08-09 NOTE — PT/OT/SLP PROGRESS
"Physical Therapy Treatment    Patient Name:  Estefania Mccollum   MRN:  [id number]    Recommendations:     Discharge Recommendations:  nursing facility, basic   Discharge Equipment Recommendations: hospital bed, lift device   Barriers to discharge: None    Assessment:     Estefania Mccollum is a 85 y.o. female admitted with a medical diagnosis of Debility.  She presents with the following impairments/functional limitations:  weakness, impaired endurance, impaired self care skills, impaired functional mobilty, gait instability, impaired balance, decreased lower extremity function. Patient tolerated session well today, less assistance required with bed mobility and transfers. Patient able to transfer to standing with CGA. Increased distance in ambulation (150 feet). VC required to keep eyes forward. Patient with severe head down posture. One rest break after ambulating 75 feet.     Rehab Prognosis: Fair; patient would benefit from acute skilled PT services to address these deficits and reach maximum level of function.    Recent Surgery: * No surgery found *      Plan:     During this hospitalization, patient to be seen daily to address the identified rehab impairments via gait training, therapeutic activities, therapeutic exercises and progress toward the following goals:    · Plan of Care Expires:  08/14/20    Subjective     Chief Complaint: "Do you want me to walk again?"  Patient/Family Comments/goals: "to get better and go home."  Pain/Comfort:  · Pain Rating 1: 0/10  · Pain Rating Post-Intervention 1: 0/10      Objective:     Communicated with patient prior to session.  Patient found HOB elevated with   upon PT entry to room.     General Precautions: Standard, fall   Orthopedic Precautions:N/A   Braces: N/A     Functional Mobility:  · Bed Mobility:     · Rolling Right: modified independence  · Scooting: independence  · Supine to Sit: stand by assistance  · Sit to Supine: contact guard assistance  · Transfers:     · Sit to " Stand:  contact guard assistance with rolling walker  · Gait: Patient ambulated 150 feet with RW and CGA. One rest break after 75 feet. Patient requries manual assistance to keep walker on straight path due to deviation to the Left.   · Balance: Sitting Fair, Standing Fair-/poor      AM-PAC 6 CLICK MOBILITY  Turning over in bed (including adjusting bedclothes, sheets and blankets)?: 4  Sitting down on and standing up from a chair with arms (e.g., wheelchair, bedside commode, etc.): 3  Moving from lying on back to sitting on the side of the bed?: 3  Moving to and from a bed to a chair (including a wheelchair)?: 3  Need to walk in hospital room?: 3  Climbing 3-5 steps with a railing?: 1  Basic Mobility Total Score: 17       Patient left HOB elevated with all lines intact and call button in reach..    GOALS:   Multidisciplinary Problems     Physical Therapy Goals        Problem: Physical Therapy Goal    Goal Priority Disciplines Outcome Goal Variances Interventions   Physical Therapy Goal     PT, PT/OT Ongoing, Progressing     Description: Patient will increase functional independence with mobility by performin. Supine to sit with Contact Guard Assistance  2. Sit to supine with Contact Guard Assistance  3. Bed to chair transfer with Minimal/Contact Guard Assistancewith or without rolling walker using Stand Pivot TECHNIQUE  4. Gait  x ~50  feet with Minimal Assistance with or without rolling walker  5. Lower extremity exercise program x10 reps per handout, with assistance as needed                   Time Tracking:     PT Received On: 20  PT Start Time: 1035     PT Stop Time: 1055  PT Total Time (min): 20 min     Billable Minutes: Therapeutic Activity 20 minutes    Treatment Type: Treatment  PT/PTA: PT     PTA Visit Number: Any     Mu Varma, PT  2020

## 2020-08-09 NOTE — PLAN OF CARE
POC reviwed ::::   Pt here for PT/OT therapy   Awaiting urine culture results   Avasys for safety   Bed exit alarm set   Incontinent care prn   Increase mobility   Call bell within reach   Side rails upx2

## 2020-08-09 NOTE — PLAN OF CARE
Patient on IP-swing bed. PT/OT for prog mobility. Tele-sitter and camera in use. Spouse visited today. Patient remains with no IV access; Dr. Land aware. Telemetry discontinued. Urine culture gram negative  rods; awaiting sensitivity. Agrees with plan of care.

## 2020-08-10 LAB
ALBUMIN SERPL BCP-MCNC: 2.7 G/DL (ref 3.5–5.2)
ALP SERPL-CCNC: 158 U/L (ref 55–135)
ALT SERPL W/O P-5'-P-CCNC: 17 U/L (ref 10–44)
ANION GAP SERPL CALC-SCNC: 10 MMOL/L (ref 8–16)
AST SERPL-CCNC: 21 U/L (ref 10–40)
BASOPHILS # BLD AUTO: 0.05 K/UL (ref 0–0.2)
BASOPHILS NFR BLD: 0.8 % (ref 0–1.9)
BILIRUB SERPL-MCNC: 0.7 MG/DL (ref 0.1–1)
BUN SERPL-MCNC: 13 MG/DL (ref 8–23)
CALCIUM SERPL-MCNC: 8.3 MG/DL (ref 8.7–10.5)
CHLORIDE SERPL-SCNC: 99 MMOL/L (ref 95–110)
CO2 SERPL-SCNC: 27 MMOL/L (ref 23–29)
CREAT SERPL-MCNC: 0.7 MG/DL (ref 0.5–1.4)
DIFFERENTIAL METHOD: ABNORMAL
EOSINOPHIL # BLD AUTO: 0.2 K/UL (ref 0–0.5)
EOSINOPHIL NFR BLD: 3.3 % (ref 0–8)
ERYTHROCYTE [DISTWIDTH] IN BLOOD BY AUTOMATED COUNT: 13 % (ref 11.5–14.5)
EST. GFR  (AFRICAN AMERICAN): >60 ML/MIN/1.73 M^2
EST. GFR  (NON AFRICAN AMERICAN): >60 ML/MIN/1.73 M^2
GLUCOSE SERPL-MCNC: 95 MG/DL (ref 70–110)
HCT VFR BLD AUTO: 38.6 % (ref 37–48.5)
HGB BLD-MCNC: 12.6 G/DL (ref 12–16)
IMM GRANULOCYTES # BLD AUTO: 0.03 K/UL (ref 0–0.04)
IMM GRANULOCYTES NFR BLD AUTO: 0.5 % (ref 0–0.5)
LYMPHOCYTES # BLD AUTO: 1.7 K/UL (ref 1–4.8)
LYMPHOCYTES NFR BLD: 25.2 % (ref 18–48)
MAGNESIUM SERPL-MCNC: 1.9 MG/DL (ref 1.6–2.6)
MCH RBC QN AUTO: 29.7 PG (ref 27–31)
MCHC RBC AUTO-ENTMCNC: 32.6 G/DL (ref 32–36)
MCV RBC AUTO: 91 FL (ref 82–98)
MONOCYTES # BLD AUTO: 0.5 K/UL (ref 0.3–1)
MONOCYTES NFR BLD: 7.3 % (ref 4–15)
NEUTROPHILS # BLD AUTO: 4.1 K/UL (ref 1.8–7.7)
NEUTROPHILS NFR BLD: 62.9 % (ref 38–73)
NRBC BLD-RTO: 0 /100 WBC
PHOSPHATE SERPL-MCNC: 3.5 MG/DL (ref 2.7–4.5)
PLATELET # BLD AUTO: 341 K/UL (ref 150–350)
PMV BLD AUTO: 8.7 FL (ref 9.2–12.9)
POTASSIUM SERPL-SCNC: 4.1 MMOL/L (ref 3.5–5.1)
PROT SERPL-MCNC: 5.6 G/DL (ref 6–8.4)
RBC # BLD AUTO: 4.24 M/UL (ref 4–5.4)
SODIUM SERPL-SCNC: 136 MMOL/L (ref 136–145)
WBC # BLD AUTO: 6.58 K/UL (ref 3.9–12.7)

## 2020-08-10 PROCEDURE — 99308 SBSQ NF CARE LOW MDM 20: CPT | Mod: HCNC,,, | Performed by: FAMILY MEDICINE

## 2020-08-10 PROCEDURE — 97116 GAIT TRAINING THERAPY: CPT | Mod: HCNC

## 2020-08-10 PROCEDURE — 99308 PR NURSING FAC CARE, SUBSEQ, MINOR COMPLIC: ICD-10-PCS | Mod: HCNC,,, | Performed by: FAMILY MEDICINE

## 2020-08-10 PROCEDURE — 99900035 HC TECH TIME PER 15 MIN (STAT): Mod: HCNC

## 2020-08-10 PROCEDURE — 94761 N-INVAS EAR/PLS OXIMETRY MLT: CPT | Mod: HCNC

## 2020-08-10 PROCEDURE — 83735 ASSAY OF MAGNESIUM: CPT | Mod: HCNC

## 2020-08-10 PROCEDURE — 80053 COMPREHEN METABOLIC PANEL: CPT | Mod: HCNC

## 2020-08-10 PROCEDURE — 97530 THERAPEUTIC ACTIVITIES: CPT | Mod: HCNC

## 2020-08-10 PROCEDURE — 25000003 PHARM REV CODE 250: Mod: HCNC | Performed by: NURSE PRACTITIONER

## 2020-08-10 PROCEDURE — 94799 UNLISTED PULMONARY SVC/PX: CPT | Mod: HCNC

## 2020-08-10 PROCEDURE — 36415 COLL VENOUS BLD VENIPUNCTURE: CPT | Mod: HCNC

## 2020-08-10 PROCEDURE — 84100 ASSAY OF PHOSPHORUS: CPT | Mod: HCNC

## 2020-08-10 PROCEDURE — 11000004 HC SNF PRIVATE: Mod: HCNC

## 2020-08-10 PROCEDURE — 85025 COMPLETE CBC W/AUTO DIFF WBC: CPT | Mod: HCNC

## 2020-08-10 RX ORDER — L. ACIDOPHILUS/L.BULGARICUS 100MM CELL
1 GRANULES IN PACKET (EA) ORAL 2 TIMES DAILY
Status: DISPENSED | OUTPATIENT
Start: 2020-08-10 | End: 2020-08-17

## 2020-08-10 RX ORDER — AMOXICILLIN AND CLAVULANATE POTASSIUM 875; 125 MG/1; MG/1
1 TABLET, FILM COATED ORAL EVERY 12 HOURS
Status: COMPLETED | OUTPATIENT
Start: 2020-08-10 | End: 2020-08-16

## 2020-08-10 RX ADMIN — MIRTAZAPINE 15 MG: 15 TABLET, ORALLY DISINTEGRATING ORAL at 08:08

## 2020-08-10 RX ADMIN — MEMANTINE 10 MG: 10 TABLET ORAL at 08:08

## 2020-08-10 RX ADMIN — LACTOBACILLUS ACIDOPHILUS / LACTOBACILLUS BULGARICUS 1 EACH: 100 MILLION CFU STRENGTH GRANULES at 09:08

## 2020-08-10 RX ADMIN — SERTRALINE HYDROCHLORIDE 25 MG: 25 TABLET ORAL at 08:08

## 2020-08-10 RX ADMIN — AMOXICILLIN AND CLAVULANATE POTASSIUM 1 TABLET: 875; 125 TABLET, FILM COATED ORAL at 09:08

## 2020-08-10 RX ADMIN — LISINOPRIL 20 MG: 20 TABLET ORAL at 09:08

## 2020-08-10 RX ADMIN — ASPIRIN 81 MG: 81 TABLET, COATED ORAL at 09:08

## 2020-08-10 RX ADMIN — LACTOBACILLUS ACIDOPHILUS / LACTOBACILLUS BULGARICUS 1 EACH: 100 MILLION CFU STRENGTH GRANULES at 08:08

## 2020-08-10 RX ADMIN — STANDARDIZED SENNA CONCENTRATE AND DOCUSATE SODIUM 1 TABLET: 8.6; 5 TABLET ORAL at 08:08

## 2020-08-10 RX ADMIN — DONEPEZIL HYDROCHLORIDE 5 MG: 5 TABLET, FILM COATED ORAL at 08:08

## 2020-08-10 RX ADMIN — MEMANTINE 10 MG: 10 TABLET ORAL at 09:08

## 2020-08-10 RX ADMIN — PRAVASTATIN SODIUM 80 MG: 40 TABLET ORAL at 09:08

## 2020-08-10 RX ADMIN — STANDARDIZED SENNA CONCENTRATE AND DOCUSATE SODIUM 1 TABLET: 8.6; 5 TABLET ORAL at 09:08

## 2020-08-10 RX ADMIN — LEVOTHYROXINE SODIUM 100 MCG: 100 TABLET ORAL at 06:08

## 2020-08-10 RX ADMIN — AMOXICILLIN AND CLAVULANATE POTASSIUM 1 TABLET: 875; 125 TABLET, FILM COATED ORAL at 08:08

## 2020-08-10 NOTE — ASSESSMENT & PLAN NOTE
Swing for PT ; currently max assist with transfer   GOAL>>Gait  x ~50  feet with Minimal Assistance with or without rolling walker  Check CT spine and pelvis today    Noted rib fractures  She is not progressing but she will be slow due to the newly found rib fractures, this also raises more concern for her safety at home;  has started PASSAR     8/5 she is approved till Friday and will keep her here for rehab as long as she can make some progress and insurance approves her stay but did discuss d/c plans with daughter yesterday. Will reach out to local nursing homes incase she does not progress and needs facility at d/c   8/7 Stand pivot bed<>w/c trng; Sit<>Stand x4; Sitting tolerance and Standing balance trng with RW; Provided tactile and verbal cues for postural trng; Gait trng with RW x ~250 feet with min assistance and cues; LE bike ex x 5 mins  Awaiting approval for more skilled days prior to D/C home with elderly  , family planning for sitters also; they defer nursing home , family making adjustments to home for safety   8/10 Gait: Patient ambulated 150 feet with RW and CGA. One rest break after 75 feet. Patient requries manual assistance to keep walker on straight path due to deviation to the Left. Balance: Sitting Fair, Standing Fair-/poor

## 2020-08-10 NOTE — PT/OT/SLP PROGRESS
Occupational Therapy   Treatment    Name: Estefania Mccollum  MRN: [id number]  Admitting Diagnosis:  Debility       Recommendations:     Discharge Recommendations: nursing facility, basic  Discharge Equipment Recommendations:  hospital bed, lift device  Barriers to discharge:  Decreased caregiver support    Assessment:     Estefania Mccollum is a 85 y.o. female with a medical diagnosis of Debility.  She presents with confusion and constant need for reassurance, requesting frequently to lay down and rest.     Performance deficits affecting function are weakness, gait instability, decreased upper extremity function, impaired endurance, impaired balance, decreased lower extremity function, impaired coordination, decreased safety awareness, impaired self care skills, impaired cognition, impaired skin, impaired functional mobilty, decreased coordination.     Rehab Prognosis:  Fair; patient would benefit from acute skilled OT services to address these deficits and reach maximum level of function.       Plan:     Patient to be seen 5 x/week to address the above listed problems via self-care/home management, therapeutic activities, therapeutic exercises  · Plan of Care Expires: 08/17/20  · Plan of Care Reviewed with: patient    Subjective     Pain/Comfort:  · Pain Rating 1: 0/10    Objective:     Communicated with: Nursing prior to session.  Patient found supine with telemetry upon OT entry to room.    General Precautions: Standard, fall   Orthopedic Precautions:N/A   Braces: N/A     Occupational Performance:     Bed Mobility:    · Patient completed Rolling/Turning to Right with minimum assistance  · Patient completed Scooting/Bridging with minimum assistance  · Patient completed Supine to Sit with minimum assistance  · Patient completed Sit to Supine with minimum assistance     Functional Mobility/Transfers:  · Patient completed Sit <> Stand Transfer with minimum assistance  with  rolling walker x 4 trials  · Functional Mobility:  small forward and backward steps EOB    Activities of Daily Living:  · Not completed      [facility] 6 Click ADL: 15    Treatment & Education:  Therapist facilitated rolling with Min A, supine to sit with Min A, scooting with Min A, sit to stand x 4 trials with min A with noted impulsivity sitting unannounced despite frequent verbal cues, sit to supine with Min A.    Patient left supine with all lines intact, call button in reach and nursing notifiedEducation:      GOALS:   Multidisciplinary Problems     Occupational Therapy Goals        Problem: Occupational Therapy Goal    Goal Priority Disciplines Outcome Interventions   Occupational Therapy Goal     OT, PT/OT Ongoing, Progressing    Description: Goals to be met by: 8/17/2020     Patient will increase functional independence with ADLs by performing:    Feeding with Modified [place].  UE Dressing with Modified [place].  LE Dressing with Minimal Assistance.  Grooming while seated with Modified [place].  Toileting from bedside commode with Minimal Assistance for hygiene and clothing management.   Sitting at edge of bed x5 minutes with Stand-by Assistance.  Supine to sit with Stand-by Assistance.  Step transfer with Contact Guard Assistance  Toilet transfer to bedside commode with Minimal Assistance.                     Time Tracking:     OT Date of Treatment: 08/10/20  OT Start Time: 1314  OT Stop Time: 1329  OT Total Time (min): 15 min    Billable Minutes:Self Care/Home Management 15 minutes    Gilles Bernard OT  8/10/2020

## 2020-08-10 NOTE — ASSESSMENT & PLAN NOTE
Continue levaquin for 7 days total for complicated UTI   Getting dose q 48hr 7/29, 7/31, 8/2, 8/4 ct Monday still showing cystitis, will cont levaquin  8/7 levofloxacin dose continued 8/6; has had 5 doses  will repeat urinalysis today   8/10 Repeat UA- positive nitrites , leuks- no fever; , Urine culture- resistant to Levofloxacin ; + allergy to tetracyline   has lost IV acess, will add oral abx

## 2020-08-10 NOTE — PLAN OF CARE
Ms Mccollum will remain here for skilled care. She will discharge home with sitters and home health when stable.

## 2020-08-10 NOTE — PLAN OF CARE
Problem: Adult Inpatient Plan of Care  Goal: Plan of Care Review  Outcome: Ongoing, Progressing     Problem: Fall Injury Risk  Goal: Absence of Fall and Fall-Related Injury  Outcome: Ongoing, Progressing     Problem: Adult Inpatient Plan of Care  Goal: Rounds/Family Conference  Outcome: Ongoing, Progressing       Pt doing well. No question/concerns at this time. Agrees with poc. No pain/falls.  Vital signs pulse ox and temp are stable. She is working well with PT and OT. Her Appetite is getting better. She normally doesn't eat that much she stated. Po abx started today for urine culture. On swing for therapy.  Called surgery this am for mariah to come see patient for dizziness. mariah never came up.

## 2020-08-10 NOTE — PLAN OF CARE
Problem: Physical Therapy Goal  Goal: Physical Therapy Goal  Description: Patient will increase functional independence with mobility by performin. Supine to sit with Contact Guard Assistance  2. Sit to supine with Contact Guard Assistance  3. Bed to chair transfer with Minimal/Contact Guard Assistancewith or without rolling walker using Stand Pivot TECHNIQUE  4. Gait  x ~50  feet with Minimal Assistance with or without rolling walker - met 8/10/20   8/10/20 - Revised goal:  4 a) Gait x ~200 feet with minimal/contact guard assistance and cues  5. Lower extremity exercise program x10 reps per handout, with assistance as needed  8/10/2020 1521 by Cricket Gaytan, PT  Outcome: Ongoing, Progressing  8/10/2020 1519 by Cricket Gaytan, PT  Outcome: Ongoing, Progressing

## 2020-08-10 NOTE — PT/OT/SLP PROGRESS
"Physical Therapy Treatment    Patient Name:  Estefania Mccollum   MRN:  [id number]    Recommendations:     Discharge Recommendations:  nursing facility, basic   Discharge Equipment Recommendations: hospital bed, lift device   Barriers to discharge: Decreased caregiver support    Assessment:     Estefania Mccollum is a 85 y.o. female admitted with a medical diagnosis of Debility.  She presents with the following impairments/functional limitations:  weakness, gait instability, decreased upper extremity function, decreased lower extremity function, impaired functional mobilty, impaired endurance, impaired self care skills, impaired balance, impaired coordination, decreased coordination, decreased safety awareness, impaired cognition, other (comment)(Leans to right side with slouch forward/kyphotic posture.). Patient tolerated well PT session. Patient noted fearful of falling during transitional activity and gets tired easily during gait trng. Provided frequent rest periods ambulating  at the hallway using RW.     Rehab Prognosis: Fair; patient would benefit from acute skilled PT services to address these deficits and reach maximum level of function.    Recent Surgery: * No surgery found *      Plan:     During this hospitalization, patient to be seen daily to address the identified rehab impairments via gait training, therapeutic activities, therapeutic exercises and progress toward the following goals:    · Plan of Care Expires:  08/14/20    Subjective     Chief Complaint: Fear of falling; Extremely leans to right side  and gets tired easily during gait trng today.  Patient/Family Comments/goals: "To return home"  Pain/Comfort:  · Pain Rating 1: 0/10  · Pain Rating Post-Intervention 1: 0/10      Objective:     Communicated with  patient prior to session.  Patient found HOB elevated with telemetry upon PT entry to room.     General Precautions: Standard, fall   Orthopedic Precautions:N/A   Braces: N/A     Functional " Mobility:  · Bed Mobility:     · Rolling Left:  supervision  · Rolling Right: supervision  · Scooting: minimum assistance  · Supine to Sit: minimum assistance  · Sit to Supine: minimum assistance  · Transfers:     · Sit to Stand:  moderate/minimal assistance and cues with RW with rolling walker  · Bed to Chair: moderate assistance with  hand-held assist  using  Stand Pivot  · Gait: Moderate/minimal assistance x ~150 feet ( 4 times rest periods) with RW.  Exhbits slouch forward kyphotic posture and leans extremely to right side today.  · Balance: Standing with RW Static: Fair-; [place]: Poor+      AM-PAC 6 CLICK MOBILITY  Turning over in bed (including adjusting bedclothes, sheets and blankets)?: 4  Sitting down on and standing up from a chair with arms (e.g., wheelchair, bedside commode, etc.): 3  Moving from lying on back to sitting on the side of the bed?: 3  Moving to and from a bed to a chair (including a wheelchair)?: 3  Need to walk in hospital room?: 3  Climbing 3-5 steps with a railing?: 1  Basic Mobility Total Score: 17       Therapeutic Activities and Exercises:   Gait trng RW x ~150 feet with moderate/minimal assistance and cues( 4 times rest periods); Rolling to sides x 4, scooting x 4; supine<>sit; Sit to Stand x 4; stand pivot transfers, balance trng with postural trng during sitting anf standing activity.    Patient left supine with all lines intact, call button in reach, bed alarm on, nursing notified and   present..    GOALS:   Multidisciplinary Problems     Physical Therapy Goals        Problem: Physical Therapy Goal    Goal Priority Disciplines Outcome Goal Variances Interventions   Physical Therapy Goal     PT, PT/OT Ongoing, Progressing     Description: Patient will increase functional independence with mobility by performin. Supine to sit with Contact Guard Assistance  2. Sit to supine with Contact Guard Assistance  3. Bed to chair transfer with Minimal/Contact Guard  Assistancewith or without rolling walker using Stand Pivot TECHNIQUE  4. Gait  x ~50  feet with Minimal Assistance with or without rolling walker  5. Lower extremity exercise program x10 reps per handout, with assistance as needed                   Time Tracking:     PT Received On: 08/10/20  PT Start Time: 1346     PT Stop Time: 1415  PT Total Time (min): 29 min     Billable Minutes: Gait Training 15 and Therapeutic Activity 14    Treatment Type: Treatment  PT/PTA: PT     PTA Visit Number: 1     Cricket Gaytan, PT  08/10/2020

## 2020-08-10 NOTE — SUBJECTIVE & OBJECTIVE
Review of Systems   Unable to perform ROS: Dementia     Objective:     Vital Signs (Most Recent):  Temp: 97.7 °F (36.5 °C) (08/09/20 2311)  Pulse: 83 (08/09/20 2311)  Resp: 18 (08/09/20 2311)  BP: 139/70 (08/09/20 2311)  SpO2: (!) 92 % (08/09/20 2311) Vital Signs (24h Range):  Temp:  [97.7 °F (36.5 °C)-98.5 °F (36.9 °C)] 97.7 °F (36.5 °C)  Pulse:  [80-84] 83  Resp:  [18-20] 18  SpO2:  [92 %-94 %] 92 %  BP: (139-147)/(69-70) 139/70     Weight: 67 kg (147 lb 11.3 oz)  Body mass index is 22.46 kg/m².    Physical Exam  Vitals signs and nursing note reviewed.   Constitutional:       Appearance: She is well-developed.   HENT:      Head: Normocephalic and atraumatic.      Right Ear: External ear normal.      Left Ear: External ear normal.      Nose: Nose normal.   Eyes:      General: No scleral icterus.     Conjunctiva/sclera: Conjunctivae normal.      Pupils: Pupils are equal, round, and reactive to light.   Neck:      Musculoskeletal: Normal range of motion and neck supple.      Thyroid: No thyromegaly.      Vascular: No JVD.      Trachea: No tracheal deviation.   Cardiovascular:      Rate and Rhythm: Normal rate.      Heart sounds: Normal heart sounds. No murmur.   Pulmonary:      Effort: Pulmonary effort is normal. No respiratory distress.      Breath sounds: Normal breath sounds. No wheezing or rales.   Chest:      Chest wall: No tenderness.   Abdominal:      General: Bowel sounds are normal. There is no distension.      Palpations: Abdomen is soft. There is no mass.      Tenderness: There is no abdominal tenderness. There is no guarding or rebound.   Musculoskeletal: Normal range of motion.         General: No tenderness.      Comments: VERY kyphotic   Lymphadenopathy:      Cervical: No cervical adenopathy.   Skin:     General: Skin is warm and dry.   Neurological:      General: No focal deficit present.      Mental Status: She is alert.      Motor: No abnormal muscle tone.      Deep Tendon Reflexes: Reflexes are  normal and symmetric.      Comments: Alert. Oriented to place and person    Psychiatric:         Behavior: Behavior normal.         Thought Content: Thought content normal.         Judgment: Judgment normal.           CRANIAL NERVES     CN III, IV, VI   Pupils are equal, round, and reactive to light.            Significant Labs:  Lab Results   Component Value Date    WBC 6.58 08/10/2020    HGB 12.6 08/10/2020    HCT 38.6 08/10/2020    MCV 91 08/10/2020     08/10/2020         CMP:   Recent Labs   Lab 20  0718   *   K 3.9      CO2 26      BUN 10   CREATININE 0.7   CALCIUM 8.2*   PROT 5.5*   ALBUMIN 2.7*   BILITOT 0.6   ALKPHOS 151*   AST 21   ALT 18   ANIONGAP 9   EGFRNONAA >60     Blood cultures NGTD  3d ago    Urine Culture, Routine Abnormal   ESCHERICHIA COLI   >100,000 cfu/ml     Resulting Agency [facility]   Susceptibility     Escherichia coli     CULTURE, URINE     Amox/K Clav'ate <=8/4 mcg/mL Sensitive     Amp/Sulbactam <=8/4 mcg/mL Sensitive     Ampicillin <=8 mcg/mL Sensitive     Cefazolin <=2 mcg/mL Sensitive     Cefepime <=2 mcg/mL Sensitive     Ceftriaxone <=1 mcg/mL Sensitive     Ciprofloxacin >2 mcg/mL Resistant     Ertapenem <=0.5 mcg/mL Sensitive     Gentamicin <=4 mcg/mL Sensitive     Levofloxacin >4 mcg/mL Resistant     Meropenem <=1 mcg/mL Sensitive     Nitrofurantoin <=32 mcg/mL Sensitive     Piperacillin/Tazo <=16 mcg/mL Sensitive     Tetracycline <=4 mcg/mL Sensitive     Tobramycin <=4 mcg/mL Sensitive     Trimeth/Sulfa <=2/38 mcg/mL Sensitive             Significant Imagin/2 CT head No acute abnormality.  8/3 ct lumbar spine Multilevel degenerative changes of the lumbar spine without evidence for fracture or subluxation.    /3 CT lumbar spine  Multilevel degenerative changes of the lumbar spine without evidence for fracture or subluxation    8/3 CT abd and pelvis Acute fractures involving the right 6 through 11th ribs with associated moderate-sized right  pleural effusion with adjacent passive atelectasis.  Some of the rib fractures are segmented.  Please note that all the ribs were not imaged and additional fractures of the ribs cannot be excluded.     There is a small amount of subcapsular fluid seen along the medial margin of the right hepatic lobe with a region of indeterminate hypoattenuation involving the liver.  In the setting of trauma, underlying hepatic contusion/laceration not excluded.     Thickening of the walls of the urinary bladder with surrounding inflammation suggesting a cystitis.     Constipation.    8/3 cxr There is a layering large right-sided pleural effusion with passive atelectasis of the right lung base, seen to a better extent on previous CT.  The left lung is clear.  Heart size is normal.  Calcified atheromatous disease affects the aorta.  Right-sided rib fracture seen to a better extent on recent CT scan.  No pneumothorax.       8/3 right rib view FINDINGS:  There are acute fractures involving the right 4th through 11th ribs, some of these fractures are segment, seen to a better extent on recent CT scan.  No pneumothorax.

## 2020-08-10 NOTE — PROGRESS NOTES
[facility] Medicine  Progress Note    Patient Name: Estefania Mccollum  MRN: [id number]  Patient Class: IP- Swing   Admission Date: 7/31/2020  Length of Stay: 10 days  Attending Physician: Manish Lynn MD  Primary Care Provider: Danna Levine MD        Subjective:     Principal Problem:Debility        HPI:  85 year old female admitted with UTI being admitted to Swing for PT. She lives at home with only her 91 year old , who is unable to lift her or transfer her safely. She is weak. She needs to be strong enough to perform her ADL's on her own. If she is unable to reach this goal, she may need nursing home placement.     Overview/Hospital Course:  8/3 This patient was placed on SKILL unit for cont abx to treat UTI as well as PT. She has become debilitated and weak at home resulting in multiple falls even with walker. Lives at home with elderly frail . Her goal is to ambulate 50 ft with min ass using RW. Yesterday she did bed mobility with min assist. Sat she was max assist to transfer.     8/4 this patient is on skill for cont therapy as she was not strong enough to be safely discharged to home with her elderly frail . She is demented and has no complaints except she wants to go home. She complains of no pain but has not been walking and always laying on right side. CT head done that did not show any acute path. NA was low yesterday so started on NS at 75ml/hr. This has improved 125>132. Ct of abd and pelvis as well as lumbar spine done yesterday showing 4-11 right rib fractures with right sided effusion and poss liver laceration. H/H stable. Ct of spine shows no fractures or dislocation. She remains max assist for any transfers and mod assist with bed mobility. Remains on levaquin for UTI and maintaining sats on RA 93-96% NAD.     8/5 pt remains on SKILL for therapy. Noted rib fractures from fall yesterday and discussed her condition with daughter Emani as well as  . With PT she remains mod assist with rolling in bed and max assist to get up in bed /stand/transfer. This is very concerning for her safety when going home with feeble . Had long discussion with Emani who was calling her sister Autumn to discuss possible placement at d/c as home with sitters may be a challenge goven her current condition.  on case and PASSAR completed     Na better with NS fluids. Na 125>134    8/7 Pt  remains on SKILL for therapy. Noted rib fractures from fall 2 days ago.  With PT she remains mod assist with rolling in bed and max assist to get up in bed /stand/transfer but has progressed .   Stand pivot bed<>w/c trng; Sit<>Stand x4; Sitting tolerance and Standing balance trng with RW; Provided tactile and verbal cues for postural trng; Gait trng with RW x ~250 feet with min assistance and cues; LE bike ex x 5 mins  Concerns over home safety have been discussed with family; lives wht 92 YO feeble . SW/case management following.   PASSAR completed    8/10/20 Pt  remains on SKILL for therapy due to debility s/p recent rib fractures, Awake and alert this am,   · Gait: Patient ambulated 150 feet with RW and CGA. One rest break after 75 feet. Patient requries manual assistance to keep walker on straight path due to deviation to the Left.   · Balance: Sitting Fair, Standing Fair-/poor  VSS, afebrile , O2 sat 92% on RA   Urine Cx resulted - + Ecoli- resistant to Levaquin, has allergy to tetracycline, adding  augmentin with probiotics         Review of Systems   Unable to perform ROS: Dementia     Objective:     Vital Signs (Most Recent):  Temp: 97.7 °F (36.5 °C) (08/09/20 2311)  Pulse: 83 (08/09/20 2311)  Resp: 18 (08/09/20 2311)  BP: 139/70 (08/09/20 2311)  SpO2: (!) 92 % (08/09/20 2311) Vital Signs (24h Range):  Temp:  [97.7 °F (36.5 °C)-98.5 °F (36.9 °C)] 97.7 °F (36.5 °C)  Pulse:  [80-84] 83  Resp:  [18-20] 18  SpO2:  [92 %-94 %] 92 %  BP: (139-147)/(69-70) 139/70      Weight: 67 kg (147 lb 11.3 oz)  Body mass index is 22.46 kg/m².    Physical Exam  Vitals signs and nursing note reviewed.   Constitutional:       Appearance: She is well-developed.   HENT:      Head: Normocephalic and atraumatic.      Right Ear: External ear normal.      Left Ear: External ear normal.      Nose: Nose normal.   Eyes:      General: No scleral icterus.     Conjunctiva/sclera: Conjunctivae normal.      Pupils: Pupils are equal, round, and reactive to light.   Neck:      Musculoskeletal: Normal range of motion and neck supple.      Thyroid: No thyromegaly.      Vascular: No JVD.      Trachea: No tracheal deviation.   Cardiovascular:      Rate and Rhythm: Normal rate.      Heart sounds: Normal heart sounds. No murmur.   Pulmonary:      Effort: Pulmonary effort is normal. No respiratory distress.      Breath sounds: Normal breath sounds. No wheezing or rales.   Chest:      Chest wall: No tenderness.   Abdominal:      General: Bowel sounds are normal. There is no distension.      Palpations: Abdomen is soft. There is no mass.      Tenderness: There is no abdominal tenderness. There is no guarding or rebound.   Musculoskeletal: Normal range of motion.         General: No tenderness.      Comments: VERY kyphotic   Lymphadenopathy:      Cervical: No cervical adenopathy.   Skin:     General: Skin is warm and dry.   Neurological:      General: No focal deficit present.      Mental Status: She is alert.      Motor: No abnormal muscle tone.      Deep Tendon Reflexes: Reflexes are normal and symmetric.      Comments: Alert. Oriented to place and person    Psychiatric:         Behavior: Behavior normal.         Thought Content: Thought content normal.         Judgment: Judgment normal.           CRANIAL NERVES     CN III, IV, VI   Pupils are equal, round, and reactive to light.            Significant Labs:  Lab Results   Component Value Date    WBC 6.58 08/10/2020    HGB 12.6 08/10/2020    HCT 38.6 08/10/2020     MCV 91 08/10/2020     08/10/2020         CMP:   Recent Labs   Lab 20  0718   *   K 3.9      CO2 26      BUN 10   CREATININE 0.7   CALCIUM 8.2*   PROT 5.5*   ALBUMIN 2.7*   BILITOT 0.6   ALKPHOS 151*   AST 21   ALT 18   ANIONGAP 9   EGFRNONAA >60     Blood cultures NGTD  3d ago    Urine Culture, Routine Abnormal   ESCHERICHIA COLI   >100,000 cfu/ml     Resulting Agency [facility]   Susceptibility     Escherichia coli     CULTURE, URINE     Amox/K Clav'ate <=8/4 mcg/mL Sensitive     Amp/Sulbactam <=8/4 mcg/mL Sensitive     Ampicillin <=8 mcg/mL Sensitive     Cefazolin <=2 mcg/mL Sensitive     Cefepime <=2 mcg/mL Sensitive     Ceftriaxone <=1 mcg/mL Sensitive     Ciprofloxacin >2 mcg/mL Resistant     Ertapenem <=0.5 mcg/mL Sensitive     Gentamicin <=4 mcg/mL Sensitive     Levofloxacin >4 mcg/mL Resistant     Meropenem <=1 mcg/mL Sensitive     Nitrofurantoin <=32 mcg/mL Sensitive     Piperacillin/Tazo <=16 mcg/mL Sensitive     Tetracycline <=4 mcg/mL Sensitive     Tobramycin <=4 mcg/mL Sensitive     Trimeth/Sulfa <=2/38 mcg/mL Sensitive             Significant Imagin/2 CT head No acute abnormality.  8/3 ct lumbar spine Multilevel degenerative changes of the lumbar spine without evidence for fracture or subluxation.    8/3 CT lumbar spine  Multilevel degenerative changes of the lumbar spine without evidence for fracture or subluxation    8/3 CT abd and pelvis Acute fractures involving the right 6 through 11th ribs with associated moderate-sized right pleural effusion with adjacent passive atelectasis.  Some of the rib fractures are segmented.  Please note that all the ribs were not imaged and additional fractures of the ribs cannot be excluded.     There is a small amount of subcapsular fluid seen along the medial margin of the right hepatic lobe with a region of indeterminate hypoattenuation involving the liver.  In the setting of trauma, underlying hepatic contusion/laceration not  excluded.     Thickening of the walls of the urinary bladder with surrounding inflammation suggesting a cystitis.     Constipation.    8/3 cxr There is a layering large right-sided pleural effusion with passive atelectasis of the right lung base, seen to a better extent on previous CT.  The left lung is clear.  Heart size is normal.  Calcified atheromatous disease affects the aorta.  Right-sided rib fracture seen to a better extent on recent CT scan.  No pneumothorax.       8/3 right rib view FINDINGS:  There are acute fractures involving the right 4th through 11th ribs, some of these fractures are segment, seen to a better extent on recent CT scan.  No pneumothorax.           Assessment/Plan:      * Debility  Swing for PT ; currently max assist with transfer   GOAL>>Gait  x ~50  feet with Minimal Assistance with or without rolling walker  Check CT spine and pelvis today    Noted rib fractures  She is not progressing but she will be slow due to the newly found rib fractures, this also raises more concern for her safety at home;  has started PASSAR     8/5 she is approved till Friday and will keep her here for rehab as long as she can make some progress and insurance approves her stay but did discuss d/c plans with daughter yesterday. Will reach out to local nursing homes incase she does not progress and needs facility at d/c   8/7 Stand pivot bed<>w/c trng; Sit<>Stand x4; Sitting tolerance and Standing balance trng with RW; Provided tactile and verbal cues for postural trng; Gait trng with RW x ~250 feet with min assistance and cues; LE bike ex x 5 mins  Awaiting approval for more skilled days prior to D/C home with elderly  , family planning for sitters also; they defer nursing home , family making adjustments to home for safety   8/10 Gait: Patient ambulated 150 feet with RW and CGA. One rest break after 75 feet. Patient requries manual assistance to keep walker on straight path due to deviation  to the Left. Balance: Sitting Fair, Standing Fair-/poor    Closed fracture of multiple ribs of right side with routine healing  Ribs 4-11 noted on imaging  Denies pain  Impeding her progress however  Cont PT  PRN Tylenol for pain control      Liver contusion  Likely liver contusion from recent falls noted on CT scan  H/H stable  Non-tender abdomen  Monitor closely but no clinical evidence of true liver lac that would require surgical intervention more likely hematoma       Hyponatremia  I think this is a reflection of her very poor diet  Start remeron  Ns at 75ml/hr   Na better 134 today  8/7 NA normal today     Acute cystitis with hematuria  Continue levaquin for 7 days total for complicated UTI   Getting dose q 48hr 7/29, 7/31, 8/2, 8/4 ct Monday still showing cystitis, will cont levaquin  8/7 levofloxacin dose continued 8/6; has had 5 doses  will repeat urinalysis today   8/10 Repeat UA- positive nitrites , leuks- no fever; , Urine culture- resistant to Levofloxacin ; + allergy to tetracyline   has lost IV acess, will add oral abx       Frequent falls  Swing for PT.   Consider nursing home placement if she does not progress   Will have case management start PASSAR incase  Check CT spine and pelvis today  Noted right rib fractures  Consider placement at d/c   8/7 CT of lumbar spine stable     Memory loss  Continue donepezil and namenda       Hyperlipidemia  Continue pravastatin         Hypothyroidism  Continue synthroid 100mcg   Lab Results   Component Value Date    TSH 2.578 12/03/2019           Hypertension  Continue lisinopril   bp 145//67      VTE Risk Mitigation (From admission, onward)         Ordered     IP VTE HIGH RISK PATIENT  Once      07/31/20 1636     Place sequential compression device  Until discontinued      07/31/20 1636                      Mary Land MD  Department of Hospital Medicine   [facility]

## 2020-08-11 LAB
ALBUMIN SERPL BCP-MCNC: 2.7 G/DL (ref 3.5–5.2)
ALP SERPL-CCNC: 165 U/L (ref 55–135)
ALT SERPL W/O P-5'-P-CCNC: 17 U/L (ref 10–44)
ANION GAP SERPL CALC-SCNC: 6 MMOL/L (ref 8–16)
AST SERPL-CCNC: 21 U/L (ref 10–40)
BILIRUB SERPL-MCNC: 0.6 MG/DL (ref 0.1–1)
BUN SERPL-MCNC: 10 MG/DL (ref 8–23)
CALCIUM SERPL-MCNC: 8.3 MG/DL (ref 8.7–10.5)
CHLORIDE SERPL-SCNC: 101 MMOL/L (ref 95–110)
CO2 SERPL-SCNC: 28 MMOL/L (ref 23–29)
CREAT SERPL-MCNC: 0.8 MG/DL (ref 0.5–1.4)
EST. GFR  (AFRICAN AMERICAN): >60 ML/MIN/1.73 M^2
EST. GFR  (NON AFRICAN AMERICAN): >60 ML/MIN/1.73 M^2
GLUCOSE SERPL-MCNC: 100 MG/DL (ref 70–110)
POTASSIUM SERPL-SCNC: 4.3 MMOL/L (ref 3.5–5.1)
PROT SERPL-MCNC: 5.4 G/DL (ref 6–8.4)
SODIUM SERPL-SCNC: 135 MMOL/L (ref 136–145)

## 2020-08-11 PROCEDURE — 97535 SELF CARE MNGMENT TRAINING: CPT | Mod: HCNC

## 2020-08-11 PROCEDURE — 11000004 HC SNF PRIVATE: Mod: HCNC

## 2020-08-11 PROCEDURE — 25000003 PHARM REV CODE 250: Mod: HCNC | Performed by: NURSE PRACTITIONER

## 2020-08-11 PROCEDURE — 97530 THERAPEUTIC ACTIVITIES: CPT | Mod: HCNC

## 2020-08-11 PROCEDURE — 94761 N-INVAS EAR/PLS OXIMETRY MLT: CPT | Mod: HCNC

## 2020-08-11 PROCEDURE — 25000003 PHARM REV CODE 250: Mod: HCNC | Performed by: FAMILY MEDICINE

## 2020-08-11 PROCEDURE — 97116 GAIT TRAINING THERAPY: CPT | Mod: HCNC

## 2020-08-11 PROCEDURE — 80053 COMPREHEN METABOLIC PANEL: CPT | Mod: HCNC

## 2020-08-11 PROCEDURE — 36415 COLL VENOUS BLD VENIPUNCTURE: CPT | Mod: HCNC

## 2020-08-11 RX ADMIN — PRAVASTATIN SODIUM 80 MG: 40 TABLET ORAL at 07:08

## 2020-08-11 RX ADMIN — DONEPEZIL HYDROCHLORIDE 5 MG: 5 TABLET, FILM COATED ORAL at 09:08

## 2020-08-11 RX ADMIN — AMOXICILLIN AND CLAVULANATE POTASSIUM 1 TABLET: 875; 125 TABLET, FILM COATED ORAL at 07:08

## 2020-08-11 RX ADMIN — MIRTAZAPINE 15 MG: 15 TABLET, ORALLY DISINTEGRATING ORAL at 09:08

## 2020-08-11 RX ADMIN — SERTRALINE HYDROCHLORIDE 25 MG: 25 TABLET ORAL at 09:08

## 2020-08-11 RX ADMIN — LISINOPRIL 20 MG: 20 TABLET ORAL at 07:08

## 2020-08-11 RX ADMIN — MEMANTINE 10 MG: 10 TABLET ORAL at 07:08

## 2020-08-11 RX ADMIN — ASPIRIN 81 MG: 81 TABLET, COATED ORAL at 07:08

## 2020-08-11 RX ADMIN — BISACODYL 10 MG: 10 SUPPOSITORY RECTAL at 10:08

## 2020-08-11 RX ADMIN — STANDARDIZED SENNA CONCENTRATE AND DOCUSATE SODIUM 1 TABLET: 8.6; 5 TABLET ORAL at 09:08

## 2020-08-11 RX ADMIN — LACTOBACILLUS ACIDOPHILUS / LACTOBACILLUS BULGARICUS 1 EACH: 100 MILLION CFU STRENGTH GRANULES at 09:08

## 2020-08-11 RX ADMIN — LACTOBACILLUS ACIDOPHILUS / LACTOBACILLUS BULGARICUS 1 EACH: 100 MILLION CFU STRENGTH GRANULES at 07:08

## 2020-08-11 RX ADMIN — STANDARDIZED SENNA CONCENTRATE AND DOCUSATE SODIUM 1 TABLET: 8.6; 5 TABLET ORAL at 07:08

## 2020-08-11 RX ADMIN — LEVOTHYROXINE SODIUM 100 MCG: 100 TABLET ORAL at 05:08

## 2020-08-11 RX ADMIN — AMOXICILLIN AND CLAVULANATE POTASSIUM 1 TABLET: 875; 125 TABLET, FILM COATED ORAL at 09:08

## 2020-08-11 RX ADMIN — MEMANTINE 10 MG: 10 TABLET ORAL at 09:08

## 2020-08-11 NOTE — PROGRESS NOTES
"[facility]  Adult Nutrition  Progress Note    SUMMARY       Recommendations    Recommendation:   1. Continue Boost Plus for intake support with regular diet   2. Encourage pt to increase intake as needed    Interventions (treatment strategy):  General healthful diet  Com[employer] Bever[employer]- Boost with Dinner  Collaboration with other providers    Goals: meet at least 75% of needs with meals and supplement by discharge  Nutrition Goal Status: new  Communication of RD Recs: (POC)    Reason for Assessment    Reason For Assessment: RD follow-up  Diagnosis: (acute cystitis with hematuria)  Relevant Medical History: osteoarthritis, cataract, colonoscopy, HLD, HTN, thyroid disease, vertigo  Interdisciplinary Rounds: attended    General Information Comments: Pt was admitted to [facility] for debility. Spoke with patient and her . Minimal intake of foods; no appetite which has been going on for "a couple of months." Typical food intake per : 1 egg at breakfast, 50% of meal brought to her by [employer], and 1 full Boost Plus for dinner. No significant weight loss. Per  UBW 160lbs. NFPE completed today, normal muscle/fat mass for person of advanced age.    Nutrition Discharge Planning: Adequate PO intake via Regular diet.    Nutrition Risk Screen    Nutrition Risk Screen: no indicators present    Nutrition/Diet History    Patient Reported Diet/Restrictions/Preferences: general  Typical Food/Fluid Intake: small breakfast (egg), meals on wheels, Bosot for dinner  Spiritual, Cultural Beliefs, Judaism Practices, Values that Affect Care: no  Supplemental Drinks or Food Habits: Boost Plus  Food Allergies: NKFA  Factors Affecting Nutritional Intake: decreased appetite    Anthropometrics    Temp: 97.4 °F (36.3 °C)  Height Method: Stated  Height: 5' 8" (172.7 cm)  Height (inches): 68 in  Weight Method: Bed Scale  Weight: 67 kg (147 lb 11.3 oz)  Weight (lb): 147.71 lb  Ideal Body Weight (IBW), " Female: 140 lb  % Ideal Body Weight, Female (lb): 105.51 %  BMI (Calculated): 22.5  BMI Grade: 18.5-24.9 - normal       Lab/Procedures/Meds    Pertinent Labs Reviewed: reviewed  Pertinent Labs Comments: Na 135, alk phos 165, protein total 5.4, albumin 2.4  Pertinent Medications Reviewed: reviewed  Pertinent Medications Comments: Levothyroxine, pravastatin, senna-docusate    Estimated/Assessed Needs    Weight Used For Calorie Calculations: 67 kg (147 lb 11.3 oz)  Energy Calorie Requirements (kcal): 1454 kcal daily  Energy Need Method: Nelson-St Jeor  Protein Requirements: 54 gm daily  Weight Used For Protein Calculations: 67 kg (147 lb 11.3 oz)(0.8 gm/kg)  Fluid Requirements (mL): 1 mL/kcal or per MD  Estimated Fluid Requirement Method: RDA Method  RDA Method (mL): 1454  CHO Requirement: 182 gm daily    Nutrition Prescription Ordered    Current Diet Order: Regular  Oral Nutrition Supplement: Boost Plus vanilla    Evaluation of Received Nutrient/Fluid Intake    I/O: +12.4 L since admit  Comments: LBM 8/7  % Intake of Estimated Energy Needs: 25 - 50 %  % Meal Intake: 25 - 50 %    Nutrition Risk    Level of Risk/Frequency of Follow-up: low - moderate(1x/wk)     Assessment and Plan    Nutrition Problem  Inadequate oral intake     Related to (etiology):   Decreased appetite     Signs and Symptoms (as evidenced by):   <50% intake of meals, pt report of not feeling like eating      Interventions (treatment strategy):  General healthful diet  Com[employer] Bever[employer]- Boost with Dinner  Collaboration with other providers     Nutrition Diagnosis Status:   Continues    Monitor and Evaluation    Food and Nutrient Intake: food and beverage intake  Food and Nutrient Adminstration: diet order  Knowledge/Beliefs/Attitudes: food and nutrition knowledge/skill  Physical Activity and Function: nutrition-related ADLs and IADLs  Anthropometric Measurements: weight, weight change  Biochemical Data, Medical Tests and Procedures: electrolyte  and renal panel, gastrointestinal profile, glucose/endocrine profile, inflammatory profile, lipid profile  Nutrition-Focused Physical Findings: overall appearance     Malnutrition Assessment  Patient with decreased intake but normal muscle and fat mass for person of advanced age:    Energy Intake (Malnutrition): less than or equal to 50% for greater than or equal to 1 month   Subcutaneous Fat Loss (Final Summary): well nourished  Muscle Loss Evaluation (Final Summary): well nourished         Nutrition Follow-Up    RD Follow-up?: Yes

## 2020-08-11 NOTE — PT/OT/SLP PROGRESS
"Physical Therapy Treatment    Patient Name:  Estefania Mccollum   MRN:  [id number]    Recommendations:     Discharge Recommendations:  nursing facility, basic   Discharge Equipment Recommendations: hospital bed, lift device   Barriers to discharge: Decreased caregiver support    Assessment:     Estefania Mccollum is a 85 y.o. female admitted with a medical diagnosis of Debility.  She presents with the following impairments/functional limitations:  weakness, impaired endurance, impaired self care skills, impaired functional mobilty, gait instability, impaired balance, impaired cognition, decreased lower extremity function, decreased upper extremity function, decreased safety awareness, decreased coordination, impaired coordination, other (comment)(Leans to right side with slouch forward flexed head(kyphotic) posture). Patient tolerated well and participated better today than yesterday. Increased gait distance and lesser resistance during gait trng.    Rehab Prognosis: Fair; patient would benefit from acute skilled PT services to address these deficits and reach maximum level of function.    Recent Surgery: * No surgery found *      Plan:     During this hospitalization, patient to be seen daily to address the identified rehab impairments via gait training, therapeutic activities, therapeutic exercises and progress toward the following goals:    · Plan of Care Expires:  08/14/20    Subjective     Chief Complaint: Patient stated always and repeatively, "What you want me to do?'. "I don't want to fall".  Patient/Family Comments/goals: To return home  Pain/Comfort:  · Pain Rating 1: 0/10  · Pain Rating Post-Intervention 1: 0/10      Objective:     Communicated with patient prior to session.  Patient found supine with telemetry upon PT entry to room.     General Precautions: Standard, fall   Orthopedic Precautions:N/A   Braces: N/A     Functional Mobility:  · Bed Mobility:     · Rolling Left:  supervision  · Rolling Right: " supervision  · Scooting: minimal/contact guard assistance and cues  · Supine to Sit: minimal/contact guard assistance and cues  · Sit to Supine: minimum assistance  · Transfers:     · Sit to Stand:  minimal/contact guard assistance and cues with rolling walker  · Bed to Chair: minimal assistance and cues with  rolling walker  using  Stand Pivot  · Gait: Minimal Assistance and cues x ~225 feet (2 times rest periods)  with RW.  Dec daniel, dec stride, dec floor clearance and dec weight shifting.  · Balance: Standing with RW Static: Fair; Dynamic: Fair-      AM-PAC 6 CLICK MOBILITY  Turning over in bed (including adjusting bedclothes, sheets and blankets)?: 4  Sitting down on and standing up from a chair with arms (e.g., wheelchair, bedside commode, etc.): 3  Moving from lying on back to sitting on the side of the bed?: 3  Moving to and from a bed to a chair (including a wheelchair)?: 3  Need to walk in hospital room?: 3  Climbing 3-5 steps with a railing?: 1  Basic Mobility Total Score: 17       Therapeutic Activities and Exercises:   Body sequence with verbal and tactile cues on bed mobility, out of bed activity and transfers; Gait trng RW x 225 feet( 2 times rest periods) with min assistance and cues for balance and correct posture.    Patient left supine with all lines intact, call button in reach, bed alarm on and nursing notified..    GOALS:   Multidisciplinary Problems     Physical Therapy Goals        Problem: Physical Therapy Goal    Goal Priority Disciplines Outcome Goal Variances Interventions   Physical Therapy Goal     PT, PT/OT Ongoing, Progressing     Description: Patient will increase functional independence with mobility by performin. Supine to sit with Contact Guard Assistance  2. Sit to supine with Contact Guard Assistance  3. Bed to chair transfer with Minimal/Contact Guard Assistancewith or without rolling walker using Stand Pivot TECHNIQUE  4. Gait  x ~50  feet with Minimal Assistance  with or without rolling walker  5. Lower extremity exercise program x10 reps per handout, with assistance as needed                   Time Tracking:     PT Received On: 08/11/20  PT Start Time: 1335     PT Stop Time: 1415  PT Total Time (min): 40 min     Billable Minutes: Gait Training 15 and Therapeutic Activity 15    Treatment Type: Treatment  PT/PTA: PT     PTA Visit Number: 1     Cricket Gaytan, PT  08/11/2020

## 2020-08-11 NOTE — PLAN OF CARE
Problem: Adult Inpatient Plan of Care  Goal: Plan of Care Review  Outcome: Ongoing, Progressing     Problem: Skin Injury Risk Increased  Goal: Skin Health and Integrity  Outcome: Ongoing, Progressing     Problem: Fall Injury Risk  Goal: Absence of Fall and Fall-Related Injury  Outcome: Ongoing, Progressing     Problem: Muscle Strength Impairment  Goal: Improved Muscle Strength  Outcome: Ongoing, Progressing     Problem: Range of Motion Impairment (Functional Deficit)  Goal: Optimal Range of Motion  Outcome: Ongoing, Progressing       Pt doing well. No question/concerns at this time. Agrees with poc. No pain/falls.  Vital signs pulse ox and temp are stable. She is working well with PT and OT. Her Appetite is getting better.  Po abx on board for  urine . On swing for therapy.

## 2020-08-11 NOTE — PATIENT CARE CONFERENCE
Skilled level of care patient care conference held. Patient is progressing toward her goals. She will discharge home with home health when stable.

## 2020-08-11 NOTE — PLAN OF CARE
Recommendation:   1. Continue Boost Plus for intake support with regular diet   2. Encourage pt to increase intake as needed    Interventions (treatment strategy):  General healthful diet  [employer] Bever[employer]- Boost with Dinner  Collaboration with other providers    Goals: meet at least 75% of needs with meals and supplement by discharge  Nutrition Goal Status: new  Nutrition Discharge Planning: Adequate PO intake via Regular diet.

## 2020-08-11 NOTE — PLAN OF CARE
Problem: Physical Therapy Goal  Goal: Physical Therapy Goal  Description: Patient will increase functional independence with mobility by performin. Supine to sit with Contact Guard Assistance  2. Sit to supine with Contact Guard Assistance  3. Bed to chair transfer with Minimal/Contact Guard Assistancewith or without rolling walker using Stand Pivot TECHNIQUE  4. Gait  x ~50  feet with Minimal Assistance with or without rolling walker - met 8/10/20   8/10/20 - Revised goal:  4 a) Gait x ~200 feet with minimal/contact guard assistance and cues  5. Lower extremity exercise program x10 reps per handout, with assistance as needed

## 2020-08-11 NOTE — PLAN OF CARE
Fall precautions in place, POC reviewed with patient needs reinforcement, disoriented to time occasionally, reorients with ques.. BS are diminished, but clear. Turned q2hr. No IV access. Swing patient. Will continue to monitor.

## 2020-08-11 NOTE — PT/OT/SLP PROGRESS
Occupational Therapy   Treatment    Name: Estefania Mccollum  MRN: [id number]  Admitting Diagnosis:  Debility       Recommendations:     Discharge Recommendations: nursing facility, basic  Discharge Equipment Recommendations:  hospital bed, lift device  Barriers to discharge:  Decreased caregiver support    Assessment:     Estefania Mccollum is a 85 y.o. female with a medical diagnosis of Debility.  She presents with good participation in mealtime, noted able to follow verbal cues to improve independence. Performance deficits affecting function are weakness, impaired endurance, impaired cognition, decreased coordination, impaired coordination, impaired self care skills, decreased upper extremity function, impaired fine motor, impaired functional mobilty, decreased lower extremity function, impaired skin, gait instability, decreased safety awareness, impaired balance.     Rehab Prognosis:  Fair; patient would benefit from acute skilled OT services to address these deficits and reach maximum level of function.       Plan:     Patient to be seen 5 x/week to address the above listed problems via self-care/home management, therapeutic activities, therapeutic exercises  · Plan of Care Expires: 08/17/20  · Plan of Care Reviewed with: patient    Subjective     Pain/Comfort:  · Pain Rating 1: 0/10    Objective:     Communicated with: Nursing prior to session.  Patient found HOB elevated with telemetry upon OT entry to room.    General Precautions: Standard, fall   Orthopedic Precautions:N/A   Braces: N/A     Occupational Performance:     Bed Mobility:    · Not completed    Functional Mobility/Transfers:  · Not completed    Activities of Daily Living:  · Feeding:  stand by assistance with setup tp cut food with verbal cues needed for bite size      AMPAC 6 Click ADL: 15    Treatment & Education:  Therapist facilitated mealtime, noted patient able to complete with SBA with setup to cut food and open containers with verbal cues needed  for bite size to decrease spillage and verbal cues needed to drink water, patient responded well, taking smaller bites with improved accuracy     Patient left HOB elevated with all lines intact, call button in reach, bed alarm on and nursing notifiedEducation:      GOALS:   Multidisciplinary Problems     Occupational Therapy Goals        Problem: Occupational Therapy Goal    Goal Priority Disciplines Outcome Interventions   Occupational Therapy Goal     OT, PT/OT Ongoing, Progressing    Description: Goals to be met by: 8/17/2020     Patient will increase functional independence with ADLs by performing:    Feeding with Modified [place].  UE Dressing with Modified [place].  LE Dressing with Minimal Assistance.  Grooming while seated with Modified [place].  Toileting from bedside commode with Minimal Assistance for hygiene and clothing management.   Sitting at edge of bed x5 minutes with Stand-by Assistance.  Supine to sit with Stand-by Assistance.  Step transfer with Contact Guard Assistance  Toilet transfer to bedside commode with Minimal Assistance.                     Time Tracking:     OT Date of Treatment: 08/11/20  OT Start Time: 1701  OT Stop Time: 1715  OT Total Time (min): 14 min    Billable Minutes:Self Care/Home Management 14 minutes    Gilles Bernard OT  8/11/2020

## 2020-08-11 NOTE — NURSING
Patient resting in bed quietly. No noted distress, no complaints. [employer] in use. Assessment per flowsheet.

## 2020-08-12 LAB
ALBUMIN SERPL BCP-MCNC: 2.6 G/DL (ref 3.5–5.2)
ALP SERPL-CCNC: 182 U/L (ref 55–135)
ALT SERPL W/O P-5'-P-CCNC: 16 U/L (ref 10–44)
ANION GAP SERPL CALC-SCNC: 7 MMOL/L (ref 8–16)
AST SERPL-CCNC: 18 U/L (ref 10–40)
BILIRUB SERPL-MCNC: 0.5 MG/DL (ref 0.1–1)
BUN SERPL-MCNC: 10 MG/DL (ref 8–23)
CALCIUM SERPL-MCNC: 8.2 MG/DL (ref 8.7–10.5)
CHLORIDE SERPL-SCNC: 102 MMOL/L (ref 95–110)
CO2 SERPL-SCNC: 27 MMOL/L (ref 23–29)
CREAT SERPL-MCNC: 0.7 MG/DL (ref 0.5–1.4)
EST. GFR  (AFRICAN AMERICAN): >60 ML/MIN/1.73 M^2
EST. GFR  (NON AFRICAN AMERICAN): >60 ML/MIN/1.73 M^2
GLUCOSE SERPL-MCNC: 106 MG/DL (ref 70–110)
POTASSIUM SERPL-SCNC: 4.3 MMOL/L (ref 3.5–5.1)
PROT SERPL-MCNC: 5.4 G/DL (ref 6–8.4)
SODIUM SERPL-SCNC: 136 MMOL/L (ref 136–145)

## 2020-08-12 PROCEDURE — 36415 COLL VENOUS BLD VENIPUNCTURE: CPT | Mod: HCNC

## 2020-08-12 PROCEDURE — 25000003 PHARM REV CODE 250: Mod: HCNC | Performed by: NURSE PRACTITIONER

## 2020-08-12 PROCEDURE — 94799 UNLISTED PULMONARY SVC/PX: CPT | Mod: HCNC

## 2020-08-12 PROCEDURE — 99232 SBSQ HOSP IP/OBS MODERATE 35: CPT | Mod: HCNC,,, | Performed by: FAMILY MEDICINE

## 2020-08-12 PROCEDURE — 97116 GAIT TRAINING THERAPY: CPT | Mod: HCNC

## 2020-08-12 PROCEDURE — 99232 PR SUBSEQUENT HOSPITAL CARE,LEVL II: ICD-10-PCS | Mod: HCNC,,, | Performed by: FAMILY MEDICINE

## 2020-08-12 PROCEDURE — 25000003 PHARM REV CODE 250: Mod: HCNC | Performed by: FAMILY MEDICINE

## 2020-08-12 PROCEDURE — 94761 N-INVAS EAR/PLS OXIMETRY MLT: CPT | Mod: HCNC

## 2020-08-12 PROCEDURE — 97530 THERAPEUTIC ACTIVITIES: CPT | Mod: HCNC

## 2020-08-12 PROCEDURE — 11000004 HC SNF PRIVATE: Mod: HCNC

## 2020-08-12 PROCEDURE — 80053 COMPREHEN METABOLIC PANEL: CPT | Mod: HCNC

## 2020-08-12 PROCEDURE — 97535 SELF CARE MNGMENT TRAINING: CPT | Mod: HCNC

## 2020-08-12 RX ORDER — LACTULOSE 10 G/15ML
20 SOLUTION ORAL 2 TIMES DAILY PRN
Status: DISCONTINUED | OUTPATIENT
Start: 2020-08-12 | End: 2020-08-19 | Stop reason: HOSPADM

## 2020-08-12 RX ADMIN — LACTOBACILLUS ACIDOPHILUS / LACTOBACILLUS BULGARICUS 1 EACH: 100 MILLION CFU STRENGTH GRANULES at 08:08

## 2020-08-12 RX ADMIN — MEMANTINE 10 MG: 10 TABLET ORAL at 08:08

## 2020-08-12 RX ADMIN — LEVOTHYROXINE SODIUM 100 MCG: 100 TABLET ORAL at 05:08

## 2020-08-12 RX ADMIN — LISINOPRIL 20 MG: 20 TABLET ORAL at 08:08

## 2020-08-12 RX ADMIN — LACTOBACILLUS ACIDOPHILUS / LACTOBACILLUS BULGARICUS 1 EACH: 100 MILLION CFU STRENGTH GRANULES at 09:08

## 2020-08-12 RX ADMIN — STANDARDIZED SENNA CONCENTRATE AND DOCUSATE SODIUM 1 TABLET: 8.6; 5 TABLET ORAL at 09:08

## 2020-08-12 RX ADMIN — MEMANTINE 10 MG: 10 TABLET ORAL at 09:08

## 2020-08-12 RX ADMIN — ASPIRIN 81 MG: 81 TABLET, COATED ORAL at 08:08

## 2020-08-12 RX ADMIN — DONEPEZIL HYDROCHLORIDE 5 MG: 5 TABLET, FILM COATED ORAL at 09:08

## 2020-08-12 RX ADMIN — AMOXICILLIN AND CLAVULANATE POTASSIUM 1 TABLET: 875; 125 TABLET, FILM COATED ORAL at 08:08

## 2020-08-12 RX ADMIN — STANDARDIZED SENNA CONCENTRATE AND DOCUSATE SODIUM 1 TABLET: 8.6; 5 TABLET ORAL at 08:08

## 2020-08-12 RX ADMIN — PRAVASTATIN SODIUM 80 MG: 40 TABLET ORAL at 08:08

## 2020-08-12 RX ADMIN — LACTULOSE 20 G: 20 SOLUTION ORAL at 09:08

## 2020-08-12 RX ADMIN — AMOXICILLIN AND CLAVULANATE POTASSIUM 1 TABLET: 875; 125 TABLET, FILM COATED ORAL at 09:08

## 2020-08-12 RX ADMIN — MIRTAZAPINE 15 MG: 15 TABLET, ORALLY DISINTEGRATING ORAL at 09:08

## 2020-08-12 RX ADMIN — SERTRALINE HYDROCHLORIDE 25 MG: 25 TABLET ORAL at 09:08

## 2020-08-12 NOTE — ASSESSMENT & PLAN NOTE
Swing for PT ; currently max assist with transfer   GOAL>>Gait  x ~50  feet with Minimal Assistance with or without rolling walker  Check CT spine and pelvis today    Noted rib fractures  She is not progressing but she will be slow due to the newly found rib fractures, this also raises more concern for her safety at home;  has started P[facility]     8/5 she is approved till Friday and will keep her here for rehab as long as she can make some progress and insurance approves her stay but did discuss d/c plans with daughter yesterday. Will reach out to local nursing homes incase she does not progress and needs facility at d/c   8/7 Stand pivot bed<>w/c trng; Sit<>Stand x4; Sitting tolerance and Standing balance trng with RW; Provided tactile and verbal cues for postural trng; Gait trng with RW x ~250 feet with min assistance and cues; LE bike ex x 5 mins  Awaiting approval for more skilled days prior to D/C home with elderly  , family planning for sitters also; they defer nursing home , family making adjustments to home for safety   8/10 Gait: Patient ambulated 150 feet with RW and CGA. One rest break after 75 feet. Patient requries manual assistance to keep walker on straight path due to deviation to the Left. Balance: Sitting Fair, Standing Fair-/poor    8/12 Minimal Assistance and cues x ~225 feet (2 times rest periods)  with RW

## 2020-08-12 NOTE — PT/OT/SLP PROGRESS
Occupational Therapy   Treatment    Name: Estefania Mccollum  MRN: [id number]  Admitting Diagnosis:  Debility       Recommendations:     Discharge Recommendations: nursing facility, basic  Discharge Equipment Recommendations:  hospital bed, lift device  Barriers to discharge:  Decreased caregiver support    Assessment:     Estefania Mccollum is a 85 y.o. female with a medical diagnosis of Debility.  She presents with improvement noted in transfers this date, able to transfer to [facility] with Min A. Performance deficits affecting function are weakness, impaired endurance, impaired cognition, decreased coordination, impaired coordination, impaired self care skills, decreased upper extremity function, impaired fine motor, impaired functional mobilty, decreased lower extremity function, impaired skin, gait instability, decreased safety awareness, impaired balance.     Rehab Prognosis:  Fair; patient would benefit from acute skilled OT services to address these deficits and reach maximum level of function.       Plan:     Patient to be seen 5 x/week to address the above listed problems via self-care/home management, therapeutic activities, therapeutic exercises  · Plan of Care Expires: 08/17/20  · Plan of Care Reviewed with: patient    Subjective     Pain/Comfort:  · Pain Rating 1: 0/10    Objective:     Communicated with: nursing prior to session.  Patient found supine with telemetry upon OT entry to room.    General Precautions: Standard, fall   Orthopedic Precautions:N/A   Braces: N/A     Occupational Performance:     Bed Mobility:    · Patient completed Rolling/Turning to Left with  modified independence  · Patient completed Rolling/Turning to Right with modified independence  · Patient completed Scooting/Bridging with supervision  · Patient completed Supine to Sit with contact guard assistance  · Patient completed Sit to Supine with stand by assistance     Functional Mobility/Transfers:  · Patient completed Sit <> Stand Transfer  with contact guard assistance  with  rolling walker x 3 trials  · Patient completed Toilet Transfer Step Transfer technique with minimum assistance with  rolling walker and bedside commode  · Functional Mobility: with RW side steps to BSC with CGA    Activities of Daily Living:  · Bathing: moderate assistance to sponge off bed level  · Upper Body Dressing: stand by assistance to [facility] 6 Click ADL: 15    Treatment & Education:  Therapist facilitated rolling with Mod I, supine to sit with verbal cues for sequencing with CGA, scooting with frequent verbal cues with supervision, sit to stand with CGA with RW x 3 trials, noted improvement, rest break in supine then, toilet transfer with Min A to BS with RW, sit to supine with SBA    Patient left supine with call button in reach, bed alarm on and nursing notifiedEducation:      GOALS:   Multidisciplinary Problems     Occupational Therapy Goals        Problem: Occupational Therapy Goal    Goal Priority Disciplines Outcome Interventions   Occupational Therapy Goal     OT, PT/OT Ongoing, Progressing    Description: Goals to be met by: 8/17/2020     Patient will increase functional independence with ADLs by performing:    Feeding with Modified [place].  UE Dressing with Modified [place].  LE Dressing with Minimal Assistance.  Grooming while seated with Modified [place].  Toileting from bedside commode with Minimal Assistance for hygiene and clothing management.   Sitting at edge of bed x5 minutes with Stand-by Assistance.  Supine to sit with Stand-by Assistance.  Step transfer with Contact Guard Assistance  Toilet transfer to bedside commode with Minimal Assistance.                     Time Tracking:     OT Date of Treatment: 08/12/20  OT Start Time: 1733  OT Stop Time: 1757  OT Total Time (min): 24 min    Billable Minutes:Self Care/Home Management 10 minutes  Therapeutic Activity 14 minutes    Gilles Bernard OT  8/12/2020

## 2020-08-12 NOTE — SUBJECTIVE & OBJECTIVE
Review of Systems   Unable to perform ROS: Dementia     Objective:     Vital Signs (Most Recent):  Temp: 97.5 °F (36.4 °C) (08/12/20 0749)  Pulse: 78 (08/12/20 0749)  Resp: 18 (08/12/20 0749)  BP: 136/63 (08/12/20 0749)  SpO2: (!) 93 % (08/12/20 0750) Vital Signs (24h Range):  Temp:  [97.5 °F (36.4 °C)-97.8 °F (36.6 °C)] 97.5 °F (36.4 °C)  Pulse:  [78-82] 78  Resp:  [18] 18  SpO2:  [93 %-95 %] 93 %  BP: (115-136)/(58-63) 136/63     Weight: 67 kg (147 lb 11.3 oz)  Body mass index is 22.46 kg/m².    Physical Exam  Vitals signs and nursing note reviewed.   Constitutional:       Appearance: She is well-developed.   HENT:      Head: Normocephalic and atraumatic.      Right Ear: External ear normal.      Left Ear: External ear normal.      Nose: Nose normal.   Eyes:      General: No scleral icterus.     Conjunctiva/sclera: Conjunctivae normal.      Pupils: Pupils are equal, round, and reactive to light.   Neck:      Musculoskeletal: Normal range of motion and neck supple.      Thyroid: No thyromegaly.      Vascular: No JVD.      Trachea: No tracheal deviation.   Cardiovascular:      Rate and Rhythm: Normal rate.      Heart sounds: Normal heart sounds. No murmur.   Pulmonary:      Effort: Pulmonary effort is normal. No respiratory distress.      Breath sounds: Normal breath sounds. No wheezing or rales.   Chest:      Chest wall: No tenderness.   Abdominal:      General: Bowel sounds are normal. There is no distension.      Palpations: Abdomen is soft. There is no mass.      Tenderness: There is no abdominal tenderness. There is no guarding or rebound.   Musculoskeletal: Normal range of motion.         General: No tenderness.      Comments: VERY kyphotic   Lymphadenopathy:      Cervical: No cervical adenopathy.   Skin:     General: Skin is warm and dry.   Neurological:      General: No focal deficit present.      Mental Status: She is alert.      Motor: No abnormal muscle tone.      Deep Tendon Reflexes: Reflexes are  normal and symmetric.      Comments: Alert. Oriented to place and person    Psychiatric:         Behavior: Behavior normal.         Thought Content: Thought content normal.         Judgment: Judgment normal.           CRANIAL NERVES     CN III, IV, VI   Pupils are equal, round, and reactive to light.            Significant Labs:  Lab Results   Component Value Date    WBC 6.58 08/10/2020    HGB 12.6 08/10/2020    HCT 38.6 08/10/2020    MCV 91 08/10/2020     08/10/2020         CMP:   Recent Labs   Lab 20  0628 20  0559   * 136   K 4.3 4.3    102   CO2 28 27    106   BUN 10 10   CREATININE 0.8 0.7   CALCIUM 8.3* 8.2*   PROT 5.4* 5.4*   ALBUMIN 2.7* 2.6*   BILITOT 0.6 0.5   ALKPHOS 165* 182*   AST 21 18   ALT 17 16   ANIONGAP 6* 7*   EGFRNONAA >60 >60     Blood cultures NGTD  3d ago    Urine Culture, Routine Abnormal   ESCHERICHIA COLI   >100,000 cfu/ml     Resulting Agency [facility]   Susceptibility     Escherichia coli     CULTURE, URINE     Amox/K Clav'ate <=8/4 mcg/mL Sensitive     Amp/Sulbactam <=8/4 mcg/mL Sensitive     Ampicillin <=8 mcg/mL Sensitive     Cefazolin <=2 mcg/mL Sensitive     Cefepime <=2 mcg/mL Sensitive     Ceftriaxone <=1 mcg/mL Sensitive     Ciprofloxacin >2 mcg/mL Resistant     Ertapenem <=0.5 mcg/mL Sensitive     Gentamicin <=4 mcg/mL Sensitive     Levofloxacin >4 mcg/mL Resistant     Meropenem <=1 mcg/mL Sensitive     Nitrofurantoin <=32 mcg/mL Sensitive     Piperacillin/Tazo <=16 mcg/mL Sensitive     Tetracycline <=4 mcg/mL Sensitive     Tobramycin <=4 mcg/mL Sensitive     Trimeth/Sulfa <=2/38 mcg/mL Sensitive             Significant Imagin/2 CT head No acute abnormality.  8/3 ct lumbar spine Multilevel degenerative changes of the lumbar spine without evidence for fracture or subluxation.    8/3 CT lumbar spine  Multilevel degenerative changes of the lumbar spine without evidence for fracture or subluxation    /3 CT abd and pelvis Acute fractures  involving the right 6 through 11th ribs with associated moderate-sized right pleural effusion with adjacent passive atelectasis.  Some of the rib fractures are segmented.  Please note that all the ribs were not imaged and additional fractures of the ribs cannot be excluded.     There is a small amount of subcapsular fluid seen along the medial margin of the right hepatic lobe with a region of indeterminate hypoattenuation involving the liver.  In the setting of trauma, underlying hepatic contusion/laceration not excluded.     Thickening of the walls of the urinary bladder with surrounding inflammation suggesting a cystitis.     Constipation.    8/3 cxr There is a layering large right-sided pleural effusion with passive atelectasis of the right lung base, seen to a better extent on previous CT.  The left lung is clear.  Heart size is normal.  Calcified atheromatous disease affects the aorta.  Right-sided rib fracture seen to a better extent on recent CT scan.  No pneumothorax.       8/3 right rib view FINDINGS:  There are acute fractures involving the right 4th through 11th ribs, some of these fractures are segment, seen to a better extent on recent CT scan.  No pneumothorax.

## 2020-08-12 NOTE — PROGRESS NOTES
[facility] Medicine  Progress Note    Patient Name: Estefania Mccollum  MRN: [id number]  Patient Class: IP- Swing   Admission Date: 7/31/2020  Length of Stay: 12 days  Attending Physician: Manish Lynn MD  Primary Care Provider: Danna Levine MD        Subjective:     Principal Problem:Debility        HPI:  85 year old female admitted with UTI being admitted to Swing for PT. She lives at home with only her 91 year old , who is unable to lift her or transfer her safely. She is weak. She needs to be strong enough to perform her ADL's on her own. If she is unable to reach this goal, she may need nursing home placement.     Overview/Hospital Course:  8/3 This patient was placed on SKILL unit for cont abx to treat UTI as well as PT. She has become debilitated and weak at home resulting in multiple falls even with walker. Lives at home with elderly frail . Her goal is to ambulate 50 ft with min ass using RW. Yesterday she did bed mobility with min assist. Sat she was max assist to transfer.     8/4 this patient is on skill for cont therapy as she was not strong enough to be safely discharged to home with her elderly frail . She is demented and has no complaints except she wants to go home. She complains of no pain but has not been walking and always laying on right side. CT head done that did not show any acute path. NA was low yesterday so started on NS at 75ml/hr. This has improved 125>132. Ct of abd and pelvis as well as lumbar spine done yesterday showing 4-11 right rib fractures with right sided effusion and poss liver laceration. H/H stable. Ct of spine shows no fractures or dislocation. She remains max assist for any transfers and mod assist with bed mobility. Remains on levaquin for UTI and maintaining sats on RA 93-96% NAD.     8/5 pt remains on SKILL for therapy. Noted rib fractures from fall yesterday and discussed her condition with daughter Emani as well as  . With PT she remains mod assist with rolling in bed and max assist to get up in bed /stand/transfer. This is very concerning for her safety when going home with feeble . Had long discussion with Emani who was calling her sister Autumn to discuss possible placement at d/c as home with sitters may be a challenge goven her current condition.  on case and PASSAR completed     Na better with NS fluids. Na 125>134    8/7 Pt  remains on SKILL for therapy. Noted rib fractures from fall 2 days ago.  With PT she remains mod assist with rolling in bed and max assist to get up in bed /stand/transfer but has progressed .   Stand pivot bed<>w/c trng; Sit<>Stand x4; Sitting tolerance and Standing balance trng with RW; Provided tactile and verbal cues for postural trng; Gait trng with RW x ~250 feet with min assistance and cues; LE bike ex x 5 mins  Concerns over home safety have been discussed with family; lives wht 92 YO feeble . SW/case management following.   PASSAR completed    8/10/20 Pt  remains on SKILL for therapy due to debility s/p recent rib fractures, Awake and alert this am,   · Gait: Patient ambulated 150 feet with RW and CGA. One rest break after 75 feet. Patient requries manual assistance to keep walker on straight path due to deviation to the Left.   · Balance: Sitting Fair, Standing Fair-/poor  VSS, afebrile , O2 sat 92% on RA   Urine Cx resulted - + Ecoli- resistant to Levaquin, has allergy to tetracycline, adding  augmentin with probiotics     8/12/20  Remains on skilled for PT/OT, for debility recent rib Fx,   Minimal Assistance and cues x ~225 feet (2 times rest periods)  with RW  Afebrile , VSS O2 sat stable on RA , Day 3 Augmentin for UTI   Planned for D/C to home with HH and sitters; maybe Friday       Review of Systems   Unable to perform ROS: Dementia     Objective:     Vital Signs (Most Recent):  Temp: 97.5 °F (36.4 °C) (08/12/20 0749)  Pulse: 78 (08/12/20 0749)  Resp: 18  (08/12/20 0749)  BP: 136/63 (08/12/20 0749)  SpO2: (!) 93 % (08/12/20 0750) Vital Signs (24h Range):  Temp:  [97.5 °F (36.4 °C)-97.8 °F (36.6 °C)] 97.5 °F (36.4 °C)  Pulse:  [78-82] 78  Resp:  [18] 18  SpO2:  [93 %-95 %] 93 %  BP: (115-136)/(58-63) 136/63     Weight: 67 kg (147 lb 11.3 oz)  Body mass index is 22.46 kg/m².    Physical Exam  Vitals signs and nursing note reviewed.   Constitutional:       Appearance: She is well-developed.   HENT:      Head: Normocephalic and atraumatic.      Right Ear: External ear normal.      Left Ear: External ear normal.      Nose: Nose normal.   Eyes:      General: No scleral icterus.     Conjunctiva/sclera: Conjunctivae normal.      Pupils: Pupils are equal, round, and reactive to light.   Neck:      Musculoskeletal: Normal range of motion and neck supple.      Thyroid: No thyromegaly.      Vascular: No JVD.      Trachea: No tracheal deviation.   Cardiovascular:      Rate and Rhythm: Normal rate.      Heart sounds: Normal heart sounds. No murmur.   Pulmonary:      Effort: Pulmonary effort is normal. No respiratory distress.      Breath sounds: Normal breath sounds. No wheezing or rales.   Chest:      Chest wall: No tenderness.   Abdominal:      General: Bowel sounds are normal. There is no distension.      Palpations: Abdomen is soft. There is no mass.      Tenderness: There is no abdominal tenderness. There is no guarding or rebound.   Musculoskeletal: Normal range of motion.         General: No tenderness.      Comments: VERY kyphotic   Lymphadenopathy:      Cervical: No cervical adenopathy.   Skin:     General: Skin is warm and dry.   Neurological:      General: No focal deficit present.      Mental Status: She is alert.      Motor: No abnormal muscle tone.      Deep Tendon Reflexes: Reflexes are normal and symmetric.      Comments: Alert. Oriented to place and person    Psychiatric:         Behavior: Behavior normal.         Thought Content: Thought content normal.          Judgment: Judgment normal.           CRANIAL NERVES     CN III, IV, VI   Pupils are equal, round, and reactive to light.            Significant Labs:  Lab Results   Component Value Date    WBC 6.58 08/10/2020    HGB 12.6 08/10/2020    HCT 38.6 08/10/2020    MCV 91 08/10/2020     08/10/2020         CMP:   Recent Labs   Lab 20  0628 20  0559   * 136   K 4.3 4.3    102   CO2 28 27    106   BUN 10 10   CREATININE 0.8 0.7   CALCIUM 8.3* 8.2*   PROT 5.4* 5.4*   ALBUMIN 2.7* 2.6*   BILITOT 0.6 0.5   ALKPHOS 165* 182*   AST 21 18   ALT 17 16   ANIONGAP 6* 7*   EGFRNONAA >60 >60     Blood cultures NGTD  3d ago    Urine Culture, Routine Abnormal   ESCHERICHIA COLI   >100,000 cfu/ml     Resulting Agency [facility]   Susceptibility     Escherichia coli     CULTURE, URINE     Amox/K Clav'ate <=8/4 mcg/mL Sensitive     Amp/Sulbactam <=8/4 mcg/mL Sensitive     Ampicillin <=8 mcg/mL Sensitive     Cefazolin <=2 mcg/mL Sensitive     Cefepime <=2 mcg/mL Sensitive     Ceftriaxone <=1 mcg/mL Sensitive     Ciprofloxacin >2 mcg/mL Resistant     Ertapenem <=0.5 mcg/mL Sensitive     Gentamicin <=4 mcg/mL Sensitive     Levofloxacin >4 mcg/mL Resistant     Meropenem <=1 mcg/mL Sensitive     Nitrofurantoin <=32 mcg/mL Sensitive     Piperacillin/Tazo <=16 mcg/mL Sensitive     Tetracycline <=4 mcg/mL Sensitive     Tobramycin <=4 mcg/mL Sensitive     Trimeth/Sulfa <=2/38 mcg/mL Sensitive             Significant Imagin/2 CT head No acute abnormality.  /3 ct lumbar spine Multilevel degenerative changes of the lumbar spine without evidence for fracture or subluxation.    /3 CT lumbar spine  Multilevel degenerative changes of the lumbar spine without evidence for fracture or subluxation    8/3 CT abd and pelvis Acute fractures involving the right 6 through 11th ribs with associated moderate-sized right pleural effusion with adjacent passive atelectasis.  Some of the rib fractures are segmented.  Please note  that all the ribs were not imaged and additional fractures of the ribs cannot be excluded.     There is a small amount of subcapsular fluid seen along the medial margin of the right hepatic lobe with a region of indeterminate hypoattenuation involving the liver.  In the setting of trauma, underlying hepatic contusion/laceration not excluded.     Thickening of the walls of the urinary bladder with surrounding inflammation suggesting a cystitis.     Constipation.    8/3 cxr There is a layering large right-sided pleural effusion with passive atelectasis of the right lung base, seen to a better extent on previous CT.  The left lung is clear.  Heart size is normal.  Calcified atheromatous disease affects the aorta.  Right-sided rib fracture seen to a better extent on recent CT scan.  No pneumothorax.       8/3 right rib view FINDINGS:  There are acute fractures involving the right 4th through 11th ribs, some of these fractures are segment, seen to a better extent on recent CT scan.  No pneumothorax.           Assessment/Plan:      * Debility  Swing for PT ; currently max assist with transfer   GOAL>>Gait  x ~50  feet with Minimal Assistance with or without rolling walker  Check CT spine and pelvis today    Noted rib fractures  She is not progressing but she will be slow due to the newly found rib fractures, this also raises more concern for her safety at home;  has started PASSAR     8/5 she is approved till Friday and will keep her here for rehab as long as she can make some progress and insurance approves her stay but did discuss d/c plans with daughter yesterday. Will reach out to local nursing homes incase she does not progress and needs facility at d/c   8/7 Stand pivot bed<>w/c trng; Sit<>Stand x4; Sitting tolerance and Standing balance trng with RW; Provided tactile and verbal cues for postural trng; Gait trng with RW x ~250 feet with min assistance and cues; LE bike ex x 5 mins  Awaiting approval for  more skilled days prior to D/C home with elderly  , family planning for sitters also; they defer nursing home , family making adjustments to home for safety   8/10 Gait: Patient ambulated 150 feet with RW and CGA. One rest break after 75 feet. Patient requries manual assistance to keep walker on straight path due to deviation to the Left. Balance: Sitting Fair, Standing Fair-/poor    8/12 Minimal Assistance and cues x ~225 feet (2 times rest periods)  with RW    Closed fracture of multiple ribs of right side with routine healing  Ribs 4-11 noted on imaging  Denies pain  Impeding her progress however  Cont PT  PRN Tylenol for pain control      Liver contusion  Likely liver contusion from recent falls noted on CT scan  H/H stable  Non-tender abdomen  Monitor closely but no clinical evidence of true liver lac that would require surgical intervention more likely hematoma       Hyponatremia  I think this is a reflection of her very poor diet  Start remeron  Ns at 75ml/hr   Na better 134 today  8/7 NA normal today     Acute cystitis with hematuria  Continue levaquin for 7 days total for complicated UTI   Getting dose q 48hr 7/29, 7/31, 8/2, 8/4 ct Monday still showing cystitis, will cont levaquin  8/7 levofloxacin dose continued 8/6; has had 5 doses  will repeat urinalysis today   8/10 Repeat UA- positive nitrites , leuks- no fever;  Urine culture- resistant to Levofloxacin ; + allergy to tetracyline   has lost IV acess, will add oral abx       Frequent falls  Swing for PT.   Consider nursing home placement if she does not progress   Will have case management start PASSBILLY smart  Check CT spine and pelvis today  Noted right rib fractures  Consider placement at d/c   8/7 CT of lumbar spine stable     Memory loss  Continue donepezil and namenda       Hyperlipidemia  Continue pravastatin         Hypothyroidism  Continue synthroid 100mcg   Lab Results   Component Value Date    TSH 2.578 12/03/2019            Hypertension  Continue lisinopril   bp 145//67      VTE Risk Mitigation (From admission, onward)         Ordered     IP VTE HIGH RISK PATIENT  Once      07/31/20 1636     Place sequential compression device  Until discontinued      07/31/20 1636                      Manish Lynn MD  Department of Hospital Medicine   [facility]

## 2020-08-12 NOTE — PT/OT/SLP PROGRESS
"Physical Therapy Treatment    Patient Name:  Estefania Mccollum   MRN:  [id number]    Recommendations:     Discharge Recommendations:  nursing facility, basic   Discharge Equipment Recommendations: hospital bed, lift device   Barriers to discharge: Decreased caregiver support    Assessment:     Estefania Mccollum is a 85 y.o. female admitted with a medical diagnosis of Debility.  She presents with the following impairments/functional limitations:  weakness, impaired endurance, impaired self care skills, impaired functional mobilty, gait instability, impaired cognition, impaired balance, decreased coordination, decreased upper extremity function, decreased lower extremity function, decreased safety awareness. Patient exhibits fear of falling during transfers bed<>wheelchair. Noted increased tolerance with gait functions using RW at the hallway with increased distance.    Rehab Prognosis: Fair; patient would benefit from acute skilled PT services to address these deficits and reach maximum level of function.    Recent Surgery: * No surgery found *      Plan:     During this hospitalization, patient to be seen daily to address the identified rehab impairments via gait training, therapeutic activities, therapeutic exercises and progress toward the following goals:    · Plan of Care Expires:  08/14/20    Subjective     Chief Complaint: Patient stated, "Don't let me fall".  Patient/Family Comments/goals: "To move around better"  Pain/Comfort:  · Pain Rating 1: 0/10  · Pain Rating Post-Intervention 1: 0/10      Objective:     Communicated with patient prior to session.  Patient found supine with telemetry upon PT entry to room.     General Precautions: Standard, fall   Orthopedic Precautions:N/A   Braces: N/A     Functional Mobility:  · Bed Mobility:     · Rolling Left:  minimal/contact gaurd assistance and cues  · Rolling Right: minimal/contact gaurd assistance and cues  · Scooting: minimal assistance and cues  · Supine to Sit: " minimal/contact gaurd assistance and cues  · Sit to Supine: minimal/contact gaurd assistance and cues  · Transfers:     · Sit to Stand:  minimal assistance and cues with rolling walker  · Bed to Chair: moderate/minimal assistance and cues with  hand-held assist  using  Stand Pivot  · Gait: Contact Gaurd Assistance and cues x ~250 feet with RW. Slouch forward  kyphotic & flexed head posture and dec stride length/].  · Balance: Standing with RW Static: Fair; Dynamic; Fair-      AM-PAC 6 CLICK MOBILITY  Turning over in bed (including adjusting bedclothes, sheets and blankets)?: 4  Sitting down on and standing up from a chair with arms (e.g., wheelchair, bedside commode, etc.): 3  Moving from lying on back to sitting on the side of the bed?: 3  Moving to and from a bed to a chair (including a wheelchair)?: 3  Need to walk in hospital room?: 3  Climbing 3-5 steps with a railing?: 1  Basic Mobility Total Score: 17       Therapeutic Activities and Exercises:   Worked on body sequence with safety measures on out of bed activity and transfers: balance and postural trng; Gait trng RW x ~250 feet with contact guard assistance.    Patient left up in a wheelchair for sitting actiivty for 1 hour and after assisted patient back to bed at supine position. with all lines intact, call button in reach, bed alarm on, nursing notified and  present..    GOALS:   Multidisciplinary Problems     Physical Therapy Goals        Problem: Physical Therapy Goal    Goal Priority Disciplines Outcome Goal Variances Interventions   Physical Therapy Goal     PT, PT/OT      Description: Goals to be met by: 20    Patient will increase functional independence with mobility by performin. Supine to sit with Contact Guard Assistance  2. Sit to supine with Contact Guard Assistance  3. Bed to chair transfer with Minimal/Contact Guard Assistancewith or without rolling walker using Stand Pivot TECHNIQUE  4. Gait  x ~50  feet with Minimal  Assistance with or without rolling walker - met 8/10/20   8/10/20 - Revised goal:  4 a) Gait x ~200 feet with minimal/contact guard assistance and cues  5. Lower extremity exercise program x10 reps per handout, with assistance as needed                Multidisciplinary Problems (Resolved)        Problem: Physical Therapy Goal    Goal Priority Disciplines Outcome Goal Variances Interventions   Physical Therapy Goal   (Resolved)     PT, PT/OT  Error     Description: Patient will increase functional independence with mobility by performin. Supine to sit with Contact Guard Assistance  2. Sit to supine with Contact Guard Assistance  3. Bed to chair transfer with Minimal/Contact Guard Assistancewith or without rolling walker using Stand Pivot TECHNIQUE  4. Gait  x ~50  feet with Minimal Assistance with or without rolling walker  5. Lower extremity exercise program x10 reps per handout, with assistance as needed                   Time Tracking:     PT Received On: 20  PT Start Time: 1327     PT Stop Time: 1400  PT Total Time (min): 33 min     Billable Minutes: Gait Training 15 and Therapeutic Activity 15    Treatment Type: Treatment  PT/PTA: PT     PTA Visit Number: 1     Cricket Gaytan, PT  2020

## 2020-08-12 NOTE — PLAN OF CARE
Revised Goals to be met by: 20    Patient will increase functional independence with mobility by performin. Supine to sit with Contact Guard Assistance  2. Sit to supine with Contact Guard Assistance  3. Bed to chair transfer with Minimal/Contact Guard Assistancewith or without rolling walker using Stand Pivot TECHNIQUE  4. Gait  x ~50  feet with Minimal Assistance with or without rolling walker - met 8/10/20   8/10/20 - Revised goal:  4 a) Gait x ~200 feet with minimal/contact guard assistance and cues  5. Lower extremity exercise program x10 reps per handout, with assistance as needed

## 2020-08-12 NOTE — PLAN OF CARE
Problem: Fall Injury Risk  Goal: Absence of Fall and Fall-Related Injury  Outcome: Ongoing, Progressing     Problem: Skin Injury Risk Increased  Goal: Skin Health and Integrity  Outcome: Ongoing, Progressing     Problem: Muscle Strength Impairment  Goal: Improved Muscle Strength  Outcome: Ongoing, Progressing    Encouraged to turn q2hr. Reorients to time with ques. No acute distress this night.

## 2020-08-12 NOTE — ASSESSMENT & PLAN NOTE
Continue levaquin for 7 days total for complicated UTI   Getting dose q 48hr 7/29, 7/31, 8/2, 8/4 ct Monday still showing cystitis, will cont levaquin  8/7 levofloxacin dose continued 8/6; has had 5 doses  will repeat urinalysis today   8/10 Repeat UA- positive nitrites , leuks- no fever;  Urine culture- resistant to Levofloxacin ; + allergy to tetracyline   has lost IV acess, will add oral abx

## 2020-08-13 LAB
ALBUMIN SERPL BCP-MCNC: 2.8 G/DL (ref 3.5–5.2)
ALP SERPL-CCNC: 191 U/L (ref 55–135)
ALT SERPL W/O P-5'-P-CCNC: 15 U/L (ref 10–44)
ANION GAP SERPL CALC-SCNC: 8 MMOL/L (ref 8–16)
ANION GAP SERPL CALC-SCNC: 9 MMOL/L (ref 8–16)
AST SERPL-CCNC: 20 U/L (ref 10–40)
BASOPHILS # BLD AUTO: 0.06 K/UL (ref 0–0.2)
BASOPHILS NFR BLD: 0.9 % (ref 0–1.9)
BILIRUB SERPL-MCNC: 0.4 MG/DL (ref 0.1–1)
BUN SERPL-MCNC: 11 MG/DL (ref 8–23)
BUN SERPL-MCNC: 12 MG/DL (ref 8–23)
CALCIUM SERPL-MCNC: 8.4 MG/DL (ref 8.7–10.5)
CALCIUM SERPL-MCNC: 8.5 MG/DL (ref 8.7–10.5)
CHLORIDE SERPL-SCNC: 101 MMOL/L (ref 95–110)
CHLORIDE SERPL-SCNC: 102 MMOL/L (ref 95–110)
CO2 SERPL-SCNC: 26 MMOL/L (ref 23–29)
CO2 SERPL-SCNC: 26 MMOL/L (ref 23–29)
CREAT SERPL-MCNC: 0.7 MG/DL (ref 0.5–1.4)
CREAT SERPL-MCNC: 0.8 MG/DL (ref 0.5–1.4)
DIFFERENTIAL METHOD: ABNORMAL
EOSINOPHIL # BLD AUTO: 0.2 K/UL (ref 0–0.5)
EOSINOPHIL NFR BLD: 3.6 % (ref 0–8)
ERYTHROCYTE [DISTWIDTH] IN BLOOD BY AUTOMATED COUNT: 13 % (ref 11.5–14.5)
EST. GFR  (AFRICAN AMERICAN): >60 ML/MIN/1.73 M^2
EST. GFR  (AFRICAN AMERICAN): >60 ML/MIN/1.73 M^2
EST. GFR  (NON AFRICAN AMERICAN): >60 ML/MIN/1.73 M^2
EST. GFR  (NON AFRICAN AMERICAN): >60 ML/MIN/1.73 M^2
GLUCOSE SERPL-MCNC: 96 MG/DL (ref 70–110)
GLUCOSE SERPL-MCNC: 97 MG/DL (ref 70–110)
HCT VFR BLD AUTO: 36 % (ref 37–48.5)
HGB BLD-MCNC: 11.6 G/DL (ref 12–16)
IMM GRANULOCYTES # BLD AUTO: 0.03 K/UL (ref 0–0.04)
IMM GRANULOCYTES NFR BLD AUTO: 0.5 % (ref 0–0.5)
LYMPHOCYTES # BLD AUTO: 1.8 K/UL (ref 1–4.8)
LYMPHOCYTES NFR BLD: 26.9 % (ref 18–48)
MAGNESIUM SERPL-MCNC: 2.1 MG/DL (ref 1.6–2.6)
MCH RBC QN AUTO: 29.6 PG (ref 27–31)
MCHC RBC AUTO-ENTMCNC: 32.2 G/DL (ref 32–36)
MCV RBC AUTO: 92 FL (ref 82–98)
MONOCYTES # BLD AUTO: 0.5 K/UL (ref 0.3–1)
MONOCYTES NFR BLD: 7.6 % (ref 4–15)
NEUTROPHILS # BLD AUTO: 4 K/UL (ref 1.8–7.7)
NEUTROPHILS NFR BLD: 60.5 % (ref 38–73)
NRBC BLD-RTO: 0 /100 WBC
PHOSPHATE SERPL-MCNC: 3.3 MG/DL (ref 2.7–4.5)
PLATELET # BLD AUTO: 399 K/UL (ref 150–350)
PMV BLD AUTO: 8.8 FL (ref 9.2–12.9)
POTASSIUM SERPL-SCNC: 4.2 MMOL/L (ref 3.5–5.1)
POTASSIUM SERPL-SCNC: 4.3 MMOL/L (ref 3.5–5.1)
PROT SERPL-MCNC: 5.7 G/DL (ref 6–8.4)
RBC # BLD AUTO: 3.92 M/UL (ref 4–5.4)
SODIUM SERPL-SCNC: 136 MMOL/L (ref 136–145)
SODIUM SERPL-SCNC: 136 MMOL/L (ref 136–145)
WBC # BLD AUTO: 6.58 K/UL (ref 3.9–12.7)

## 2020-08-13 PROCEDURE — 94760 N-INVAS EAR/PLS OXIMETRY 1: CPT | Mod: HCNC

## 2020-08-13 PROCEDURE — 84100 ASSAY OF PHOSPHORUS: CPT | Mod: HCNC

## 2020-08-13 PROCEDURE — 25000003 PHARM REV CODE 250: Mod: HCNC | Performed by: FAMILY MEDICINE

## 2020-08-13 PROCEDURE — 25000003 PHARM REV CODE 250: Mod: HCNC | Performed by: NURSE PRACTITIONER

## 2020-08-13 PROCEDURE — 97530 THERAPEUTIC ACTIVITIES: CPT | Mod: HCNC

## 2020-08-13 PROCEDURE — 97535 SELF CARE MNGMENT TRAINING: CPT | Mod: HCNC

## 2020-08-13 PROCEDURE — 80053 COMPREHEN METABOLIC PANEL: CPT | Mod: HCNC

## 2020-08-13 PROCEDURE — 36415 COLL VENOUS BLD VENIPUNCTURE: CPT | Mod: HCNC

## 2020-08-13 PROCEDURE — 97116 GAIT TRAINING THERAPY: CPT | Mod: HCNC

## 2020-08-13 PROCEDURE — 80048 BASIC METABOLIC PNL TOTAL CA: CPT | Mod: HCNC

## 2020-08-13 PROCEDURE — 85025 COMPLETE CBC W/AUTO DIFF WBC: CPT | Mod: HCNC

## 2020-08-13 PROCEDURE — 83735 ASSAY OF MAGNESIUM: CPT | Mod: HCNC

## 2020-08-13 PROCEDURE — 11000004 HC SNF PRIVATE: Mod: HCNC

## 2020-08-13 RX ADMIN — LISINOPRIL 20 MG: 20 TABLET ORAL at 09:08

## 2020-08-13 RX ADMIN — LACTOBACILLUS ACIDOPHILUS / LACTOBACILLUS BULGARICUS 1 EACH: 100 MILLION CFU STRENGTH GRANULES at 09:08

## 2020-08-13 RX ADMIN — MEMANTINE 10 MG: 10 TABLET ORAL at 09:08

## 2020-08-13 RX ADMIN — LACTULOSE 20 G: 20 SOLUTION ORAL at 09:08

## 2020-08-13 RX ADMIN — ASPIRIN 81 MG: 81 TABLET, COATED ORAL at 09:08

## 2020-08-13 RX ADMIN — STANDARDIZED SENNA CONCENTRATE AND DOCUSATE SODIUM 1 TABLET: 8.6; 5 TABLET ORAL at 09:08

## 2020-08-13 RX ADMIN — SERTRALINE HYDROCHLORIDE 25 MG: 25 TABLET ORAL at 09:08

## 2020-08-13 RX ADMIN — DONEPEZIL HYDROCHLORIDE 5 MG: 5 TABLET, FILM COATED ORAL at 09:08

## 2020-08-13 RX ADMIN — MIRTAZAPINE 15 MG: 15 TABLET, ORALLY DISINTEGRATING ORAL at 09:08

## 2020-08-13 RX ADMIN — LEVOTHYROXINE SODIUM 100 MCG: 100 TABLET ORAL at 07:08

## 2020-08-13 RX ADMIN — AMOXICILLIN AND CLAVULANATE POTASSIUM 1 TABLET: 875; 125 TABLET, FILM COATED ORAL at 09:08

## 2020-08-13 RX ADMIN — PRAVASTATIN SODIUM 80 MG: 40 TABLET ORAL at 09:08

## 2020-08-13 NOTE — PT/OT/SLP PROGRESS
Occupational Therapy   Treatment    Name: Estefania Mccollum  MRN: [id number]  Admitting Diagnosis:  Debility       Recommendations:     Discharge Recommendations: nursing facility, basic  Discharge Equipment Recommendations:  hospital bed, lift device  Barriers to discharge:  Decreased caregiver support    Assessment:     Estefania Mccollum is a 85 y.o. female with a medical diagnosis of Debility.  She presents with good participation and improvement in transfers. Performance deficits affecting function are weakness, gait instability, decreased upper extremity function, impaired endurance, decreased safety awareness, impaired cognition, pain, impaired self care skills, impaired functional mobilty.     Rehab Prognosis:  Fair; patient would benefit from acute skilled OT services to address these deficits and reach maximum level of function.       Plan:     Patient to be seen 5 x/week to address the above listed problems via self-care/home management, therapeutic activities, therapeutic exercises  · Plan of Care Expires: 08/17/20  · Plan of Care Reviewed with: patient    Subjective     Pain/Comfort:  · Pain Rating 1: 0/10    Objective:     Communicated with: Nursing prior to session.  Patient found supine with telemetry upon OT entry to room.    General Precautions: Standard, fall   Orthopedic Precautions:N/A   Braces: N/A     Occupational Performance:     Bed Mobility:    · Patient completed Rolling/Turning to Left with  modified independence  · Patient completed Rolling/Turning to Right with modified independence  · Patient completed Supine to Sit with contact guard assistance  · Patient completed Sit to Supine with contact guard assistance     Functional Mobility/Transfers:  · Patient completed Sit <> Stand Transfer with contact guard assistance  with  rolling walker   · Patient completed Toilet Transfer Step Transfer technique with contact guard assistance with  rolling walker x 3 trials  · Functional Mobility: with RW with  CGA    Activities of Daily Living:  · Grooming: stand by assistance sitting EOB to brush teeth and comb hair      AMPAC 6 Click ADL: 15    Treatment & Education:  Therapist facilitated toilet tranfers with CGA and grooming sitting EOB with frequent verbal cues for postural control.    Patient left supine with all lines intact and call button in reachEducation:      GOALS:   Multidisciplinary Problems     Occupational Therapy Goals        Problem: Occupational Therapy Goal    Goal Priority Disciplines Outcome Interventions   Occupational Therapy Goal     OT, PT/OT Ongoing, Progressing    Description: Goals to be met by: 8/17/2020     Patient will increase functional independence with ADLs by performing:    Feeding with Modified [place].  UE Dressing with Modified [place].  LE Dressing with Minimal Assistance.  Grooming while seated with Modified [place].  Toileting from bedside commode with Minimal Assistance for hygiene and clothing management.   Sitting at edge of bed x5 minutes with Stand-by Assistance.  Supine to sit with Stand-by Assistance.  Step transfer with Contact Guard Assistance  Toilet transfer to bedside commode with Minimal Assistance.                     Time Tracking:     OT Date of Treatment: 08/13/20  OT Start Time: 0959  OT Stop Time: 1023  OT Total Time (min): 24 min    Billable Minutes:Self Care/Home Management 24 minutes    Gilles Bernard OT  8/13/2020

## 2020-08-13 NOTE — PT/OT/SLP PROGRESS
"Physical Therapy Treatment    Patient Name:  Estefania Mccollum   MRN:  [id number]    Recommendations:     Discharge Recommendations:  nursing facility, basic, home with home health, home health PT   Discharge Equipment Recommendations: hospital bed, lift device   Barriers to discharge: Decreased caregiver support    Assessment:     Estefania Mccollum is a 85 y.o. female admitted with a medical diagnosis of Debility.  She presents with the following impairments/functional limitations:  weakness, gait instability, impaired functional mobilty, impaired self care skills, impaired balance, impaired endurance, impaired cognition, decreased safety awareness, pain. Patient tolerated well PT session today. Patient has increased gait distance the length of 2 hallways using RW. Continue to provide constant cues to keep head up for safety and directions. Patient remains with impaired posture( slouch forward thoracic and cervical kyphotic posture).    Rehab Prognosis: Fair; patient would benefit from acute skilled PT services to address these deficits and reach maximum level of function.    Recent Surgery: * No surgery found *      Plan:     During this hospitalization, patient to be seen daily to address the identified rehab impairments via gait training, therapeutic exercises, therapeutic activities and progress toward the following goals:    · Plan of Care Expires:  08/14/20    Subjective     Chief Complaint: no new symptoms  Patient/Family Comments/goals: 'to return home tomorrow".  Pain/Comfort:  · Pain Rating 1: 0/10  · Pain Rating Post-Intervention 1: 0/10      Objective:     Communicated with patient and  prior to session.  Patient found HOB elevated with telemetry upon PT entry to room.     General Precautions: Standard, fall   Orthopedic Precautions:N/A   Braces: N/A     Functional Mobility:  · Bed Mobility:     · Rolling Left:  modified independence  · Rolling Right: modified independence  · Scooting: minimal/contact " guard assistance and cues  · Supine to Sit: minimal/contact guard assistance and cues  · Sit to Supine: minimal/contact guard assistance and cues  · Transfers:     · Sit to Stand:  contact guard assistance and  cues with rolling walker  · Bed to Chair: minimal/contact guard assistance and cues with  hand-held assist  using  Stand Pivot  · Gait: Contact guard assistane and cues x ~300 feet with RW  · Balance: Standing with RW Static: Fair; Dynmaic: Fair-      AM-PAC 6 CLICK MOBILITY  Turning over in bed (including adjusting bedclothes, sheets and blankets)?: 4  Sitting down on and standing up from a chair with arms (e.g., wheelchair, bedside commode, etc.): 3  Moving from lying on back to sitting on the side of the bed?: 3  Moving to and from a bed to a chair (including a wheelchair)?: 3  Need to walk in hospital room?: 3  Climbing 3-5 steps with a railing?: 1  Basic Mobility Total Score: 17       Therapeutic Activities and Exercises:   Safety education  And trained body sequebnce on bed mobility, repositioning, out of bed activity and transfers. Gait trng RW x ~300 feet with contact guard assistance and cues at 2 hallways.    Patient left supine with all lines intact, call button in reach, bed alarm on, nursing notified and  present..    GOALS:   Multidisciplinary Problems     Physical Therapy Goals        Problem: Physical Therapy Goal    Goal Priority Disciplines Outcome Goal Variances Interventions   Physical Therapy Goal     PT, PT/OT Ongoing, Progressing     Description: Goals to be met by: 20    Patient will increase functional independence with mobility by performin. Supine to sit with Contact Guard Assistance  2. Sit to supine with Contact Guard Assistance  3. Bed to chair transfer with Minimal/Contact Guard Assistancewith or without rolling walker using Stand Pivot TECHNIQUE  4. Gait  x ~50  feet with Minimal Assistance with or without rolling walker - met 8/10/20   8/10/20 - Revised  goal:  4 a) Gait x ~200 feet with minimal/contact guard assistance and cues  5. Lower extremity exercise program x10 reps per handout, with assistance as needed                Multidisciplinary Problems (Resolved)        Problem: Physical Therapy Goal    Goal Priority Disciplines Outcome Goal Variances Interventions   Physical Therapy Goal   (Resolved)     PT, PT/OT  Error     Description: Patient will increase functional independence with mobility by performin. Supine to sit with Contact Guard Assistance  2. Sit to supine with Contact Guard Assistance  3. Bed to chair transfer with Minimal/Contact Guard Assistancewith or without rolling walker using Stand Pivot TECHNIQUE  4. Gait  x ~50  feet with Minimal Assistance with or without rolling walker  5. Lower extremity exercise program x10 reps per handout, with assistance as needed                   Time Tracking:     PT Received On: 20  PT Start Time: 1502     PT Stop Time: 1535  PT Total Time (min): 33 min     Billable Minutes: Gait Training 15 and Therapeutic Activity 15    Treatment Type: Treatment  PT/PTA: PT     PTA Visit Number: 1     Cricket Gaytan, PT  2020

## 2020-08-13 NOTE — PLAN OF CARE
Problem: Physical Therapy Goal  Goal: Physical Therapy Goal  Description: Goals to be met by: 20    Patient will increase functional independence with mobility by performin. Supine to sit with Contact Guard Assistance  2. Sit to supine with Contact Guard Assistance  3. Bed to chair transfer with Minimal/Contact Guard Assistancewith or without rolling walker using Stand Pivot TECHNIQUE  4. Gait  x ~50  feet with Minimal Assistance with or without rolling walker - met 8/10/20   8/10/20 - Revised goal:  4 a) Gait x ~200 feet with minimal/contact guard assistance and cues  5. Lower extremity exercise program x10 reps per handout, with assistance as needed     Outcome: Ongoing, Progressing

## 2020-08-13 NOTE — PLAN OF CARE
IP-swing bed. PT/OT. Patient free of falls and incidence this shift. Patient with c/o constipation; lactulose ordered. Reported in handoff patient needing dose of lactulose tonight. Patient free of incidence and injury this shift. Tele-sitter in use. Monitor camera in view of patient.

## 2020-08-13 NOTE — PLAN OF CARE
08/13/20 1450   Post-Acute Status   Post-Acute Authorization Home Health   Post-Acute Placement Status Referrals Sent   Patient choice form signed by patient/caregiver List from [facility]   Discharge Plan   Discharge Plan A Home Health     Home Health is pending with [facility].

## 2020-08-14 LAB
ALBUMIN SERPL BCP-MCNC: 2.8 G/DL (ref 3.5–5.2)
ALP SERPL-CCNC: 195 U/L (ref 55–135)
ALT SERPL W/O P-5'-P-CCNC: 14 U/L (ref 10–44)
ANION GAP SERPL CALC-SCNC: 8 MMOL/L (ref 8–16)
AST SERPL-CCNC: 21 U/L (ref 10–40)
BILIRUB SERPL-MCNC: 0.4 MG/DL (ref 0.1–1)
BUN SERPL-MCNC: 10 MG/DL (ref 8–23)
CALCIUM SERPL-MCNC: 8.6 MG/DL (ref 8.7–10.5)
CHLORIDE SERPL-SCNC: 102 MMOL/L (ref 95–110)
CO2 SERPL-SCNC: 27 MMOL/L (ref 23–29)
CREAT SERPL-MCNC: 0.7 MG/DL (ref 0.5–1.4)
EST. GFR  (AFRICAN AMERICAN): >60 ML/MIN/1.73 M^2
EST. GFR  (NON AFRICAN AMERICAN): >60 ML/MIN/1.73 M^2
GLUCOSE SERPL-MCNC: 101 MG/DL (ref 70–110)
POTASSIUM SERPL-SCNC: 4.6 MMOL/L (ref 3.5–5.1)
PROT SERPL-MCNC: 5.7 G/DL (ref 6–8.4)
SODIUM SERPL-SCNC: 137 MMOL/L (ref 136–145)

## 2020-08-14 PROCEDURE — 25000003 PHARM REV CODE 250: Mod: HCNC | Performed by: NURSE PRACTITIONER

## 2020-08-14 PROCEDURE — 99232 PR SUBSEQUENT HOSPITAL CARE,LEVL II: ICD-10-PCS | Mod: HCNC,,, | Performed by: INTERNAL MEDICINE

## 2020-08-14 PROCEDURE — 11000004 HC SNF PRIVATE: Mod: HCNC

## 2020-08-14 PROCEDURE — 97535 SELF CARE MNGMENT TRAINING: CPT | Mod: HCNC

## 2020-08-14 PROCEDURE — 99232 SBSQ HOSP IP/OBS MODERATE 35: CPT | Mod: HCNC,,, | Performed by: INTERNAL MEDICINE

## 2020-08-14 PROCEDURE — 80053 COMPREHEN METABOLIC PANEL: CPT | Mod: HCNC

## 2020-08-14 PROCEDURE — 94761 N-INVAS EAR/PLS OXIMETRY MLT: CPT | Mod: HCNC

## 2020-08-14 PROCEDURE — 36415 COLL VENOUS BLD VENIPUNCTURE: CPT | Mod: HCNC

## 2020-08-14 RX ADMIN — MEMANTINE 10 MG: 10 TABLET ORAL at 09:08

## 2020-08-14 RX ADMIN — LACTOBACILLUS ACIDOPHILUS / LACTOBACILLUS BULGARICUS 1 EACH: 100 MILLION CFU STRENGTH GRANULES at 08:08

## 2020-08-14 RX ADMIN — AMOXICILLIN AND CLAVULANATE POTASSIUM 1 TABLET: 875; 125 TABLET, FILM COATED ORAL at 08:08

## 2020-08-14 RX ADMIN — AMOXICILLIN AND CLAVULANATE POTASSIUM 1 TABLET: 875; 125 TABLET, FILM COATED ORAL at 09:08

## 2020-08-14 RX ADMIN — STANDARDIZED SENNA CONCENTRATE AND DOCUSATE SODIUM 1 TABLET: 8.6; 5 TABLET ORAL at 08:08

## 2020-08-14 RX ADMIN — DONEPEZIL HYDROCHLORIDE 5 MG: 5 TABLET, FILM COATED ORAL at 09:08

## 2020-08-14 RX ADMIN — ASPIRIN 81 MG: 81 TABLET, COATED ORAL at 08:08

## 2020-08-14 RX ADMIN — LISINOPRIL 20 MG: 20 TABLET ORAL at 08:08

## 2020-08-14 RX ADMIN — PRAVASTATIN SODIUM 80 MG: 40 TABLET ORAL at 08:08

## 2020-08-14 RX ADMIN — LEVOTHYROXINE SODIUM 100 MCG: 100 TABLET ORAL at 06:08

## 2020-08-14 RX ADMIN — STANDARDIZED SENNA CONCENTRATE AND DOCUSATE SODIUM 1 TABLET: 8.6; 5 TABLET ORAL at 09:08

## 2020-08-14 RX ADMIN — LACTOBACILLUS ACIDOPHILUS / LACTOBACILLUS BULGARICUS 1 EACH: 100 MILLION CFU STRENGTH GRANULES at 09:08

## 2020-08-14 RX ADMIN — MIRTAZAPINE 15 MG: 15 TABLET, ORALLY DISINTEGRATING ORAL at 09:08

## 2020-08-14 RX ADMIN — MEMANTINE 10 MG: 10 TABLET ORAL at 08:08

## 2020-08-14 RX ADMIN — SERTRALINE HYDROCHLORIDE 25 MG: 25 TABLET ORAL at 09:08

## 2020-08-14 NOTE — ASSESSMENT & PLAN NOTE
Swing for PT ; currently max assist with transfer   GOAL>>Gait  x ~50  feet with Minimal Assistance with or without rolling walker  Check CT spine and pelvis today    Noted rib fractures  She is not progressing but she will be slow due to the newly found rib fractures, this also raises more concern for her safety at home;  has started PASSAR     8/5 she is approved till Friday and will keep her here for rehab as long as she can make some progress and insurance approves her stay but did discuss d/c plans with daughter yesterday. Will reach out to local nursing homes incase she does not progress and needs facility at d/c   8/7 Stand pivot bed<>w/c trng; Sit<>Stand x4; Sitting tolerance and Standing balance trng with RW; Provided tactile and verbal cues for postural trng; Gait trng with RW x ~250 feet with min assistance and cues; LE bike ex x 5 mins  Awaiting approval for more skilled days prior to D/C home with elderly  , family planning for sitters also; they defer nursing home , family making adjustments to home for safety   8/10 Gait: Patient ambulated 150 feet with RW and CGA. One rest break after 75 feet. Patient requries manual assistance to keep walker on straight path due to deviation to the Left. Balance: Sitting Fair, Standing Fair-/poor    8/12 Minimal Assistance and cues x ~225 feet (2 times rest periods)  with RW    8/14 Contact guard assistane and cues x ~300 feet with RW.

## 2020-08-14 NOTE — PLAN OF CARE
Pt remains on swing for debility. PT/OT and ST consulted. Walking well with PT using rolling walker. Vitals stable; afebrile. On day four augmentin for UTI. Denies pain. Pleasantly confused at times; reorienting as needed. Esitter at bedside. Remains free from injury/falls. Reviewed plan of care with pt; states agreement.

## 2020-08-14 NOTE — ASSESSMENT & PLAN NOTE
Swing for PT.   Consider nursing home placement if she does not progress   Will have case management start PASSAR incase  Check CT spine and pelvis today  Noted right rib fractures  Consider placement at d/c .  8/7 CT of lumbar spine stable   8/14 Contact guard assistane and cues x ~300 feet with RW; planned for D/C home with HH

## 2020-08-14 NOTE — PT/OT/SLP PROGRESS
Occupational Therapy   Treatment    Name: Estefania Mccollum  MRN: [id number]  Admitting Diagnosis:  Debility       Recommendations:     Discharge Recommendations: nursing facility, basic, home with home health, home health PT  Discharge Equipment Recommendations:  hospital bed, lift device  Barriers to discharge:  Decreased caregiver support    Assessment:     Estefania Mccollum is a 85 y.o. female with a medical diagnosis of Debility.  She presents with the following performance deficits affecting function are weakness, gait instability, impaired functional mobilty, impaired self care skills, impaired balance, impaired endurance, impaired cognition, decreased safety awareness, pain.    Rehab Prognosis:  Fair; patient would benefit from acute skilled OT services to address these deficits and reach maximum level of function.       Plan:     Patient to be seen 5 x/week to address the above listed problems via self-care/home management, therapeutic activities, therapeutic exercises  · Plan of Care Expires: 08/17/20  · Plan of Care Reviewed with: patient    Subjective     Pain/Comfort:  · Pain Rating 1: 0/10    Objective:     Communicated with: nursing and patient prior to session.  Patient found right sidelying with telemetry upon OT entry to room.    General Precautions: Standard, fall   Orthopedic Precautions:N/A   Braces: N/A     Occupational Performance:     Bed Mobility:    · Patient completed Rolling/Turning to Left with  stand by assistance  · Patient completed Rolling/Turning to Right with stand by assistance  · Patient completed Supine to Sit with minimum assistance  · Patient completed Sit to Supine with minimum assistance     Functional Mobility/Transfers:  · Not completed    Activities of Daily Living:  · Grooming: stand by assistance . Pt only agreeable to completing hair brushing while supine in bed.  · Lower Body Dressing: moderate assistance . Pt able to doff socks. Pt required assistance with initiating donning  socks by having therapist placing socks on toes and patient was able to pull socks on.      [facility] 6 Click ADL: 15    Treatment & Education:  Pt completed ADLs during this session. While sitting at EOB patient required CGA and verbal/tactile cueing to support self up on bed.     Patient left left sidelying with all lines intact and call button in reachEducation:      GOALS:   Multidisciplinary Problems     Occupational Therapy Goals        Problem: Occupational Therapy Goal    Goal Priority Disciplines Outcome Interventions   Occupational Therapy Goal     OT, PT/OT Ongoing, Progressing    Description: Goals to be met by: 8/17/2020     Patient will increase functional independence with ADLs by performing:    Feeding with Modified [place].  UE Dressing with Modified [place].  LE Dressing with Minimal Assistance.  Grooming while seated with Modified [place].  Toileting from bedside commode with Minimal Assistance for hygiene and clothing management.   Sitting at edge of bed x5 minutes with Stand-by Assistance.  Supine to sit with Stand-by Assistance.  Step transfer with Contact Guard Assistance  Toilet transfer to bedside commode with Minimal Assistance.                     Time Tracking:     OT Date of Treatment: 08/14/20  OT Start Time: 1429  OT Stop Time: 1442  OT Total Time (min): 13 min    Billable Minutes:Self Care/Home Management 13    Christina Jimenez OT  8/14/2020

## 2020-08-14 NOTE — PLAN OF CARE
Patient remains on falls precaution. Tele-sitter and monitor in use. PT/OT. Large BM today to relieve constipation. Patient received lactulose. Patient free of falls and incidence this shift. Spouse visited today at bedside.

## 2020-08-14 NOTE — ASSESSMENT & PLAN NOTE
Continue levaquin for 7 days total for complicated UTI   Getting dose q 48hr 7/29, 7/31, 8/2, 8/4 ct Monday still showing cystitis, will cont levaquin  8/7 levofloxacin dose continued 8/6; has had 5 doses  will repeat urinalysis today   8/10 Repeat UA- positive nitrites , leuks- no fever;  Urine culture- resistant to Levofloxacin ; + allergy to tetracyline   has lost IV acess, will add oral abx   8/14 Day 4 augmentin .

## 2020-08-14 NOTE — ASSESSMENT & PLAN NOTE
Ribs 4-11 noted on imaging  Denies pain  Impeding her progress however  Cont PT  PRN Tylenol for pain control.

## 2020-08-14 NOTE — ASSESSMENT & PLAN NOTE
Likely liver contusion from recent falls noted on CT scan  H/H stable.  Non-tender abdomen  Monitor closely but no clinical evidence of true liver lac that would require surgical intervention more likely hematoma

## 2020-08-14 NOTE — ASSESSMENT & PLAN NOTE
I think this is a reflection of her very poor diet  Start remeron  Ns at 75ml/hr   Na better 134 today  8/7 NA normal today   RESOLVED .

## 2020-08-14 NOTE — NURSING
Monitor tech reported pt Vtach on telemetry. Went into pts room; she was sitting in bed trying to eat breakfast with Bipap and venti mask lying beside her. Pts color was ashey; stating she wanted to eat breakfast. Spoke with pt about importance of her breathing instead of eating at this time.  Oxygen sats 70%. Immediately placed back on bipap 35% FIO2; oxygen sats up to 84%. Dr Hays at bedside. FIO2 increased to 45% and pts oxygen sats increased to 91%. Instructed pt on importance of keeping Bipap on; pt states understanding. CXR ordered. Plans to transfer to ICU.

## 2020-08-14 NOTE — PT/OT/SLP PROGRESS
Physical Therapy      Patient Name:  Estefania Mccollum   MRN:  [id number]    Patient not seen today secondary to Not applicable, Patient unwilling to participate(Patient refused Physical Therapy treatment today.). Will follow-up tomorrow 8/15/20.    Cricket Gaytan, PT

## 2020-08-14 NOTE — SUBJECTIVE & OBJECTIVE
Review of Systems   Unable to perform ROS: Dementia     Objective:     Vital Signs (Most Recent):  Temp: 97.4 °F (36.3 °C) (08/14/20 0715)  Pulse: 72 (08/14/20 0715)  Resp: 17 (08/14/20 0715)  BP: 139/63 (08/14/20 0715)  SpO2: (!) 94 % (08/14/20 0735) Vital Signs (24h Range):  Temp:  [97.4 °F (36.3 °C)-98.8 °F (37.1 °C)] 97.4 °F (36.3 °C)  Pulse:  [72-84] 72  Resp:  [17-18] 17  SpO2:  [93 %-95 %] 94 %  BP: (119-139)/(58-63) 139/63     Weight: 67 kg (147 lb 11.3 oz)  Body mass index is 22.46 kg/m².    Intake/Output Summary (Last 24 hours) at 8/14/2020 1238  Last data filed at 8/13/2020 2218  Gross per 24 hour   Intake 240 ml   Output --   Net 240 ml      Physical Exam  Vitals signs and nursing note reviewed.   Constitutional:       Appearance: She is well-developed.   HENT:      Head: Normocephalic and atraumatic.      Right Ear: External ear normal.      Left Ear: External ear normal.      Nose: Nose normal.   Eyes:      General: No scleral icterus.     Conjunctiva/sclera: Conjunctivae normal.      Pupils: Pupils are equal, round, and reactive to light.   Neck:      Musculoskeletal: Normal range of motion and neck supple.      Thyroid: No thyromegaly.      Vascular: No JVD.      Trachea: No tracheal deviation.   Cardiovascular:      Rate and Rhythm: Normal rate.      Heart sounds: Normal heart sounds. No murmur.   Pulmonary:      Effort: Pulmonary effort is normal. No respiratory distress.      Breath sounds: Normal breath sounds. No wheezing or rales.   Chest:      Chest wall: No tenderness.   Abdominal:      General: Bowel sounds are normal. There is no distension.      Palpations: Abdomen is soft. There is no mass.      Tenderness: There is no abdominal tenderness. There is no guarding or rebound.   Musculoskeletal: Normal range of motion.         General: No tenderness.      Comments: VERY kyphotic   Lymphadenopathy:      Cervical: No cervical adenopathy.   Skin:     General: Skin is warm and dry.    Neurological:      General: No focal deficit present.      Mental Status: She is alert.      Motor: No abnormal muscle tone.      Deep Tendon Reflexes: Reflexes are normal and symmetric.      Comments: Alert. Oriented to place and person    Psychiatric:         Behavior: Behavior normal.         Thought Content: Thought content normal.         Judgment: Judgment normal.         Significant Labs:   CBC:   Recent Labs   Lab 08/13/20  0620   WBC 6.58   HGB 11.6*   HCT 36.0*   *     CMP:   Recent Labs   Lab 08/13/20  0620 08/14/20  0639     136 137   K 4.3  4.2 4.6     102 102   CO2 26  26 27   GLU 96  97 101   BUN 11  12 10   CREATININE 0.8  0.7 0.7   CALCIUM 8.5*  8.4* 8.6*   PROT 5.7* 5.7*   ALBUMIN 2.8* 2.8*   BILITOT 0.4 0.4   ALKPHOS 191* 195*   AST 20 21   ALT 15 14   ANIONGAP 9  8 8   EGFRNONAA >60  >60 >60

## 2020-08-14 NOTE — PROGRESS NOTES
[facility] Medicine  Progress Note    Patient Name: Estefania Mccollum  MRN: [id number]  Patient Class: IP- Swing   Admission Date: 7/31/2020  Length of Stay: 14 days  Attending Physician: Manish Lynn MD  Primary Care Provider: Danna Levine MD        Subjective:     Principal Problem:Debility        HPI:  85 year old female admitted with UTI being admitted to Swing for PT. She lives at home with only her 91 year old , who is unable to lift her or transfer her safely. She is weak. She needs to be strong enough to perform her ADL's on her own. If she is unable to reach this goal, she may need nursing home placement.     Overview/Hospital Course:  8/3 This patient was placed on SKILL unit for cont abx to treat UTI as well as PT. She has become debilitated and weak at home resulting in multiple falls even with walker. Lives at home with elderly frail . Her goal is to ambulate 50 ft with min ass using RW. Yesterday she did bed mobility with min assist. Sat she was max assist to transfer.     8/4 this patient is on skill for cont therapy as she was not strong enough to be safely discharged to home with her elderly frail . She is demented and has no complaints except she wants to go home. She complains of no pain but has not been walking and always laying on right side. CT head done that did not show any acute path. NA was low yesterday so started on NS at 75ml/hr. This has improved 125>132. Ct of abd and pelvis as well as lumbar spine done yesterday showing 4-11 right rib fractures with right sided effusion and poss liver laceration. H/H stable. Ct of spine shows no fractures or dislocation. She remains max assist for any transfers and mod assist with bed mobility. Remains on levaquin for UTI and maintaining sats on RA 93-96% NAD.     8/5 pt remains on SKILL for therapy. Noted rib fractures from fall yesterday and discussed her condition with daughter Emani as well as  ". With PT she remains mod assist with rolling in bed and max assist to get up in bed /stand/transfer. This is very concerning for her safety when going home with feeble . Had long discussion with Emani who was calling her sister Autumn to discuss possible placement at d/c as home with sitters may be a challenge goven her current condition.  on case and PASSAR completed     Na better with NS fluids. Na 125>134    8/7 Pt  remains on SKILL for therapy. Noted rib fractures from fall 2 days ago.  With PT she remains mod assist with rolling in bed and max assist to get up in bed /stand/transfer but has progressed .   Stand pivot bed<>w/c trng; Sit<>Stand x4; Sitting tolerance and Standing balance trng with RW; Provided tactile and verbal cues for postural trng; Gait trng with RW x ~250 feet with min assistance and cues; LE bike ex x 5 mins  Concerns over home safety have been discussed with family; lives wht 92 YO feeble . SW/case management following.   PASSAR completed    8/10/20 Pt  remains on SKILL for therapy due to debility s/p recent rib fractures, Awake and alert this am,   · Gait: Patient ambulated 150 feet with RW and CGA. One rest break after 75 feet. Patient requries manual assistance to keep walker on straight path due to deviation to the Left.   · Balance: Sitting Fair, Standing Fair-/poor  VSS, afebrile , O2 sat 92% on RA   Urine Cx resulted - + Ecoli- resistant to Levaquin, has allergy to tetracycline, adding  augmentin with probiotics     8/12/20  Remains on skilled for PT/OT, for debility recent rib Fx,   Minimal Assistance and cues x ~225 feet (2 times rest periods)  with RW  Afebrile , VSS O2 sat stable on RA , Day 3 Augmentin for UTI   Planned for D/C to home with HH and sitters; maybe Friday 8/14/20 States " I feel fine"   Remains on skilled for PT/OT, for debility recent rib Fx,   Contact guard assistane and cues x ~300 feet with RW  Afebrile , VSS O2 sat stable on " RA , Day 4 Augmentin for UTI   Large BM yesterday   Planned for D/C to home with HH and sitters; CW asking for more days for therapy .        Review of Systems   Unable to perform ROS: Dementia     Objective:     Vital Signs (Most Recent):  Temp: 97.4 °F (36.3 °C) (08/14/20 0715)  Pulse: 72 (08/14/20 0715)  Resp: 17 (08/14/20 0715)  BP: 139/63 (08/14/20 0715)  SpO2: (!) 94 % (08/14/20 0735) Vital Signs (24h Range):  Temp:  [97.4 °F (36.3 °C)-98.8 °F (37.1 °C)] 97.4 °F (36.3 °C)  Pulse:  [72-84] 72  Resp:  [17-18] 17  SpO2:  [93 %-95 %] 94 %  BP: (119-139)/(58-63) 139/63     Weight: 67 kg (147 lb 11.3 oz)  Body mass index is 22.46 kg/m².    Intake/Output Summary (Last 24 hours) at 8/14/2020 1238  Last data filed at 8/13/2020 2218  Gross per 24 hour   Intake 240 ml   Output --   Net 240 ml      Physical Exam  Vitals signs and nursing note reviewed.   Constitutional:       Appearance: She is well-developed.   HENT:      Head: Normocephalic and atraumatic.      Right Ear: External ear normal.      Left Ear: External ear normal.      Nose: Nose normal.   Eyes:      General: No scleral icterus.     Conjunctiva/sclera: Conjunctivae normal.      Pupils: Pupils are equal, round, and reactive to light.   Neck:      Musculoskeletal: Normal range of motion and neck supple.      Thyroid: No thyromegaly.      Vascular: No JVD.      Trachea: No tracheal deviation.   Cardiovascular:      Rate and Rhythm: Normal rate.      Heart sounds: Normal heart sounds. No murmur.   Pulmonary:      Effort: Pulmonary effort is normal. No respiratory distress.      Breath sounds: Normal breath sounds. No wheezing or rales.   Chest:      Chest wall: No tenderness.   Abdominal:      General: Bowel sounds are normal. There is no distension.      Palpations: Abdomen is soft. There is no mass.      Tenderness: There is no abdominal tenderness. There is no guarding or rebound.   Musculoskeletal: Normal range of motion.         General: No tenderness.       Comments: VERY kyphotic   Lymphadenopathy:      Cervical: No cervical adenopathy.   Skin:     General: Skin is warm and dry.   Neurological:      General: No focal deficit present.      Mental Status: She is alert.      Motor: No abnormal muscle tone.      Deep Tendon Reflexes: Reflexes are normal and symmetric.      Comments: Alert. Oriented to place and person    Psychiatric:         Behavior: Behavior normal.         Thought Content: Thought content normal.         Judgment: Judgment normal.         Significant Labs:   CBC:   Recent Labs   Lab 08/13/20  0620   WBC 6.58   HGB 11.6*   HCT 36.0*   *     CMP:   Recent Labs   Lab 08/13/20  0620 08/14/20  0639     136 137   K 4.3  4.2 4.6     102 102   CO2 26  26 27   GLU 96  97 101   BUN 11  12 10   CREATININE 0.8  0.7 0.7   CALCIUM 8.5*  8.4* 8.6*   PROT 5.7* 5.7*   ALBUMIN 2.8* 2.8*   BILITOT 0.4 0.4   ALKPHOS 191* 195*   AST 20 21   ALT 15 14   ANIONGAP 9  8 8   EGFRNONAA >60  >60 >60           Assessment/Plan:      * Debility  Swing for PT ; currently max assist with transfer   GOAL>>Gait  x ~50  feet with Minimal Assistance with or without rolling walker  Check CT spine and pelvis today    Noted rib fractures  She is not progressing but she will be slow due to the newly found rib fractures, this also raises more concern for her safety at home;  has started PASSAR     8/5 she is approved till Friday and will keep her here for rehab as long as she can make some progress and insurance approves her stay but did discuss d/c plans with daughter yesterday. Will reach out to local nursing homes incase she does not progress and needs facility at d/c   8/7 Stand pivot bed<>w/c trng; Sit<>Stand x4; Sitting tolerance and Standing balance trng with RW; Provided tactile and verbal cues for postural trng; Gait trng with RW x ~250 feet with min assistance and cues; LE bike ex x 5 mins  Awaiting approval for more skilled days prior to  D/C home with elderly  , family planning for sitters also; they defer nursing home , family making adjustments to home for safety   8/10 Gait: Patient ambulated 150 feet with RW and CGA. One rest break after 75 feet. Patient requries manual assistance to keep walker on straight path due to deviation to the Left. Balance: Sitting Fair, Standing Fair-/poor    8/12 Minimal Assistance and cues x ~225 feet (2 times rest periods)  with RW    8/14 Contact guard assistane and cues x ~300 feet with RW.    Closed fracture of multiple ribs of right side with routine healing  Ribs 4-11 noted on imaging  Denies pain  Impeding her progress however  Cont PT  PRN Tylenol for pain control.      Liver contusion  Likely liver contusion from recent falls noted on CT scan  H/H stable.  Non-tender abdomen  Monitor closely but no clinical evidence of true liver lac that would require surgical intervention more likely hematoma       Hyponatremia  I think this is a reflection of her very poor diet  Start remeron  Ns at 75ml/hr   Na better 134 today  8/7 NA normal today   RESOLVED .    Acute cystitis with hematuria  Continue levaquin for 7 days total for complicated UTI   Getting dose q 48hr 7/29, 7/31, 8/2, 8/4 ct Monday still showing cystitis, will cont levaquin  8/7 levofloxacin dose continued 8/6; has had 5 doses  will repeat urinalysis today   8/10 Repeat UA- positive nitrites , leuks- no fever;  Urine culture- resistant to Levofloxacin ; + allergy to tetracyline   has lost IV acess, will add oral abx   8/14 Day 4 augmentin .    Frequent falls  Swing for PT.   Consider nursing home placement if she does not progress   Will have case management start PASSAR incase  Check CT spine and pelvis today  Noted right rib fractures  Consider placement at d/c .  8/7 CT of lumbar spine stable   8/14 Contact guard assistane and cues x ~300 feet with RW; planned for D/C home with      Memory loss  Continue donepezil and namenda  .      Hyperlipidemia  Continue pravastatin .        Hypothyroidism  Continue synthroid 100mcg .  Lab Results   Component Value Date    TSH 2.578 12/03/2019           Hypertension  Continue lisinopril .  bp 145//67      VTE Risk Mitigation (From admission, onward)         Ordered     IP VTE HIGH RISK PATIENT  Once      07/31/20 1636     Place sequential compression device  Until discontinued      07/31/20 1636                Discharge Planning   JAMARI:      Code Status: Full Code   Is the patient medically ready for discharge?:     Reason for patient still in hospital (select all that apply): Treatment  Discharge Plan A: Home Health                  Josette Hays MD  Department of Hospital Medicine   [facility]

## 2020-08-15 LAB
ALBUMIN SERPL BCP-MCNC: 2.8 G/DL (ref 3.5–5.2)
ALP SERPL-CCNC: 190 U/L (ref 55–135)
ALT SERPL W/O P-5'-P-CCNC: 11 U/L (ref 10–44)
ANION GAP SERPL CALC-SCNC: 8 MMOL/L (ref 8–16)
AST SERPL-CCNC: 18 U/L (ref 10–40)
BILIRUB SERPL-MCNC: 0.5 MG/DL (ref 0.1–1)
BUN SERPL-MCNC: 11 MG/DL (ref 8–23)
CALCIUM SERPL-MCNC: 8.5 MG/DL (ref 8.7–10.5)
CHLORIDE SERPL-SCNC: 102 MMOL/L (ref 95–110)
CO2 SERPL-SCNC: 27 MMOL/L (ref 23–29)
CREAT SERPL-MCNC: 0.7 MG/DL (ref 0.5–1.4)
EST. GFR  (AFRICAN AMERICAN): >60 ML/MIN/1.73 M^2
EST. GFR  (NON AFRICAN AMERICAN): >60 ML/MIN/1.73 M^2
GLUCOSE SERPL-MCNC: 102 MG/DL (ref 70–110)
POTASSIUM SERPL-SCNC: 4.1 MMOL/L (ref 3.5–5.1)
PROT SERPL-MCNC: 5.8 G/DL (ref 6–8.4)
SODIUM SERPL-SCNC: 137 MMOL/L (ref 136–145)

## 2020-08-15 PROCEDURE — 80053 COMPREHEN METABOLIC PANEL: CPT | Mod: HCNC

## 2020-08-15 PROCEDURE — 11000004 HC SNF PRIVATE: Mod: HCNC

## 2020-08-15 PROCEDURE — 97116 GAIT TRAINING THERAPY: CPT | Mod: HCNC

## 2020-08-15 PROCEDURE — 25000003 PHARM REV CODE 250: Mod: HCNC | Performed by: NURSE PRACTITIONER

## 2020-08-15 PROCEDURE — 97530 THERAPEUTIC ACTIVITIES: CPT | Mod: HCNC

## 2020-08-15 PROCEDURE — 94761 N-INVAS EAR/PLS OXIMETRY MLT: CPT | Mod: HCNC

## 2020-08-15 PROCEDURE — 94799 UNLISTED PULMONARY SVC/PX: CPT | Mod: HCNC

## 2020-08-15 PROCEDURE — 36415 COLL VENOUS BLD VENIPUNCTURE: CPT | Mod: HCNC

## 2020-08-15 RX ADMIN — DONEPEZIL HYDROCHLORIDE 5 MG: 5 TABLET, FILM COATED ORAL at 09:08

## 2020-08-15 RX ADMIN — LEVOTHYROXINE SODIUM 100 MCG: 100 TABLET ORAL at 09:08

## 2020-08-15 RX ADMIN — AMOXICILLIN AND CLAVULANATE POTASSIUM 1 TABLET: 875; 125 TABLET, FILM COATED ORAL at 09:08

## 2020-08-15 RX ADMIN — MIRTAZAPINE 15 MG: 15 TABLET, ORALLY DISINTEGRATING ORAL at 09:08

## 2020-08-15 RX ADMIN — LACTOBACILLUS ACIDOPHILUS / LACTOBACILLUS BULGARICUS 1 EACH: 100 MILLION CFU STRENGTH GRANULES at 09:08

## 2020-08-15 RX ADMIN — ASPIRIN 81 MG: 81 TABLET, COATED ORAL at 09:08

## 2020-08-15 RX ADMIN — PRAVASTATIN SODIUM 80 MG: 40 TABLET ORAL at 09:08

## 2020-08-15 RX ADMIN — STANDARDIZED SENNA CONCENTRATE AND DOCUSATE SODIUM 1 TABLET: 8.6; 5 TABLET ORAL at 09:08

## 2020-08-15 RX ADMIN — LISINOPRIL 20 MG: 20 TABLET ORAL at 09:08

## 2020-08-15 RX ADMIN — SERTRALINE HYDROCHLORIDE 25 MG: 25 TABLET ORAL at 09:08

## 2020-08-15 RX ADMIN — MEMANTINE 10 MG: 10 TABLET ORAL at 09:08

## 2020-08-15 NOTE — PLAN OF CARE
Problem: Fall Injury Risk  Goal: Absence of Fall and Fall-Related Injury  Outcome: Ongoing, Progressing     Problem: Coordination Impairment (Functional Deficit)  Goal: Optimal Coordination  Outcome: Ongoing, Progressing     Problem: Skin Injury Risk Increased  Goal: Skin Health and Integrity  Outcome: Ongoing, Progressing     Problem: Balance Impairment (Functional Deficit)  Goal: Improved Balance and Postural Control  Outcome: Ongoing, Progressing     Problem: Range of Motion Impairment (Functional Deficit)  Goal: Optimal Range of Motion  Outcome: Ongoing, Progressing   Uneventful evening. Continue to reorient to time.

## 2020-08-15 NOTE — PT/OT/SLP PROGRESS
"Physical Therapy Treatment    Patient Name:  Estefania Mccollum   MRN:  [id number]    Recommendations:     Discharge Recommendations:  nursing facility, basic, home health PT, home with home health   Discharge Equipment Recommendations: hospital bed, lift device   Barriers to discharge: Decreased caregiver support    Assessment:     Estefania Mccollum is a 85 y.o. female admitted with a medical diagnosis of Debility.  She presents with the following impairments/functional limitations:  weakness, gait instability, impaired functional mobilty, impaired self care skills, impaired balance, impaired endurance, pain, impaired cognition, decreased safety awareness, other (comment)(Slouch forward, flexed neck and kyphotic posture.). Tolerated well PT session. Able to maintain gait distance ~300 feet at the hallway with RW and with contact/supervision assistance . No sign of fatigue.    Rehab Prognosis: Fair; patient would benefit from acute skilled PT services to address these deficits and reach maximum level of function.    Recent Surgery: * No surgery found *      Plan:     During this hospitalization, patient to be seen daily to address the identified rehab impairments via gait training, therapeutic exercises, therapeutic activities and progress toward the following goals:    · Plan of Care Expires:  08/14/20    Subjective     Chief Complaint: no new symptoms  Patient/Family Comments/goals: " To go home tomorrow" - per patient.  Pain/Comfort:  · Pain Rating 1: 0/10  · Pain Rating Post-Intervention 1: 0/10      Objective:     Communicated with patient and  prior to session.  Patient found HOB elevated with telemetry upon PT entry to room.     General Precautions: Standard, fall   Orthopedic Precautions:N/A   Braces: N/A     Functional Mobility:  · Bed Mobility:     · Rolling Left:  modified independent with bed rail  · Rolling Right: modified independent with bed rail  · Scooting: contact guard assistance and cues  · Supine " to Sit: contact guard assistance and cues  · Sit to Supine: contact guard assistance and cues  · Transfers:     · Sit to Stand:  contact guard/supervision assistance  with rolling walker  · Bed to Chair: contact guard assistance and cues with  hand-held assist  using  Stand Pivot  · Gait: contact guard/supervision assistance x ~300 feet with RW. Slouch forward, flexed neck and kyphotic posture  · Balance: Standing with RW Static: Fair; Dynamic: Fair-      AM-PAC 6 CLICK MOBILITY  Turning over in bed (including adjusting bedclothes, sheets and blankets)?: 4  Sitting down on and standing up from a chair with arms (e.g., wheelchair, bedside commode, etc.): 3  Moving from lying on back to sitting on the side of the bed?: 4  Moving to and from a bed to a chair (including a wheelchair)?: 3  Need to walk in hospital room?: 3  Climbing 3-5 steps with a railing?: 1  Basic Mobility Total Score: 18       Therapeutic Activities and Exercises:   Implemented body sequence on out of bed activity and transfers, standing balance trng and postural trng; Gait trng RW x ~300 feet with contact guard/supervision assistance.    Patient left supine with all lines intact, call button in reach, bed alarm on, nursing notified and  present..    GOALS:   Multidisciplinary Problems     Physical Therapy Goals        Problem: Physical Therapy Goal    Goal Priority Disciplines Outcome Goal Variances Interventions   Physical Therapy Goal     PT, PT/OT Ongoing, Progressing     Description: Goals to be met by: 20    Patient will increase functional independence with mobility by performin. Supine to sit with Contact Guard Assistance  2. Sit to supine with Contact Guard Assistance  3. Bed to chair transfer with Minimal/Contact Guard Assistancewith or without rolling walker using Stand Pivot TECHNIQUE  4. Gait  x ~50  feet with Minimal Assistance with or without rolling walker - met 8/10/20   8/10/20 - Revised goal:  4 a) Gait x ~200  feet with minimal/contact guard assistance and cues  5. Lower extremity exercise program x10 reps per handout, with assistance as needed                Multidisciplinary Problems (Resolved)        Problem: Physical Therapy Goal    Goal Priority Disciplines Outcome Goal Variances Interventions   Physical Therapy Goal   (Resolved)     PT, PT/OT  Error     Description: Patient will increase functional independence with mobility by performin. Supine to sit with Contact Guard Assistance  2. Sit to supine with Contact Guard Assistance  3. Bed to chair transfer with Minimal/Contact Guard Assistancewith or without rolling walker using Stand Pivot TECHNIQUE  4. Gait  x ~50  feet with Minimal Assistance with or without rolling walker  5. Lower extremity exercise program x10 reps per handout, with assistance as needed                   Time Tracking:     PT Received On: 08/15/20  PT Start Time: 1301     PT Stop Time: 1325  PT Total Time (min): 24 min     Billable Minutes: Gait Training 12 and Therapeutic Activity 12    Treatment Type: Treatment  PT/PTA: PT     PTA Visit Number: 1     Cricket Gaytan, PT  08/15/2020

## 2020-08-15 NOTE — PLAN OF CARE
Pt remains on swing for debility. PT/OT and ST consulted. Uses rolling walker for ambulation. Taking augmentin for UTI. Vitals stable. Turning every 2 hours. Esitter at bedside and alarm set. Remains free from injury/ falls. Reviewed plan of care with pt and ; states agreement.

## 2020-08-16 LAB
ALBUMIN SERPL BCP-MCNC: 2.9 G/DL (ref 3.5–5.2)
ALP SERPL-CCNC: 191 U/L (ref 55–135)
ALT SERPL W/O P-5'-P-CCNC: 11 U/L (ref 10–44)
ANION GAP SERPL CALC-SCNC: 7 MMOL/L (ref 8–16)
AST SERPL-CCNC: 18 U/L (ref 10–40)
BILIRUB SERPL-MCNC: 0.4 MG/DL (ref 0.1–1)
BUN SERPL-MCNC: 11 MG/DL (ref 8–23)
CALCIUM SERPL-MCNC: 8.5 MG/DL (ref 8.7–10.5)
CHLORIDE SERPL-SCNC: 101 MMOL/L (ref 95–110)
CO2 SERPL-SCNC: 27 MMOL/L (ref 23–29)
CREAT SERPL-MCNC: 0.7 MG/DL (ref 0.5–1.4)
EST. GFR  (AFRICAN AMERICAN): >60 ML/MIN/1.73 M^2
EST. GFR  (NON AFRICAN AMERICAN): >60 ML/MIN/1.73 M^2
GLUCOSE SERPL-MCNC: 100 MG/DL (ref 70–110)
POTASSIUM SERPL-SCNC: 4.1 MMOL/L (ref 3.5–5.1)
PROT SERPL-MCNC: 5.8 G/DL (ref 6–8.4)
SODIUM SERPL-SCNC: 135 MMOL/L (ref 136–145)

## 2020-08-16 PROCEDURE — 11000004 HC SNF PRIVATE: Mod: HCNC

## 2020-08-16 PROCEDURE — 25000003 PHARM REV CODE 250: Mod: HCNC | Performed by: NURSE PRACTITIONER

## 2020-08-16 PROCEDURE — 97116 GAIT TRAINING THERAPY: CPT | Mod: HCNC

## 2020-08-16 PROCEDURE — 94761 N-INVAS EAR/PLS OXIMETRY MLT: CPT | Mod: HCNC

## 2020-08-16 PROCEDURE — 36415 COLL VENOUS BLD VENIPUNCTURE: CPT | Mod: HCNC

## 2020-08-16 PROCEDURE — 80053 COMPREHEN METABOLIC PANEL: CPT | Mod: HCNC

## 2020-08-16 RX ADMIN — LEVOTHYROXINE SODIUM 100 MCG: 100 TABLET ORAL at 05:08

## 2020-08-16 RX ADMIN — MEMANTINE 10 MG: 10 TABLET ORAL at 09:08

## 2020-08-16 RX ADMIN — DONEPEZIL HYDROCHLORIDE 5 MG: 5 TABLET, FILM COATED ORAL at 09:08

## 2020-08-16 RX ADMIN — ASPIRIN 81 MG: 81 TABLET, COATED ORAL at 08:08

## 2020-08-16 RX ADMIN — AMOXICILLIN AND CLAVULANATE POTASSIUM 1 TABLET: 875; 125 TABLET, FILM COATED ORAL at 08:08

## 2020-08-16 RX ADMIN — SERTRALINE HYDROCHLORIDE 25 MG: 25 TABLET ORAL at 09:08

## 2020-08-16 RX ADMIN — LACTOBACILLUS ACIDOPHILUS / LACTOBACILLUS BULGARICUS 1 EACH: 100 MILLION CFU STRENGTH GRANULES at 09:08

## 2020-08-16 RX ADMIN — AMOXICILLIN AND CLAVULANATE POTASSIUM 1 TABLET: 875; 125 TABLET, FILM COATED ORAL at 09:08

## 2020-08-16 RX ADMIN — LISINOPRIL 20 MG: 20 TABLET ORAL at 08:08

## 2020-08-16 RX ADMIN — STANDARDIZED SENNA CONCENTRATE AND DOCUSATE SODIUM 1 TABLET: 8.6; 5 TABLET ORAL at 09:08

## 2020-08-16 RX ADMIN — PRAVASTATIN SODIUM 80 MG: 40 TABLET ORAL at 08:08

## 2020-08-16 RX ADMIN — MEMANTINE 10 MG: 10 TABLET ORAL at 08:08

## 2020-08-16 RX ADMIN — MIRTAZAPINE 15 MG: 15 TABLET, ORALLY DISINTEGRATING ORAL at 09:08

## 2020-08-16 NOTE — PLAN OF CARE
Pt remains on swing for debility. PT/OT and ST consulted. Ambulating with rolling walker and assist.  at bedside during the day. Pt feeds self; just needs assistance with setting up trays. Incontinence of bowel and bladder. Turning every 2 hours. Skin tear/abrasion to right arm; dressings changed. Esitter at bedside and alarms set. Vitals stable. Remains free from injury/falls. Reviewed plan of care with pt and ; state agreement.

## 2020-08-16 NOTE — PLAN OF CARE
Problem: Fall Injury Risk  Goal: Absence of Fall and Fall-Related Injury  Outcome: Ongoing, Progressing     Problem: Skin Injury Risk Increased  Goal: Skin Health and Integrity  Outcome: Ongoing, Progressing     Problem: Muscle Strength Impairment  Goal: Improved Muscle Strength  Outcome: Ongoing, Progressing     Problem: Coordination Impairment (Functional Deficit)  Goal: Optimal Coordination  Outcome: Ongoing, Progressing    Without injury this shift. Disoriented as to time, remains pleasant and cooperative. Voices no c/o pain or discomfort.  Swallows meds without difficulty. Will monitor.

## 2020-08-16 NOTE — PT/OT/SLP PROGRESS
"Physical Therapy Treatment    Patient Name:  Estefania Mccollum   MRN:  [id number]    Recommendations:     Discharge Recommendations:  nursing facility, basic, home with home health, home health PT   Discharge Equipment Recommendations: hospital bed, lift device   Barriers to discharge: Decreased caregiver support    Assessment:     Estefania Mccollum is a 85 y.o. female admitted with a medical diagnosis of Debility.  She presents with the following impairments/functional limitations:  weakness, gait instability, impaired functional mobilty, impaired self care skills, impaired endurance, impaired balance, impaired cognition, decreased safety awareness, pain, other (comment)(slouch forward , neck flexed and  kyphotic posture). Patient tolerated well PT session. Patient has maintained gait distance for consecutive days for ~300 feet at the hallway using RW and with supervision to keep patient on the tasks and for directions.    Rehab Prognosis: Fair; patient would benefit from acute skilled PT services to address these deficits and reach maximum level of function.    Recent Surgery: * No surgery found *      Plan:     During this hospitalization, patient to be seen daily to address the identified rehab impairments via gait training, therapeutic activities, therapeutic exercises and progress toward the following goals:    · Plan of Care Expires:  08/14/20    Subjective     Chief Complaint: no new complain  Patient/Family Comments/goals: "To go home soon".  Pain/Comfort:  · Pain Rating 1: 0/10  · Pain Rating Post-Intervention 1: 0/10      Objective:     Communicated with patient and  prior to session.  Patient found supine with telemetry upon PT entry to room.     General Precautions: Standard, fall   Orthopedic Precautions:N/A   Braces: N/A     Functional Mobility:  · Bed Mobility:     · Rolling Left:  modified independence  · Rolling Right: modified independence  · Scooting: supervision  · Supine to Sit: " supervision  · Sit to Supine: contact guard/supervision  · Transfers:     · Sit to Stand:  supervision with rolling walker  · Bed to Chair: contact guard/supervision with  hand-held assist  using  Stand Pivot  · Gait: Supervision x ~300 feet with RW. Slouch forward, flexed neck and kyphotic posture.  · Balance: Standing with RW Static: Fair; Dynamic: Fair      AM-PAC 6 CLICK MOBILITY  Turning over in bed (including adjusting bedclothes, sheets and blankets)?: 4  Sitting down on and standing up from a chair with arms (e.g., wheelchair, bedside commode, etc.): 3  Moving from lying on back to sitting on the side of the bed?: 4  Moving to and from a bed to a chair (including a wheelchair)?: 3  Need to walk in hospital room?: 3  Climbing 3-5 steps with a railing?: 1  Basic Mobility Total Score: 18       Therapeutic Activities and Exercises:   Safety education on out of bed and transfer trng; Gait trng RW x ~300 feet with supervision.    Patient left supine with all lines intact, call button in reach, bed alarm on, nursing notified and  present..    GOALS:   Multidisciplinary Problems     Physical Therapy Goals        Problem: Physical Therapy Goal    Goal Priority Disciplines Outcome Goal Variances Interventions   Physical Therapy Goal     PT, PT/OT Ongoing, Progressing     Description: Goals to be met by: 20    Patient will increase functional independence with mobility by performin. Supine to sit with Contact Guard Assistance  2. Sit to supine with Contact Guard Assistance  3. Bed to chair transfer with Minimal/Contact Guard Assistancewith or without rolling walker using Stand Pivot TECHNIQUE  4. Gait  x ~50  feet with Minimal Assistance with or without rolling walker - met 8/10/20   8/10/20 - Revised goal:  4 a) Gait x ~200 feet with minimal/contact guard assistance and cues  5. Lower extremity exercise program x10 reps per handout, with assistance as needed                Multidisciplinary  Problems (Resolved)        Problem: Physical Therapy Goal    Goal Priority Disciplines Outcome Goal Variances Interventions   Physical Therapy Goal   (Resolved)     PT, PT/OT  Error     Description: Patient will increase functional independence with mobility by performin. Supine to sit with Contact Guard Assistance  2. Sit to supine with Contact Guard Assistance  3. Bed to chair transfer with Minimal/Contact Guard Assistancewith or without rolling walker using Stand Pivot TECHNIQUE  4. Gait  x ~50  feet with Minimal Assistance with or without rolling walker  5. Lower extremity exercise program x10 reps per handout, with assistance as needed                   Time Tracking:     PT Received On: 20  PT Start Time: 1119     PT Stop Time: 1140  PT Total Time (min): 21 min     Billable Minutes: Gait Training 20    Treatment Type: Treatment  PT/PTA: PT     PTA Visit Number: 1     Cricket Gaytan, PT  2020

## 2020-08-17 LAB
ALBUMIN SERPL BCP-MCNC: 2.9 G/DL (ref 3.5–5.2)
ALP SERPL-CCNC: 201 U/L (ref 55–135)
ALT SERPL W/O P-5'-P-CCNC: 10 U/L (ref 10–44)
ANION GAP SERPL CALC-SCNC: 10 MMOL/L (ref 8–16)
ANION GAP SERPL CALC-SCNC: 10 MMOL/L (ref 8–16)
AST SERPL-CCNC: 18 U/L (ref 10–40)
BASOPHILS # BLD AUTO: 0.07 K/UL (ref 0–0.2)
BASOPHILS NFR BLD: 0.9 % (ref 0–1.9)
BILIRUB SERPL-MCNC: 0.5 MG/DL (ref 0.1–1)
BUN SERPL-MCNC: 13 MG/DL (ref 8–23)
BUN SERPL-MCNC: 13 MG/DL (ref 8–23)
CALCIUM SERPL-MCNC: 8.5 MG/DL (ref 8.7–10.5)
CALCIUM SERPL-MCNC: 8.6 MG/DL (ref 8.7–10.5)
CHLORIDE SERPL-SCNC: 102 MMOL/L (ref 95–110)
CHLORIDE SERPL-SCNC: 102 MMOL/L (ref 95–110)
CO2 SERPL-SCNC: 25 MMOL/L (ref 23–29)
CO2 SERPL-SCNC: 25 MMOL/L (ref 23–29)
CREAT SERPL-MCNC: 0.7 MG/DL (ref 0.5–1.4)
CREAT SERPL-MCNC: 0.7 MG/DL (ref 0.5–1.4)
DIFFERENTIAL METHOD: ABNORMAL
EOSINOPHIL # BLD AUTO: 0.2 K/UL (ref 0–0.5)
EOSINOPHIL NFR BLD: 3.1 % (ref 0–8)
ERYTHROCYTE [DISTWIDTH] IN BLOOD BY AUTOMATED COUNT: 13 % (ref 11.5–14.5)
EST. GFR  (AFRICAN AMERICAN): >60 ML/MIN/1.73 M^2
EST. GFR  (AFRICAN AMERICAN): >60 ML/MIN/1.73 M^2
EST. GFR  (NON AFRICAN AMERICAN): >60 ML/MIN/1.73 M^2
EST. GFR  (NON AFRICAN AMERICAN): >60 ML/MIN/1.73 M^2
GLUCOSE SERPL-MCNC: 96 MG/DL (ref 70–110)
GLUCOSE SERPL-MCNC: 97 MG/DL (ref 70–110)
HCT VFR BLD AUTO: 37.4 % (ref 37–48.5)
HGB BLD-MCNC: 12.5 G/DL (ref 12–16)
IMM GRANULOCYTES # BLD AUTO: 0.03 K/UL (ref 0–0.04)
IMM GRANULOCYTES NFR BLD AUTO: 0.4 % (ref 0–0.5)
LYMPHOCYTES # BLD AUTO: 2.6 K/UL (ref 1–4.8)
LYMPHOCYTES NFR BLD: 33.6 % (ref 18–48)
MAGNESIUM SERPL-MCNC: 2 MG/DL (ref 1.6–2.6)
MCH RBC QN AUTO: 30.2 PG (ref 27–31)
MCHC RBC AUTO-ENTMCNC: 33.4 G/DL (ref 32–36)
MCV RBC AUTO: 90 FL (ref 82–98)
MONOCYTES # BLD AUTO: 0.6 K/UL (ref 0.3–1)
MONOCYTES NFR BLD: 7.2 % (ref 4–15)
NEUTROPHILS # BLD AUTO: 4.2 K/UL (ref 1.8–7.7)
NEUTROPHILS NFR BLD: 54.8 % (ref 38–73)
NRBC BLD-RTO: 0 /100 WBC
PHOSPHATE SERPL-MCNC: 3.8 MG/DL (ref 2.7–4.5)
PLATELET # BLD AUTO: 388 K/UL (ref 150–350)
PMV BLD AUTO: 8.7 FL (ref 9.2–12.9)
POTASSIUM SERPL-SCNC: 4.3 MMOL/L (ref 3.5–5.1)
POTASSIUM SERPL-SCNC: 4.4 MMOL/L (ref 3.5–5.1)
PROT SERPL-MCNC: 5.9 G/DL (ref 6–8.4)
RBC # BLD AUTO: 4.14 M/UL (ref 4–5.4)
SODIUM SERPL-SCNC: 137 MMOL/L (ref 136–145)
SODIUM SERPL-SCNC: 137 MMOL/L (ref 136–145)
WBC # BLD AUTO: 7.69 K/UL (ref 3.9–12.7)

## 2020-08-17 PROCEDURE — 84100 ASSAY OF PHOSPHORUS: CPT | Mod: HCNC

## 2020-08-17 PROCEDURE — 36415 COLL VENOUS BLD VENIPUNCTURE: CPT | Mod: HCNC

## 2020-08-17 PROCEDURE — 97116 GAIT TRAINING THERAPY: CPT | Mod: HCNC

## 2020-08-17 PROCEDURE — 11000004 HC SNF PRIVATE: Mod: HCNC

## 2020-08-17 PROCEDURE — 97530 THERAPEUTIC ACTIVITIES: CPT | Mod: HCNC

## 2020-08-17 PROCEDURE — 80053 COMPREHEN METABOLIC PANEL: CPT | Mod: HCNC

## 2020-08-17 PROCEDURE — 94761 N-INVAS EAR/PLS OXIMETRY MLT: CPT | Mod: HCNC

## 2020-08-17 PROCEDURE — 80048 BASIC METABOLIC PNL TOTAL CA: CPT | Mod: HCNC

## 2020-08-17 PROCEDURE — 99233 SBSQ HOSP IP/OBS HIGH 50: CPT | Mod: HCNC,,, | Performed by: INTERNAL MEDICINE

## 2020-08-17 PROCEDURE — 83735 ASSAY OF MAGNESIUM: CPT | Mod: HCNC

## 2020-08-17 PROCEDURE — 99233 PR SUBSEQUENT HOSPITAL CARE,LEVL III: ICD-10-PCS | Mod: HCNC,,, | Performed by: INTERNAL MEDICINE

## 2020-08-17 PROCEDURE — 25000003 PHARM REV CODE 250: Mod: HCNC | Performed by: NURSE PRACTITIONER

## 2020-08-17 PROCEDURE — 94799 UNLISTED PULMONARY SVC/PX: CPT | Mod: HCNC

## 2020-08-17 PROCEDURE — 85025 COMPLETE CBC W/AUTO DIFF WBC: CPT | Mod: HCNC

## 2020-08-17 RX ADMIN — ASPIRIN 81 MG: 81 TABLET, COATED ORAL at 08:08

## 2020-08-17 RX ADMIN — MIRTAZAPINE 15 MG: 15 TABLET, ORALLY DISINTEGRATING ORAL at 08:08

## 2020-08-17 RX ADMIN — PRAVASTATIN SODIUM 80 MG: 40 TABLET ORAL at 08:08

## 2020-08-17 RX ADMIN — MEMANTINE 10 MG: 10 TABLET ORAL at 08:08

## 2020-08-17 RX ADMIN — DONEPEZIL HYDROCHLORIDE 5 MG: 5 TABLET, FILM COATED ORAL at 08:08

## 2020-08-17 RX ADMIN — LEVOTHYROXINE SODIUM 100 MCG: 100 TABLET ORAL at 05:08

## 2020-08-17 RX ADMIN — STANDARDIZED SENNA CONCENTRATE AND DOCUSATE SODIUM 1 TABLET: 8.6; 5 TABLET ORAL at 08:08

## 2020-08-17 RX ADMIN — SERTRALINE HYDROCHLORIDE 25 MG: 25 TABLET ORAL at 08:08

## 2020-08-17 RX ADMIN — LISINOPRIL 20 MG: 20 TABLET ORAL at 08:08

## 2020-08-17 NOTE — PLAN OF CARE
Ms Mccollum will discharge home on Wednesday with [facility] program. I spoke to Emani, patient's daughter, to update her on the discharge day. I handed Mr Mccollum a prescription for a lift chair to help with some possible reimbersement from medicare.

## 2020-08-17 NOTE — PT/OT/SLP PROGRESS
"Physical Therapy Treatment    Patient Name:  Estefania Mccollum   MRN:  [id number]    Recommendations:     Discharge Recommendations:  nursing facility, basic, home with home health, home health PT   Discharge Equipment Recommendations: hospital bed, lift device   Barriers to discharge: Decreased caregiver support    Assessment:     Estefania Mccollum is a 85 y.o. female admitted with a medical diagnosis of Debility.  She presents with the following impairments/functional limitations:  weakness, gait instability, impaired self care skills, impaired functional mobilty, impaired cognition, impaired balance, pain, decreased safety awareness. Patient tolerated well PT session today.  Patient has been consistent in performing gait functions with the same distance at the hallway for ~300 feet( 2 hallways) using RW with supervision and constant coaxing to keep patient on the task.    Rehab Prognosis: Fair; patient would benefit from acute skilled PT services to address these deficits and reach maximum level of function.    Recent Surgery: * No surgery found *      Plan:     During this hospitalization, patient to be seen daily to address the identified rehab impairments via gait training, therapeutic activities, therapeutic exercises and progress toward the following goals:    · Plan of Care Expires:  08/14/20    Subjective     Chief Complaint: No new complain  Patient/Family Comments/goals: "To return home".  Pain/Comfort:  · Pain Rating 1: 0/10  · Pain Rating Post-Intervention 1: 0/10      Objective:     Communicated with patient and  prior to session.  Patient found supine with telemetry upon PT entry to room.     General Precautions: Standard, fall   Orthopedic Precautions:N/A   Braces: N/A     Functional Mobility:  · Bed Mobility:     · Rolling Left:  modified independence  · Rolling Right: modified independence  · Scooting: supervision  · Supine to Sit: supervision  · Sit to Supine: contact guard " assistance  · Transfers:     · Sit to Stand:  supervision with rolling walker  · Bed to Chair: contact guard assistance with verbal and manual cues with  hand-held assist  using  Stand Pivot  · Gait: Close Supervision x ~300 feet with RW. Dec stride, and kyphotic posture with flexed neck  · Balance: Standing with RW Static: Fair+; Dynamic: Fair+      AM-PAC 6 CLICK MOBILITY  Turning over in bed (including adjusting bedclothes, sheets and blankets)?: 4  Sitting down on and standing up from a chair with arms (e.g., wheelchair, bedside commode, etc.): 3  Moving from lying on back to sitting on the side of the bed?: 4  Moving to and from a bed to a chair (including a wheelchair)?: 3  Need to walk in hospital room?: 3  Climbing 3-5 steps with a railing?: 1  Basic Mobility Total Score: 18       Therapeutic Activities and Exercises:   Body sequence on out of bed activity and trasnfers; Standing Balance trng and  postural trng; Gait trng RW x ~300 fee with close supervision.    Patient left supine with all lines intact, call button in reach, bed alarm on, nursing notified and  present..    GOALS:   Multidisciplinary Problems     Physical Therapy Goals        Problem: Physical Therapy Goal    Goal Priority Disciplines Outcome Goal Variances Interventions   Physical Therapy Goal     PT, PT/OT Ongoing, Progressing     Description: Goals to be met by: 20    Patient will increase functional independence with mobility by performin. Supine to sit with Contact Guard Assistance  2. Sit to supine with Contact Guard Assistance  3. Bed to chair transfer with Minimal/Contact Guard Assistancewith or without rolling walker using Stand Pivot TECHNIQUE  4. Gait  x ~50  feet with Minimal Assistance with or without rolling walker - met 8/10/20   8/10/20 - Revised goal:  4 a) Gait x ~200 feet with minimal/contact guard assistance and cues  5. Lower extremity exercise program x10 reps per handout, with assistance as needed                 Multidisciplinary Problems (Resolved)        Problem: Physical Therapy Goal    Goal Priority Disciplines Outcome Goal Variances Interventions   Physical Therapy Goal   (Resolved)     PT, PT/OT  Error     Description: Patient will increase functional independence with mobility by performin. Supine to sit with Contact Guard Assistance  2. Sit to supine with Contact Guard Assistance  3. Bed to chair transfer with Minimal/Contact Guard Assistancewith or without rolling walker using Stand Pivot TECHNIQUE  4. Gait  x ~50  feet with Minimal Assistance with or without rolling walker  5. Lower extremity exercise program x10 reps per handout, with assistance as needed                   Time Tracking:     PT Received On: 20  PT Start Time: 1412     PT Stop Time: 1442  PT Total Time (min): 30 min     Billable Minutes: Gait Training 15 and Therapeutic Activity 15    Treatment Type: Treatment  PT/PTA: PT     PTA Visit Number: 1     Cricket Gaytan, PT  2020

## 2020-08-17 NOTE — PROGRESS NOTES
[facility] Hospital Medicine  Progress Note    Patient Name: Estefania Mccollum  MRN: [id number]  Patient Class: IP- Swing   Admission Date: 7/31/2020  Length of Stay: 17 days  Attending Physician: Manish Lynn MD  Primary Care Provider: Danna Levine MD        Subjective:     Principal Problem:Debility        HPI:  85 year old female admitted with UTI being admitted to Swing for PT. She lives at home with only her 91 year old , who is unable to lift her or transfer her safely. She is weak. She needs to be strong enough to perform her ADL's on her own. If she is unable to reach this goal, she may need nursing home placement.     Overview/Hospital Course:  8/3 This patient was placed on SKILL unit for cont abx to treat UTI as well as PT. She has become debilitated and weak at home resulting in multiple falls even with walker. Lives at home with elderly frail . Her goal is to ambulate 50 ft with min ass using RW. Yesterday she did bed mobility with min assist. Sat she was max assist to transfer.     8/4 this patient is on skill for cont therapy as she was not strong enough to be safely discharged to home with her elderly frail . She is demented and has no complaints except she wants to go home. She complains of no pain but has not been walking and always laying on right side. CT head done that did not show any acute path. NA was low yesterday so started on NS at 75ml/hr. This has improved 125>132. Ct of abd and pelvis as well as lumbar spine done yesterday showing 4-11 right rib fractures with right sided effusion and poss liver laceration. H/H stable. Ct of spine shows no fractures or dislocation. She remains max assist for any transfers and mod assist with bed mobility. Remains on levaquin for UTI and maintaining sats on RA 93-96% NAD.     8/5 pt remains on SKILL for therapy. Noted rib fractures from fall yesterday and discussed her condition with daughter Emani as well as  ". With PT she remains mod assist with rolling in bed and max assist to get up in bed /stand/transfer. This is very concerning for her safety when going home with feeble . Had long discussion with Emani who was calling her sister Autumn to discuss possible placement at d/c as home with sitters may be a challenge goven her current condition.  on case and PASSAR completed     Na better with NS fluids. Na 125>134    8/7 Pt  remains on SKILL for therapy. Noted rib fractures from fall 2 days ago.  With PT she remains mod assist with rolling in bed and max assist to get up in bed /stand/transfer but has progressed .   Stand pivot bed<>w/c trng; Sit<>Stand x4; Sitting tolerance and Standing balance trng with RW; Provided tactile and verbal cues for postural trng; Gait trng with RW x ~250 feet with min assistance and cues; LE bike ex x 5 mins  Concerns over home safety have been discussed with family; lives wht 92 YO feeble . SW/case management following.   PASSAR completed    8/10/20 Pt  remains on SKILL for therapy due to debility s/p recent rib fractures, Awake and alert this am,   · Gait: Patient ambulated 150 feet with RW and CGA. One rest break after 75 feet. Patient requries manual assistance to keep walker on straight path due to deviation to the Left.   · Balance: Sitting Fair, Standing Fair-/poor  VSS, afebrile , O2 sat 92% on RA   Urine Cx resulted - + Ecoli- resistant to Levaquin, has allergy to tetracycline, adding  augmentin with probiotics     8/12/20  Remains on skilled for PT/OT, for debility recent rib Fx,   Minimal Assistance and cues x ~225 feet (2 times rest periods)  with RW  Afebrile , VSS O2 sat stable on RA , Day 3 Augmentin for UTI   Planned for D/C to home with HH and sitters; maybe Friday 8/14/20 States " I feel fine"   Remains on skilled for PT/OT, for debility recent rib Fx,   Contact guard assistane and cues x ~300 feet with RW  Afebrile , VSS O2 sat stable on " RA , Day 4 Augmentin for UTI   Large BM yesterday   Planned for D/C to home with HH and sitters; CW asking for more days for therapy .    8/17/20  Estefania Mccollum is a 85 y.o. female   remains on SKILL for PT/OT. She has surpassed her goal of 50ft and now ambulating  With supervision x ~300 feet with RW. Planned for D/C to home with HH and sitters; she is planned d/c Wednesday .        Review of Systems   Unable to perform ROS: Dementia   Eyes: Negative for discharge.   Neurological:        Alert and asking a lot of questions     Objective:     Vital Signs (Most Recent):  Temp: 96 °F (35.6 °C) (08/17/20 0723)  Pulse: 74 (08/17/20 0723)  Resp: 18 (08/17/20 0723)  BP: (!) 122/58 (08/17/20 0723)  SpO2: (!) 94 % (08/17/20 0729) Vital Signs (24h Range):  Temp:  [96 °F (35.6 °C)-99.4 °F (37.4 °C)] 96 °F (35.6 °C)  Pulse:  [74-82] 74  Resp:  [18] 18  SpO2:  [94 %-95 %] 94 %  BP: (122-135)/(58-62) 122/58     Weight: 67 kg (147 lb 11.3 oz)  Body mass index is 22.46 kg/m².    Intake/Output Summary (Last 24 hours) at 8/17/2020 0848  Last data filed at 8/17/2020 0600  Gross per 24 hour   Intake 1440 ml   Output --   Net 1440 ml      Physical Exam  Vitals signs and nursing note reviewed.   Constitutional:       Appearance: She is well-developed.   HENT:      Head: Normocephalic and atraumatic.      Right Ear: External ear normal.      Left Ear: External ear normal.      Nose: Nose normal.   Eyes:      General: No scleral icterus.     Conjunctiva/sclera: Conjunctivae normal.      Pupils: Pupils are equal, round, and reactive to light.   Neck:      Musculoskeletal: Normal range of motion and neck supple.      Thyroid: No thyromegaly.      Vascular: No JVD.      Trachea: No tracheal deviation.   Cardiovascular:      Rate and Rhythm: Normal rate.      Pulses: Normal pulses.      Heart sounds: Normal heart sounds. No murmur.   Pulmonary:      Effort: Pulmonary effort is normal. No respiratory distress.      Breath sounds: Normal  breath sounds. No wheezing or rales.   Chest:      Chest wall: No tenderness.   Abdominal:      General: Bowel sounds are normal. There is no distension.      Palpations: Abdomen is soft. There is no mass.      Tenderness: There is no abdominal tenderness. There is no guarding or rebound.   Musculoskeletal: Normal range of motion.         General: No tenderness.      Comments: VERY kyphotic   Lymphadenopathy:      Cervical: No cervical adenopathy.   Skin:     General: Skin is warm and dry.   Neurological:      General: No focal deficit present.      Mental Status: She is alert.      Motor: No abnormal muscle tone.      Deep Tendon Reflexes: Reflexes are normal and symmetric.      Comments: Alert. Oriented to place and person    Psychiatric:         Behavior: Behavior normal.         Thought Content: Thought content normal.         Judgment: Judgment normal.         Significant Labs:   CBC:   Recent Labs   Lab 08/17/20  0553   WBC 7.69   HGB 12.5   HCT 37.4   *     CMP:   Recent Labs   Lab 08/16/20  0614 08/17/20  0553   * 137  137   K 4.1 4.4  4.3    102  102   CO2 27 25  25    97  96   BUN 11 13  13   CREATININE 0.7 0.7  0.7   CALCIUM 8.5* 8.5*  8.6*   PROT 5.8* 5.9*   ALBUMIN 2.9* 2.9*   BILITOT 0.4 0.5   ALKPHOS 191* 201*   AST 18 18   ALT 11 10   ANIONGAP 7* 10  10   EGFRNONAA >60 >60  >60     Mag 2.0      Assessment/Plan:      * Debility  Swing for PT ; currently max assist with transfer   GOAL>>Gait  x ~50  feet with Minimal Assistance with or without rolling walker  Check CT spine and pelvis today    Noted rib fractures  She is not progressing but she will be slow due to the newly found rib fractures, this also raises more concern for her safety at home;  has started PASSAR     8/5 she is approved till Friday and will keep her here for rehab as long as she can make some progress and insurance approves her stay but did discuss d/c plans with daughter yesterday.  Will reach out to local nursing homes incase she does not progress and needs facility at d/c   8/7 Stand pivot bed<>w/c trng; Sit<>Stand x4; Sitting tolerance and Standing balance trng with RW; Provided tactile and verbal cues for postural trng; Gait trng with RW x ~250 feet with min assistance and cues; LE bike ex x 5 mins  Awaiting approval for more skilled days prior to D/C home with elderly  , family planning for sitters also; they defer nursing home , family making adjustments to home for safety   8/10 Gait: Patient ambulated 150 feet with RW and CGA. One rest break after 75 feet. Patient requries manual assistance to keep walker on straight path due to deviation to the Left. Balance: Sitting Fair, Standing Fair-/poor    8/12 Minimal Assistance and cues x ~225 feet (2 times rest periods)  with RW.    8/14 Contact guard assistane and cues x ~300 feet with RW.    · 8/17 Gait: Supervision x ~300 feet with RW. Slouch forward, flexed neck and kyphotic posture.  Balance: Standing with RW Static: Fair; Dynamic: Fair    Closed fracture of multiple ribs of right side with routine healing  Ribs 4-11 noted on imaging  Denies pain  Impeding her progress however  Cont PT.  PRN Tylenol for pain control.      Liver contusion  Likely liver contusion from recent falls noted on CT scan  H/H stable.  Non-tender abdomen.  Monitor closely but no clinical evidence of true liver lac that would require surgical intervention more likely hematoma       Hyponatremia  I think this is a reflection of her very poor diet  Start remeron  Ns at 75ml/hr   Na better 134 today  8/7 NA normal today .  RESOLVED .    Acute cystitis with hematuria  Continue levaquin for 7 days total for complicated UTI   Getting dose q 48hr 7/29, 7/31, 8/2, 8/4 ct Monday still showing cystitis, will cont levaquin  8/7 levofloxacin dose continued 8/6; has had 5 doses  will repeat urinalysis today   8/10 Repeat UA- positive nitrites , leuks- no fever;  Urine  culture- resistant to Levofloxacin ; + allergy to tetracyline   has lost IV acess, will add oral abx   8/14 Day 4 augmentin .  8/17 competed .    Frequent falls  Swing for PT.   Consider nursing home placement if she does not progress   Will have case management start [facility] smart  Check CT spine and pelvis today  Noted right rib fractures.  Consider placement at d/c .  8/7 CT of lumbar spine stable   8/14 Contact guard assistane and cues x ~300 feet with RW; planned for D/C home with    8/17 Gait: Supervision x ~300 feet with RW. Slouch forward, flexed neck and kyphotic posture.  Balance: Standing with RW Static: Fair; Dynamic: FairRevised goal:  4 a) Gait x ~200 feet with minimal/contact guard assistance and cues    Memory loss  Continue donepezil and namenda       Hyperlipidemia  Continue pravastatin         Hypothyroidism  Continue synthroid 100mcg   Lab Results   Component Value Date    TSH 2.578 12/03/2019           Hypertension  Continue lisinopril .  bp 145//67.      VTE Risk Mitigation (From admission, onward)         Ordered     IP VTE HIGH RISK PATIENT  Once      07/31/20 1636     Place sequential compression device  Until discontinued      07/31/20 1636                Discharge Planning   JAMARI:      Code Status: Full Code   Is the patient medically ready for discharge?:     Reason for patient still in hospital (select all that apply): Treatment  Discharge Plan A: Home Health                  Josette Hays MD  Department of Hospital Medicine   [facility]

## 2020-08-17 NOTE — PLAN OF CARE
Uneventful evening, No injuries no falls, AVASYS in use. Remains disoriented to time, reorients with ques. Antibiotic therapy continues for UTI.

## 2020-08-17 NOTE — ASSESSMENT & PLAN NOTE
I think this is a reflection of her very poor diet  Start remeron  Ns at 75ml/hr   Na better 134 today  8/7 NA normal today .  RESOLVED .

## 2020-08-17 NOTE — ASSESSMENT & PLAN NOTE
Continue levaquin for 7 days total for complicated UTI   Getting dose q 48hr 7/29, 7/31, 8/2, 8/4 ct Monday still showing cystitis, will cont levaquin  8/7 levofloxacin dose continued 8/6; has had 5 doses  will repeat urinalysis today   8/10 Repeat UA- positive nitrites , leuks- no fever;  Urine culture- resistant to Levofloxacin ; + allergy to tetracyline   has lost IV acess, will add oral abx   8/14 Day 4 augmentin .  8/17 competed .

## 2020-08-17 NOTE — ASSESSMENT & PLAN NOTE
Likely liver contusion from recent falls noted on CT scan  H/H stable.  Non-tender abdomen.  Monitor closely but no clinical evidence of true liver lac that would require surgical intervention more likely hematoma

## 2020-08-17 NOTE — ASSESSMENT & PLAN NOTE
Swing for PT ; currently max assist with transfer   GOAL>>Gait  x ~50  feet with Minimal Assistance with or without rolling walker  Check CT spine and pelvis today    Noted rib fractures  She is not progressing but she will be slow due to the newly found rib fractures, this also raises more concern for her safety at home;  has started PASSAR     8/5 she is approved till Friday and will keep her here for rehab as long as she can make some progress and insurance approves her stay but did discuss d/c plans with daughter yesterday. Will reach out to local nursing homes incase she does not progress and needs facility at d/c   8/7 Stand pivot bed<>w/c trng; Sit<>Stand x4; Sitting tolerance and Standing balance trng with RW; Provided tactile and verbal cues for postural trng; Gait trng with RW x ~250 feet with min assistance and cues; LE bike ex x 5 mins  Awaiting approval for more skilled days prior to D/C home with elderly  , family planning for sitters also; they defer nursing home , family making adjustments to home for safety   8/10 Gait: Patient ambulated 150 feet with RW and CGA. One rest break after 75 feet. Patient requries manual assistance to keep walker on straight path due to deviation to the Left. Balance: Sitting Fair, Standing Fair-/poor    8/12 Minimal Assistance and cues x ~225 feet (2 times rest periods)  with RW.    8/14 Contact guard assistane and cues x ~300 feet with RW.    · 8/17 Gait: Supervision x ~300 feet with RW. Slouch forward, flexed neck and kyphotic posture.  Balance: Standing with RW Static: Fair; Dynamic: Fair

## 2020-08-17 NOTE — SUBJECTIVE & OBJECTIVE
Review of Systems   Unable to perform ROS: Dementia   Eyes: Negative for discharge.   Neurological:        Alert and asking a lot of questions     Objective:     Vital Signs (Most Recent):  Temp: 96 °F (35.6 °C) (08/17/20 0723)  Pulse: 74 (08/17/20 0723)  Resp: 18 (08/17/20 0723)  BP: (!) 122/58 (08/17/20 0723)  SpO2: (!) 94 % (08/17/20 0729) Vital Signs (24h Range):  Temp:  [96 °F (35.6 °C)-99.4 °F (37.4 °C)] 96 °F (35.6 °C)  Pulse:  [74-82] 74  Resp:  [18] 18  SpO2:  [94 %-95 %] 94 %  BP: (122-135)/(58-62) 122/58     Weight: 67 kg (147 lb 11.3 oz)  Body mass index is 22.46 kg/m².    Intake/Output Summary (Last 24 hours) at 8/17/2020 0848  Last data filed at 8/17/2020 0600  Gross per 24 hour   Intake 1440 ml   Output --   Net 1440 ml      Physical Exam  Vitals signs and nursing note reviewed.   Constitutional:       Appearance: She is well-developed.   HENT:      Head: Normocephalic and atraumatic.      Right Ear: External ear normal.      Left Ear: External ear normal.      Nose: Nose normal.   Eyes:      General: No scleral icterus.     Conjunctiva/sclera: Conjunctivae normal.      Pupils: Pupils are equal, round, and reactive to light.   Neck:      Musculoskeletal: Normal range of motion and neck supple.      Thyroid: No thyromegaly.      Vascular: No JVD.      Trachea: No tracheal deviation.   Cardiovascular:      Rate and Rhythm: Normal rate.      Pulses: Normal pulses.      Heart sounds: Normal heart sounds. No murmur.   Pulmonary:      Effort: Pulmonary effort is normal. No respiratory distress.      Breath sounds: Normal breath sounds. No wheezing or rales.   Chest:      Chest wall: No tenderness.   Abdominal:      General: Bowel sounds are normal. There is no distension.      Palpations: Abdomen is soft. There is no mass.      Tenderness: There is no abdominal tenderness. There is no guarding or rebound.   Musculoskeletal: Normal range of motion.         General: No tenderness.      Comments: VERY  kyphotic   Lymphadenopathy:      Cervical: No cervical adenopathy.   Skin:     General: Skin is warm and dry.   Neurological:      General: No focal deficit present.      Mental Status: She is alert.      Motor: No abnormal muscle tone.      Deep Tendon Reflexes: Reflexes are normal and symmetric.      Comments: Alert. Oriented to place and person    Psychiatric:         Behavior: Behavior normal.         Thought Content: Thought content normal.         Judgment: Judgment normal.         Significant Labs:   CBC:   Recent Labs   Lab 08/17/20  0553   WBC 7.69   HGB 12.5   HCT 37.4   *     CMP:   Recent Labs   Lab 08/16/20  0614 08/17/20  0553   * 137  137   K 4.1 4.4  4.3    102  102   CO2 27 25  25    97  96   BUN 11 13  13   CREATININE 0.7 0.7  0.7   CALCIUM 8.5* 8.5*  8.6*   PROT 5.8* 5.9*   ALBUMIN 2.9* 2.9*   BILITOT 0.4 0.5   ALKPHOS 191* 201*   AST 18 18   ALT 11 10   ANIONGAP 7* 10  10   EGFRNONAA >60 >60  >60     Mag 2.0

## 2020-08-17 NOTE — PLAN OF CARE
Patient on IP-swing bed. Plan to go home Wednesday. Case management seen patient today. Lift chair ordered for home use. Patient free of falls and incidence this shift. Agrees with plan of care.

## 2020-08-17 NOTE — ASSESSMENT & PLAN NOTE
Ribs 4-11 noted on imaging  Denies pain  Impeding her progress however  Cont PT.  PRN Tylenol for pain control.

## 2020-08-17 NOTE — ASSESSMENT & PLAN NOTE
Swing for PT.   Consider nursing home placement if she does not progress   Will have case management start [facility] smart  Check CT spine and pelvis today  Noted right rib fractures.  Consider placement at d/c .  8/7 CT of lumbar spine stable   8/14 Contact guard assistane and cues x ~300 feet with RW; planned for D/C home with HH   8/17 Gait: Supervision x ~300 feet with RW. Slouch forward, flexed neck and kyphotic posture.  Balance: Standing with RW Static: Fair; Dynamic: FairRevised goal:  4 a) Gait x ~200 feet with minimal/contact guard assistance and cues

## 2020-08-17 NOTE — PT/OT/SLP PROGRESS
"Occupational Therapy  Treatment    Estefania Mccollum   MRN: [id number]   Admitting Diagnosis: Debility    OT Date of Treatment: 08/17/20   OT Start Time: 0430  OT Stop Time: 0451  OT Total Time (min): 21 min    Billable Minutes:  Therapeutic Activity 21 minutes    General Precautions: Standard, fall  Orthopedic Precautions:    Braces:           Subjective:  Communicated with Nursing and PT prior to session. Pt required encouragement to participate. She replied, "Do I have to do this because I do not really want to?" With encouragement from OT and spouse at , pt actively participated.    Pain/Comfort  Pain Rating 1: 2/10  Location - Side 1: Bilateral  Location - Orientation 1: posterior  Location 1: back  Pain Addressed 1: Reposition  Pain Rating Post-Intervention 1: 1/10    Objective:    Functional Mobility:  Bed Mobility:   Min A with rolling left to right- incoordinated movements  Moderate A with scooting up in bed- decreased BUE/BLE strength    Transfers:   Supine to sitting EOB: Min A due to decreased abdominal strength and decrease coordination    Activities of Daily Living:     Feeding: NT today     UE dressing: NT today    LE dressing: NT today    Bathing: NT today    Toileting: NT today    Balance:   Static Sit: FAIR+: Able to take MINIMAL challenges from all directions  Dynamic Sit: FAIR: decreased trunk control and abdominal strength noted.  Static Stand: NT today  Dynamic stand: NT today    Therapeutic Activities and Exercises:  Performed supine to sitting with min A. Required assistance due to decrease BUE and abdominal strength. Decreased fxnal balance limited pts ability to sit up right without support. Noted pt has rounded shoulders when sitting upright. Cued with VC and TC to retract shoulders to raise chest for better positioning.   Maintained sitting upright for ~15 minutes. Fair dynamic sitting balance. Fair + static. Secondary to weak abdominal muscles.   Pt required verbal and tactile cuing to " "remain upright.   Pt expressed fear of falling out of bed.   While sitting upright performed overhead reaching / shoulder flexion: 1 x 10  Trunk anterior and posterior leaning: 1 x 10  Trunk rotations each side: 1 x 10    Pt was fatigued following tx and returned from sitting EOB to supine with min A. Moderate A to scoot up in bed for proper positioning due to BUE weakness.    AM-PAC 6 CLICK ADL   How much help from another person does this patient currently need?   1 = Unable, Total/Dependent Assistance  2 = A lot, Maximum/Moderate Assistance  3 = A little, Minimum/Contact Guard/Supervision  4 = None, Modified [place]/Independent    Putting on and taking off regular lower body clothing? : 3  Bathing (including washing, rinsing, drying)?: 3  Toileting, which includes using toilet, bedpan, or urinal? : 3  Putting on and taking off regular upper body clothing?: 3  Taking care of personal grooming such as brushing teeth?: 3  Eating meals?: 3  Daily Activity Total Score: 18     AM-PAC Raw Score CMS "G-Code Modifier Level of Impairment Assistance   6 % Total / Unable   7 - 8 CM 80 - 100% Maximal Assist   9-13 CL 60 - 80% Moderate Assist   14 - 19 CK 40 - 60% Moderate Assist   20 - 22 CJ 20 - 40% Minimal Assist   23 CI 1-20% SBA / CGA   24 CH 0% Independent/ Mod I       Patient left HOB elevated with all lines intact, call button in reach and  present    ASSESSMENT:  Estefania Mccollum is a 85 y.o. female with a medical diagnosis of Debility and presents with decreased endurance, limited dynamic and static balance in both sitting and standing, decreased BUE strength and coordination. All above deficits limiting adl performance and safe t/f. Pt requires encouragement to participate due to decreased motivation. VC and repetition needed for patient to correctly/actively participate.    Rehab identified problem list/impairments:      Rehab potential is good.    Activity tolerance: Fair    Discharge " recommendations: Discharge Facility/Level of Care Needs: home with home health     Equipment recommendations: bedside commode, bath bench, grab bar, dressing device, raised toilet, shower chair, walker, rolling     GOALS:   Multidisciplinary Problems     Occupational Therapy Goals        Problem: Occupational Therapy Goal    Goal Priority Disciplines Outcome Interventions   Occupational Therapy Goal     OT, PT/OT Ongoing, Progressing    Description: Goals to be met by: 8/17/2020     Patient will increase functional independence with ADLs by performing:    Feeding with Modified [place].  UE Dressing with Modified [place].  LE Dressing with Minimal Assistance.  Grooming while seated with Modified [place].  Toileting from bedside commode with Minimal Assistance for hygiene and clothing management.   Sitting at edge of bed x5 minutes with Stand-by Assistance.  Supine to sit with Stand-by Assistance.  Step transfer with Contact Guard Assistance  Toilet transfer to bedside commode with Minimal Assistance.                     Plan:  Patient to be seen 5 x/week to address the above listed problems via self-care/home management, therapeutic activities, therapeutic exercises, neuromuscular re-education  Plan of Care expires: 08/17/20  Plan of Care reviewed with: patient, spouse         Wanda DANETTE Ahn  08/17/2020

## 2020-08-17 NOTE — CARE UPDATE
Patient ha ander  to go home on Wednesday with Home Health. Due to orientation has had limited participation in activity therapy.

## 2020-08-18 LAB
ALBUMIN SERPL BCP-MCNC: 3.2 G/DL (ref 3.5–5.2)
ALP SERPL-CCNC: 209 U/L (ref 55–135)
ALT SERPL W/O P-5'-P-CCNC: 12 U/L (ref 10–44)
ANION GAP SERPL CALC-SCNC: 11 MMOL/L (ref 8–16)
AST SERPL-CCNC: 19 U/L (ref 10–40)
BILIRUB SERPL-MCNC: 0.4 MG/DL (ref 0.1–1)
BUN SERPL-MCNC: 13 MG/DL (ref 8–23)
CALCIUM SERPL-MCNC: 8.9 MG/DL (ref 8.7–10.5)
CHLORIDE SERPL-SCNC: 101 MMOL/L (ref 95–110)
CO2 SERPL-SCNC: 26 MMOL/L (ref 23–29)
CREAT SERPL-MCNC: 0.7 MG/DL (ref 0.5–1.4)
EST. GFR  (AFRICAN AMERICAN): >60 ML/MIN/1.73 M^2
EST. GFR  (NON AFRICAN AMERICAN): >60 ML/MIN/1.73 M^2
GLUCOSE SERPL-MCNC: 99 MG/DL (ref 70–110)
POTASSIUM SERPL-SCNC: 4.4 MMOL/L (ref 3.5–5.1)
PROT SERPL-MCNC: 6.3 G/DL (ref 6–8.4)
SODIUM SERPL-SCNC: 138 MMOL/L (ref 136–145)

## 2020-08-18 PROCEDURE — 94761 N-INVAS EAR/PLS OXIMETRY MLT: CPT | Mod: HCNC

## 2020-08-18 PROCEDURE — 97116 GAIT TRAINING THERAPY: CPT | Mod: HCNC

## 2020-08-18 PROCEDURE — 97530 THERAPEUTIC ACTIVITIES: CPT | Mod: HCNC

## 2020-08-18 PROCEDURE — 11000004 HC SNF PRIVATE: Mod: HCNC

## 2020-08-18 PROCEDURE — 94799 UNLISTED PULMONARY SVC/PX: CPT | Mod: HCNC

## 2020-08-18 PROCEDURE — 36415 COLL VENOUS BLD VENIPUNCTURE: CPT | Mod: HCNC

## 2020-08-18 PROCEDURE — 25000003 PHARM REV CODE 250: Mod: HCNC | Performed by: NURSE PRACTITIONER

## 2020-08-18 PROCEDURE — 80053 COMPREHEN METABOLIC PANEL: CPT | Mod: HCNC

## 2020-08-18 PROCEDURE — 97535 SELF CARE MNGMENT TRAINING: CPT | Mod: HCNC

## 2020-08-18 RX ADMIN — DONEPEZIL HYDROCHLORIDE 5 MG: 5 TABLET, FILM COATED ORAL at 09:08

## 2020-08-18 RX ADMIN — ASPIRIN 81 MG: 81 TABLET, COATED ORAL at 08:08

## 2020-08-18 RX ADMIN — STANDARDIZED SENNA CONCENTRATE AND DOCUSATE SODIUM 1 TABLET: 8.6; 5 TABLET ORAL at 09:08

## 2020-08-18 RX ADMIN — SERTRALINE HYDROCHLORIDE 25 MG: 25 TABLET ORAL at 09:08

## 2020-08-18 RX ADMIN — STANDARDIZED SENNA CONCENTRATE AND DOCUSATE SODIUM 1 TABLET: 8.6; 5 TABLET ORAL at 08:08

## 2020-08-18 RX ADMIN — MEMANTINE 10 MG: 10 TABLET ORAL at 09:08

## 2020-08-18 RX ADMIN — PRAVASTATIN SODIUM 80 MG: 40 TABLET ORAL at 08:08

## 2020-08-18 RX ADMIN — MEMANTINE 10 MG: 10 TABLET ORAL at 08:08

## 2020-08-18 RX ADMIN — LISINOPRIL 20 MG: 20 TABLET ORAL at 08:08

## 2020-08-18 RX ADMIN — LEVOTHYROXINE SODIUM 100 MCG: 100 TABLET ORAL at 06:08

## 2020-08-18 RX ADMIN — MIRTAZAPINE 15 MG: 15 TABLET, ORALLY DISINTEGRATING ORAL at 09:08

## 2020-08-18 NOTE — PLAN OF CARE
Plan of Care discussed with patient at start of shift.  Requires re-enforcement and reassurance.  Follows commands.  Oriented x3.  Continually asks what time is it.  Sleeping most of shift.  Avasys and baby monitor in place.  Bed alarm on.  Side rails up x2.  No falls this shift.  Requires assist to reposition.  Able to assist.  Right hand dressing dry and intact to skin tear.  No drainage.  Afebrile.

## 2020-08-18 NOTE — PT/OT/SLP PROGRESS
"Physical Therapy Treatment    Patient Name:  Estefania Mccollum   MRN:  [id number]    Recommendations:     Discharge Recommendations:  home with home health, home health PT, home health OT, nursing facility, basic   Discharge Equipment Recommendations: none   Barriers to discharge: Decreased caregiver support    Assessment:     Estefania Mccollum is a 85 y.o. female admitted with a medical diagnosis of Debility.  She presents with the following impairments/functional limitations:  weakness, impaired self care skills, impaired functional mobilty, gait instability, impaired endurance, impaired balance, impaired cognition, decreased safety awareness, decreased lower extremity function, other (comment)(Slouch forward , flexed neck and kyphotic posture). Patient tolerated well PT session today. Patient has been consistent to perform and tolerate gait distance x ~300 feet( 2 hallways)  Using RW  With Supervision. No sign of respiratory distress and fatigue. PT goals established were met.    Rehab Prognosis: Fair; patient would benefit from acute skilled PT services to address these deficits and reach maximum level of function.    Recent Surgery: * No surgery found *      Plan:     During this hospitalization, patient to be seen daily to address the identified rehab impairments via gait training, therapeutic activities, therapeutic exercises and progress toward the following goals:    · Plan of Care Expires:  08/14/20    Subjective     Chief Complaint: no new complain  Patient/Family Comments/goals: "To go home tomorrow".  Pain/Comfort:  · Pain Rating 1: 0/10  · Pain Rating Post-Intervention 1: 0/10      Objective:     Communicated with patient and   prior to session.  Patient found supine with telemetry upon PT entry to room.     General Precautions: Standard, fall   Orthopedic Precautions:N/A   Braces: N/A     Functional Mobility:  · Bed Mobility:     · Rolling Left:  modified independence  · Rolling Right: modified " independence  · Scooting: supervision  · Supine to Sit: supervision  · Sit to Supine: supervision  · Transfers:     · Sit to Stand:  supervision with rolling walker  · Bed to Chair: contact guard/supervision with  hand-held assist  using  Stand Pivot  · Gait: Supervision x ~300 feet( 2 hallways) with RW. Slouch forward, flexed neck and kyphotic posture.  · Balance: Standing with RW Static and Dynmaic: Fair+      AM-PAC 6 CLICK MOBILITY  Turning over in bed (including adjusting bedclothes, sheets and blankets)?: 4  Sitting down on and standing up from a chair with arms (e.g., wheelchair, bedside commode, etc.): 3  Moving from lying on back to sitting on the side of the bed?: 4  Moving to and from a bed to a chair (including a wheelchair)?: 3  Need to walk in hospital room?: 3  Climbing 3-5 steps with a railing?: 1  Basic Mobility Total Score: 18       Therapeutic Activities and Exercises:   Body seqeunce on out of bed activity and transfers, standing balance and postural trng; Gait trng RW x ~300 feet with supervision    Patient left supine with all lines intact, call button in reach, bed alarm on, nursing  notified and  present..    GOALS:   Multidisciplinary Problems     Physical Therapy Goals        Problem: Physical Therapy Goal    Goal Priority Disciplines Outcome Goal Variances Interventions   Physical Therapy Goal     PT, PT/OT Ongoing, Progressing     Description: Goals to be met by: 20    Patient will increase functional independence with mobility by performin. Supine to sit with Contact Guard Assistance  2. Sit to supine with Contact Guard Assistance  3. Bed to chair transfer with Minimal/Contact Guard Assistancewith or without rolling walker using Stand Pivot TECHNIQUE  4. Gait  x ~50  feet with Minimal Assistance with or without rolling walker - met 8/10/20   8/10/20 - Revised goal:  4 a) Gait x ~200 feet with minimal/contact guard assistance and cues  5. Lower extremity exercise  program x10 reps per handout, with assistance as needed                Multidisciplinary Problems (Resolved)        Problem: Physical Therapy Goal    Goal Priority Disciplines Outcome Goal Variances Interventions   Physical Therapy Goal   (Resolved)     PT, PT/OT  Error     Description: Patient will increase functional independence with mobility by performin. Supine to sit with Contact Guard Assistance  2. Sit to supine with Contact Guard Assistance  3. Bed to chair transfer with Minimal/Contact Guard Assistancewith or without rolling walker using Stand Pivot TECHNIQUE  4. Gait  x ~50  feet with Minimal Assistance with or without rolling walker  5. Lower extremity exercise program x10 reps per handout, with assistance as needed                   Time Tracking:     PT Received On: 20  PT Start Time: 1413     PT Stop Time: 1440  PT Total Time (min): 27 min     Billable Minutes: Gait Training 15 and Therapeutic Activity 12    Treatment Type: Treatment  PT/PTA: PT     PTA Visit Number: 1     Cricket Gaytan, PT  2020

## 2020-08-18 NOTE — NURSING
Walking rounding.  Report received from Kymberly RAMIREZ.  Patient awake without complaints.  Call bell in reach.    Bed alarm on.  Ayasys and bedside camera in place.

## 2020-08-18 NOTE — PT/OT/SLP PROGRESS
Occupational Therapy   Treatment    Name: Estefania Mccollum  MRN: [id number]  Admitting Diagnosis:  Debility       Recommendations:     Discharge Recommendations: home with home health, home health PT, home health OT(requires 24 hour assistance, other than )  Discharge Equipment Recommendations:  none  Barriers to discharge:  Decreased caregiver support    Assessment:     Estefania Mccollum is a 85 y.o. female with a medical diagnosis of Debility.  She presents with good participation, verbalizing need for BM upon OT entry into room. Patient continues to require Max A for toileting as she requires assistance for diaper management and wiping as she focuses on balance. Patient will require able bodied caregiver to assist with toileting when home.     Performance deficits affecting function are weakness, gait instability, decreased upper extremity function, impaired endurance, impaired balance, decreased lower extremity function, decreased safety awareness, impaired self care skills, impaired cognition, impaired functional mobilty.     Rehab Prognosis:  Fair; patient would benefit from acute skilled OT services to address these deficits and reach maximum level of function.       Plan:     Patient to be seen 5 x/week to address the above listed problems via self-care/home management, therapeutic activities, therapeutic exercises  · Plan of Care Expires: 08/21/20  · Plan of Care Reviewed with: patient    Subjective     Pain/Comfort:  · Pain Rating 1: 0/10    Objective:     Communicated with: Nursing prior to session.  Patient found supine with telemetry upon OT entry to room.    General Precautions: Standard, fall   Orthopedic Precautions:N/A   Braces: N/A     Occupational Performance:     Bed Mobility:    · Patient completed Scooting/Bridging with stand by assistance  · Patient completed Supine to Sit with contact guard assistance  · Patient completed Sit to Supine with stand by assistance     Functional  Mobility/Transfers:  · Patient completed Sit <> Stand Transfer with stand by assistance  with  rolling walker   · Patient completed Toilet Transfer Step Transfer technique with minimum assistance with  bedside commode  · Functional Mobility: steps to BSC with CGA    Activities of Daily Living:  · Toileting: maximal assistance wiping and brief management      [facility] 6 Click ADL: 17    Treatment & Education:  Upon entry into room patient reports needing to have bowel movement. Therapist facilitated supine to sit with CGA (noted required more assist due to urgency of BM), sit to stand with SBA, toilet transfer to BSC with Min A, max A for toileting for wiping with max verbal cues for postural control from patient to improvement balance, brief management bed level to don with patient rolling with SBA.     Patient left supine with call button in reachEducation:      GOALS:   Multidisciplinary Problems     Occupational Therapy Goals        Problem: Occupational Therapy Goal    Goal Priority Disciplines Outcome Interventions   Occupational Therapy Goal     OT, PT/OT Ongoing, Progressing    Description: Goals to be met by: 8/21/2020     Patient will increase functional independence with ADLs by performing:    Feeding with Modified [place].  UE Dressing with Modified [place].  LE Dressing with Minimal Assistance.  Grooming while seated with Modified [place].  Toileting from bedside commode with Minimal Assistance for hygiene and clothing management.   Sitting at edge of bed x5 minutes with Stand-by Assistance. (GOAL MET)  Supine to sit with Stand-by Assistance. (GOAL MET)  Step transfer with Contact Guard Assistance (GOAL MET)  Toilet transfer to bedside commode with Minimal Assistance. (GOAL MET)                     Time Tracking:     OT Date of Treatment: 08/18/20  OT Start Time: 1111  OT Stop Time: 1129  OT Total Time (min): 18 min    Billable Minutes:Self Care/Home Management 18 minutes    Gilles Bernard  OT  8/18/2020

## 2020-08-18 NOTE — PATIENT CARE CONFERENCE
SWING bed Skilled Nursing Patient Care Conference.         Skilled level of care weekly patient care conference complete. Patient progressing toward goals and should plan for discharge tomorrow.      PT Recommendations: Hospital bed, lift device     Progress toward goals: patient is approaching baseline and progressing toward goals. Some goals met.          OT Recommendations: hospital bed, lift device  OT also recommends 24 hour sitter care.      Progress toward goals: patient progressing toward goals.    Conversation with patient and family:  Spouse aware of discharge tomorrow.

## 2020-08-19 VITALS
HEART RATE: 72 BPM | BODY MASS INDEX: 22.38 KG/M2 | RESPIRATION RATE: 16 BRPM | SYSTOLIC BLOOD PRESSURE: 122 MMHG | DIASTOLIC BLOOD PRESSURE: 61 MMHG | HEIGHT: 68 IN | OXYGEN SATURATION: 93 % | WEIGHT: 147.69 LBS | TEMPERATURE: 98 F

## 2020-08-19 LAB
ALBUMIN SERPL BCP-MCNC: 2.9 G/DL (ref 3.5–5.2)
ALP SERPL-CCNC: 174 U/L (ref 55–135)
ALT SERPL W/O P-5'-P-CCNC: 11 U/L (ref 10–44)
ANION GAP SERPL CALC-SCNC: 10 MMOL/L (ref 8–16)
AST SERPL-CCNC: 18 U/L (ref 10–40)
BILIRUB SERPL-MCNC: 0.4 MG/DL (ref 0.1–1)
BUN SERPL-MCNC: 14 MG/DL (ref 8–23)
CALCIUM SERPL-MCNC: 8.3 MG/DL (ref 8.7–10.5)
CHLORIDE SERPL-SCNC: 102 MMOL/L (ref 95–110)
CO2 SERPL-SCNC: 24 MMOL/L (ref 23–29)
CREAT SERPL-MCNC: 0.7 MG/DL (ref 0.5–1.4)
EST. GFR  (AFRICAN AMERICAN): >60 ML/MIN/1.73 M^2
EST. GFR  (NON AFRICAN AMERICAN): >60 ML/MIN/1.73 M^2
GLUCOSE SERPL-MCNC: 95 MG/DL (ref 70–110)
POTASSIUM SERPL-SCNC: 4.1 MMOL/L (ref 3.5–5.1)
PROT SERPL-MCNC: 5.8 G/DL (ref 6–8.4)
SODIUM SERPL-SCNC: 136 MMOL/L (ref 136–145)

## 2020-08-19 PROCEDURE — 94760 N-INVAS EAR/PLS OXIMETRY 1: CPT | Mod: HCNC

## 2020-08-19 PROCEDURE — 99238 HOSP IP/OBS DSCHRG MGMT 30/<: CPT | Mod: HCNC,,, | Performed by: FAMILY MEDICINE

## 2020-08-19 PROCEDURE — 25000003 PHARM REV CODE 250: Mod: HCNC | Performed by: NURSE PRACTITIONER

## 2020-08-19 PROCEDURE — 36415 COLL VENOUS BLD VENIPUNCTURE: CPT | Mod: HCNC

## 2020-08-19 PROCEDURE — 80053 COMPREHEN METABOLIC PANEL: CPT | Mod: HCNC

## 2020-08-19 PROCEDURE — 99238 PR HOSPITAL DISCHARGE DAY,<30 MIN: ICD-10-PCS | Mod: HCNC,,, | Performed by: FAMILY MEDICINE

## 2020-08-19 RX ADMIN — PRAVASTATIN SODIUM 80 MG: 40 TABLET ORAL at 08:08

## 2020-08-19 RX ADMIN — LEVOTHYROXINE SODIUM 100 MCG: 100 TABLET ORAL at 06:08

## 2020-08-19 RX ADMIN — ASPIRIN 81 MG: 81 TABLET, COATED ORAL at 08:08

## 2020-08-19 RX ADMIN — LISINOPRIL 20 MG: 20 TABLET ORAL at 08:08

## 2020-08-19 RX ADMIN — MEMANTINE 10 MG: 10 TABLET ORAL at 08:08

## 2020-08-19 NOTE — PLAN OF CARE
Ms Mccollum will discharge home today. She is returning home with her  and 24 hour sitter care. She will discharge home with [facility] for [facility]. I have spoken with Emani on Monday 8/17 who is arranging the sitter care to confirm discharge 8/19.

## 2020-08-19 NOTE — ASSESSMENT & PLAN NOTE
Swing for PT.   Consider nursing home placement if she does not progress   Will have case management start [facility]  Check CT spine and pelvis today  Noted right rib fractures.  Consider placement at d/c .  8/7 CT of lumbar spine stable   8/14 Contact guard assistane and cues x ~300 feet with RW; planned for D/C home with    8/17 Gait: Supervision x ~300 feet with RW. Slouch forward, flexed neck and kyphotic posture.  Balance: Standing with RW Static: Fair; Dynamic: FairRevised goal:  4 a) Gait x ~200 feet with minimal/contact guard assistance and cues    8/19 Gait: Supervision x ~300 feet( 2 hallways) with RW. Slouch forward, flexed neck and kyphotic posture. D/c with [facility] today

## 2020-08-19 NOTE — PROGRESS NOTES
[facility] Medicine  Progress Note    Patient Name: Estefania Mccollum  MRN: [id number]  Patient Class: IP- Swing   Admission Date: 7/31/2020  Length of Stay: 19 days  Attending Physician: Manish Lynn MD  Primary Care Provider: Danna Levine MD        Subjective:     Principal Problem:Debility        HPI:  85 year old female admitted with UTI being admitted to Swing for PT. She lives at home with only her 91 year old , who is unable to lift her or transfer her safely. She is weak. She needs to be strong enough to perform her ADL's on her own. If she is unable to reach this goal, she may need nursing home placement.     Overview/Hospital Course:  8/3 This patient was placed on SKILL unit for cont abx to treat UTI as well as PT. She has become debilitated and weak at home resulting in multiple falls even with walker. Lives at home with elderly frail . Her goal is to ambulate 50 ft with min ass using RW. Yesterday she did bed mobility with min assist. Sat she was max assist to transfer.     8/4 this patient is on skill for cont therapy as she was not strong enough to be safely discharged to home with her elderly frail . She is demented and has no complaints except she wants to go home. She complains of no pain but has not been walking and always laying on right side. CT head done that did not show any acute path. NA was low yesterday so started on NS at 75ml/hr. This has improved 125>132. Ct of abd and pelvis as well as lumbar spine done yesterday showing 4-11 right rib fractures with right sided effusion and poss liver laceration. H/H stable. Ct of spine shows no fractures or dislocation. She remains max assist for any transfers and mod assist with bed mobility. Remains on levaquin for UTI and maintaining sats on RA 93-96% NAD.     8/5 pt remains on SKILL for therapy. Noted rib fractures from fall yesterday and discussed her condition with daughter Emani as well as  ". With PT she remains mod assist with rolling in bed and max assist to get up in bed /stand/transfer. This is very concerning for her safety when going home with feeble . Had long discussion with Emani who was calling her sister Autumn to discuss possible placement at d/c as home with sitters may be a challenge goven her current condition.  on case and PASSAR completed     Na better with NS fluids. Na 125>134    8/7 Pt  remains on SKILL for therapy. Noted rib fractures from fall 2 days ago.  With PT she remains mod assist with rolling in bed and max assist to get up in bed /stand/transfer but has progressed .   Stand pivot bed<>w/c trng; Sit<>Stand x4; Sitting tolerance and Standing balance trng with RW; Provided tactile and verbal cues for postural trng; Gait trng with RW x ~250 feet with min assistance and cues; LE bike ex x 5 mins  Concerns over home safety have been discussed with family; lives wht 90 YO feeble . SW/case management following.   PASSAR completed    8/10/20 Pt  remains on SKILL for therapy due to debility s/p recent rib fractures, Awake and alert this am,   · Gait: Patient ambulated 150 feet with RW and CGA. One rest break after 75 feet. Patient requries manual assistance to keep walker on straight path due to deviation to the Left.   · Balance: Sitting Fair, Standing Fair-/poor  VSS, afebrile , O2 sat 92% on RA   Urine Cx resulted - + Ecoli- resistant to Levaquin, has allergy to tetracycline, adding  augmentin with probiotics     8/12/20  Remains on skilled for PT/OT, for debility recent rib Fx,   Minimal Assistance and cues x ~225 feet (2 times rest periods)  with RW  Afebrile , VSS O2 sat stable on RA , Day 3 Augmentin for UTI   Planned for D/C to home with HH and sitters; maybe Friday 8/14/20 States " I feel fine"   Remains on skilled for PT/OT, for debility recent rib Fx,   Contact guard assistane and cues x ~300 feet with RW  Afebrile , VSS O2 sat stable on " RA , Day 4 Augmentin for UTI   Large BM yesterday   Planned for D/C to home with HH and sitters; CW asking for more days for therapy .    8/17/20  Estefania Mccollum is a 85 y.o. female   remains on SKILL for PT/OT. She has surpassed her goal of 50ft and now ambulating  With supervision x ~300 feet with RW. Planned for D/C to home with HH and sitters; she is planned d/c Wednesday .    8/19/20  Pt is doing well. VSS/afebrile. Labs stable. Doing well with PT>>Gait: Supervision x ~300 feet( 2 hallways) with RW. Slouch forward, flexed neck and kyphotic posture. She has met/exceded her goals. Plan is for d/c today to home with home health        Review of Systems   Unable to perform ROS: Dementia   Eyes: Negative for discharge.   Neurological:        Alert and asking a lot of questions     Objective:     Vital Signs (Most Recent):  Temp: 98.2 °F (36.8 °C) (08/19/20 0712)  Pulse: 72 (08/19/20 0740)  Resp: 16 (08/19/20 0712)  BP: 122/61 (08/19/20 0712)  SpO2: (!) 93 % (08/19/20 0740) Vital Signs (24h Range):  Temp:  [98.2 °F (36.8 °C)-98.3 °F (36.8 °C)] 98.2 °F (36.8 °C)  Pulse:  [72-78] 72  Resp:  [16-18] 16  SpO2:  [93 %-95 %] 93 %  BP: (122-147)/(61-68) 122/61     Weight: 67 kg (147 lb 11.3 oz)  Body mass index is 22.46 kg/m².    Intake/Output Summary (Last 24 hours) at 8/19/2020 1030  Last data filed at 8/18/2020 1200  Gross per 24 hour   Intake 120 ml   Output --   Net 120 ml      Physical Exam  Vitals signs and nursing note reviewed.   Constitutional:       Appearance: She is well-developed.   HENT:      Head: Normocephalic and atraumatic.      Right Ear: External ear normal.      Left Ear: External ear normal.      Nose: Nose normal.   Eyes:      General: No scleral icterus.     Conjunctiva/sclera: Conjunctivae normal.      Pupils: Pupils are equal, round, and reactive to light.   Neck:      Musculoskeletal: Normal range of motion and neck supple.      Thyroid: No thyromegaly.      Vascular: No JVD.      Trachea: No  tracheal deviation.   Cardiovascular:      Rate and Rhythm: Normal rate.      Pulses: Normal pulses.      Heart sounds: Normal heart sounds. No murmur.   Pulmonary:      Effort: Pulmonary effort is normal. No respiratory distress.      Breath sounds: Normal breath sounds. No wheezing or rales.   Chest:      Chest wall: No tenderness.   Abdominal:      General: Bowel sounds are normal. There is no distension.      Palpations: Abdomen is soft. There is no mass.      Tenderness: There is no abdominal tenderness. There is no guarding or rebound.   Musculoskeletal: Normal range of motion.         General: No tenderness.      Comments: VERY kyphotic   Lymphadenopathy:      Cervical: No cervical adenopathy.   Skin:     General: Skin is warm and dry.   Neurological:      General: No focal deficit present.      Mental Status: She is alert.      Motor: No abnormal muscle tone.      Deep Tendon Reflexes: Reflexes are normal and symmetric.      Comments: Alert. Oriented to place and person    Psychiatric:         Behavior: Behavior normal.         Thought Content: Thought content normal.         Judgment: Judgment normal.         Significant Labs:     Lab Results   Component Value Date    WBC 7.69 08/17/2020    HGB 12.5 08/17/2020    HCT 37.4 08/17/2020    MCV 90 08/17/2020     (H) 08/17/2020       CMP:   Recent Labs   Lab 08/18/20  0555 08/19/20  0534    136   K 4.4 4.1    102   CO2 26 24   GLU 99 95   BUN 13 14   CREATININE 0.7 0.7   CALCIUM 8.9 8.3*   PROT 6.3 5.8*   ALBUMIN 3.2* 2.9*   BILITOT 0.4 0.4   ALKPHOS 209* 174*   AST 19 18   ALT 12 11   ANIONGAP 11 10   EGFRNONAA >60 >60     Mag 2.0      Assessment/Plan:      * Debility  Swing for PT ; currently max assist with transfer   GOAL>>Gait  x ~50  feet with Minimal Assistance with or without rolling walker  Check CT spine and pelvis today    Noted rib fractures  She is not progressing but she will be slow due to the newly found rib fractures, this also  raises more concern for her safety at home;  has started PASSAR     8/5 she is approved till Friday and will keep her here for rehab as long as she can make some progress and insurance approves her stay but did discuss d/c plans with daughter yesterday. Will reach out to local nursing homes incase she does not progress and needs facility at d/c   8/7 Stand pivot bed<>w/c trng; Sit<>Stand x4; Sitting tolerance and Standing balance trng with RW; Provided tactile and verbal cues for postural trng; Gait trng with RW x ~250 feet with min assistance and cues; LE bike ex x 5 mins  Awaiting approval for more skilled days prior to D/C home with elderly  , family planning for sitters also; they defer nursing home , family making adjustments to home for safety   8/10 Gait: Patient ambulated 150 feet with RW and CGA. One rest break after 75 feet. Patient requries manual assistance to keep walker on straight path due to deviation to the Left. Balance: Sitting Fair, Standing Fair-/poor    8/12 Minimal Assistance and cues x ~225 feet (2 times rest periods)  with RW.    8/14 Contact guard assistane and cues x ~300 feet with RW.    · 8/17 Gait: Supervision x ~300 feet with RW. Slouch forward, flexed neck and kyphotic posture.  Balance: Standing with RW Static: Fair; Dynamic: Fair    8/19 Gait: Supervision x ~300 feet( 2 hallways) with RW. Slouch forward, flexed neck and kyphotic posture. [facility] with [facility]     Closed fracture of multiple ribs of right side with routine healing  Ribs 4-11 noted on imaging  Denies pain  Impeding her progress however  Cont PT.  PRN Tylenol for pain control.      Liver contusion  Likely liver contusion from recent falls noted on CT scan  H/H stable.  Non-tender abdomen.  Monitor closely but no clinical evidence of true liver lac that would require surgical intervention more likely hematoma       Hyponatremia  I think this is a reflection of her very poor diet  Start  remeron  Ns at 75ml/hr   Na better 134 today  8/7 NA normal today .  RESOLVED .    Acute cystitis with hematuria  Continue levaquin for 7 days total for complicated UTI   Getting dose q 48hr 7/29, 7/31, 8/2, 8/4 ct Monday still showing cystitis, will cont levaquin  8/7 levofloxacin dose continued 8/6; has had 5 doses  will repeat urinalysis today   8/10 Repeat UA- positive nitrites , leuks- no fever;  Urine culture- resistant to Levofloxacin ; + allergy to tetracyline   has lost IV acess, will add oral abx   8/14 Day 4 augmentin .  8/17 competed .    Frequent falls  Swing for PT.   Consider nursing home placement if she does not progress   Will have case management start PASSAR incase  Check CT spine and pelvis today  Noted right rib fractures.  Consider placement at d/c .  8/7 CT of lumbar spine stable   8/14 Contact guard assistane and cues x ~300 feet with RW; planned for D/C home with    8/17 Gait: Supervision x ~300 feet with RW. Slouch forward, flexed neck and kyphotic posture.  Balance: Standing with RW Static: Fair; Dynamic: FairRevised goal:  4 a) Gait x ~200 feet with minimal/contact guard assistance and cues    8/19 Gait: Supervision x ~300 feet( 2 hallways) with RW. Slouch forward, flexed neck and kyphotic posture. D[facility]c with [facility] today    Memory loss  Continue donepezil and namenda       Hyperlipidemia  Continue pravastatin         Hypothyroidism  Continue synthroid 100mcg   Lab Results   Component Value Date    TSH 2.578 12/03/2019           Hypertension  Continue lisinopril .  bp 1422//68      VTE Risk Mitigation (From admission, onward)         Ordered     IP VTE HIGH RISK PATIENT  Once      07/31/20 1636     Place sequential compression device  Until discontinued      07/31/20 1636                Discharge Planning   JAMARI:      Code Status: Full Code   Is the patient medically ready for discharge?:    Yes                 Sagar Reddy MD  Department of Hospital Medicine    [facility]

## 2020-08-19 NOTE — PLAN OF CARE
08/19/20 1335   Final Note   Assessment Type Final Discharge Note   Anticipated Discharge Disposition Home-Health   Hospital Follow Up  Appt(s) scheduled? Yes   Discharge plans and expectations educations in teach back method with documentation complete? Yes   Right Care Referral Info   Post Acute Recommendation Home-care   Facility Name [facility].   [address] [facility] [address] [facility] [address]   [place] [address]     Ms Mccollum is discharging home today with [facility] and sitters at home.

## 2020-08-19 NOTE — PLAN OF CARE
Referral sent to [facility].    08/19/20 0013   Post-Acute Status   Post-Acute Authorization Home Health   Home Health Status Referrals Sent   Part D Coverage [employer]   Discharge Delays None known at this time   Discharge Plan   Discharge Plan [facility]

## 2020-08-19 NOTE — SUBJECTIVE & OBJECTIVE
Review of Systems   Unable to perform ROS: Dementia   Eyes: Negative for discharge.   Neurological:        Alert and asking a lot of questions     Objective:     Vital Signs (Most Recent):  Temp: 98.2 °F (36.8 °C) (08/19/20 0712)  Pulse: 72 (08/19/20 0740)  Resp: 16 (08/19/20 0712)  BP: 122/61 (08/19/20 0712)  SpO2: (!) 93 % (08/19/20 0740) Vital Signs (24h Range):  Temp:  [98.2 °F (36.8 °C)-98.3 °F (36.8 °C)] 98.2 °F (36.8 °C)  Pulse:  [72-78] 72  Resp:  [16-18] 16  SpO2:  [93 %-95 %] 93 %  BP: (122-147)/(61-68) 122/61     Weight: 67 kg (147 lb 11.3 oz)  Body mass index is 22.46 kg/m².    Intake/Output Summary (Last 24 hours) at 8/19/2020 1030  Last data filed at 8/18/2020 1200  Gross per 24 hour   Intake 120 ml   Output --   Net 120 ml      Physical Exam  Vitals signs and nursing note reviewed.   Constitutional:       Appearance: She is well-developed.   HENT:      Head: Normocephalic and atraumatic.      Right Ear: External ear normal.      Left Ear: External ear normal.      Nose: Nose normal.   Eyes:      General: No scleral icterus.     Conjunctiva/sclera: Conjunctivae normal.      Pupils: Pupils are equal, round, and reactive to light.   Neck:      Musculoskeletal: Normal range of motion and neck supple.      Thyroid: No thyromegaly.      Vascular: No JVD.      Trachea: No tracheal deviation.   Cardiovascular:      Rate and Rhythm: Normal rate.      Pulses: Normal pulses.      Heart sounds: Normal heart sounds. No murmur.   Pulmonary:      Effort: Pulmonary effort is normal. No respiratory distress.      Breath sounds: Normal breath sounds. No wheezing or rales.   Chest:      Chest wall: No tenderness.   Abdominal:      General: Bowel sounds are normal. There is no distension.      Palpations: Abdomen is soft. There is no mass.      Tenderness: There is no abdominal tenderness. There is no guarding or rebound.   Musculoskeletal: Normal range of motion.         General: No tenderness.      Comments: VERY  kyphotic   Lymphadenopathy:      Cervical: No cervical adenopathy.   Skin:     General: Skin is warm and dry.   Neurological:      General: No focal deficit present.      Mental Status: She is alert.      Motor: No abnormal muscle tone.      Deep Tendon Reflexes: Reflexes are normal and symmetric.      Comments: Alert. Oriented to place and person    Psychiatric:         Behavior: Behavior normal.         Thought Content: Thought content normal.         Judgment: Judgment normal.         Significant Labs:     Lab Results   Component Value Date    WBC 7.69 08/17/2020    HGB 12.5 08/17/2020    HCT 37.4 08/17/2020    MCV 90 08/17/2020     (H) 08/17/2020       CMP:   Recent Labs   Lab 08/18/20  0555 08/19/20  0534    136   K 4.4 4.1    102   CO2 26 24   GLU 99 95   BUN 13 14   CREATININE 0.7 0.7   CALCIUM 8.9 8.3*   PROT 6.3 5.8*   ALBUMIN 3.2* 2.9*   BILITOT 0.4 0.4   ALKPHOS 209* 174*   AST 19 18   ALT 12 11   ANIONGAP 11 10   EGFRNONAA >60 >60     Mag 2.0

## 2020-08-19 NOTE — DISCHARGE INSTRUCTIONS
Call MD or return to the ER for any of the following:    -chest pain  -shortness of breath  -temperature >100.4  -pain with urination or inability to urinate  -persistent weakness or dizziness  -confusion or decreased level of conciousness

## 2020-08-19 NOTE — PLAN OF CARE
08/19/20 1333   Medicare Message   Important Message from Medicare regarding Discharge Appeal Rights Given to patient/caregiver;Explained to patient/caregiver;Signed/date by patient/caregiver   Date IMM was signed 08/19/20   Time IMM was signed 0900     Medicare message on non coverage signed for discharged from skilled level of care.   DVT (deep venous thrombosis)    GERD (gastroesophageal reflux disease)

## 2020-08-19 NOTE — NURSING
Pt in stable condition. Discharged home with home health. Reviewed discharge instructions, follow up appts, medications and reportable signs and symptoms with pt and spouse; states understanding.

## 2020-08-19 NOTE — DISCHARGE SUMMARY
[facility] Medicine  Discharge Summary      Patient Name: Estefania Mccollum  MRN: [id number]  Admission Date: 7/31/2020  Hospital Length of Stay: 19 days  Discharge Date and Time:  08/19/2020 12:20 PM  Attending Physician: Manish Lynn MD   Discharging Provider: Savanna Gruber NP  Primary Care Provider: Danna Levine MD      HPI:   85 year old female admitted with UTI being admitted to [facility] for PT. She lives at home with only her 91 year old , who is unable to lift her or transfer her safely. She is weak. She needs to be strong enough to perform her ADL's on her own. If she is unable to reach this goal, she may need nursing home placement.     * No surgery found *      Hospital Course:   8/3 This patient was placed on SKILL unit for cont abx to treat UTI as well as PT. She has become debilitated and weak at home resulting in multiple falls even with walker. Lives at home with elderly frail . Her goal is to ambulate 50 ft with min ass using RW. Yesterday she did bed mobility with min assist. Sat she was max assist to transfer.     8/4 this patient is on skill for cont therapy as she was not strong enough to be safely discharged to home with her elderly frail . She is demented and has no complaints except she wants to go home. She complains of no pain but has not been walking and always laying on right side. CT head done that did not show any acute path. NA was low yesterday so started on NS at 75ml/hr. This has improved 125>132. Ct of abd and pelvis as well as lumbar spine done yesterday showing 4-11 right rib fractures with right sided effusion and poss liver laceration. H/H stable. Ct of spine shows no fractures or dislocation. She remains max assist for any transfers and mod assist with bed mobility. Remains on levaquin for UTI and maintaining sats on RA 93-96% NAD.     8/5 pt remains on SKILL for therapy. Noted rib fractures from fall yesterday and  "discussed her condition with daughter Emani as well as . With PT she remains mod assist with rolling in bed and max assist to get up in bed /stand/transfer. This is very concerning for her safety when going home with feeble . Had long discussion with Emani who was calling her sister Autumn to discuss possible placement at d/c as home with sitters may be a challenge goven her current condition.  on case and PASSAR completed     Na better with NS fluids. Na 125>134    8/7 Pt  remains on SKILL for therapy. Noted rib fractures from fall 2 days ago.  With PT she remains mod assist with rolling in bed and max assist to get up in bed /stand/transfer but has progressed .   Stand pivot bed<>w/c trng; Sit<>Stand x4; Sitting tolerance and Standing balance trng with RW; Provided tactile and verbal cues for postural trng; Gait trng with RW x ~250 feet with min assistance and cues; LE bike ex x 5 mins  Concerns over home safety have been discussed with family; lives wht 90 YO feeble . SW/case management following.   PASSAR completed    8/10/20 Pt  remains on SKILL for therapy due to debility s/p recent rib fractures, Awake and alert this am,   · Gait: Patient ambulated 150 feet with RW and CGA. One rest break after 75 feet. Patient requries manual assistance to keep walker on straight path due to deviation to the Left.   · Balance: Sitting Fair, Standing Fair-/poor  VSS, afebrile , O2 sat 92% on RA   Urine Cx resulted - + Ecoli- resistant to Levaquin, has allergy to tetracycline, adding  augmentin with probiotics     8/12/20  Remains on skilled for PT/OT, for debility recent rib Fx,   Minimal Assistance and cues x ~225 feet (2 times rest periods)  with RW  Afebrile , VSS O2 sat stable on RA , Day 3 Augmentin for UTI   Planned for D/C to home with HH and sitters; maybe Friday 8/14/20 States " I feel fine"   Remains on skilled for PT/OT, for debility recent rib Fx,   Contact guard assistane and " cues x ~300 feet with RW  Afebrile , VSS O2 sat stable on RA , Day 4 Augmentin for UTI   Large BM yesterday   Planned for D/C to home with HH and sitters; CW asking for more days for therapy .    8/17/20  Estefania Mccollum is a 85 y.o. female   remains on SKILL for PT/OT. She has surpassed her goal of 50ft and now ambulating  With supervision x ~300 feet with RW. Planned for D/C to home with HH and sitters; she is planned d/c Wednesday .    8/19/20  Pt is doing well. VSS/afebrile. Labs stable. Doing well with PT>>Gait: Supervision x ~300 feet( 2 hallways) with RW. Slouch forward, flexed neck and kyphotic posture. She has met/exceded her goals. Plan is for d/c today to home with home health     Consults:   Consults (From admission, onward)        Status Ordering Provider     Inpatient consult to ENT  Once     Provider:  (Not yet assigned)    Acknowledged ZENON CHASE     Inpatient consult to Registered Dietitian/Nutritionist  Once     Provider:  (Not yet assigned)    Completed ELIJAH DWYER     Inpatient consult to Social Work  Once     Provider:  (Not yet assigned)    Completed ELIJAH DWYER          * Debility  Swing for PT ; currently max assist with transfer   GOAL>>Gait  x ~50  feet with Minimal Assistance with or without rolling walker  Check CT spine and pelvis today    Noted rib fractures  She is not progressing but she will be slow due to the newly found rib fractures, this also raises more concern for her safety at home;  has started PASSAR     8/5 she is approved till Friday and will keep her here for rehab as long as she can make some progress and insurance approves her stay but did discuss d/c plans with daughter yesterday. Will reach out to local nursing homes incase she does not progress and needs facility at d/c   8/7 Stand pivot bed<>w/c trng; Sit<>Stand x4; Sitting tolerance and Standing balance trng with RW; Provided tactile and verbal cues for postural trng; Gait trng with RW x  ~250 feet with min assistance and cues; LE bike ex x 5 mins  Awaiting approval for more skilled days prior to D/C home with elderly  , family planning for sitters also; they defer nursing home , family making adjustments to home for safety   8/10 Gait: Patient ambulated 150 feet with RW and CGA. One rest break after 75 feet. Patient requries manual assistance to keep walker on straight path due to deviation to the Left. Balance: Sitting Fair, Standing Fair-/poor    8/12 Minimal Assistance and cues x ~225 feet (2 times rest periods)  with RW.    8/14 Contact guard assistane and cues x ~300 feet with RW.    · 8/17 Gait: Supervision x ~300 feet with RW. Slouch forward, flexed neck and kyphotic posture.  Balance: Standing with RW Static: Fair; Dynamic: Fair    8/19 Gait: Supervision x ~300 feet( 2 hallways) with RW. Slouch forward, flexed neck and kyphotic posture. [facility]c with [facility] today     Closed fracture of multiple ribs of right side with routine healing  Ribs 4-11 noted on imaging  Denies pain  Impeding her progress however  Cont PT.  PRN Tylenol for pain control.      Liver contusion  Likely liver contusion from recent falls noted on CT scan  H/H stable.  Non-tender abdomen.  Monitor closely but no clinical evidence of true liver lac that would require surgical intervention more likely hematoma       Hyponatremia  I think this is a reflection of her very poor diet  Start remeron  Ns at 75ml/hr   Na better 134 today  8/7 NA normal today .  RESOLVED .    Acute cystitis with hematuria  Continue levaquin for 7 days total for complicated UTI   Getting dose q 48hr 7/29, 7/31, 8/2, 8/4 ct Monday still showing cystitis, will cont levaquin  8/7 levofloxacin dose continued 8/6; has had 5 doses  will repeat urinalysis today   8/10 Repeat UA- positive nitrites , leuks- no fever;  Urine culture- resistant to Levofloxacin ; + allergy to tetracyline   has lost IV acess, will add oral abx   8/14 Day 4 augmentin  .  8/17 competed .    Frequent falls  Swing for PT.   Consider nursing home placement if she does not progress   Will have case management start PASSAR incase  Check CT spine and pelvis today  Noted right rib fractures.  Consider placement at d/c .  8/7 CT of lumbar spine stable   8/14 Contact guard assistane and cues x ~300 feet with RW; planned for D/C home with    8/17 Gait: Supervision x ~300 feet with RW. Slouch forward, flexed neck and kyphotic posture.  Balance: Standing with RW Static: Fair; Dynamic: FairRevised goal:  4 a) Gait x ~200 feet with minimal/contact guard assistance and cues    8/19 Gait: Supervision x ~300 feet( 2 hallways) with RW. Slouch forward, flexed neck and kyphotic posture. [facility] with [facility] today    Memory loss  Continue donepezil and namenda       Hyperlipidemia  Continue pravastatin         Hypothyroidism  Continue synthroid 100mcg   Lab Results   Component Value Date    TSH 2.578 12/03/2019           Hypertension  Continue lisinopril .  bp 122/611-147/68      Final Active Diagnoses:    Diagnosis Date Noted POA    PRINCIPAL PROBLEM:  Debility [R53.81] 09/27/2019 Yes    Liver contusion [S36.112A] 08/04/2020 Yes    Closed fracture of multiple ribs of right side with routine healing [S22.41XD] 08/04/2020 Not Applicable    Hyponatremia [E87.1] 08/03/2020 Yes    Acute cystitis with hematuria [N30.01] 07/29/2020 Yes    Frequent falls [R29.6] 09/27/2019 Not Applicable    Memory loss [R41.3] 10/17/2016 Yes    Hypothyroidism [E03.9] 07/12/2012 Yes    Hyperlipidemia [E78.5] 07/12/2012 Yes    Hypertension [I10]  Yes      Problems Resolved During this Admission:       Discharged Condition: good    [place] [facility]    [place] Up:  Follow-up Information     Danna Levine MD In 1 week.    Specialty: Internal Medicine  Contact information:  [address]  [facility] [id number]  [phone]                 Patient Instructions:      Ambulatory  referral/consult to Home Health   Standing Status: Future   Referral Priority: Routine Referral Type: Home Health   Referral Reason: Specialty Services Required   Requested Specialty: Home Health Services   Number of Visits Requested: 1       Significant Diagnostic Studies:     Lab Results   Component Value Date     WBC 7.69 08/17/2020     HGB 12.5 08/17/2020     HCT 37.4 08/17/2020     MCV 90 08/17/2020      (H) 08/17/2020         CMP:        Recent Labs   Lab 08/18/20  0555 08/19/20  0534    136   K 4.4 4.1    102   CO2 26 24   GLU 99 95   BUN 13 14   CREATININE 0.7 0.7   CALCIUM 8.9 8.3*   PROT 6.3 5.8*   ALBUMIN 3.2* 2.9*   BILITOT 0.4 0.4   ALKPHOS 209* 174*   AST 19 18   ALT 12 11   ANIONGAP 11 10   EGFRNONAA >60 >60      Mag 2.0      Pending Diagnostic Studies:     None         Medications:  Reconciled Home Medications:      Medication List      CHANGE how you take these medications    levothyroxine 100 MCG tablet  Commonly known as: SYNTHROID  Take 1 tablet (100 mcg total) by mouth once daily.  What changed: Another medication with the same name was removed. Continue taking this medication, and follow the directions you see here.        CONTINUE taking these medications    acetaminophen 325 MG tablet  Commonly known as: TYLENOL  Take 325 mg by mouth every 6 (six) hours as needed for Pain.     aspirin 81 MG EC tablet  Commonly known as: ECOTRIN  Take 81 mg by mouth once daily.     calcium carbonate 600 mg calcium (1,500 mg) Tab  Commonly known as: OS-RAMONE  Take 600 mg by mouth once daily.     donepeziL 5 MG tablet  Commonly known as: ARICEPT  Take 1 tablet (5 mg total) by mouth every evening.     fish oil-omega-3 fatty acids 300-1,000 mg capsule  Take 2 g by mouth once daily.     lisinopriL 20 MG tablet  Commonly known as: PRINIVIL,ZESTRIL  TAKE 1 TABLET EVERY DAY     memantine 10 MG Tab  Commonly known as: NAMENDA  TAKE 1 TABLET TWICE DAILY     multivitamin capsule  Take 1 capsule by mouth  once daily.     pravastatin 80 MG tablet  Commonly known as: PRAVACHOL  TAKE 1 TABLET EVERY DAY     sertraline 25 MG tablet  Commonly known as: ZOLOFT  TAKE 1 TABLET (25 MG TOTAL) BY MOUTH EVERY EVENING.        STOP taking these medications    FLUAD 1940-3825 (65 YR UP)(PF) 45 mcg (15 mcg x 3)/0.5 mL Syrg  Generic drug: flu vac 2019 [id number](PF)     omeprazole 40 MG capsule  Commonly known as: PRILOSEC     oxybutynin 5 MG Tab  Commonly known as: DITROPAN            Indwelling Lines/Drains at time of discharge:   Lines/Drains/Airways     Epidural Line                 Neuraxial Analgesia/Anesthesia Assessment (using dermatomes) Epidural 12/06/16 0710 1352 days         Perineural Analgesia/Anesthesia Assessment (using dermatomes) Epidural 12/06/16 0619 1352 days                Time spent on the discharge of patient: 20 minutes  Patient was seen and examined on the date of discharge and determined to be suitable for discharge.         Savanna Gruber NP  Department of Hospital Medicine  [facility]

## 2020-08-19 NOTE — PT/OT/SLP DISCHARGE
Physical Therapy Discharge Summary    Name: Estefania Mccollum  MRN: [id number]   Principal Problem: Debility     Patient Discharged from acute Physical Therapy on 20.  Please refer to prior PT noted date on 20 for functional status.     Assessment:     Patient was discharged unexpectedly.  Information required to complete an accurate discharge summary is unknown.  Refer to therapy initial evaluation and last progress note for initial and most recent functional status and goal achievement.  Recommendations made may be found in medical record. Patient appropriate for care in another setting.    Objective:     GOALS:   Multidisciplinary Problems     Physical Therapy Goals     Not on file          Multidisciplinary Problems (Resolved)        Problem: Physical Therapy Goal    Goal Priority Disciplines Outcome Goal Variances Interventions   Physical Therapy Goal   (Resolved)     PT, PT/OT  Error     Description: Patient will increase functional independence with mobility by performin. Supine to sit with Contact Guard Assistance  2. Sit to supine with Contact Guard Assistance  3. Bed to chair transfer with Minimal/Contact Guard Assistancewith or without rolling walker using Stand Pivot TECHNIQUE  4. Gait  x ~50  feet with Minimal Assistance with or without rolling walker  5. Lower extremity exercise program x10 reps per handout, with assistance as needed          Problem: Physical Therapy Goal    Goal Priority Disciplines Outcome Goal Variances Interventions   Physical Therapy Goal   (Resolved)     PT, PT/OT Met     Description: Goals to be met by: 20    Patient will increase functional independence with mobility by performin. Supine to sit with Contact Guard Assistance  2. Sit to supine with Contact Guard Assistance  3. Bed to chair transfer with Minimal/Contact Guard Assistancewith or without rolling walker using Stand Pivot TECHNIQUE  4. Gait  x ~50  feet with Minimal Assistance with  or without rolling walker - met 8/10/20   8/10/20 - Revised goal:  4 a) Gait x ~200 feet with minimal/contact guard assistance and cues  5. Lower extremity exercise program x10 reps per handout, with assistance as needed                      Reasons for Discontinuation of Therapy Services  Transfer to alternate level of care., Satisfactory goal achievement., Therapist determines that the patient will no longer benefit from therapy services. and PT goals exceeded.      Plan:     Patient Discharged to: Home with Home Health Service.    Cricket Gaytan, PT  8/19/2020

## 2020-08-19 NOTE — ASSESSMENT & PLAN NOTE
Swing for PT ; currently max assist with transfer   GOAL>>Gait  x ~50  feet with Minimal Assistance with or without rolling walker  Check CT spine and pelvis today    Noted rib fractures  She is not progressing but she will be slow due to the newly found rib fractures, this also raises more concern for her safety at home;  has started PASSAR     8/5 she is approved till Friday and will keep her here for rehab as long as she can make some progress and insurance approves her stay but did discuss d/c plans with daughter yesterday. Will reach out to local nursing homes incase she does not progress and needs facility at d/c   8/7 Stand pivot bed<>w/c trng; Sit<>Stand x4; Sitting tolerance and Standing balance trng with RW; Provided tactile and verbal cues for postural trng; Gait trng with RW x ~250 feet with min assistance and cues; LE bike ex x 5 mins  Awaiting approval for more skilled days prior to D/C home with elderly  , family planning for sitters also; they defer nursing home , family making adjustments to home for safety   8/10 Gait: Patient ambulated 150 feet with RW and CGA. One rest break after 75 feet. Patient requries manual assistance to keep walker on straight path due to deviation to the Left. Balance: Sitting Fair, Standing Fair-/poor    8/12 Minimal Assistance and cues x ~225 feet (2 times rest periods)  with RW.    8/14 Contact guard assistane and cues x ~300 feet with RW.    · 8/17 Gait: Supervision x ~300 feet with RW. Slouch forward, flexed neck and kyphotic posture.  Balance: Standing with RW Static: Fair; Dynamic: Fair    8/19 Gait: Supervision x ~300 feet( 2 hallways) with RW. Slouch forward, flexed neck and kyphotic posture. D/c with [facility] today

## 2020-08-20 PROCEDURE — G0180 PR HOME HEALTH MD CERTIFICATION: ICD-10-PCS | Mod: ,,, | Performed by: FAMILY MEDICINE

## 2020-08-20 PROCEDURE — G0180 MD CERTIFICATION HHA PATIENT: HCPCS | Mod: ,,, | Performed by: FAMILY MEDICINE

## 2020-08-27 ENCOUNTER — OFFICE VISIT (OUTPATIENT)
Dept: INTERNAL MEDICINE | Facility: CLINIC | Age: 85
End: 2020-08-27
Payer: MEDICARE
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
## 2020-08-27 DIAGNOSIS — R06.02 SOB (SHORTNESS OF BREATH): ICD-10-CM

## 2020-08-27 DIAGNOSIS — R07.81 RIB PAIN ON LEFT SIDE: ICD-10-CM

## 2020-08-27 PROCEDURE — 1101F PT FALLS ASSESS-DOCD LE1/YR: CPT | Mod: HCNC,CPTII,95, | Performed by: INTERNAL MEDICINE

## 2020-08-27 PROCEDURE — 99214 OFFICE O/P EST MOD 30 MIN: CPT | Mod: HCNC,95,, | Performed by: INTERNAL MEDICINE

## 2020-08-27 PROCEDURE — 1159F PR MEDICATION LIST DOCUMENTED IN MEDICAL RECORD: ICD-10-PCS | Mod: HCNC,95,, | Performed by: INTERNAL MEDICINE

## 2020-08-27 PROCEDURE — 1101F PR PT FALLS ASSESS DOC 0-1 FALLS W/OUT INJ PAST YR: ICD-10-PCS | Mod: HCNC,CPTII,95, | Performed by: INTERNAL MEDICINE

## 2020-08-27 PROCEDURE — 1159F MED LIST DOCD IN RCRD: CPT | Mod: HCNC,95,, | Performed by: INTERNAL MEDICINE

## 2020-08-27 PROCEDURE — 99214 PR OFFICE/OUTPT VISIT, EST, LEVL IV, 30-39 MIN: ICD-10-PCS | Mod: HCNC,95,, | Performed by: INTERNAL MEDICINE

## 2020-08-29 NOTE — PROGRESS NOTES
Subjective:      Patient ID: Estefania Mccollum is a 85 y.o. female.    Chief Complaint: Hospital Follow Up    HPI:  HPI   The patient location is: home  The chief complaint leading to consultation is: Hospital follow up    Visit type: audiovisual    Face to Face time with patient: 20   minutes of total time spent on the encounter, which includes face to face time and non-face to face time preparing to see the patient (eg, review of tests), Obtaining and/or reviewing separately obtained history, Documenting clinical information in the electronic or other health record, Independently interpreting results (not separately reported) and communicating results to the patient/family/caregiver, or Care coordination (not separately reported).         Each patient to whom he or she provides medical services by telemedicine is:  (1) informed of the relationship between the physician and patient and the respective role of any other health care provider with respect to management of the patient; and (2) notified that he or she may decline to receive medical services by telemedicine and may withdraw from such care at any time.    Notes:   Participants in the audiovisual call our her  and her caretaker Rosio.    I have received a communication by portal message from her daughter that they will be increasing the amount of home help    At present the patient has helped consistently 8-12 and at bedtime.  On some days she has extended help during the day also.  The home assistant today told me that she is having discomfort on the left side as well as she noted some bruising.  On further questioning her 1st  states that she had an additional fall.  He states it was during the time there was no help.  He was with his wife and she made a turn while they were walking and fell.  He felt he was able to help reduce the impact of the fall.  There does not appear to be any other reason for the fall other than instability and  weakness with walking.  She does have home health coming as well as occupational and physical therapy.  Her  asked that we carefully reviewed the medications which she did and I see that I have no additions or subtractions from the medication list.  I reviewed the previous laboratory studies.  I asked about any home health durable medical equipment she may need in they told me that there was an option between a hospital bed and a lift chair in she chose the lift chair.    Patient Active Problem List   Diagnosis    Hypertension    Hypothyroidism    Hyperlipidemia    Retinal drusen - Both Eyes    Hearing loss, sensorineural    History of colonic polyps    Osteopenia    MCI (mild cognitive impairment)    Memory loss    Primary osteoarthritis of left knee    Aortic atherosclerosis    Balance problem    Decreased GFR    Debility    Frequent falls    Acute cystitis with hematuria    Hyponatremia    Liver contusion    Closed fracture of multiple ribs of right side with routine healing     Past Medical History:   Diagnosis Date    Adenomatous polyp     Allergy     sinus    Annual physical exam 2011    Arthritis     osteoarthritis    Cataract     History of bone density study 2009    History of colonoscopy 10/30/2008    History of mammogram 2011    Hyperlipidemia     Hypertension     Macular degeneration     Postmenopausal hormone replacement therapy     therapy stopped    Squamous cell carcinoma 2019    Left thigh (ED&C)    Thyroid disease     hypothyroidism    Vertigo      Past Surgical History:   Procedure Laterality Date    APPENDECTOMY      12 years old    CATARACT EXTRACTION      Both eyes    CATARACT EXTRACTION, BILATERAL       SECTION      x 3    CHOLECYSTECTOMY      COLONOSCOPY  10/02/2013    HYSTERECTOMY      total    JOINT REPLACEMENT Left     OOPHORECTOMY      TONSILLECTOMY      TOTAL KNEE ARTHROPLASTY Left 2016    Yag       Left  Eye     Family History   Problem Relation Age of Onset    Heart failure Mother     Hypertension Mother     Hyperlipidemia Mother     Heart failure Father     Hypertension Father     Hyperlipidemia Father     Asthma Father     Hypertension Sister     Diabetes Sister         Prediabetes    Heart attack Brother     No Known Problems Daughter     Heart disease Brother         Has had open heart surgery    No Known Problems Brother     Mental retardation Daughter     No Known Problems Daughter     Coronary artery disease Neg Hx     Amblyopia Neg Hx     Blindness Neg Hx     Cataracts Neg Hx     Glaucoma Neg Hx     Macular degeneration Neg Hx     Retinal detachment Neg Hx     Strabismus Neg Hx     Melanoma Neg Hx     Psoriasis Neg Hx     Lupus Neg Hx     Eczema Neg Hx     Acne Neg Hx      Review of Systems   She complains of no shortness of breath or chest pain she does complain when asked of the left rib pain and she has had multiple fractures from a fall to the right ribs.  Objective:   There were no vitals filed for this visit.  There is no height or weight on file to calculate BMI.   Vital signs were discussed with me and are stable.  Physical Exam   Patient is in a chair and appears very weak.  She does answer several of the questions asked appropriately, others she is not sure and has her  answer.  Assessment:     1. Fall, sequela    2. SOB (shortness of breath)    3. Rib pain on left side      Plan:   Estefania was seen today for hospital follow up.    Diagnoses and all orders for this visit:    Fall, sequela  -     X-Ray Ribs 2 View Left; Future  -     X-Ray Ribs 2 View Left    SOB (shortness of breath)  -     X-Ray Chest PA And Lateral; Future  -     X-Ray Chest PA And Lateral    Rib pain on left side  -     X-Ray Ribs 2 View Left; Future  -     X-Ray Ribs 2 View Left     I do feel the patient does need additional services during the day.  I would hope that they could arrange 7 day a  week 8:00 a.m. to 8:00 p.m. care.  In the past the patient and her  have chosen to stay in their own home but this will require additional assistance for safety reasons.    I was able to communicate with the home health agency and arrange that a home chest x-ray as well as rib x-ray were done and confirmed with the  that they were completed on Friday.  I have asked that they fax the results to me in expect to have them by Monday.  I also left my cell phone for the radiologist or the team that was implementing the fax so if there were any significant abnormalities a could call me directly.    Problem List Items Addressed This Visit     None      Visit Diagnoses     Fall, sequela    -  Primary    Relevant Orders    X-Ray Ribs 2 View Left    SOB (shortness of breath)        Relevant Orders    X-Ray Chest PA And Lateral    Rib pain on left side        Relevant Orders    X-Ray Ribs 2 View Left        Orders Placed This Encounter   Procedures    X-Ray Chest PA And Lateral     Standing Status:   Future     Number of Occurrences:   1     Standing Expiration Date:   10/26/2020    X-Ray Ribs 2 View Left     Standing Status:   Future     Number of Occurrences:   1     Standing Expiration Date:   8/27/2021     No follow-ups on file.     Medication List          Accurate as of August 27, 2020 11:59 PM. If you have any questions, ask your nurse or doctor.            CONTINUE taking these medications    acetaminophen 325 MG tablet  Commonly known as: TYLENOL     aspirin 81 MG EC tablet  Commonly known as: ECOTRIN     calcium carbonate 600 mg calcium (1,500 mg) Tab  Commonly known as: OS-RAMONE     donepeziL 5 MG tablet  Commonly known as: ARICEPT  Take 1 tablet (5 mg total) by mouth every evening.     fish oil-omega-3 fatty acids 300-1,000 mg capsule     levothyroxine 100 MCG tablet  Commonly known as: SYNTHROID  Take 1 tablet (100 mcg total) by mouth once daily.     lisinopriL 20 MG tablet  Commonly known as:  PRINIVIL,ZESTRIL  TAKE 1 TABLET EVERY DAY     memantine 10 MG Tab  Commonly known as: NAMENDA  TAKE 1 TABLET TWICE DAILY     multivitamin capsule     pravastatin 80 MG tablet  Commonly known as: PRAVACHOL  TAKE 1 TABLET EVERY DAY     sertraline 25 MG tablet  Commonly known as: ZOLOFT  TAKE 1 TABLET (25 MG TOTAL) BY MOUTH EVERY EVENING.

## 2020-08-31 ENCOUNTER — EXTERNAL HOME HEALTH (OUTPATIENT)
Dept: HOME HEALTH SERVICES | Facility: HOSPITAL | Age: 85
End: 2020-08-31
Payer: MEDICARE

## 2020-09-03 ENCOUNTER — DOCUMENT SCAN (OUTPATIENT)
Dept: HOME HEALTH SERVICES | Facility: HOSPITAL | Age: 85
End: 2020-09-03
Payer: MEDICARE

## 2020-09-03 ENCOUNTER — OFFICE VISIT (OUTPATIENT)
Dept: INTERNAL MEDICINE | Facility: CLINIC | Age: 85
End: 2020-09-03
Payer: MEDICARE

## 2020-09-03 ENCOUNTER — TELEPHONE (OUTPATIENT)
Dept: INTERNAL MEDICINE | Facility: CLINIC | Age: 85
End: 2020-09-03

## 2020-09-03 DIAGNOSIS — R05.9 COUGH: ICD-10-CM

## 2020-09-03 DIAGNOSIS — S22.49XD CLOSED FRACTURE OF MULTIPLE RIBS WITH ROUTINE HEALING, UNSPECIFIED LATERALITY, SUBSEQUENT ENCOUNTER: Primary | ICD-10-CM

## 2020-09-03 PROCEDURE — 1159F PR MEDICATION LIST DOCUMENTED IN MEDICAL RECORD: ICD-10-PCS | Mod: HCNC,95,, | Performed by: INTERNAL MEDICINE

## 2020-09-03 PROCEDURE — 99213 OFFICE O/P EST LOW 20 MIN: CPT | Mod: HCNC,95,, | Performed by: INTERNAL MEDICINE

## 2020-09-03 PROCEDURE — 1159F MED LIST DOCD IN RCRD: CPT | Mod: HCNC,95,, | Performed by: INTERNAL MEDICINE

## 2020-09-03 PROCEDURE — 1101F PT FALLS ASSESS-DOCD LE1/YR: CPT | Mod: HCNC,CPTII,95, | Performed by: INTERNAL MEDICINE

## 2020-09-03 PROCEDURE — 99213 PR OFFICE/OUTPT VISIT, EST, LEVL III, 20-29 MIN: ICD-10-PCS | Mod: HCNC,95,, | Performed by: INTERNAL MEDICINE

## 2020-09-03 PROCEDURE — 1101F PR PT FALLS ASSESS DOC 0-1 FALLS W/OUT INJ PAST YR: ICD-10-PCS | Mod: HCNC,CPTII,95, | Performed by: INTERNAL MEDICINE

## 2020-09-03 RX ORDER — AMOXICILLIN 500 MG/1
500 TABLET, FILM COATED ORAL EVERY 12 HOURS
Qty: 14 TABLET | Refills: 0 | Status: SHIPPED | OUTPATIENT
Start: 2020-09-03 | End: 2020-09-03 | Stop reason: SDUPTHER

## 2020-09-03 RX ORDER — AMOXICILLIN 500 MG/1
500 TABLET, FILM COATED ORAL EVERY 12 HOURS
Qty: 14 TABLET | Refills: 0 | Status: SHIPPED | OUTPATIENT
Start: 2020-09-03 | End: 2020-09-10

## 2020-09-06 NOTE — PROGRESS NOTES
Subjective:      Patient ID: Estefania Mccollum is a 85 y.o. female.    Chief Complaint: Follow-up    HPI:  HPI   The patient location is: home  The chief complaint leading to consultation is: left chest wall pain    Visit type: audiovisual    Face to Face time with patient: 10  15 minutes of total time spent on the encounter, which includes face to face time and non-face to face time preparing to see the patient (eg, review of tests), Obtaining and/or reviewing separately obtained history, Documenting clinical information in the electronic or other health record, Independently interpreting results (not separately reported) and communicating results to the patient/family/caregiver, or Care coordination (not separately reported).         Each patient to whom he or she provides medical services by telemedicine is:  (1) informed of the relationship between the physician and patient and the respective role of any other health care provider with respect to management of the patient; and (2) notified that he or she may decline to receive medical services by telemedicine and may withdraw from such care at any time.    Notes:   Patient is with her caretaker Rosio. I spoke with her  after receiving the xray results which were done at home.    Discussed:  Fractures: non displaced on the left new and related to recent fall    Cough: uncertain atelectasis versus infiltrate. Discussed antibiotics, patient inclined to antibiotics    Patient Active Problem List   Diagnosis    Hypertension    Hypothyroidism    Hyperlipidemia    Retinal drusen - Both Eyes    Hearing loss, sensorineural    History of colonic polyps    Osteopenia    MCI (mild cognitive impairment)    Memory loss    Primary osteoarthritis of left knee    Aortic atherosclerosis    Balance problem    Decreased GFR    Debility    Frequent falls    Acute cystitis with hematuria    Hyponatremia    Liver contusion    Closed fracture of multiple ribs  of right side with routine healing     Past Medical History:   Diagnosis Date    Adenomatous polyp     Allergy     sinus    Annual physical exam 2011    Arthritis     osteoarthritis    Cataract     History of bone density study 2009    History of colonoscopy 10/30/2008    History of mammogram 2011    Hyperlipidemia     Hypertension     Macular degeneration     Postmenopausal hormone replacement therapy     therapy stopped    Squamous cell carcinoma 2019    Left thigh (ED&C)    Thyroid disease     hypothyroidism    Vertigo      Past Surgical History:   Procedure Laterality Date    APPENDECTOMY      12 years old    CATARACT EXTRACTION      Both eyes    CATARACT EXTRACTION, BILATERAL       SECTION      x 3    CHOLECYSTECTOMY      COLONOSCOPY  10/02/2013    HYSTERECTOMY      total    JOINT REPLACEMENT Left     OOPHORECTOMY      TONSILLECTOMY      TOTAL KNEE ARTHROPLASTY Left 2016    Yag       Left Eye     Family History   Problem Relation Age of Onset    Heart failure Mother     Hypertension Mother     Hyperlipidemia Mother     Heart failure Father     Hypertension Father     Hyperlipidemia Father     Asthma Father     Hypertension Sister     Diabetes Sister         Prediabetes    Heart attack Brother     No Known Problems Daughter     Heart disease Brother         Has had open heart surgery    No Known Problems Brother     Mental retardation Daughter     No Known Problems Daughter     Coronary artery disease Neg Hx     Amblyopia Neg Hx     Blindness Neg Hx     Cataracts Neg Hx     Glaucoma Neg Hx     Macular degeneration Neg Hx     Retinal detachment Neg Hx     Strabismus Neg Hx     Melanoma Neg Hx     Psoriasis Neg Hx     Lupus Neg Hx     Eczema Neg Hx     Acne Neg Hx      Review of Systems   Cough, not short of breath, discomfort on the left improved  Objective:   There were no vitals filed for this visit.  There is no height or  weight on file to calculate BMI.  Physical Exam  Constitutional:       General: She is not in acute distress.  Neurological:      Mental Status: She is alert.       Assessment:     1. Closed fracture of multiple ribs with routine healing, unspecified laterality, subsequent encounter    2. Cough      Plan:   Estefania was seen today for follow-up.    Diagnoses and all orders for this visit:    Closed fracture of multiple ribs with routine healing, unspecified laterality, subsequent encounter: no treatment as it is a nondisplaced fracture    Cough: possible benefit of antibiotics    Other orders  -     Discontinue: amoxicillin (AMOXIL) 500 MG Tab; Take 1 tablet (500 mg total) by mouth every 12 (twelve) hours. for 10 days        Problem List Items Addressed This Visit     None      Visit Diagnoses     Closed fracture of multiple ribs with routine healing, unspecified laterality, subsequent encounter    -  Primary    Cough            No orders of the defined types were placed in this encounter.    No follow-ups on file.     Medication List          Accurate as of September 3, 2020 11:59 PM. If you have any questions, ask your nurse or doctor.            START taking these medications    amoxicillin 500 MG Tab  Commonly known as: AMOXIL  Take 1 tablet (500 mg total) by mouth every 12 (twelve) hours. for 7 days  Started by: Danna Levine MD        CONTINUE taking these medications    acetaminophen 325 MG tablet  Commonly known as: TYLENOL     aspirin 81 MG EC tablet  Commonly known as: ECOTRIN     calcium carbonate 600 mg calcium (1,500 mg) Tab  Commonly known as: OS-RAMONE     donepeziL 5 MG tablet  Commonly known as: ARICEPT  Take 1 tablet (5 mg total) by mouth every evening.     fish oil-omega-3 fatty acids 300-1,000 mg capsule     levothyroxine 100 MCG tablet  Commonly known as: SYNTHROID  Take 1 tablet (100 mcg total) by mouth once daily.     lisinopriL 20 MG tablet  Commonly known as: PRINIVIL,ZESTRIL  TAKE 1 TABLET  EVERY DAY     memantine 10 MG Tab  Commonly known as: NAMENDA  TAKE 1 TABLET TWICE DAILY     multivitamin capsule     pravastatin 80 MG tablet  Commonly known as: PRAVACHOL  TAKE 1 TABLET EVERY DAY     sertraline 25 MG tablet  Commonly known as: ZOLOFT  TAKE 1 TABLET (25 MG TOTAL) BY MOUTH EVERY EVENING.           Where to Get Your Medications      These medications were sent to [facility]/pharmacy #[id number] - [place], [place] - [address]  [address], [place] [address]    Phone: [phone]   · amoxicillin 500 MG Tab

## 2020-09-29 ENCOUNTER — PATIENT MESSAGE (OUTPATIENT)
Dept: OTHER | Facility: OTHER | Age: 85
End: 2020-09-29

## 2020-09-30 ENCOUNTER — PATIENT OUTREACH (OUTPATIENT)
Dept: ADMINISTRATIVE | Facility: OTHER | Age: 85
End: 2020-09-30

## 2020-09-30 NOTE — PROGRESS NOTES
Health Maintenance Due   Topic Date Due    DEXA SCAN  03/29/2018    Influenza Vaccine (1) 08/01/2020     Updates were requested from care everywhere.  Chart was reviewed for overdue Proactive [facility] Encounters ([facility]) topics (CRS, Breast Cancer Screening, Eye exam)  Health Maintenance has been updated.  LINKS immunization registry triggered.  Immunizations were reconciled.

## 2020-10-05 ENCOUNTER — OFFICE VISIT (OUTPATIENT)
Dept: NEUROLOGY | Facility: CLINIC | Age: 85
End: 2020-10-05
Payer: MEDICARE

## 2020-10-05 VITALS — HEIGHT: 68 IN | RESPIRATION RATE: 16 BRPM | TEMPERATURE: 98 F | BODY MASS INDEX: 22.46 KG/M2 | HEART RATE: 94 BPM

## 2020-10-05 DIAGNOSIS — G23.1 PSP (PROGRESSIVE SUPRANUCLEAR PALSY): ICD-10-CM

## 2020-10-05 DIAGNOSIS — R26.9 GAIT ABNORMALITY: Primary | ICD-10-CM

## 2020-10-05 DIAGNOSIS — I95.2 HYPOTENSION DUE TO DRUGS: ICD-10-CM

## 2020-10-05 DIAGNOSIS — R42 VERTIGO: ICD-10-CM

## 2020-10-05 DIAGNOSIS — R41.3 MEMORY LOSS: ICD-10-CM

## 2020-10-05 PROCEDURE — 1159F PR MEDICATION LIST DOCUMENTED IN MEDICAL RECORD: ICD-10-PCS | Mod: HCNC,S$GLB,, | Performed by: PSYCHIATRY & NEUROLOGY

## 2020-10-05 PROCEDURE — 99999 PR PBB SHADOW E&M-EST. PATIENT-LVL IV: ICD-10-PCS | Mod: PBBFAC,HCNC,, | Performed by: PSYCHIATRY & NEUROLOGY

## 2020-10-05 PROCEDURE — 1101F PT FALLS ASSESS-DOCD LE1/YR: CPT | Mod: HCNC,CPTII,S$GLB, | Performed by: PSYCHIATRY & NEUROLOGY

## 2020-10-05 PROCEDURE — 1101F PR PT FALLS ASSESS DOC 0-1 FALLS W/OUT INJ PAST YR: ICD-10-PCS | Mod: HCNC,CPTII,S$GLB, | Performed by: PSYCHIATRY & NEUROLOGY

## 2020-10-05 PROCEDURE — 1159F MED LIST DOCD IN RCRD: CPT | Mod: HCNC,S$GLB,, | Performed by: PSYCHIATRY & NEUROLOGY

## 2020-10-05 PROCEDURE — 1126F PR PAIN SEVERITY QUANTIFIED, NO PAIN PRESENT: ICD-10-PCS | Mod: HCNC,S$GLB,, | Performed by: PSYCHIATRY & NEUROLOGY

## 2020-10-05 PROCEDURE — 99499 UNLISTED E&M SERVICE: CPT | Mod: S$GLB,,, | Performed by: PSYCHIATRY & NEUROLOGY

## 2020-10-05 PROCEDURE — 99999 PR PBB SHADOW E&M-EST. PATIENT-LVL IV: CPT | Mod: PBBFAC,HCNC,, | Performed by: PSYCHIATRY & NEUROLOGY

## 2020-10-05 PROCEDURE — 99499 RISK ADDL DX/OHS AUDIT: ICD-10-PCS | Mod: S$GLB,,, | Performed by: PSYCHIATRY & NEUROLOGY

## 2020-10-05 PROCEDURE — 99214 PR OFFICE/OUTPT VISIT, EST, LEVL IV, 30-39 MIN: ICD-10-PCS | Mod: HCNC,S$GLB,, | Performed by: PSYCHIATRY & NEUROLOGY

## 2020-10-05 PROCEDURE — 1126F AMNT PAIN NOTED NONE PRSNT: CPT | Mod: HCNC,S$GLB,, | Performed by: PSYCHIATRY & NEUROLOGY

## 2020-10-05 PROCEDURE — 99214 OFFICE O/P EST MOD 30 MIN: CPT | Mod: HCNC,S$GLB,, | Performed by: PSYCHIATRY & NEUROLOGY

## 2020-10-05 NOTE — PATIENT INSTRUCTIONS
Reduce dose Lisinopril by 1/2 and review lower blood pressure with Dr Levine as soon as possible.

## 2020-10-05 NOTE — PROGRESS NOTES
HPI:  Estefania Mccollum is a 85y.o. female known to me for  Vertigo. Responds to PT. Some vague symptoms at times, but improved with normal MRI brain prior  Also has mild cognitive impairment like symptoms vs Early Alzheimer's disease.    Patient is here for follow up  Since the last visit with me, the patient has been seeing NP, Mirian Ortiz, in Neurology in this clinic    Was discharged form [facility] last month (required SWING) for UTI, debility, rib fractures      At the last visit with NP, MRI brain was ordered to rule out CVA as cause of her recurrent gait problems.     She continues to decline with mobility and memory     Gait is worse and she is walking more slowly  She has a fear of falling increase falling    Dizziness continues    Living with     BP 95/55 today. She takes 20mg Lisinopril. Had a weak spell on on the toilet this week. This has occurred prior    She continues with dizziness but really denies vertigo.     No clear hallucinations    Review of Systems   Unable to perform ROS: Dementia       Objective:      Physical Exam      Exam:  Gen Appearance, well developed/nourished in no apparent distress  CV: 2+ distal pulses with no edema or swelling  Neuro:  MS: Awake, alert, sustains attention. She is not oriented to date, and knows today is Mondayconsistent with prior).   Recent recall is good to recent events /remote memory intact, Language is full to spontaneous speech/comprehension. Fund of Knowledge is full  Visual spatial skill are good   CN: Optic discs are flat with normal vasculature, PERRL, Extraoccular movements and visual fields are full except with vertical movements. Normal facial sensation and strength, Hearing symmetric, Tongue and Palate are midline and strong. Shoulder Shrug symmetric and strong.  Motor: Normal bulk, tone increased mildly thoughout, no abnormal movements. 5/5 strength bilateral upper/lower extremities with 2+ reflexes and no clonus  Some bradykinesia of  blinking noted.   Sensory:  Intact to temp and vibration Romberg negative  Cerebellar: Finger-nose,Rapid alternating movements intact  Gait: Great difficulty with standing, no ataxia, but she has some severely impaired postural reflexes     CT head 10/2016: 1.  No CT evidence of an acute intracranial abnormality.  2.  Atrophy and small vessel ischemic changes of the periventricular white matter      12/2019 MRI brain: 1.  No MRI evidence of an acute intracranial abnormality.     2.  Atrophy and small vessel ischemic changes of the periventricular white matter.    8/2020 Ct head: No acute abnormality.    Labs: 10/2016 TSH and B12 normal  1/2017 CMP, CBC unremarkable  2  Assessment:     Estefania Mccollum is a 85 y.o. female known to me for  Vertigo. Responds to PT. 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     Plan:   1. Continue Namenda to 10mg BID  - Discussed the further  treatment being good diet and physical and memory exercise.   2. Aricept to continue at current dose. Updated MRI brain 2019 unrevealing for cause of further decline. 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 Discussed likely diagnosis with daughter  -treatment would be supportive. Refer to home vestibular and gait therapy to her home health. In [facility]  3. Remains off driving as she continues with episodes of vertigo and has memory loss and has sitters/ lives with   4. PT PRN vertigo helps.She has seen ENT and continues to follow with ENT about otalgia/ hearing loss/ prior infections  - She had + vertigo with head thrust in 7/2019 and + nystagmus consistent with peripheral vertigo.   -She has difficulty tolerating larger doses of meclizine  5. Gait has been declining with memory. But suspect PD +  disorder developing over the past year.   6. Insomnia resolved with better sleep hygeine. Melatonin 3mg OTC to use PRN   7. Hypotension noted today. Likely symptomatic given some spells at home. Take half dose lisinopril until reaching out to PCP to further discuss      RTC 6 months

## 2020-10-06 ENCOUNTER — PATIENT MESSAGE (OUTPATIENT)
Dept: INTERNAL MEDICINE | Facility: CLINIC | Age: 85
End: 2020-10-06

## 2020-10-19 PROCEDURE — G0179 MD RECERTIFICATION HHA PT: HCPCS | Mod: ,,, | Performed by: FAMILY MEDICINE

## 2020-10-19 PROCEDURE — G0179 PR HOME HEALTH MD RECERTIFICATION: ICD-10-PCS | Mod: ,,, | Performed by: FAMILY MEDICINE

## 2020-10-20 ENCOUNTER — DOCUMENT SCAN (OUTPATIENT)
Dept: HOME HEALTH SERVICES | Facility: HOSPITAL | Age: 85
End: 2020-10-20
Payer: MEDICARE

## 2020-10-21 ENCOUNTER — EXTERNAL HOME HEALTH (OUTPATIENT)
Dept: HOME HEALTH SERVICES | Facility: HOSPITAL | Age: 85
End: 2020-10-21
Payer: MEDICARE

## 2020-11-27 ENCOUNTER — HOSPITAL ENCOUNTER (EMERGENCY)
Facility: HOSPITAL | Age: 85
Discharge: ANOTHER HOSPITAL | End: 2020-11-27
Attending: SURGERY
Payer: MEDICARE

## 2020-11-27 VITALS
DIASTOLIC BLOOD PRESSURE: 77 MMHG | WEIGHT: 147.25 LBS | BODY MASS INDEX: 22.39 KG/M2 | TEMPERATURE: 97 F | HEART RATE: 84 BPM | RESPIRATION RATE: 16 BRPM | SYSTOLIC BLOOD PRESSURE: 164 MMHG | OXYGEN SATURATION: 96 %

## 2020-11-27 DIAGNOSIS — I21.4 NSTEMI (NON-ST ELEVATED MYOCARDIAL INFARCTION): Primary | ICD-10-CM

## 2020-11-27 DIAGNOSIS — M54.2 NECK PAIN: ICD-10-CM

## 2020-11-27 LAB
ALBUMIN SERPL BCP-MCNC: 4.2 G/DL (ref 3.5–5.2)
ALP SERPL-CCNC: 125 U/L (ref 55–135)
ALT SERPL W/O P-5'-P-CCNC: 17 U/L (ref 10–44)
ANION GAP SERPL CALC-SCNC: 15 MMOL/L (ref 8–16)
APTT BLDCRRT: 27.5 SEC (ref 21–32)
AST SERPL-CCNC: 19 U/L (ref 10–40)
BASOPHILS # BLD AUTO: 0.04 K/UL (ref 0–0.2)
BASOPHILS NFR BLD: 0.3 % (ref 0–1.9)
BILIRUB SERPL-MCNC: 0.9 MG/DL (ref 0.1–1)
BNP SERPL-MCNC: 77 PG/ML (ref 0–99)
BUN SERPL-MCNC: 17 MG/DL (ref 8–23)
CALCIUM SERPL-MCNC: 9.5 MG/DL (ref 8.7–10.5)
CHLORIDE SERPL-SCNC: 99 MMOL/L (ref 95–110)
CK MB SERPL-MCNC: 1.8 NG/ML (ref 0.1–6.5)
CK MB SERPL-RTO: 2.8 % (ref 0–5)
CK SERPL-CCNC: 65 U/L (ref 20–180)
CK SERPL-CCNC: 65 U/L (ref 20–180)
CO2 SERPL-SCNC: 23 MMOL/L (ref 23–29)
CREAT SERPL-MCNC: 1.1 MG/DL (ref 0.5–1.4)
DIFFERENTIAL METHOD: ABNORMAL
EOSINOPHIL # BLD AUTO: 0 K/UL (ref 0–0.5)
EOSINOPHIL NFR BLD: 0.3 % (ref 0–8)
ERYTHROCYTE [DISTWIDTH] IN BLOOD BY AUTOMATED COUNT: 13.8 % (ref 11.5–14.5)
EST. GFR  (AFRICAN AMERICAN): 53 ML/MIN/1.73 M^2
EST. GFR  (NON AFRICAN AMERICAN): 46 ML/MIN/1.73 M^2
GLUCOSE SERPL-MCNC: 153 MG/DL (ref 70–110)
HCT VFR BLD AUTO: 49.2 % (ref 37–48.5)
HGB BLD-MCNC: 16.2 G/DL (ref 12–16)
IMM GRANULOCYTES # BLD AUTO: 0.05 K/UL (ref 0–0.04)
IMM GRANULOCYTES NFR BLD AUTO: 0.4 % (ref 0–0.5)
INR PPP: 1 (ref 0.8–1.2)
LYMPHOCYTES # BLD AUTO: 1.1 K/UL (ref 1–4.8)
LYMPHOCYTES NFR BLD: 8.3 % (ref 18–48)
MCH RBC QN AUTO: 29.2 PG (ref 27–31)
MCHC RBC AUTO-ENTMCNC: 32.9 G/DL (ref 32–36)
MCV RBC AUTO: 89 FL (ref 82–98)
MONOCYTES # BLD AUTO: 0.6 K/UL (ref 0.3–1)
MONOCYTES NFR BLD: 4.7 % (ref 4–15)
NEUTROPHILS # BLD AUTO: 11.1 K/UL (ref 1.8–7.7)
NEUTROPHILS NFR BLD: 86 % (ref 38–73)
NRBC BLD-RTO: 0 /100 WBC
PLATELET # BLD AUTO: 284 K/UL (ref 150–350)
PMV BLD AUTO: 9 FL (ref 9.2–12.9)
POTASSIUM SERPL-SCNC: 4.5 MMOL/L (ref 3.5–5.1)
PROT SERPL-MCNC: 7.4 G/DL (ref 6–8.4)
PROTHROMBIN TIME: 10.5 SEC (ref 9–12.5)
RBC # BLD AUTO: 5.55 M/UL (ref 4–5.4)
SARS-COV-2 RDRP RESP QL NAA+PROBE: NEGATIVE
SODIUM SERPL-SCNC: 137 MMOL/L (ref 136–145)
TROPONIN I SERPL DL<=0.01 NG/ML-MCNC: 0.13 NG/ML (ref 0–0.03)
WBC # BLD AUTO: 12.9 K/UL (ref 3.9–12.7)

## 2020-11-27 PROCEDURE — 85610 PROTHROMBIN TIME: CPT | Mod: HCNC

## 2020-11-27 PROCEDURE — 82553 CREATINE MB FRACTION: CPT | Mod: HCNC

## 2020-11-27 PROCEDURE — 36415 COLL VENOUS BLD VENIPUNCTURE: CPT | Mod: HCNC

## 2020-11-27 PROCEDURE — 82550 ASSAY OF CK (CPK): CPT | Mod: HCNC

## 2020-11-27 PROCEDURE — 85730 THROMBOPLASTIN TIME PARTIAL: CPT | Mod: HCNC

## 2020-11-27 PROCEDURE — U0002 COVID-19 LAB TEST NON-CDC: HCPCS | Mod: HCNC

## 2020-11-27 PROCEDURE — 85025 COMPLETE CBC W/AUTO DIFF WBC: CPT | Mod: HCNC

## 2020-11-27 PROCEDURE — 93005 ELECTROCARDIOGRAM TRACING: CPT | Mod: HCNC

## 2020-11-27 PROCEDURE — 84484 ASSAY OF TROPONIN QUANT: CPT | Mod: HCNC

## 2020-11-27 PROCEDURE — 83880 ASSAY OF NATRIURETIC PEPTIDE: CPT | Mod: HCNC

## 2020-11-27 PROCEDURE — 25000003 PHARM REV CODE 250: Mod: HCNC | Performed by: SURGERY

## 2020-11-27 PROCEDURE — 99285 EMERGENCY DEPT VISIT HI MDM: CPT | Mod: 25,HCNC

## 2020-11-27 PROCEDURE — 93010 ELECTROCARDIOGRAM REPORT: CPT | Mod: HCNC,,, | Performed by: INTERNAL MEDICINE

## 2020-11-27 PROCEDURE — 93010 EKG 12-LEAD: ICD-10-PCS | Mod: HCNC,,, | Performed by: INTERNAL MEDICINE

## 2020-11-27 PROCEDURE — 80053 COMPREHEN METABOLIC PANEL: CPT | Mod: HCNC

## 2020-11-27 RX ORDER — PANTOPRAZOLE SODIUM 40 MG/1
40 TABLET, DELAYED RELEASE ORAL
Status: COMPLETED | OUTPATIENT
Start: 2020-11-27 | End: 2020-11-27

## 2020-11-27 RX ORDER — ATORVASTATIN CALCIUM 40 MG/1
40 TABLET, FILM COATED ORAL
Status: COMPLETED | OUTPATIENT
Start: 2020-11-27 | End: 2020-11-27

## 2020-11-27 RX ORDER — ASPIRIN 325 MG
325 TABLET ORAL
Status: COMPLETED | OUTPATIENT
Start: 2020-11-27 | End: 2020-11-27

## 2020-11-27 RX ADMIN — NITROGLYCERIN 0.25 INCH: 20 OINTMENT TOPICAL at 07:11

## 2020-11-27 RX ADMIN — PANTOPRAZOLE SODIUM 40 MG: 40 TABLET, DELAYED RELEASE ORAL at 07:11

## 2020-11-27 RX ADMIN — ASPIRIN 325 MG ORAL TABLET 325 MG: 325 PILL ORAL at 07:11

## 2020-11-27 RX ADMIN — ATORVASTATIN CALCIUM 40 MG: 40 TABLET, FILM COATED ORAL at 07:11

## 2020-11-28 NOTE — ED PROVIDER NOTES
[facility]                                                 Chief Complaint  86 y.o. female with Neck Pain     History of Present Illness  Estefania Mccollum presents to the emergency room with right neck pain  Patient with significant right neck pain, was uncomfortable at home  Patient has significant dementia, does not give history accurately now   called 911, brought to the ER for neck pain, was uncomfortable    The history is provided by the patient   device was not used during this ER visit  Medical history: Allergies, arthritis HTN, HLD, thyroid disease, vertigo  Surgeries: Appendectomy, cataract, gallbladder,  section, knee, tonsils, KAY  Patient is allergic to tetracycline    I have reviewed all of this patient's past medical, surgical, family, and social   histories as well as active allergies and medications documented in the  electronic medical record    Review of Systems and Physical Exam      Review of Systems  -- severe dementia, cannot give an accurate review of systems tonight    Vital Signs  Her blood pressure is 98/64 and her pulse is 87.   Her respiration is 16 and oxygen saturation is 96%.     Physical Exam  -- Nursing note and vitals reviewed  -- Constitutional: Appears well-developed and well-nourished  -- Head: Atraumatic. Normocephalic. No obvious abnormality  -- Eyes: Pupils are equal and reactive to light. Normal conjunctiva and lids  -- Neck: Normal range of motion. Neck supple. No masses, trachea midline  -- Cardiac: Normal rate, regular rhythm and normal heart sounds  -- Pulmonary: Normal respiratory effort, breath sounds clear to auscultation  -- Abdominal: Soft, no tenderness. Normal bowel sounds. Normal liver edge  -- Musculoskeletal: Normal range of motion, no effusions. Joints stable   -- Neurological: No focal deficits. Showed good interaction with staff  -- Vascular: Posterior tibial, dorsalis pedis and radial pulses 2+  bilaterally      Emergency Room Course      Lab Results     K 4.5   CL 99   CO2 23   BUN 17   CREATININE 1.1    (H)   ALKPHOS 125   AST 19   ALT 17   BILITOT 0.9   ALBUMIN 4.2   PROT 7.4   WBC 12.90 (H)   HGB 16.2 (H)   HCT 49.2 (H)      CPK 65   CPK 65   CPKMB 1.8   TROPONINI 0.131 (H)     EKG   -- The EKG findings today shows nonspecific ST changes today    Radiology  -- C-spine x-ray shows degenerative changes  -- Chest x-ray showed no infiltrate and showed no acute pathology    Additional Work up  -- rapid Coronavirus PCR was negative    Medications Given  aspirin tablet 325 mg (325 mg Oral Given 11/27/20 1916)   atorvastatin tablet 40 mg (40 mg Oral Given 11/27/20 1916)   nitroGLYCERIN 2% TD oint ointment 0.25 inch (0.25 inches Topical (Top) Given 11/27/20 1915)   pantoprazole EC tablet 40 mg (40 mg Oral Given 11/27/20 1916)     ED Physician Management  -- Diagnosis management comments: 86 y.o. female with right neck pain  -- severe dementia, inaccurate historian, appears uncomfortable today  -- patient has an elevated troponin likely non STEMI based on presentation  -- family would like the patient transferred to [place] for cardiology eval  -- no signs of ST elevation MI, pain improved with nitroglycerin in the ER    Diagnosis  [M54.2] Neck pain  [I21.4] NSTEMI (non-ST elevated myocardial infarction) (Primary)    Disposition and Plan  -- Disposition: transfer  -- Condition: stable    This note is dictated on M*Modal word recognition program.  There are word recognition mistakes that are occasionally missed on review.         Hair Longo MD  11/27/20 1925

## 2020-12-01 ENCOUNTER — OFFICE VISIT (OUTPATIENT)
Dept: INTERNAL MEDICINE | Facility: CLINIC | Age: 85
End: 2020-12-01
Payer: MEDICARE

## 2020-12-01 DIAGNOSIS — I25.10 CORONARY ARTERY DISEASE INVOLVING NATIVE CORONARY ARTERY OF NATIVE HEART WITHOUT ANGINA PECTORIS: ICD-10-CM

## 2020-12-01 DIAGNOSIS — Z09 HOSPITAL DISCHARGE FOLLOW-UP: Primary | ICD-10-CM

## 2020-12-01 PROCEDURE — 99442 PR PHYSICIAN TELEPHONE EVALUATION 11-20 MIN: ICD-10-PCS | Mod: HCNC,95,, | Performed by: INTERNAL MEDICINE

## 2020-12-01 PROCEDURE — 99442 PR PHYSICIAN TELEPHONE EVALUATION 11-20 MIN: CPT | Mod: HCNC,95,, | Performed by: INTERNAL MEDICINE

## 2020-12-02 NOTE — PROGRESS NOTES
Established Patient - Audio Only Telehealth Visit     The patient location is: home  The chief complaint leading to consultation is: follow up hospitialization, only [facility] report is available  Visit type: Virtual visit with audio only (telephone)  Total time spent with patient: 15       The reason for the audio only service rather than synchronous audio and video virtual visit was related to technical difficulties or patient preference/necessity.     Each patient to whom I provide medical services by telemedicine is:  (1) informed of the relationship between the physician and patient and the respective role of any other health care provider with respect to management of the patient; and (2) notified that they may decline to receive medical services by telemedicine and may withdraw from such care at any time. Patient verbally consented to receive this service via voice-only telephone call.       HPI:   Patient was taken to [facility] in [place] and transferred to [facility] because of concern  MI. Her  states no medications changes He has help 16 hours a day. He was told to see Cardiology. I discussed and made suggestions. He states his wife is weaker but still able to walk with a walker. Patient has dementia and  is the primary caretaker with strong support from his children who are out of town.       Assessment and plan:  Diagnoses and all orders for this visit:    Hospital discharge follow-up  Comments:   felt total time in both hospitals less than 24 hours    Coronary artery disease involving native coronary artery of native heart without angina pectoris  Comments:  Advised  a follow up with Cardiology near home would be important,                                  This service was not originating from a related E/M service provided within the previous 7 days nor will  to an E/M service or procedure within the next 24 hours or my soonest available appointment.   Prevailing standard of care was able to be met in this audio-only visit.

## 2020-12-11 ENCOUNTER — PATIENT MESSAGE (OUTPATIENT)
Dept: OTHER | Facility: OTHER | Age: 85
End: 2020-12-11

## 2020-12-16 ENCOUNTER — LAB VISIT (OUTPATIENT)
Dept: LAB | Facility: HOSPITAL | Age: 85
End: 2020-12-16
Attending: INTERNAL MEDICINE
Payer: MEDICARE

## 2020-12-16 DIAGNOSIS — I10 HTN (HYPERTENSION): Primary | ICD-10-CM

## 2020-12-16 DIAGNOSIS — E03.9 HYPOTHYROID: ICD-10-CM

## 2020-12-16 DIAGNOSIS — E78.5 HYPERLIPIDEMIA: ICD-10-CM

## 2020-12-16 LAB
ALP SERPL-CCNC: 114 U/L (ref 55–135)
ALT SERPL W/O P-5'-P-CCNC: 9 U/L (ref 10–44)
ANION GAP SERPL CALC-SCNC: 9 MMOL/L (ref 8–16)
AST SERPL-CCNC: 15 U/L (ref 10–40)
BUN SERPL-MCNC: 12 MG/DL (ref 8–23)
CALCIUM SERPL-MCNC: 8.8 MG/DL (ref 8.7–10.5)
CHLORIDE SERPL-SCNC: 101 MMOL/L (ref 95–110)
CHOLEST SERPL-MCNC: 152 MG/DL (ref 120–199)
CHOLEST/HDLC SERPL: 3 {RATIO} (ref 2–5)
CO2 SERPL-SCNC: 27 MMOL/L (ref 23–29)
CREAT SERPL-MCNC: 0.7 MG/DL (ref 0.5–1.4)
ERYTHROCYTE [DISTWIDTH] IN BLOOD BY AUTOMATED COUNT: 13.8 % (ref 11.5–14.5)
EST. GFR  (AFRICAN AMERICAN): >60 ML/MIN/1.73 M^2
EST. GFR  (NON AFRICAN AMERICAN): >60 ML/MIN/1.73 M^2
GLUCOSE SERPL-MCNC: 95 MG/DL (ref 70–110)
HCT VFR BLD AUTO: 44 % (ref 37–48.5)
HDLC SERPL-MCNC: 51 MG/DL (ref 40–75)
HDLC SERPL: 33.6 % (ref 20–50)
HGB BLD-MCNC: 14.2 G/DL (ref 12–16)
LDLC SERPL CALC-MCNC: 81.6 MG/DL (ref 63–159)
MAGNESIUM SERPL-MCNC: 2.1 MG/DL (ref 1.6–2.6)
MCH RBC QN AUTO: 28.9 PG (ref 27–31)
MCHC RBC AUTO-ENTMCNC: 32.3 G/DL (ref 32–36)
MCV RBC AUTO: 90 FL (ref 82–98)
NONHDLC SERPL-MCNC: 101 MG/DL
PLATELET # BLD AUTO: 271 K/UL (ref 150–350)
PMV BLD AUTO: 10 FL (ref 9.2–12.9)
POTASSIUM SERPL-SCNC: 4 MMOL/L (ref 3.5–5.1)
RBC # BLD AUTO: 4.91 M/UL (ref 4–5.4)
SODIUM SERPL-SCNC: 137 MMOL/L (ref 136–145)
T4 SERPL-MCNC: 9.4 UG/DL (ref 4.5–11.5)
TRIGL SERPL-MCNC: 97 MG/DL (ref 30–150)
TSH SERPL DL<=0.005 MIU/L-ACNC: 1.13 UIU/ML (ref 0.4–4)
WBC # BLD AUTO: 6.1 K/UL (ref 3.9–12.7)

## 2020-12-16 PROCEDURE — 84450 TRANSFERASE (AST) (SGOT): CPT | Mod: HCNC

## 2020-12-16 PROCEDURE — 84436 ASSAY OF TOTAL THYROXINE: CPT | Mod: HCNC

## 2020-12-16 PROCEDURE — 83735 ASSAY OF MAGNESIUM: CPT | Mod: HCNC

## 2020-12-16 PROCEDURE — 84075 ASSAY ALKALINE PHOSPHATASE: CPT | Mod: HCNC

## 2020-12-16 PROCEDURE — 85027 COMPLETE CBC AUTOMATED: CPT | Mod: HCNC

## 2020-12-16 PROCEDURE — 80048 BASIC METABOLIC PNL TOTAL CA: CPT | Mod: HCNC

## 2020-12-16 PROCEDURE — 84460 ALANINE AMINO (ALT) (SGPT): CPT | Mod: HCNC

## 2020-12-16 PROCEDURE — 84479 ASSAY OF THYROID (T3 OR T4): CPT | Mod: HCNC

## 2020-12-16 PROCEDURE — 80061 LIPID PANEL: CPT | Mod: HCNC

## 2020-12-16 PROCEDURE — 84443 ASSAY THYROID STIM HORMONE: CPT | Mod: HCNC

## 2020-12-18 LAB — T3RU NFR SERPL: 36 % (ref 28–41)

## 2020-12-28 ENCOUNTER — PATIENT MESSAGE (OUTPATIENT)
Dept: INTERNAL MEDICINE | Facility: CLINIC | Age: 85
End: 2020-12-28

## 2020-12-28 RX ORDER — LISINOPRIL 10 MG/1
10 TABLET ORAL DAILY
Qty: 90 TABLET | Refills: 3 | Status: SHIPPED | OUTPATIENT
Start: 2020-12-28 | End: 2021-02-01 | Stop reason: SDUPTHER

## 2021-02-01 RX ORDER — LISINOPRIL 10 MG/1
10 TABLET ORAL DAILY
Qty: 90 TABLET | Refills: 3 | Status: SHIPPED | OUTPATIENT
Start: 2021-02-01 | End: 2022-02-01

## 2021-04-08 ENCOUNTER — TELEPHONE (OUTPATIENT)
Dept: INTERNAL MEDICINE | Facility: CLINIC | Age: 86
End: 2021-04-08

## 2021-04-09 ENCOUNTER — PATIENT OUTREACH (OUTPATIENT)
Dept: ADMINISTRATIVE | Facility: HOSPITAL | Age: 86
End: 2021-04-09

## 2021-04-09 ENCOUNTER — TELEPHONE (OUTPATIENT)
Dept: ADMINISTRATIVE | Facility: HOSPITAL | Age: 86
End: 2021-04-09

## 2021-06-28 ENCOUNTER — PATIENT MESSAGE (OUTPATIENT)
Dept: INTERNAL MEDICINE | Facility: CLINIC | Age: 86
End: 2021-06-28

## 2021-06-28 DIAGNOSIS — Z91.81 AT HIGH RISK FOR INJURY RELATED TO FALL: ICD-10-CM

## 2021-06-28 DIAGNOSIS — R41.9 NEUROCOGNITIVE DISORDER: ICD-10-CM

## 2021-06-28 DIAGNOSIS — R53.81 DEBILITY: Primary | ICD-10-CM

## 2021-06-30 ENCOUNTER — TELEPHONE (OUTPATIENT)
Dept: INTERNAL MEDICINE | Facility: CLINIC | Age: 86
End: 2021-06-30

## 2021-07-05 ENCOUNTER — PATIENT MESSAGE (OUTPATIENT)
Dept: INTERNAL MEDICINE | Facility: CLINIC | Age: 86
End: 2021-07-05

## 2021-07-06 ENCOUNTER — OFFICE VISIT (OUTPATIENT)
Dept: INTERNAL MEDICINE | Facility: CLINIC | Age: 86
End: 2021-07-06
Payer: MEDICARE

## 2021-07-06 DIAGNOSIS — R29.6 FREQUENT FALLS: ICD-10-CM

## 2021-07-06 DIAGNOSIS — R26.89 BALANCE PROBLEM: Primary | ICD-10-CM

## 2021-07-06 DIAGNOSIS — G31.84 MCI (MILD COGNITIVE IMPAIRMENT): ICD-10-CM

## 2021-07-06 DIAGNOSIS — R53.81 DEBILITY: ICD-10-CM

## 2021-07-06 DIAGNOSIS — I10 ESSENTIAL HYPERTENSION: ICD-10-CM

## 2021-07-06 DIAGNOSIS — R41.3 MEMORY LOSS: ICD-10-CM

## 2021-07-06 PROBLEM — I70.0 AORTIC ATHEROSCLEROSIS: Status: RESOLVED | Noted: 2017-01-19 | Resolved: 2021-07-06

## 2021-07-06 PROBLEM — N30.01 ACUTE CYSTITIS WITH HEMATURIA: Status: RESOLVED | Noted: 2020-07-29 | Resolved: 2021-07-06

## 2021-07-06 PROCEDURE — 99499 UNLISTED E&M SERVICE: CPT | Mod: 95,,, | Performed by: INTERNAL MEDICINE

## 2021-07-06 PROCEDURE — 99214 OFFICE O/P EST MOD 30 MIN: CPT | Mod: 95,,, | Performed by: INTERNAL MEDICINE

## 2021-07-06 PROCEDURE — 99499 RISK ADDL DX/OHS AUDIT: ICD-10-PCS | Mod: 95,,, | Performed by: INTERNAL MEDICINE

## 2021-07-06 PROCEDURE — 1159F PR MEDICATION LIST DOCUMENTED IN MEDICAL RECORD: ICD-10-PCS | Mod: 95,,, | Performed by: INTERNAL MEDICINE

## 2021-07-06 PROCEDURE — 99214 PR OFFICE/OUTPT VISIT, EST, LEVL IV, 30-39 MIN: ICD-10-PCS | Mod: 95,,, | Performed by: INTERNAL MEDICINE

## 2021-07-06 PROCEDURE — 1159F MED LIST DOCD IN RCRD: CPT | Mod: 95,,, | Performed by: INTERNAL MEDICINE

## 2021-07-07 ENCOUNTER — TELEPHONE (OUTPATIENT)
Dept: INTERNAL MEDICINE | Facility: CLINIC | Age: 86
End: 2021-07-07

## 2021-07-15 ENCOUNTER — TELEPHONE (OUTPATIENT)
Dept: INTERNAL MEDICINE | Facility: CLINIC | Age: 86
End: 2021-07-15

## 2021-08-03 ENCOUNTER — TELEPHONE (OUTPATIENT)
Dept: INTERNAL MEDICINE | Facility: CLINIC | Age: 86
End: 2021-08-03

## 2021-08-03 DIAGNOSIS — R53.1 WEAKNESS: Primary | ICD-10-CM
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
## 2021-08-03 DIAGNOSIS — R41.89 COGNITIVE DECLINE: ICD-10-CM

## 2021-08-04 ENCOUNTER — TELEPHONE (OUTPATIENT)
Dept: INTERNAL MEDICINE | Facility: CLINIC | Age: 86
End: 2021-08-04

## 2021-08-13 ENCOUNTER — TELEPHONE (OUTPATIENT)
Dept: NEUROLOGY | Facility: CLINIC | Age: 86
End: 2021-08-13

## 2021-08-23 RX ORDER — SERTRALINE HYDROCHLORIDE 25 MG/1
25 TABLET, FILM COATED ORAL NIGHTLY
Qty: 90 TABLET | Refills: 1 | Status: SHIPPED | OUTPATIENT
Start: 2021-08-23 | End: 2022-08-23

## 2021-08-23 RX ORDER — DONEPEZIL HYDROCHLORIDE 5 MG/1
5 TABLET, FILM COATED ORAL NIGHTLY
Qty: 90 TABLET | Refills: 3 | Status: SHIPPED | OUTPATIENT
Start: 2021-08-23 | End: 2021-10-18

## 2021-08-24 ENCOUNTER — TELEPHONE (OUTPATIENT)
Dept: INTERNAL MEDICINE | Facility: CLINIC | Age: 86
End: 2021-08-24

## 2021-08-24 DIAGNOSIS — Z74.09: Primary | ICD-10-CM

## 2021-08-25 ENCOUNTER — PATIENT MESSAGE (OUTPATIENT)
Dept: INTERNAL MEDICINE | Facility: CLINIC | Age: 86
End: 2021-08-25

## 2021-08-27 ENCOUNTER — TELEPHONE (OUTPATIENT)
Dept: INTERNAL MEDICINE | Facility: CLINIC | Age: 86
End: 2021-08-27

## 2021-10-14 ENCOUNTER — PATIENT OUTREACH (OUTPATIENT)
Dept: ADMINISTRATIVE | Facility: OTHER | Age: 86
End: 2021-10-14

## 2021-10-18 ENCOUNTER — OFFICE VISIT (OUTPATIENT)
Dept: NEUROLOGY | Facility: CLINIC | Age: 86
End: 2021-10-18
Payer: MEDICARE

## 2021-10-18 DIAGNOSIS — R41.3 MEMORY LOSS: ICD-10-CM

## 2021-10-18 DIAGNOSIS — R42 VERTIGO: ICD-10-CM

## 2021-10-18 DIAGNOSIS — G23.1 PSP (PROGRESSIVE SUPRANUCLEAR PALSY): Primary | ICD-10-CM

## 2021-10-18 DIAGNOSIS — I95.2 HYPOTENSION DUE TO DRUGS: ICD-10-CM

## 2021-10-18 PROCEDURE — 99499 UNLISTED E&M SERVICE: CPT | Mod: 95,,, | Performed by: PSYCHIATRY & NEUROLOGY

## 2021-10-18 PROCEDURE — 1159F MED LIST DOCD IN RCRD: CPT | Mod: CPTII,95,, | Performed by: PSYCHIATRY & NEUROLOGY

## 2021-10-18 PROCEDURE — 99214 PR OFFICE/OUTPT VISIT, EST, LEVL IV, 30-39 MIN: ICD-10-PCS | Mod: 95,,, | Performed by: PSYCHIATRY & NEUROLOGY

## 2021-10-18 PROCEDURE — 99214 OFFICE O/P EST MOD 30 MIN: CPT | Mod: 95,,, | Performed by: PSYCHIATRY & NEUROLOGY

## 2021-10-18 PROCEDURE — 1159F PR MEDICATION LIST DOCUMENTED IN MEDICAL RECORD: ICD-10-PCS | Mod: CPTII,95,, | Performed by: PSYCHIATRY & NEUROLOGY

## 2021-10-18 PROCEDURE — 99499 RISK ADDL DX/OHS AUDIT: ICD-10-PCS | Mod: 95,,, | Performed by: PSYCHIATRY & NEUROLOGY

## 2021-11-10 ENCOUNTER — PATIENT MESSAGE (OUTPATIENT)
Dept: NEUROLOGY | Facility: CLINIC | Age: 86
End: 2021-11-10
Payer: MEDICARE

## 2021-11-10 DIAGNOSIS — R41.3 MEMORY LOSS: ICD-10-CM

## 2021-11-10 DIAGNOSIS — G23.1 PSP (PROGRESSIVE SUPRANUCLEAR PALSY): Primary | ICD-10-CM

## 2021-11-10 DIAGNOSIS — R53.1 WEAKNESS: ICD-10-CM

## 2021-11-11 DIAGNOSIS — G23.1 PSP (PROGRESSIVE SUPRANUCLEAR PALSY): Primary | ICD-10-CM

## 2021-11-12 ENCOUNTER — PATIENT MESSAGE (OUTPATIENT)
Dept: NEUROLOGY | Facility: CLINIC | Age: 86
End: 2021-11-12
Payer: MEDICARE

## 2022-06-23 NOTE — PATIENT INSTRUCTIONS
Counseling and Referral of Other Preventative  (Italic type indicates deductible and co-insurance are waived)    Patient Name: Estefania Mccollum  Today's Date: 7/17/2019    Health Maintenance       Date Due Completion Date    Shingles Vaccine (2 of 3) 02/14/2008 12/20/2007    DEXA SCAN 03/29/2018 3/29/2016    Override on 8/6/2009: (N/S)    Influenza Vaccine 08/01/2019 10/3/2018    Override on 9/14/2017: Done ([facility]  CutOff)    Override on 9/15/2016: Done    Override on 9/27/2014: Done (Done at [facility] pharmacy)    Lipid Panel 03/07/2020 3/7/2019    TETANUS VACCINE 03/17/2024 3/17/2014        No orders of the defined types were placed in this encounter.    The following information is provided to all patients.  This information is to help you find resources for any of the problems found today that may be affecting your health:                Living healthy guide: www.[facility].[place].[facility]      Understanding Diabetes: www.diabetes.org      Eating healthy: www.cdc.gov/healthyweight      CDC home safety checklist: www.cdc.gov/steadi/patient.html      Agency on Aging: www.goea.[place].[facility]      Alcoholics anonymous (AA): www.aa.org      Physical Activity: www.isaiah.nih.gov/ak9djpc      Tobacco use: www.quitwithusla.org     
Initial (On Arrival)